# Patient Record
Sex: FEMALE | Race: WHITE | NOT HISPANIC OR LATINO | Employment: OTHER | URBAN - METROPOLITAN AREA
[De-identification: names, ages, dates, MRNs, and addresses within clinical notes are randomized per-mention and may not be internally consistent; named-entity substitution may affect disease eponyms.]

---

## 2019-01-01 ENCOUNTER — APPOINTMENT (INPATIENT)
Dept: NON INVASIVE DIAGNOSTICS | Facility: HOSPITAL | Age: 64
DRG: 383 | End: 2019-01-01
Payer: COMMERCIAL

## 2019-01-01 ENCOUNTER — HOSPITAL ENCOUNTER (INPATIENT)
Facility: HOSPITAL | Age: 64
LOS: 3 days | Discharge: HOME | DRG: 720 | End: 2019-12-18
Attending: EMERGENCY MEDICINE | Admitting: FAMILY MEDICINE
Payer: COMMERCIAL

## 2019-01-01 ENCOUNTER — HOSPITAL ENCOUNTER (INPATIENT)
Facility: HOSPITAL | Age: 64
LOS: 4 days | Discharge: HOME HEALTH | DRG: 383 | End: 2019-12-24
Attending: EMERGENCY MEDICINE | Admitting: INTERNAL MEDICINE
Payer: COMMERCIAL

## 2019-01-01 ENCOUNTER — APPOINTMENT (EMERGENCY)
Dept: RADIOLOGY | Facility: HOSPITAL | Age: 64
DRG: 383 | End: 2019-01-01
Payer: COMMERCIAL

## 2019-01-01 ENCOUNTER — APPOINTMENT (EMERGENCY)
Dept: CT IMAGING | Facility: HOSPITAL | Age: 64
DRG: 383 | End: 2019-01-01
Payer: COMMERCIAL

## 2019-01-01 ENCOUNTER — APPOINTMENT (INPATIENT)
Dept: ULTRASOUND IMAGING | Facility: HOSPITAL | Age: 64
DRG: 383 | End: 2019-01-01
Payer: COMMERCIAL

## 2019-01-01 ENCOUNTER — APPOINTMENT (EMERGENCY)
Dept: RADIOLOGY | Facility: HOSPITAL | Age: 64
DRG: 720 | End: 2019-01-01
Payer: COMMERCIAL

## 2019-01-01 ENCOUNTER — OFFICE VISIT (OUTPATIENT)
Dept: UROLOGY | Facility: CLINIC | Age: 64
End: 2019-01-01
Payer: COMMERCIAL

## 2019-01-01 ENCOUNTER — APPOINTMENT (INPATIENT)
Dept: VASCULAR ULTRASOUND | Facility: HOSPITAL | Age: 64
DRG: 383 | End: 2019-01-01
Payer: COMMERCIAL

## 2019-01-01 VITALS
HEIGHT: 68 IN | WEIGHT: 271.6 LBS | HEART RATE: 18 BPM | BODY MASS INDEX: 41.16 KG/M2 | SYSTOLIC BLOOD PRESSURE: 118 MMHG | DIASTOLIC BLOOD PRESSURE: 80 MMHG

## 2019-01-01 VITALS
WEIGHT: 277.78 LBS | HEIGHT: 68 IN | BODY MASS INDEX: 42.1 KG/M2 | DIASTOLIC BLOOD PRESSURE: 56 MMHG | OXYGEN SATURATION: 95 % | RESPIRATION RATE: 22 BRPM | SYSTOLIC BLOOD PRESSURE: 118 MMHG | TEMPERATURE: 98.3 F | HEART RATE: 94 BPM

## 2019-01-01 VITALS
DIASTOLIC BLOOD PRESSURE: 54 MMHG | HEIGHT: 68 IN | RESPIRATION RATE: 18 BRPM | TEMPERATURE: 97.6 F | OXYGEN SATURATION: 97 % | HEART RATE: 82 BPM | BODY MASS INDEX: 42.97 KG/M2 | WEIGHT: 283.51 LBS | SYSTOLIC BLOOD PRESSURE: 101 MMHG

## 2019-01-01 DIAGNOSIS — R53.1 WEAKNESS: ICD-10-CM

## 2019-01-01 DIAGNOSIS — A41.9 SEPSIS WITHOUT ACUTE ORGAN DYSFUNCTION, DUE TO UNSPECIFIED ORGANISM (HCC): ICD-10-CM

## 2019-01-01 DIAGNOSIS — N20.0 STAGHORN CALCULUS: Primary | ICD-10-CM

## 2019-01-01 DIAGNOSIS — R94.31 PROLONGED Q-T INTERVAL ON ECG: ICD-10-CM

## 2019-01-01 DIAGNOSIS — L03.90 CELLULITIS: ICD-10-CM

## 2019-01-01 DIAGNOSIS — M79.89 LEFT ARM SWELLING: ICD-10-CM

## 2019-01-01 DIAGNOSIS — C49.9 LEIOMYOSARCOMA (HCC): ICD-10-CM

## 2019-01-01 DIAGNOSIS — R50.9 FEVER: Primary | ICD-10-CM

## 2019-01-01 DIAGNOSIS — L03.113 CELLULITIS OF RIGHT UPPER ARM: Primary | ICD-10-CM

## 2019-01-01 LAB
ALBUMIN SERPL BCP-MCNC: 1.9 G/DL (ref 3.5–5)
ALBUMIN SERPL BCP-MCNC: 3.2 G/DL (ref 3.5–5)
ALP SERPL-CCNC: 104 U/L (ref 46–116)
ALP SERPL-CCNC: 116 U/L (ref 46–116)
ALT SERPL W P-5'-P-CCNC: 33 U/L (ref 12–78)
ALT SERPL W P-5'-P-CCNC: 54 U/L (ref 12–78)
ANION GAP SERPL CALCULATED.3IONS-SCNC: 10 MMOL/L (ref 4–13)
ANION GAP SERPL CALCULATED.3IONS-SCNC: 11 MMOL/L (ref 4–13)
ANION GAP SERPL CALCULATED.3IONS-SCNC: 8 MMOL/L (ref 4–13)
ANION GAP SERPL CALCULATED.3IONS-SCNC: 9 MMOL/L (ref 4–13)
ANION GAP SERPL CALCULATED.3IONS-SCNC: 9 MMOL/L (ref 4–13)
APTT PPP: 34 SECONDS (ref 23–37)
APTT PPP: 38 SECONDS (ref 23–37)
AST SERPL W P-5'-P-CCNC: 32 U/L (ref 5–45)
AST SERPL W P-5'-P-CCNC: 43 U/L (ref 5–45)
ATRIAL RATE: 84 BPM
ATRIAL RATE: 87 BPM
ATRIAL RATE: 99 BPM
BACTERIA BLD CULT: NORMAL
BACTERIA UR CULT: ABNORMAL
BACTERIA UR QL AUTO: ABNORMAL /HPF
BACTERIA UR QL AUTO: ABNORMAL /HPF
BASOPHILS # BLD AUTO: 0.02 THOUSANDS/ΜL (ref 0–0.1)
BASOPHILS # BLD MANUAL: 0 THOUSAND/UL (ref 0–0.1)
BASOPHILS # BLD MANUAL: 0 THOUSAND/UL (ref 0–0.1)
BASOPHILS NFR BLD AUTO: 0 % (ref 0–1)
BASOPHILS NFR MAR MANUAL: 0 % (ref 0–1)
BASOPHILS NFR MAR MANUAL: 0 % (ref 0–1)
BILIRUB DIRECT SERPL-MCNC: 0.17 MG/DL (ref 0–0.2)
BILIRUB SERPL-MCNC: 0.5 MG/DL (ref 0.2–1)
BILIRUB SERPL-MCNC: 0.7 MG/DL (ref 0.2–1)
BILIRUB UR QL STRIP: NEGATIVE
BILIRUB UR QL STRIP: NEGATIVE
BUN SERPL-MCNC: 10 MG/DL (ref 5–25)
BUN SERPL-MCNC: 15 MG/DL (ref 5–25)
BUN SERPL-MCNC: 8 MG/DL (ref 5–25)
BUN SERPL-MCNC: 8 MG/DL (ref 5–25)
BUN SERPL-MCNC: 9 MG/DL (ref 5–25)
CALCIUM SERPL-MCNC: 8.4 MG/DL (ref 8.3–10.1)
CALCIUM SERPL-MCNC: 8.7 MG/DL (ref 8.3–10.1)
CALCIUM SERPL-MCNC: 8.7 MG/DL (ref 8.3–10.1)
CALCIUM SERPL-MCNC: 8.8 MG/DL (ref 8.3–10.1)
CALCIUM SERPL-MCNC: 8.8 MG/DL (ref 8.3–10.1)
CALCIUM SERPL-MCNC: 8.9 MG/DL (ref 8.3–10.1)
CALCIUM SERPL-MCNC: 9 MG/DL (ref 8.3–10.1)
CHLORIDE SERPL-SCNC: 101 MMOL/L (ref 100–108)
CHLORIDE SERPL-SCNC: 101 MMOL/L (ref 100–108)
CHLORIDE SERPL-SCNC: 102 MMOL/L (ref 100–108)
CHLORIDE SERPL-SCNC: 102 MMOL/L (ref 100–108)
CHLORIDE SERPL-SCNC: 103 MMOL/L (ref 100–108)
CHLORIDE SERPL-SCNC: 99 MMOL/L (ref 100–108)
CHLORIDE SERPL-SCNC: 99 MMOL/L (ref 100–108)
CLARITY UR: ABNORMAL
CLARITY UR: CLEAR
CO2 SERPL-SCNC: 23 MMOL/L (ref 21–32)
CO2 SERPL-SCNC: 24 MMOL/L (ref 21–32)
CO2 SERPL-SCNC: 24 MMOL/L (ref 21–32)
CO2 SERPL-SCNC: 25 MMOL/L (ref 21–32)
CO2 SERPL-SCNC: 25 MMOL/L (ref 21–32)
CO2 SERPL-SCNC: 26 MMOL/L (ref 21–32)
CO2 SERPL-SCNC: 30 MMOL/L (ref 21–32)
COLOR UR: YELLOW
COLOR UR: YELLOW
CREAT SERPL-MCNC: 0.91 MG/DL (ref 0.6–1.3)
CREAT SERPL-MCNC: 0.91 MG/DL (ref 0.6–1.3)
CREAT SERPL-MCNC: 0.97 MG/DL (ref 0.6–1.3)
CREAT SERPL-MCNC: 1.02 MG/DL (ref 0.6–1.3)
CREAT SERPL-MCNC: 1.04 MG/DL (ref 0.6–1.3)
CREAT SERPL-MCNC: 1.05 MG/DL (ref 0.6–1.3)
CREAT SERPL-MCNC: 1.05 MG/DL (ref 0.6–1.3)
EOSINOPHIL # BLD AUTO: 0.01 THOUSAND/ΜL (ref 0–0.61)
EOSINOPHIL # BLD AUTO: 0.01 THOUSAND/ΜL (ref 0–0.61)
EOSINOPHIL # BLD AUTO: 0.04 THOUSAND/ΜL (ref 0–0.61)
EOSINOPHIL # BLD MANUAL: 0 THOUSAND/UL (ref 0–0.4)
EOSINOPHIL # BLD MANUAL: 0 THOUSAND/UL (ref 0–0.4)
EOSINOPHIL NFR BLD AUTO: 0 % (ref 0–6)
EOSINOPHIL NFR BLD MANUAL: 0 % (ref 0–6)
EOSINOPHIL NFR BLD MANUAL: 0 % (ref 0–6)
ERYTHROCYTE [DISTWIDTH] IN BLOOD BY AUTOMATED COUNT: 14.4 % (ref 11.6–15.1)
ERYTHROCYTE [DISTWIDTH] IN BLOOD BY AUTOMATED COUNT: 14.6 % (ref 11.6–15.1)
ERYTHROCYTE [DISTWIDTH] IN BLOOD BY AUTOMATED COUNT: 14.6 % (ref 11.6–15.1)
ERYTHROCYTE [DISTWIDTH] IN BLOOD BY AUTOMATED COUNT: 14.7 % (ref 11.6–15.1)
ERYTHROCYTE [DISTWIDTH] IN BLOOD BY AUTOMATED COUNT: 14.9 % (ref 11.6–15.1)
ERYTHROCYTE [DISTWIDTH] IN BLOOD BY AUTOMATED COUNT: 15 % (ref 11.6–15.1)
FERRITIN SERPL-MCNC: 1568 NG/ML (ref 8–388)
FLUAV RNA NPH QL NAA+PROBE: NORMAL
FLUBV RNA NPH QL NAA+PROBE: NORMAL
GFR SERPL CREATININE-BSD FRML MDRD: 57 ML/MIN/1.73SQ M
GFR SERPL CREATININE-BSD FRML MDRD: 59 ML/MIN/1.73SQ M
GFR SERPL CREATININE-BSD FRML MDRD: 62 ML/MIN/1.73SQ M
GFR SERPL CREATININE-BSD FRML MDRD: 67 ML/MIN/1.73SQ M
GFR SERPL CREATININE-BSD FRML MDRD: 67 ML/MIN/1.73SQ M
GLUCOSE P FAST SERPL-MCNC: 125 MG/DL (ref 65–99)
GLUCOSE SERPL-MCNC: 125 MG/DL (ref 65–140)
GLUCOSE SERPL-MCNC: 136 MG/DL (ref 65–140)
GLUCOSE SERPL-MCNC: 137 MG/DL (ref 65–140)
GLUCOSE SERPL-MCNC: 137 MG/DL (ref 65–140)
GLUCOSE SERPL-MCNC: 138 MG/DL (ref 65–140)
GLUCOSE SERPL-MCNC: 140 MG/DL (ref 65–140)
GLUCOSE SERPL-MCNC: 143 MG/DL (ref 65–140)
GLUCOSE SERPL-MCNC: 143 MG/DL (ref 65–140)
GLUCOSE SERPL-MCNC: 144 MG/DL (ref 65–140)
GLUCOSE SERPL-MCNC: 149 MG/DL (ref 65–140)
GLUCOSE SERPL-MCNC: 150 MG/DL (ref 65–140)
GLUCOSE SERPL-MCNC: 154 MG/DL (ref 65–140)
GLUCOSE SERPL-MCNC: 158 MG/DL (ref 65–140)
GLUCOSE SERPL-MCNC: 161 MG/DL (ref 65–140)
GLUCOSE SERPL-MCNC: 166 MG/DL (ref 65–140)
GLUCOSE SERPL-MCNC: 168 MG/DL (ref 65–140)
GLUCOSE SERPL-MCNC: 169 MG/DL (ref 65–140)
GLUCOSE SERPL-MCNC: 170 MG/DL (ref 65–140)
GLUCOSE SERPL-MCNC: 171 MG/DL (ref 65–140)
GLUCOSE SERPL-MCNC: 179 MG/DL (ref 65–140)
GLUCOSE SERPL-MCNC: 195 MG/DL (ref 65–140)
GLUCOSE SERPL-MCNC: 207 MG/DL (ref 65–140)
GLUCOSE UR STRIP-MCNC: NEGATIVE MG/DL
GLUCOSE UR STRIP-MCNC: NEGATIVE MG/DL
HCT VFR BLD AUTO: 22.5 % (ref 34.8–46.1)
HCT VFR BLD AUTO: 23.6 % (ref 34.8–46.1)
HCT VFR BLD AUTO: 23.8 % (ref 34.8–46.1)
HCT VFR BLD AUTO: 23.8 % (ref 34.8–46.1)
HCT VFR BLD AUTO: 24.3 % (ref 34.8–46.1)
HCT VFR BLD AUTO: 32.4 % (ref 34.8–46.1)
HCT VFR BLD AUTO: 32.7 % (ref 34.8–46.1)
HCT VFR BLD AUTO: 36.7 % (ref 34.8–46.1)
HGB BLD-MCNC: 10.8 G/DL (ref 11.5–15.4)
HGB BLD-MCNC: 10.9 G/DL (ref 11.5–15.4)
HGB BLD-MCNC: 12.2 G/DL (ref 11.5–15.4)
HGB BLD-MCNC: 7.6 G/DL (ref 11.5–15.4)
HGB BLD-MCNC: 7.8 G/DL (ref 11.5–15.4)
HGB BLD-MCNC: 7.9 G/DL (ref 11.5–15.4)
HGB BLD-MCNC: 8 G/DL (ref 11.5–15.4)
HGB BLD-MCNC: 8.3 G/DL (ref 11.5–15.4)
HGB UR QL STRIP.AUTO: ABNORMAL
HGB UR QL STRIP.AUTO: ABNORMAL
IMM GRANULOCYTES # BLD AUTO: 0.04 THOUSAND/UL (ref 0–0.2)
IMM GRANULOCYTES # BLD AUTO: 0.17 THOUSAND/UL (ref 0–0.2)
IMM GRANULOCYTES # BLD AUTO: 0.2 THOUSAND/UL (ref 0–0.2)
IMM GRANULOCYTES NFR BLD AUTO: 0 % (ref 0–2)
IMM GRANULOCYTES NFR BLD AUTO: 2 % (ref 0–2)
IMM GRANULOCYTES NFR BLD AUTO: 2 % (ref 0–2)
INR PPP: 1.02 (ref 0.84–1.19)
INR PPP: 1.12 (ref 0.84–1.19)
IRON SATN MFR SERPL: 15 %
IRON SERPL-MCNC: 41 UG/DL (ref 50–170)
KETONES UR STRIP-MCNC: NEGATIVE MG/DL
KETONES UR STRIP-MCNC: NEGATIVE MG/DL
LACTATE SERPL-SCNC: 0.9 MMOL/L (ref 0.5–2)
LACTATE SERPL-SCNC: 1.2 MMOL/L (ref 0.5–2)
LACTATE SERPL-SCNC: 1.2 MMOL/L (ref 0.5–2)
LEUKOCYTE ESTERASE UR QL STRIP: ABNORMAL
LEUKOCYTE ESTERASE UR QL STRIP: ABNORMAL
LYMPHOCYTES # BLD AUTO: 0.56 THOUSAND/UL (ref 0.6–4.47)
LYMPHOCYTES # BLD AUTO: 0.62 THOUSANDS/ΜL (ref 0.6–4.47)
LYMPHOCYTES # BLD AUTO: 0.71 THOUSANDS/ΜL (ref 0.6–4.47)
LYMPHOCYTES # BLD AUTO: 0.82 THOUSAND/UL (ref 0.6–4.47)
LYMPHOCYTES # BLD AUTO: 0.91 THOUSANDS/ΜL (ref 0.6–4.47)
LYMPHOCYTES # BLD AUTO: 10 % (ref 14–44)
LYMPHOCYTES # BLD AUTO: 6 % (ref 14–44)
LYMPHOCYTES NFR BLD AUTO: 6 % (ref 14–44)
LYMPHOCYTES NFR BLD AUTO: 6 % (ref 14–44)
LYMPHOCYTES NFR BLD AUTO: 8 % (ref 14–44)
MAGNESIUM SERPL-MCNC: 1.6 MG/DL (ref 1.6–2.6)
MAGNESIUM SERPL-MCNC: 1.9 MG/DL (ref 1.6–2.6)
MCH RBC QN AUTO: 32.4 PG (ref 26.8–34.3)
MCH RBC QN AUTO: 32.7 PG (ref 26.8–34.3)
MCH RBC QN AUTO: 32.9 PG (ref 26.8–34.3)
MCH RBC QN AUTO: 33 PG (ref 26.8–34.3)
MCH RBC QN AUTO: 33.2 PG (ref 26.8–34.3)
MCH RBC QN AUTO: 33.3 PG (ref 26.8–34.3)
MCHC RBC AUTO-ENTMCNC: 32.8 G/DL (ref 31.4–37.4)
MCHC RBC AUTO-ENTMCNC: 33 G/DL (ref 31.4–37.4)
MCHC RBC AUTO-ENTMCNC: 33.2 G/DL (ref 31.4–37.4)
MCHC RBC AUTO-ENTMCNC: 33.2 G/DL (ref 31.4–37.4)
MCHC RBC AUTO-ENTMCNC: 33.6 G/DL (ref 31.4–37.4)
MCHC RBC AUTO-ENTMCNC: 33.8 G/DL (ref 31.4–37.4)
MCHC RBC AUTO-ENTMCNC: 33.9 G/DL (ref 31.4–37.4)
MCHC RBC AUTO-ENTMCNC: 34.2 G/DL (ref 31.4–37.4)
MCV RBC AUTO: 97 FL (ref 82–98)
MCV RBC AUTO: 97 FL (ref 82–98)
MCV RBC AUTO: 98 FL (ref 82–98)
MCV RBC AUTO: 99 FL (ref 82–98)
MONOCYTES # BLD AUTO: 0 THOUSAND/UL (ref 0–1.22)
MONOCYTES # BLD AUTO: 0 THOUSAND/UL (ref 0–1.22)
MONOCYTES # BLD AUTO: 0.11 THOUSAND/ΜL (ref 0.17–1.22)
MONOCYTES # BLD AUTO: 0.22 THOUSAND/ΜL (ref 0.17–1.22)
MONOCYTES # BLD AUTO: 0.32 THOUSAND/ΜL (ref 0.17–1.22)
MONOCYTES NFR BLD AUTO: 1 % (ref 4–12)
MONOCYTES NFR BLD AUTO: 2 % (ref 4–12)
MONOCYTES NFR BLD AUTO: 3 % (ref 4–12)
MONOCYTES NFR BLD: 0 % (ref 4–12)
MONOCYTES NFR BLD: 0 % (ref 4–12)
NEUTROPHILS # BLD AUTO: 10.66 THOUSANDS/ΜL (ref 1.85–7.62)
NEUTROPHILS # BLD AUTO: 8.79 THOUSANDS/ΜL (ref 1.85–7.62)
NEUTROPHILS # BLD AUTO: 9.95 THOUSANDS/ΜL (ref 1.85–7.62)
NEUTROPHILS # BLD MANUAL: 12.78 THOUSAND/UL (ref 1.85–7.62)
NEUTROPHILS # BLD MANUAL: 4.85 THOUSAND/UL (ref 1.85–7.62)
NEUTS BAND NFR BLD MANUAL: 7 % (ref 0–8)
NEUTS SEG NFR BLD AUTO: 87 % (ref 43–75)
NEUTS SEG NFR BLD AUTO: 87 % (ref 43–75)
NEUTS SEG NFR BLD AUTO: 89 % (ref 43–75)
NEUTS SEG NFR BLD AUTO: 90 % (ref 43–75)
NEUTS SEG NFR BLD AUTO: 91 % (ref 43–75)
NITRITE UR QL STRIP: NEGATIVE
NITRITE UR QL STRIP: NEGATIVE
NON-SQ EPI CELLS URNS QL MICRO: ABNORMAL /HPF
NON-SQ EPI CELLS URNS QL MICRO: ABNORMAL /HPF
NRBC BLD AUTO-RTO: 0 /100 WBCS
NRBC BLD AUTO-RTO: 1 /100 WBCS
NRBC BLD AUTO-RTO: 1 /100 WBCS
OTHER STN SPEC: ABNORMAL
P AXIS: 43 DEGREES
P AXIS: 43 DEGREES
P AXIS: 63 DEGREES
PH UR STRIP.AUTO: 5.5 [PH]
PH UR STRIP.AUTO: 5.5 [PH]
PLATELET # BLD AUTO: 130 THOUSANDS/UL (ref 149–390)
PLATELET # BLD AUTO: 135 THOUSANDS/UL (ref 149–390)
PLATELET # BLD AUTO: 149 THOUSANDS/UL (ref 149–390)
PLATELET # BLD AUTO: 158 THOUSANDS/UL (ref 149–390)
PLATELET # BLD AUTO: 158 THOUSANDS/UL (ref 149–390)
PLATELET # BLD AUTO: 167 THOUSANDS/UL (ref 149–390)
PLATELET # BLD AUTO: 172 THOUSANDS/UL (ref 149–390)
PLATELET # BLD AUTO: 208 THOUSANDS/UL (ref 149–390)
PLATELET # BLD AUTO: 263 THOUSANDS/UL (ref 149–390)
PLATELET BLD QL SMEAR: ABNORMAL
PLATELET BLD QL SMEAR: ADEQUATE
PMV BLD AUTO: 10.1 FL (ref 8.9–12.7)
PMV BLD AUTO: 10.2 FL (ref 8.9–12.7)
PMV BLD AUTO: 10.3 FL (ref 8.9–12.7)
PMV BLD AUTO: 10.7 FL (ref 8.9–12.7)
PMV BLD AUTO: 9.4 FL (ref 8.9–12.7)
PMV BLD AUTO: 9.6 FL (ref 8.9–12.7)
PMV BLD AUTO: 9.6 FL (ref 8.9–12.7)
PMV BLD AUTO: 9.8 FL (ref 8.9–12.7)
PMV BLD AUTO: 9.8 FL (ref 8.9–12.7)
POTASSIUM SERPL-SCNC: 3.1 MMOL/L (ref 3.5–5.3)
POTASSIUM SERPL-SCNC: 3.1 MMOL/L (ref 3.5–5.3)
POTASSIUM SERPL-SCNC: 3.2 MMOL/L (ref 3.5–5.3)
POTASSIUM SERPL-SCNC: 3.3 MMOL/L (ref 3.5–5.3)
POTASSIUM SERPL-SCNC: 3.4 MMOL/L (ref 3.5–5.3)
POTASSIUM SERPL-SCNC: 3.6 MMOL/L (ref 3.5–5.3)
POTASSIUM SERPL-SCNC: 4.3 MMOL/L (ref 3.5–5.3)
PR INTERVAL: 142 MS
PR INTERVAL: 144 MS
PR INTERVAL: 250 MS
PROCALCITONIN SERPL-MCNC: 0.4 NG/ML
PROCALCITONIN SERPL-MCNC: 0.59 NG/ML
PROCALCITONIN SERPL-MCNC: 0.71 NG/ML
PROCALCITONIN SERPL-MCNC: 0.96 NG/ML
PROCALCITONIN SERPL-MCNC: 1.58 NG/ML
PROCALCITONIN SERPL-MCNC: <0.05 NG/ML
PROT SERPL-MCNC: 6.5 G/DL (ref 6.4–8.2)
PROT SERPL-MCNC: 7.1 G/DL (ref 6.4–8.2)
PROT UR STRIP-MCNC: ABNORMAL MG/DL
PROT UR STRIP-MCNC: NEGATIVE MG/DL
PROTHROMBIN TIME: 13.4 SECONDS (ref 11.6–14.5)
PROTHROMBIN TIME: 14.4 SECONDS (ref 11.6–14.5)
QRS AXIS: 35 DEGREES
QRS AXIS: 65 DEGREES
QRS AXIS: 66 DEGREES
QRSD INTERVAL: 76 MS
QRSD INTERVAL: 94 MS
QRSD INTERVAL: 96 MS
QT INTERVAL: 326 MS
QT INTERVAL: 454 MS
QT INTERVAL: 454 MS
QTC INTERVAL: 418 MS
QTC INTERVAL: 536 MS
QTC INTERVAL: 546 MS
RBC # BLD AUTO: 2.28 MILLION/UL (ref 3.81–5.12)
RBC # BLD AUTO: 2.4 MILLION/UL (ref 3.81–5.12)
RBC # BLD AUTO: 2.41 MILLION/UL (ref 3.81–5.12)
RBC # BLD AUTO: 2.43 MILLION/UL (ref 3.81–5.12)
RBC # BLD AUTO: 2.5 MILLION/UL (ref 3.81–5.12)
RBC # BLD AUTO: 3.3 MILLION/UL (ref 3.81–5.12)
RBC # BLD AUTO: 3.3 MILLION/UL (ref 3.81–5.12)
RBC # BLD AUTO: 3.71 MILLION/UL (ref 3.81–5.12)
RBC #/AREA URNS AUTO: ABNORMAL /HPF
RBC #/AREA URNS AUTO: ABNORMAL /HPF
RSV RNA NPH QL NAA+PROBE: NORMAL
SODIUM SERPL-SCNC: 133 MMOL/L (ref 136–145)
SODIUM SERPL-SCNC: 135 MMOL/L (ref 136–145)
SODIUM SERPL-SCNC: 136 MMOL/L (ref 136–145)
SODIUM SERPL-SCNC: 137 MMOL/L (ref 136–145)
SODIUM SERPL-SCNC: 138 MMOL/L (ref 136–145)
SP GR UR STRIP.AUTO: 1.02 (ref 1–1.03)
SP GR UR STRIP.AUTO: <=1.005 (ref 1–1.03)
T WAVE AXIS: 45 DEGREES
T WAVE AXIS: 56 DEGREES
T WAVE AXIS: 57 DEGREES
TIBC SERPL-MCNC: 272 UG/DL (ref 250–450)
TOTAL CELLS COUNTED SPEC: 100
TOTAL CELLS COUNTED SPEC: 100
TROPONIN I SERPL-MCNC: <0.02 NG/ML
TROPONIN I SERPL-MCNC: <0.02 NG/ML
UROBILINOGEN UR QL STRIP.AUTO: 0.2 E.U./DL
UROBILINOGEN UR QL STRIP.AUTO: 0.2 E.U./DL
VANCOMYCIN TROUGH SERPL-MCNC: 17.2 UG/ML (ref 10–20)
VANCOMYCIN TROUGH SERPL-MCNC: 17.5 UG/ML (ref 10–20)
VANCOMYCIN TROUGH SERPL-MCNC: 20.4 UG/ML (ref 10–20)
VARIANT LYMPHS # BLD AUTO: 3 %
VENTRICULAR RATE: 84 BPM
VENTRICULAR RATE: 87 BPM
VENTRICULAR RATE: 99 BPM
WBC # BLD AUTO: 10.06 THOUSAND/UL (ref 4.31–10.16)
WBC # BLD AUTO: 10.3 THOUSAND/UL (ref 4.31–10.16)
WBC # BLD AUTO: 11.07 THOUSAND/UL (ref 4.31–10.16)
WBC # BLD AUTO: 11.72 THOUSAND/UL (ref 4.31–10.16)
WBC # BLD AUTO: 11.92 THOUSAND/UL (ref 4.31–10.16)
WBC # BLD AUTO: 13.6 THOUSAND/UL (ref 4.31–10.16)
WBC # BLD AUTO: 5.57 THOUSAND/UL (ref 4.31–10.16)
WBC # BLD AUTO: 9.83 THOUSAND/UL (ref 4.31–10.16)
WBC #/AREA URNS AUTO: ABNORMAL /HPF
WBC #/AREA URNS AUTO: ABNORMAL /HPF

## 2019-01-01 PROCEDURE — 99285 EMERGENCY DEPT VISIT HI MDM: CPT

## 2019-01-01 PROCEDURE — G8988 SELF CARE GOAL STATUS: HCPCS

## 2019-01-01 PROCEDURE — 94760 N-INVAS EAR/PLS OXIMETRY 1: CPT

## 2019-01-01 PROCEDURE — 99232 SBSQ HOSP IP/OBS MODERATE 35: CPT | Performed by: PHYSICIAN ASSISTANT

## 2019-01-01 PROCEDURE — 80202 ASSAY OF VANCOMYCIN: CPT | Performed by: NURSE PRACTITIONER

## 2019-01-01 PROCEDURE — 93010 ELECTROCARDIOGRAM REPORT: CPT | Performed by: INTERNAL MEDICINE

## 2019-01-01 PROCEDURE — 99254 IP/OBS CNSLTJ NEW/EST MOD 60: CPT | Performed by: INTERNAL MEDICINE

## 2019-01-01 PROCEDURE — 99255 IP/OBS CONSLTJ NEW/EST HI 80: CPT | Performed by: INTERNAL MEDICINE

## 2019-01-01 PROCEDURE — 85007 BL SMEAR W/DIFF WBC COUNT: CPT | Performed by: INTERNAL MEDICINE

## 2019-01-01 PROCEDURE — 80202 ASSAY OF VANCOMYCIN: CPT | Performed by: PHYSICIAN ASSISTANT

## 2019-01-01 PROCEDURE — 82948 REAGENT STRIP/BLOOD GLUCOSE: CPT

## 2019-01-01 PROCEDURE — 93306 TTE W/DOPPLER COMPLETE: CPT | Performed by: INTERNAL MEDICINE

## 2019-01-01 PROCEDURE — 80048 BASIC METABOLIC PNL TOTAL CA: CPT | Performed by: PHYSICIAN ASSISTANT

## 2019-01-01 PROCEDURE — 93005 ELECTROCARDIOGRAM TRACING: CPT

## 2019-01-01 PROCEDURE — 93306 TTE W/DOPPLER COMPLETE: CPT

## 2019-01-01 PROCEDURE — 83550 IRON BINDING TEST: CPT | Performed by: PHYSICIAN ASSISTANT

## 2019-01-01 PROCEDURE — 83605 ASSAY OF LACTIC ACID: CPT | Performed by: EMERGENCY MEDICINE

## 2019-01-01 PROCEDURE — 80048 BASIC METABOLIC PNL TOTAL CA: CPT | Performed by: EMERGENCY MEDICINE

## 2019-01-01 PROCEDURE — G8987 SELF CARE CURRENT STATUS: HCPCS

## 2019-01-01 PROCEDURE — 85049 AUTOMATED PLATELET COUNT: CPT | Performed by: PHYSICIAN ASSISTANT

## 2019-01-01 PROCEDURE — 94664 DEMO&/EVAL PT USE INHALER: CPT

## 2019-01-01 PROCEDURE — 83540 ASSAY OF IRON: CPT | Performed by: PHYSICIAN ASSISTANT

## 2019-01-01 PROCEDURE — 36415 COLL VENOUS BLD VENIPUNCTURE: CPT | Performed by: EMERGENCY MEDICINE

## 2019-01-01 PROCEDURE — 70450 CT HEAD/BRAIN W/O DYE: CPT

## 2019-01-01 PROCEDURE — 99285 EMERGENCY DEPT VISIT HI MDM: CPT | Performed by: EMERGENCY MEDICINE

## 2019-01-01 PROCEDURE — 84484 ASSAY OF TROPONIN QUANT: CPT | Performed by: PHYSICIAN ASSISTANT

## 2019-01-01 PROCEDURE — 83605 ASSAY OF LACTIC ACID: CPT | Performed by: PHYSICIAN ASSISTANT

## 2019-01-01 PROCEDURE — 80048 BASIC METABOLIC PNL TOTAL CA: CPT | Performed by: INTERNAL MEDICINE

## 2019-01-01 PROCEDURE — 81001 URINALYSIS AUTO W/SCOPE: CPT | Performed by: PHYSICIAN ASSISTANT

## 2019-01-01 PROCEDURE — G8978 MOBILITY CURRENT STATUS: HCPCS

## 2019-01-01 PROCEDURE — 99233 SBSQ HOSP IP/OBS HIGH 50: CPT | Performed by: INTERNAL MEDICINE

## 2019-01-01 PROCEDURE — 96367 TX/PROPH/DG ADDL SEQ IV INF: CPT

## 2019-01-01 PROCEDURE — 99232 SBSQ HOSP IP/OBS MODERATE 35: CPT | Performed by: INTERNAL MEDICINE

## 2019-01-01 PROCEDURE — 85610 PROTHROMBIN TIME: CPT | Performed by: EMERGENCY MEDICINE

## 2019-01-01 PROCEDURE — 93971 EXTREMITY STUDY: CPT

## 2019-01-01 PROCEDURE — 85730 THROMBOPLASTIN TIME PARTIAL: CPT | Performed by: EMERGENCY MEDICINE

## 2019-01-01 PROCEDURE — 83735 ASSAY OF MAGNESIUM: CPT | Performed by: NURSE PRACTITIONER

## 2019-01-01 PROCEDURE — 81001 URINALYSIS AUTO W/SCOPE: CPT | Performed by: EMERGENCY MEDICINE

## 2019-01-01 PROCEDURE — 87040 BLOOD CULTURE FOR BACTERIA: CPT | Performed by: EMERGENCY MEDICINE

## 2019-01-01 PROCEDURE — 99238 HOSP IP/OBS DSCHRG MGMT 30/<: CPT | Performed by: INTERNAL MEDICINE

## 2019-01-01 PROCEDURE — 93971 EXTREMITY STUDY: CPT | Performed by: SURGERY

## 2019-01-01 PROCEDURE — 84145 PROCALCITONIN (PCT): CPT | Performed by: NURSE PRACTITIONER

## 2019-01-01 PROCEDURE — 36415 COLL VENOUS BLD VENIPUNCTURE: CPT | Performed by: INTERNAL MEDICINE

## 2019-01-01 PROCEDURE — 85027 COMPLETE CBC AUTOMATED: CPT | Performed by: PHYSICIAN ASSISTANT

## 2019-01-01 PROCEDURE — 80076 HEPATIC FUNCTION PANEL: CPT | Performed by: EMERGENCY MEDICINE

## 2019-01-01 PROCEDURE — 84145 PROCALCITONIN (PCT): CPT | Performed by: EMERGENCY MEDICINE

## 2019-01-01 PROCEDURE — 84145 PROCALCITONIN (PCT): CPT | Performed by: INTERNAL MEDICINE

## 2019-01-01 PROCEDURE — 84484 ASSAY OF TROPONIN QUANT: CPT | Performed by: EMERGENCY MEDICINE

## 2019-01-01 PROCEDURE — 82728 ASSAY OF FERRITIN: CPT | Performed by: PHYSICIAN ASSISTANT

## 2019-01-01 PROCEDURE — 85007 BL SMEAR W/DIFF WBC COUNT: CPT | Performed by: NURSE PRACTITIONER

## 2019-01-01 PROCEDURE — 84145 PROCALCITONIN (PCT): CPT | Performed by: PHYSICIAN ASSISTANT

## 2019-01-01 PROCEDURE — 99253 IP/OBS CNSLTJ NEW/EST LOW 45: CPT | Performed by: INTERNAL MEDICINE

## 2019-01-01 PROCEDURE — 83735 ASSAY OF MAGNESIUM: CPT | Performed by: PHYSICIAN ASSISTANT

## 2019-01-01 PROCEDURE — 99283 EMERGENCY DEPT VISIT LOW MDM: CPT | Performed by: EMERGENCY MEDICINE

## 2019-01-01 PROCEDURE — 85025 COMPLETE CBC W/AUTO DIFF WBC: CPT | Performed by: EMERGENCY MEDICINE

## 2019-01-01 PROCEDURE — 80048 BASIC METABOLIC PNL TOTAL CA: CPT | Performed by: NURSE PRACTITIONER

## 2019-01-01 PROCEDURE — 71046 X-RAY EXAM CHEST 2 VIEWS: CPT

## 2019-01-01 PROCEDURE — 85027 COMPLETE CBC AUTOMATED: CPT | Performed by: NURSE PRACTITIONER

## 2019-01-01 PROCEDURE — 99214 OFFICE O/P EST MOD 30 MIN: CPT | Performed by: UROLOGY

## 2019-01-01 PROCEDURE — 83735 ASSAY OF MAGNESIUM: CPT | Performed by: INTERNAL MEDICINE

## 2019-01-01 PROCEDURE — 87631 RESP VIRUS 3-5 TARGETS: CPT | Performed by: NURSE PRACTITIONER

## 2019-01-01 PROCEDURE — 99220 PR INITIAL OBSERVATION CARE/DAY 70 MINUTES: CPT | Performed by: INTERNAL MEDICINE

## 2019-01-01 PROCEDURE — 80053 COMPREHEN METABOLIC PANEL: CPT | Performed by: EMERGENCY MEDICINE

## 2019-01-01 PROCEDURE — 96365 THER/PROPH/DIAG IV INF INIT: CPT

## 2019-01-01 PROCEDURE — 97163 PT EVAL HIGH COMPLEX 45 MIN: CPT

## 2019-01-01 PROCEDURE — 87106 FUNGI IDENTIFICATION YEAST: CPT | Performed by: EMERGENCY MEDICINE

## 2019-01-01 PROCEDURE — 99239 HOSP IP/OBS DSCHRG MGMT >30: CPT | Performed by: PHYSICIAN ASSISTANT

## 2019-01-01 PROCEDURE — 83036 HEMOGLOBIN GLYCOSYLATED A1C: CPT | Performed by: PHYSICIAN ASSISTANT

## 2019-01-01 PROCEDURE — 83735 ASSAY OF MAGNESIUM: CPT | Performed by: EMERGENCY MEDICINE

## 2019-01-01 PROCEDURE — G8979 MOBILITY GOAL STATUS: HCPCS

## 2019-01-01 PROCEDURE — 99223 1ST HOSP IP/OBS HIGH 75: CPT | Performed by: INTERNAL MEDICINE

## 2019-01-01 PROCEDURE — 97167 OT EVAL HIGH COMPLEX 60 MIN: CPT

## 2019-01-01 PROCEDURE — 85027 COMPLETE CBC AUTOMATED: CPT | Performed by: INTERNAL MEDICINE

## 2019-01-01 PROCEDURE — 85025 COMPLETE CBC W/AUTO DIFF WBC: CPT | Performed by: PHYSICIAN ASSISTANT

## 2019-01-01 PROCEDURE — 87086 URINE CULTURE/COLONY COUNT: CPT | Performed by: EMERGENCY MEDICINE

## 2019-01-01 RX ORDER — POTASSIUM CHLORIDE 750 MG/1
10 TABLET, EXTENDED RELEASE ORAL DAILY
Status: DISCONTINUED | OUTPATIENT
Start: 2019-01-01 | End: 2019-01-01

## 2019-01-01 RX ORDER — BUPROPION HYDROCHLORIDE 150 MG/1
150 TABLET, EXTENDED RELEASE ORAL 2 TIMES DAILY
Status: DISCONTINUED | OUTPATIENT
Start: 2019-01-01 | End: 2019-01-01 | Stop reason: HOSPADM

## 2019-01-01 RX ORDER — NICOTINE 21 MG/24HR
1 PATCH, TRANSDERMAL 24 HOURS TRANSDERMAL EVERY 24 HOURS
Status: DISCONTINUED | OUTPATIENT
Start: 2019-01-01 | End: 2019-01-01 | Stop reason: HOSPADM

## 2019-01-01 RX ORDER — IBUPROFEN 600 MG/1
600 TABLET ORAL ONCE
Status: COMPLETED | OUTPATIENT
Start: 2019-01-01 | End: 2019-01-01

## 2019-01-01 RX ORDER — POTASSIUM CHLORIDE 20 MEQ/1
40 TABLET, EXTENDED RELEASE ORAL ONCE
Status: COMPLETED | OUTPATIENT
Start: 2019-01-01 | End: 2019-01-01

## 2019-01-01 RX ORDER — POTASSIUM CHLORIDE 20 MEQ/1
40 TABLET, EXTENDED RELEASE ORAL DAILY
Status: DISCONTINUED | OUTPATIENT
Start: 2019-01-01 | End: 2019-01-01 | Stop reason: HOSPADM

## 2019-01-01 RX ORDER — POTASSIUM CHLORIDE 20 MEQ/1
40 TABLET, EXTENDED RELEASE ORAL
Status: COMPLETED | OUTPATIENT
Start: 2019-01-01 | End: 2019-01-01

## 2019-01-01 RX ORDER — SACCHAROMYCES BOULARDII 250 MG
250 CAPSULE ORAL 2 TIMES DAILY
Status: DISCONTINUED | OUTPATIENT
Start: 2019-01-01 | End: 2019-01-01 | Stop reason: HOSPADM

## 2019-01-01 RX ORDER — CYCLOBENZAPRINE HCL 10 MG
5 TABLET ORAL 3 TIMES DAILY PRN
Status: DISCONTINUED | OUTPATIENT
Start: 2019-01-01 | End: 2019-01-01 | Stop reason: HOSPADM

## 2019-01-01 RX ORDER — SODIUM CHLORIDE 9 MG/ML
50 INJECTION, SOLUTION INTRAVENOUS CONTINUOUS
Status: DISCONTINUED | OUTPATIENT
Start: 2019-01-01 | End: 2019-01-01

## 2019-01-01 RX ORDER — SACCHAROMYCES BOULARDII 250 MG
250 CAPSULE ORAL 2 TIMES DAILY
Refills: 0 | Status: ON HOLD
Start: 2019-01-01 | End: 2020-01-01 | Stop reason: ALTCHOICE

## 2019-01-01 RX ORDER — ASPIRIN 81 MG/1
81 TABLET, CHEWABLE ORAL DAILY
Status: DISCONTINUED | OUTPATIENT
Start: 2019-01-01 | End: 2019-01-01 | Stop reason: HOSPADM

## 2019-01-01 RX ORDER — MONTELUKAST SODIUM 10 MG/1
10 TABLET ORAL
Status: DISCONTINUED | OUTPATIENT
Start: 2019-01-01 | End: 2019-01-01 | Stop reason: HOSPADM

## 2019-01-01 RX ORDER — LANOLIN ALCOHOL/MO/W.PET/CERES
3 CREAM (GRAM) TOPICAL
Status: DISCONTINUED | OUTPATIENT
Start: 2019-01-01 | End: 2019-01-01

## 2019-01-01 RX ORDER — LIDOCAINE 50 MG/G
1 PATCH TOPICAL DAILY
Status: DISCONTINUED | OUTPATIENT
Start: 2019-01-01 | End: 2019-01-01 | Stop reason: HOSPADM

## 2019-01-01 RX ORDER — ACETAMINOPHEN 325 MG/1
650 TABLET ORAL ONCE
Status: DISCONTINUED | OUTPATIENT
Start: 2019-01-01 | End: 2019-01-01

## 2019-01-01 RX ORDER — GABAPENTIN 300 MG/1
600 CAPSULE ORAL
Status: DISCONTINUED | OUTPATIENT
Start: 2019-01-01 | End: 2019-01-01 | Stop reason: HOSPADM

## 2019-01-01 RX ORDER — ALBUTEROL SULFATE 2.5 MG/3ML
2.5 SOLUTION RESPIRATORY (INHALATION) EVERY 6 HOURS PRN
Status: DISCONTINUED | OUTPATIENT
Start: 2019-01-01 | End: 2019-01-01 | Stop reason: HOSPADM

## 2019-01-01 RX ORDER — GABAPENTIN 300 MG/1
600 CAPSULE ORAL
Status: DISCONTINUED | OUTPATIENT
Start: 2019-01-01 | End: 2019-01-01

## 2019-01-01 RX ORDER — POTASSIUM CHLORIDE 14.9 MG/ML
20 INJECTION INTRAVENOUS ONCE
Status: COMPLETED | OUTPATIENT
Start: 2019-01-01 | End: 2019-01-01

## 2019-01-01 RX ORDER — PRAVASTATIN SODIUM 40 MG
40 TABLET ORAL
Status: DISCONTINUED | OUTPATIENT
Start: 2019-01-01 | End: 2019-01-01 | Stop reason: HOSPADM

## 2019-01-01 RX ORDER — TRAMADOL HYDROCHLORIDE 50 MG/1
50 TABLET ORAL EVERY 6 HOURS PRN
Status: DISCONTINUED | OUTPATIENT
Start: 2019-01-01 | End: 2019-01-01 | Stop reason: HOSPADM

## 2019-01-01 RX ORDER — NICOTINE 21 MG/24HR
1 PATCH, TRANSDERMAL 24 HOURS TRANSDERMAL DAILY
Status: DISCONTINUED | OUTPATIENT
Start: 2019-01-01 | End: 2019-01-01 | Stop reason: HOSPADM

## 2019-01-01 RX ORDER — LISINOPRIL 10 MG/1
10 TABLET ORAL DAILY
Status: DISCONTINUED | OUTPATIENT
Start: 2019-01-01 | End: 2019-01-01 | Stop reason: HOSPADM

## 2019-01-01 RX ORDER — BUTALBITAL, ACETAMINOPHEN AND CAFFEINE 50; 325; 40 MG/1; MG/1; MG/1
1 TABLET ORAL EVERY 4 HOURS PRN
Status: DISCONTINUED | OUTPATIENT
Start: 2019-01-01 | End: 2019-01-01 | Stop reason: HOSPADM

## 2019-01-01 RX ORDER — SERTRALINE HYDROCHLORIDE 100 MG/1
100 TABLET, FILM COATED ORAL
Status: DISCONTINUED | OUTPATIENT
Start: 2019-01-01 | End: 2019-01-01

## 2019-01-01 RX ORDER — DIPHENHYDRAMINE HCL 25 MG
25 TABLET ORAL EVERY 6 HOURS PRN
Status: DISCONTINUED | OUTPATIENT
Start: 2019-01-01 | End: 2019-01-01 | Stop reason: HOSPADM

## 2019-01-01 RX ORDER — LANOLIN ALCOHOL/MO/W.PET/CERES
3 CREAM (GRAM) TOPICAL
Status: DISCONTINUED | OUTPATIENT
Start: 2019-01-01 | End: 2019-01-01 | Stop reason: HOSPADM

## 2019-01-01 RX ORDER — SODIUM CHLORIDE 9 MG/ML
3 INJECTION INTRAVENOUS AS NEEDED
Status: DISCONTINUED | OUTPATIENT
Start: 2019-01-01 | End: 2019-01-01 | Stop reason: HOSPADM

## 2019-01-01 RX ORDER — IBUPROFEN 600 MG/1
600 TABLET ORAL EVERY 6 HOURS PRN
Status: DISCONTINUED | OUTPATIENT
Start: 2019-01-01 | End: 2019-01-01

## 2019-01-01 RX ORDER — ACETAMINOPHEN 325 MG/1
650 TABLET ORAL EVERY 6 HOURS PRN
Status: DISCONTINUED | OUTPATIENT
Start: 2019-01-01 | End: 2019-01-01 | Stop reason: HOSPADM

## 2019-01-01 RX ORDER — LANOLIN ALCOHOL/MO/W.PET/CERES
6 CREAM (GRAM) TOPICAL
Status: DISCONTINUED | OUTPATIENT
Start: 2019-01-01 | End: 2019-01-01 | Stop reason: HOSPADM

## 2019-01-01 RX ORDER — ONDANSETRON 4 MG/1
4 TABLET, ORALLY DISINTEGRATING ORAL ONCE
Status: COMPLETED | OUTPATIENT
Start: 2019-01-01 | End: 2019-01-01

## 2019-01-01 RX ORDER — MAGNESIUM SULFATE HEPTAHYDRATE 40 MG/ML
2 INJECTION, SOLUTION INTRAVENOUS ONCE
Status: COMPLETED | OUTPATIENT
Start: 2019-01-01 | End: 2019-01-01

## 2019-01-01 RX ORDER — ONDANSETRON 2 MG/ML
4 INJECTION INTRAMUSCULAR; INTRAVENOUS EVERY 4 HOURS PRN
Status: DISCONTINUED | OUTPATIENT
Start: 2019-01-01 | End: 2019-01-01 | Stop reason: HOSPADM

## 2019-01-01 RX ADMIN — BUPROPION HYDROCHLORIDE 150 MG: 150 TABLET, FILM COATED, EXTENDED RELEASE ORAL at 09:12

## 2019-01-01 RX ADMIN — MELATONIN 3 MG: 3 TAB ORAL at 22:53

## 2019-01-01 RX ADMIN — BUTALBITAL, ACETAMINOPHEN, AND CAFFEINE 1 TABLET: 50; 325; 40 TABLET ORAL at 11:37

## 2019-01-01 RX ADMIN — BUPROPION HYDROCHLORIDE 150 MG: 150 TABLET, FILM COATED, EXTENDED RELEASE ORAL at 08:49

## 2019-01-01 RX ADMIN — MELATONIN 3 MG: 3 TAB ORAL at 21:21

## 2019-01-01 RX ADMIN — ENOXAPARIN SODIUM 40 MG: 40 INJECTION SUBCUTANEOUS at 08:11

## 2019-01-01 RX ADMIN — GABAPENTIN 600 MG: 300 CAPSULE ORAL at 21:07

## 2019-01-01 RX ADMIN — MONTELUKAST SODIUM 10 MG: 10 TABLET, FILM COATED ORAL at 21:10

## 2019-01-01 RX ADMIN — LISINOPRIL 10 MG: 10 TABLET ORAL at 10:27

## 2019-01-01 RX ADMIN — LISINOPRIL: 10 TABLET ORAL at 09:12

## 2019-01-01 RX ADMIN — BUPROPION HYDROCHLORIDE 150 MG: 150 TABLET, FILM COATED, EXTENDED RELEASE ORAL at 08:35

## 2019-01-01 RX ADMIN — GABAPENTIN 600 MG: 300 CAPSULE ORAL at 05:44

## 2019-01-01 RX ADMIN — POTASSIUM CHLORIDE 20 MEQ: 200 INJECTION, SOLUTION INTRAVENOUS at 14:07

## 2019-01-01 RX ADMIN — SERTRALINE HYDROCHLORIDE 100 MG: 100 TABLET ORAL at 21:57

## 2019-01-01 RX ADMIN — BUPROPION HYDROCHLORIDE 150 MG: 150 TABLET, FILM COATED, EXTENDED RELEASE ORAL at 17:12

## 2019-01-01 RX ADMIN — BUPROPION HYDROCHLORIDE 150 MG: 150 TABLET, FILM COATED, EXTENDED RELEASE ORAL at 17:14

## 2019-01-01 RX ADMIN — CEFEPIME HYDROCHLORIDE 1000 MG: 1 INJECTION, POWDER, FOR SOLUTION INTRAMUSCULAR; INTRAVENOUS at 00:50

## 2019-01-01 RX ADMIN — LISINOPRIL: 10 TABLET ORAL at 08:09

## 2019-01-01 RX ADMIN — MONTELUKAST SODIUM 10 MG: 10 TABLET, FILM COATED ORAL at 21:08

## 2019-01-01 RX ADMIN — NICOTINE 1 PATCH: 14 PATCH TRANSDERMAL at 09:11

## 2019-01-01 RX ADMIN — Medication 250 MG: at 17:06

## 2019-01-01 RX ADMIN — MELATONIN 3 MG: 3 TAB ORAL at 21:10

## 2019-01-01 RX ADMIN — SODIUM CHLORIDE 1000 ML: 0.9 INJECTION, SOLUTION INTRAVENOUS at 17:01

## 2019-01-01 RX ADMIN — VANCOMYCIN HYDROCHLORIDE 1500 MG: 5 INJECTION, POWDER, LYOPHILIZED, FOR SOLUTION INTRAVENOUS at 03:36

## 2019-01-01 RX ADMIN — ONDANSETRON 4 MG: 4 TABLET, ORALLY DISINTEGRATING ORAL at 00:20

## 2019-01-01 RX ADMIN — ASPIRIN 81 MG 81 MG: 81 TABLET ORAL at 10:26

## 2019-01-01 RX ADMIN — VANCOMYCIN HYDROCHLORIDE 1500 MG: 5 INJECTION, POWDER, LYOPHILIZED, FOR SOLUTION INTRAVENOUS at 04:37

## 2019-01-01 RX ADMIN — LISINOPRIL 10 MG: 10 TABLET ORAL at 08:49

## 2019-01-01 RX ADMIN — MONTELUKAST SODIUM 10 MG: 10 TABLET, FILM COATED ORAL at 21:27

## 2019-01-01 RX ADMIN — BUPROPION HYDROCHLORIDE 150 MG: 150 TABLET, FILM COATED, EXTENDED RELEASE ORAL at 08:21

## 2019-01-01 RX ADMIN — POTASSIUM CHLORIDE 10 MEQ: 10 TABLET, EXTENDED RELEASE ORAL at 09:55

## 2019-01-01 RX ADMIN — ACETAMINOPHEN 650 MG: 325 TABLET, FILM COATED ORAL at 23:56

## 2019-01-01 RX ADMIN — INSULIN LISPRO 1 UNITS: 100 INJECTION, SOLUTION INTRAVENOUS; SUBCUTANEOUS at 08:05

## 2019-01-01 RX ADMIN — GABAPENTIN 600 MG: 300 CAPSULE ORAL at 21:21

## 2019-01-01 RX ADMIN — PRAVASTATIN SODIUM 40 MG: 40 TABLET ORAL at 17:12

## 2019-01-01 RX ADMIN — VANCOMYCIN HYDROCHLORIDE 1500 MG: 5 INJECTION, POWDER, LYOPHILIZED, FOR SOLUTION INTRAVENOUS at 08:08

## 2019-01-01 RX ADMIN — Medication 250 MG: at 17:45

## 2019-01-01 RX ADMIN — NICOTINE 1 PATCH: 21 PATCH TRANSDERMAL at 10:25

## 2019-01-01 RX ADMIN — Medication 250 MG: at 08:50

## 2019-01-01 RX ADMIN — HYDROCHLOROTHIAZIDE: 12.5 TABLET ORAL at 10:27

## 2019-01-01 RX ADMIN — CYCLOBENZAPRINE HYDROCHLORIDE 5 MG: 10 TABLET, FILM COATED ORAL at 08:28

## 2019-01-01 RX ADMIN — POTASSIUM CHLORIDE 40 MEQ: 1500 TABLET, EXTENDED RELEASE ORAL at 14:23

## 2019-01-01 RX ADMIN — POTASSIUM CHLORIDE 40 MEQ: 1500 TABLET, EXTENDED RELEASE ORAL at 10:56

## 2019-01-01 RX ADMIN — VANCOMYCIN HYDROCHLORIDE 1250 MG: 5 INJECTION, POWDER, LYOPHILIZED, FOR SOLUTION INTRAVENOUS at 23:56

## 2019-01-01 RX ADMIN — CEFEPIME HYDROCHLORIDE 1000 MG: 1 INJECTION, POWDER, FOR SOLUTION INTRAMUSCULAR; INTRAVENOUS at 14:50

## 2019-01-01 RX ADMIN — Medication 1 TABLET: at 08:35

## 2019-01-01 RX ADMIN — MONTELUKAST SODIUM 10 MG: 10 TABLET, FILM COATED ORAL at 22:33

## 2019-01-01 RX ADMIN — CEFEPIME HYDROCHLORIDE 2000 MG: 2 INJECTION, POWDER, FOR SOLUTION INTRAVENOUS at 23:08

## 2019-01-01 RX ADMIN — BUPROPION HYDROCHLORIDE 150 MG: 150 TABLET, FILM COATED, EXTENDED RELEASE ORAL at 10:28

## 2019-01-01 RX ADMIN — Medication 250 MG: at 09:12

## 2019-01-01 RX ADMIN — Medication 1 TABLET: at 08:09

## 2019-01-01 RX ADMIN — CEFEPIME HYDROCHLORIDE 2000 MG: 2 INJECTION, POWDER, FOR SOLUTION INTRAVENOUS at 04:19

## 2019-01-01 RX ADMIN — BUPROPION HYDROCHLORIDE 150 MG: 150 TABLET, FILM COATED, EXTENDED RELEASE ORAL at 17:27

## 2019-01-01 RX ADMIN — MONTELUKAST SODIUM 10 MG: 10 TABLET, FILM COATED ORAL at 21:56

## 2019-01-01 RX ADMIN — VANCOMYCIN HYDROCHLORIDE 1500 MG: 5 INJECTION, POWDER, LYOPHILIZED, FOR SOLUTION INTRAVENOUS at 03:54

## 2019-01-01 RX ADMIN — CEFEPIME HYDROCHLORIDE 1000 MG: 1 INJECTION, POWDER, FOR SOLUTION INTRAMUSCULAR; INTRAVENOUS at 02:15

## 2019-01-01 RX ADMIN — LISINOPRIL: 10 TABLET ORAL at 08:21

## 2019-01-01 RX ADMIN — Medication 1 TABLET: at 09:55

## 2019-01-01 RX ADMIN — ENOXAPARIN SODIUM 40 MG: 40 INJECTION SUBCUTANEOUS at 10:25

## 2019-01-01 RX ADMIN — PRAVASTATIN SODIUM 40 MG: 40 TABLET ORAL at 19:39

## 2019-01-01 RX ADMIN — Medication 250 MG: at 09:55

## 2019-01-01 RX ADMIN — SODIUM CHLORIDE 50 ML/HR: 0.9 INJECTION, SOLUTION INTRAVENOUS at 10:41

## 2019-01-01 RX ADMIN — PRAVASTATIN SODIUM 40 MG: 40 TABLET ORAL at 16:18

## 2019-01-01 RX ADMIN — POTASSIUM CHLORIDE 10 MEQ: 10 TABLET, EXTENDED RELEASE ORAL at 08:21

## 2019-01-01 RX ADMIN — SERTRALINE HYDROCHLORIDE 100 MG: 100 TABLET ORAL at 22:33

## 2019-01-01 RX ADMIN — ENOXAPARIN SODIUM 40 MG: 40 INJECTION SUBCUTANEOUS at 08:09

## 2019-01-01 RX ADMIN — ACETAMINOPHEN 650 MG: 325 TABLET, FILM COATED ORAL at 14:53

## 2019-01-01 RX ADMIN — NICOTINE 1 PATCH: 14 PATCH TRANSDERMAL at 09:55

## 2019-01-01 RX ADMIN — VANCOMYCIN HYDROCHLORIDE 1500 MG: 5 INJECTION, POWDER, LYOPHILIZED, FOR SOLUTION INTRAVENOUS at 05:13

## 2019-01-01 RX ADMIN — ONDANSETRON 4 MG: 2 INJECTION INTRAMUSCULAR; INTRAVENOUS at 14:27

## 2019-01-01 RX ADMIN — NICOTINE 1 PATCH: 21 PATCH TRANSDERMAL at 08:10

## 2019-01-01 RX ADMIN — POTASSIUM CHLORIDE 20 MEQ: 200 INJECTION, SOLUTION INTRAVENOUS at 19:41

## 2019-01-01 RX ADMIN — ASPIRIN 81 MG 81 MG: 81 TABLET ORAL at 09:12

## 2019-01-01 RX ADMIN — IBUPROFEN 600 MG: 600 TABLET, FILM COATED ORAL at 22:52

## 2019-01-01 RX ADMIN — Medication 1 TABLET: at 10:26

## 2019-01-01 RX ADMIN — ENOXAPARIN SODIUM 40 MG: 40 INJECTION SUBCUTANEOUS at 09:55

## 2019-01-01 RX ADMIN — CYANOCOBALAMIN TAB 500 MCG 1000 MCG: 500 TAB at 08:50

## 2019-01-01 RX ADMIN — POTASSIUM CHLORIDE 40 MEQ: 1500 TABLET, EXTENDED RELEASE ORAL at 17:27

## 2019-01-01 RX ADMIN — MAGNESIUM SULFATE HEPTAHYDRATE 2 G: 40 INJECTION, SOLUTION INTRAVENOUS at 22:19

## 2019-01-01 RX ADMIN — SERTRALINE HYDROCHLORIDE 100 MG: 100 TABLET ORAL at 06:11

## 2019-01-01 RX ADMIN — Medication 250 MG: at 08:09

## 2019-01-01 RX ADMIN — Medication 250 MG: at 08:21

## 2019-01-01 RX ADMIN — INSULIN LISPRO 1 UNITS: 100 INJECTION, SOLUTION INTRAVENOUS; SUBCUTANEOUS at 11:37

## 2019-01-01 RX ADMIN — CYANOCOBALAMIN TAB 500 MCG 1000 MCG: 500 TAB at 08:09

## 2019-01-01 RX ADMIN — INSULIN LISPRO 1 UNITS: 100 INJECTION, SOLUTION INTRAVENOUS; SUBCUTANEOUS at 17:31

## 2019-01-01 RX ADMIN — CEFEPIME HYDROCHLORIDE 1000 MG: 1 INJECTION, POWDER, FOR SOLUTION INTRAMUSCULAR; INTRAVENOUS at 01:20

## 2019-01-01 RX ADMIN — SODIUM CHLORIDE 1000 ML: 0.9 INJECTION, SOLUTION INTRAVENOUS at 02:30

## 2019-01-01 RX ADMIN — INSULIN LISPRO 1 UNITS: 100 INJECTION, SOLUTION INTRAVENOUS; SUBCUTANEOUS at 11:55

## 2019-01-01 RX ADMIN — CEFEPIME HYDROCHLORIDE 1000 MG: 1 INJECTION, POWDER, FOR SOLUTION INTRAMUSCULAR; INTRAVENOUS at 16:03

## 2019-01-01 RX ADMIN — PRAVASTATIN SODIUM 40 MG: 40 TABLET ORAL at 15:54

## 2019-01-01 RX ADMIN — Medication 250 MG: at 19:39

## 2019-01-01 RX ADMIN — PRAVASTATIN SODIUM 40 MG: 40 TABLET ORAL at 17:06

## 2019-01-01 RX ADMIN — ASPIRIN 81 MG 81 MG: 81 TABLET ORAL at 09:55

## 2019-01-01 RX ADMIN — VANCOMYCIN HYDROCHLORIDE 1500 MG: 5 INJECTION, POWDER, LYOPHILIZED, FOR SOLUTION INTRAVENOUS at 17:12

## 2019-01-01 RX ADMIN — BUPROPION HYDROCHLORIDE 150 MG: 150 TABLET, FILM COATED, EXTENDED RELEASE ORAL at 08:22

## 2019-01-01 RX ADMIN — CYCLOBENZAPRINE HYDROCHLORIDE 5 MG: 10 TABLET, FILM COATED ORAL at 18:12

## 2019-01-01 RX ADMIN — PRAVASTATIN SODIUM 40 MG: 40 TABLET ORAL at 15:59

## 2019-01-01 RX ADMIN — CEFEPIME HYDROCHLORIDE 2000 MG: 2 INJECTION, POWDER, FOR SOLUTION INTRAVENOUS at 17:06

## 2019-01-01 RX ADMIN — BUPROPION HYDROCHLORIDE 150 MG: 150 TABLET, FILM COATED, EXTENDED RELEASE ORAL at 17:59

## 2019-01-01 RX ADMIN — Medication 250 MG: at 17:12

## 2019-01-01 RX ADMIN — Medication 250 MG: at 17:13

## 2019-01-01 RX ADMIN — BUPROPION HYDROCHLORIDE 150 MG: 150 TABLET, FILM COATED, EXTENDED RELEASE ORAL at 08:09

## 2019-01-01 RX ADMIN — CYCLOBENZAPRINE HYDROCHLORIDE 5 MG: 10 TABLET, FILM COATED ORAL at 17:28

## 2019-01-01 RX ADMIN — POTASSIUM CHLORIDE 40 MEQ: 1500 TABLET, EXTENDED RELEASE ORAL at 10:32

## 2019-01-01 RX ADMIN — POTASSIUM CHLORIDE 40 MEQ: 1500 TABLET, EXTENDED RELEASE ORAL at 08:09

## 2019-01-01 RX ADMIN — BUPROPION HYDROCHLORIDE 150 MG: 150 TABLET, FILM COATED, EXTENDED RELEASE ORAL at 19:39

## 2019-01-01 RX ADMIN — VANCOMYCIN HYDROCHLORIDE 1500 MG: 5 INJECTION, POWDER, LYOPHILIZED, FOR SOLUTION INTRAVENOUS at 16:18

## 2019-01-01 RX ADMIN — Medication 250 MG: at 08:35

## 2019-01-01 RX ADMIN — BUPROPION HYDROCHLORIDE 150 MG: 150 TABLET, FILM COATED, EXTENDED RELEASE ORAL at 17:06

## 2019-01-01 RX ADMIN — ASPIRIN 81 MG 81 MG: 81 TABLET ORAL at 08:50

## 2019-01-01 RX ADMIN — ONDANSETRON 4 MG: 2 INJECTION INTRAMUSCULAR; INTRAVENOUS at 16:18

## 2019-01-01 RX ADMIN — LISINOPRIL: 10 TABLET ORAL at 09:55

## 2019-01-01 RX ADMIN — CYANOCOBALAMIN TAB 500 MCG 1000 MCG: 500 TAB at 08:35

## 2019-01-01 RX ADMIN — GABAPENTIN 600 MG: 300 CAPSULE ORAL at 21:10

## 2019-01-01 RX ADMIN — NICOTINE 1 PATCH: 21 PATCH TRANSDERMAL at 08:36

## 2019-01-01 RX ADMIN — BUPROPION HYDROCHLORIDE 150 MG: 150 TABLET, FILM COATED, EXTENDED RELEASE ORAL at 09:55

## 2019-01-01 RX ADMIN — NICOTINE 1 PATCH: 21 PATCH TRANSDERMAL at 08:50

## 2019-01-01 RX ADMIN — ENOXAPARIN SODIUM 40 MG: 40 INJECTION SUBCUTANEOUS at 08:21

## 2019-01-01 RX ADMIN — CEFEPIME HYDROCHLORIDE 1000 MG: 1 INJECTION, POWDER, FOR SOLUTION INTRAMUSCULAR; INTRAVENOUS at 15:45

## 2019-01-01 RX ADMIN — GABAPENTIN 600 MG: 300 CAPSULE ORAL at 21:27

## 2019-01-01 RX ADMIN — PERFLUTREN 0.6 ML/MIN: 6.52 INJECTION, SUSPENSION INTRAVENOUS at 11:03

## 2019-01-01 RX ADMIN — POTASSIUM CHLORIDE 20 MEQ: 200 INJECTION, SOLUTION INTRAVENOUS at 00:20

## 2019-01-01 RX ADMIN — ASPIRIN 81 MG 81 MG: 81 TABLET ORAL at 08:35

## 2019-01-01 RX ADMIN — MONTELUKAST SODIUM 10 MG: 10 TABLET, FILM COATED ORAL at 21:29

## 2019-01-01 RX ADMIN — MELATONIN 6 MG: 3 TAB ORAL at 21:08

## 2019-01-01 RX ADMIN — VANCOMYCIN HYDROCHLORIDE 1500 MG: 1 INJECTION, POWDER, LYOPHILIZED, FOR SOLUTION INTRAVENOUS at 17:34

## 2019-01-01 RX ADMIN — MELATONIN 3 MG: 3 TAB ORAL at 21:57

## 2019-01-01 RX ADMIN — ONDANSETRON 4 MG: 2 INJECTION INTRAMUSCULAR; INTRAVENOUS at 08:05

## 2019-01-01 RX ADMIN — PRAVASTATIN SODIUM 40 MG: 40 TABLET ORAL at 17:27

## 2019-01-01 RX ADMIN — CEFEPIME HYDROCHLORIDE 2000 MG: 2 INJECTION, POWDER, FOR SOLUTION INTRAVENOUS at 06:03

## 2019-01-01 RX ADMIN — SODIUM CHLORIDE 50 ML/HR: 0.9 INJECTION, SOLUTION INTRAVENOUS at 14:20

## 2019-01-01 RX ADMIN — Medication 250 MG: at 17:59

## 2019-01-01 RX ADMIN — CYCLOBENZAPRINE HYDROCHLORIDE 5 MG: 10 TABLET, FILM COATED ORAL at 21:10

## 2019-01-01 RX ADMIN — GABAPENTIN 600 MG: 300 CAPSULE ORAL at 22:32

## 2019-01-01 RX ADMIN — Medication 250 MG: at 17:27

## 2019-01-01 RX ADMIN — TRAMADOL HYDROCHLORIDE 50 MG: 50 TABLET, FILM COATED ORAL at 21:29

## 2019-01-01 RX ADMIN — MELATONIN 3 MG: 3 TAB ORAL at 22:33

## 2019-01-01 RX ADMIN — CEFEPIME HYDROCHLORIDE 2000 MG: 2 INJECTION, POWDER, FOR SOLUTION INTRAVENOUS at 06:12

## 2019-01-01 RX ADMIN — MONTELUKAST SODIUM 10 MG: 10 TABLET, FILM COATED ORAL at 21:21

## 2019-01-01 RX ADMIN — NICOTINE 1 PATCH: 14 PATCH TRANSDERMAL at 08:17

## 2019-01-01 RX ADMIN — GABAPENTIN 600 MG: 300 CAPSULE ORAL at 21:29

## 2019-01-01 RX ADMIN — ENOXAPARIN SODIUM 40 MG: 40 INJECTION SUBCUTANEOUS at 08:50

## 2019-01-01 RX ADMIN — CYANOCOBALAMIN TAB 500 MCG 1000 MCG: 500 TAB at 10:28

## 2019-01-01 RX ADMIN — VANCOMYCIN HYDROCHLORIDE 1500 MG: 5 INJECTION, POWDER, LYOPHILIZED, FOR SOLUTION INTRAVENOUS at 17:17

## 2019-01-01 RX ADMIN — ASPIRIN 81 MG 81 MG: 81 TABLET ORAL at 08:21

## 2019-01-01 RX ADMIN — GABAPENTIN 600 MG: 300 CAPSULE ORAL at 21:56

## 2019-01-01 RX ADMIN — CYANOCOBALAMIN TAB 500 MCG 1000 MCG: 500 TAB at 09:55

## 2019-01-01 RX ADMIN — ENOXAPARIN SODIUM 40 MG: 40 INJECTION SUBCUTANEOUS at 09:11

## 2019-01-01 RX ADMIN — LIDOCAINE 1 PATCH: 50 PATCH TOPICAL at 08:08

## 2019-01-01 RX ADMIN — SODIUM CHLORIDE 1000 ML: 0.9 INJECTION, SOLUTION INTRAVENOUS at 23:08

## 2019-01-01 RX ADMIN — IBUPROFEN 600 MG: 600 TABLET, FILM COATED ORAL at 19:25

## 2019-01-01 RX ADMIN — NICOTINE 1 PATCH: 14 PATCH TRANSDERMAL at 08:22

## 2019-01-01 RX ADMIN — SODIUM CHLORIDE 50 ML/HR: 0.9 INJECTION, SOLUTION INTRAVENOUS at 15:47

## 2019-01-01 RX ADMIN — Medication 1 TABLET: at 08:21

## 2019-01-01 RX ADMIN — CYANOCOBALAMIN TAB 500 MCG 1000 MCG: 500 TAB at 08:21

## 2019-01-01 RX ADMIN — LISINOPRIL 10 MG: 10 TABLET ORAL at 08:09

## 2019-01-01 RX ADMIN — Medication 1 TABLET: at 09:12

## 2019-01-01 RX ADMIN — ENOXAPARIN SODIUM 40 MG: 40 INJECTION SUBCUTANEOUS at 08:35

## 2019-01-01 RX ADMIN — POTASSIUM CHLORIDE 10 MEQ: 10 TABLET, EXTENDED RELEASE ORAL at 09:12

## 2019-01-01 RX ADMIN — SERTRALINE HYDROCHLORIDE 100 MG: 100 TABLET ORAL at 21:10

## 2019-01-01 RX ADMIN — MELATONIN 6 MG: 3 TAB ORAL at 21:29

## 2019-01-01 RX ADMIN — CYANOCOBALAMIN TAB 500 MCG 1000 MCG: 500 TAB at 09:11

## 2019-01-01 RX ADMIN — Medication 1 TABLET: at 08:49

## 2019-01-01 RX ADMIN — ASPIRIN 81 MG 81 MG: 81 TABLET ORAL at 08:09

## 2019-01-01 RX ADMIN — POTASSIUM CHLORIDE 40 MEQ: 1500 TABLET, EXTENDED RELEASE ORAL at 15:54

## 2019-01-01 RX ADMIN — BUPROPION HYDROCHLORIDE 150 MG: 150 TABLET, FILM COATED, EXTENDED RELEASE ORAL at 17:45

## 2019-01-01 RX ADMIN — SODIUM CHLORIDE 50 ML/HR: 0.9 INJECTION, SOLUTION INTRAVENOUS at 14:27

## 2019-10-12 ENCOUNTER — HOSPITAL ENCOUNTER (INPATIENT)
Facility: HOSPITAL | Age: 64
LOS: 7 days | Discharge: HOME | DRG: 721 | End: 2019-10-20
Attending: EMERGENCY MEDICINE | Admitting: INTERNAL MEDICINE
Payer: COMMERCIAL

## 2019-10-12 ENCOUNTER — APPOINTMENT (EMERGENCY)
Dept: CT IMAGING | Facility: HOSPITAL | Age: 64
DRG: 721 | End: 2019-10-12
Payer: COMMERCIAL

## 2019-10-12 ENCOUNTER — APPOINTMENT (EMERGENCY)
Dept: RADIOLOGY | Facility: HOSPITAL | Age: 64
DRG: 721 | End: 2019-10-12
Payer: COMMERCIAL

## 2019-10-12 DIAGNOSIS — Z92.21 HISTORY OF CANCER CHEMOTHERAPY: ICD-10-CM

## 2019-10-12 DIAGNOSIS — N20.0 STAGHORN CALCULUS: ICD-10-CM

## 2019-10-12 DIAGNOSIS — J18.9 PNEUMONIA: ICD-10-CM

## 2019-10-12 DIAGNOSIS — R50.9 FEVER: Primary | ICD-10-CM

## 2019-10-12 DIAGNOSIS — R19.7 DIARRHEA: ICD-10-CM

## 2019-10-12 DIAGNOSIS — A41.9 SEPSIS (HCC): ICD-10-CM

## 2019-10-12 DIAGNOSIS — T80.219A: ICD-10-CM

## 2019-10-12 LAB
ALBUMIN SERPL BCP-MCNC: 3.3 G/DL (ref 3.5–5)
ALP SERPL-CCNC: 83 U/L (ref 46–116)
ALT SERPL W P-5'-P-CCNC: 25 U/L (ref 12–78)
ANION GAP SERPL CALCULATED.3IONS-SCNC: 10 MMOL/L (ref 4–13)
APTT PPP: 38 SECONDS (ref 23–37)
AST SERPL W P-5'-P-CCNC: 19 U/L (ref 5–45)
ATRIAL RATE: 104 BPM
BASOPHILS # BLD AUTO: 0.03 THOUSANDS/ΜL (ref 0–0.1)
BASOPHILS NFR BLD AUTO: 0 % (ref 0–1)
BILIRUB SERPL-MCNC: 0.7 MG/DL (ref 0.2–1)
BUN SERPL-MCNC: 16 MG/DL (ref 5–25)
CALCIUM SERPL-MCNC: 9.4 MG/DL (ref 8.3–10.1)
CHLORIDE SERPL-SCNC: 99 MMOL/L (ref 100–108)
CO2 SERPL-SCNC: 27 MMOL/L (ref 21–32)
CREAT SERPL-MCNC: 1.02 MG/DL (ref 0.6–1.3)
EOSINOPHIL # BLD AUTO: 0.03 THOUSAND/ΜL (ref 0–0.61)
EOSINOPHIL NFR BLD AUTO: 0 % (ref 0–6)
ERYTHROCYTE [DISTWIDTH] IN BLOOD BY AUTOMATED COUNT: 13.6 % (ref 11.6–15.1)
GFR SERPL CREATININE-BSD FRML MDRD: 59 ML/MIN/1.73SQ M
GLUCOSE SERPL-MCNC: 130 MG/DL (ref 65–140)
HCT VFR BLD AUTO: 37.8 % (ref 34.8–46.1)
HGB BLD-MCNC: 13 G/DL (ref 11.5–15.4)
IMM GRANULOCYTES # BLD AUTO: 0.08 THOUSAND/UL (ref 0–0.2)
IMM GRANULOCYTES NFR BLD AUTO: 1 % (ref 0–2)
INR PPP: 1.04 (ref 0.84–1.19)
LACTATE SERPL-SCNC: 1.9 MMOL/L (ref 0.5–2)
LYMPHOCYTES # BLD AUTO: 1.2 THOUSANDS/ΜL (ref 0.6–4.47)
LYMPHOCYTES NFR BLD AUTO: 7 % (ref 14–44)
MCH RBC QN AUTO: 32.1 PG (ref 26.8–34.3)
MCHC RBC AUTO-ENTMCNC: 34.4 G/DL (ref 31.4–37.4)
MCV RBC AUTO: 93 FL (ref 82–98)
MONOCYTES # BLD AUTO: 0.12 THOUSAND/ΜL (ref 0.17–1.22)
MONOCYTES NFR BLD AUTO: 1 % (ref 4–12)
NEUTROPHILS # BLD AUTO: 15.93 THOUSANDS/ΜL (ref 1.85–7.62)
NEUTS SEG NFR BLD AUTO: 91 % (ref 43–75)
NRBC BLD AUTO-RTO: 0 /100 WBCS
P AXIS: 56 DEGREES
PLATELET # BLD AUTO: 176 THOUSANDS/UL (ref 149–390)
PMV BLD AUTO: 10 FL (ref 8.9–12.7)
POTASSIUM SERPL-SCNC: 3.4 MMOL/L (ref 3.5–5.3)
PR INTERVAL: 144 MS
PROT SERPL-MCNC: 7.5 G/DL (ref 6.4–8.2)
PROTHROMBIN TIME: 13.6 SECONDS (ref 11.6–14.5)
QRS AXIS: 55 DEGREES
QRSD INTERVAL: 90 MS
QT INTERVAL: 320 MS
QTC INTERVAL: 420 MS
RBC # BLD AUTO: 4.05 MILLION/UL (ref 3.81–5.12)
SODIUM SERPL-SCNC: 136 MMOL/L (ref 136–145)
T WAVE AXIS: 49 DEGREES
VENTRICULAR RATE: 104 BPM
WBC # BLD AUTO: 17.39 THOUSAND/UL (ref 4.31–10.16)

## 2019-10-12 PROCEDURE — 85025 COMPLETE CBC W/AUTO DIFF WBC: CPT | Performed by: EMERGENCY MEDICINE

## 2019-10-12 PROCEDURE — 96374 THER/PROPH/DIAG INJ IV PUSH: CPT

## 2019-10-12 PROCEDURE — 83605 ASSAY OF LACTIC ACID: CPT | Performed by: EMERGENCY MEDICINE

## 2019-10-12 PROCEDURE — 74177 CT ABD & PELVIS W/CONTRAST: CPT

## 2019-10-12 PROCEDURE — 84145 PROCALCITONIN (PCT): CPT | Performed by: EMERGENCY MEDICINE

## 2019-10-12 PROCEDURE — 71046 X-RAY EXAM CHEST 2 VIEWS: CPT

## 2019-10-12 PROCEDURE — 87040 BLOOD CULTURE FOR BACTERIA: CPT | Performed by: EMERGENCY MEDICINE

## 2019-10-12 PROCEDURE — 99285 EMERGENCY DEPT VISIT HI MDM: CPT | Performed by: EMERGENCY MEDICINE

## 2019-10-12 PROCEDURE — 71260 CT THORAX DX C+: CPT

## 2019-10-12 PROCEDURE — 85610 PROTHROMBIN TIME: CPT | Performed by: EMERGENCY MEDICINE

## 2019-10-12 PROCEDURE — 96361 HYDRATE IV INFUSION ADD-ON: CPT

## 2019-10-12 PROCEDURE — 85730 THROMBOPLASTIN TIME PARTIAL: CPT | Performed by: EMERGENCY MEDICINE

## 2019-10-12 PROCEDURE — 36415 COLL VENOUS BLD VENIPUNCTURE: CPT | Performed by: EMERGENCY MEDICINE

## 2019-10-12 PROCEDURE — 93010 ELECTROCARDIOGRAM REPORT: CPT | Performed by: INTERNAL MEDICINE

## 2019-10-12 PROCEDURE — 81001 URINALYSIS AUTO W/SCOPE: CPT | Performed by: EMERGENCY MEDICINE

## 2019-10-12 PROCEDURE — 80053 COMPREHEN METABOLIC PANEL: CPT | Performed by: EMERGENCY MEDICINE

## 2019-10-12 PROCEDURE — 96375 TX/PRO/DX INJ NEW DRUG ADDON: CPT

## 2019-10-12 PROCEDURE — 99285 EMERGENCY DEPT VISIT HI MDM: CPT

## 2019-10-12 PROCEDURE — 93005 ELECTROCARDIOGRAM TRACING: CPT

## 2019-10-12 RX ORDER — ONDANSETRON 2 MG/ML
4 INJECTION INTRAMUSCULAR; INTRAVENOUS ONCE
Status: COMPLETED | OUTPATIENT
Start: 2019-10-12 | End: 2019-10-12

## 2019-10-12 RX ORDER — HYDROMORPHONE HCL/PF 1 MG/ML
0.5 SYRINGE (ML) INJECTION ONCE
Status: COMPLETED | OUTPATIENT
Start: 2019-10-12 | End: 2019-10-12

## 2019-10-12 RX ORDER — ACETAMINOPHEN 325 MG/1
650 TABLET ORAL ONCE
Status: COMPLETED | OUTPATIENT
Start: 2019-10-12 | End: 2019-10-12

## 2019-10-12 RX ADMIN — ACETAMINOPHEN 650 MG: 325 TABLET, FILM COATED ORAL at 21:20

## 2019-10-12 RX ADMIN — HYDROMORPHONE HYDROCHLORIDE 0.5 MG: 1 INJECTION, SOLUTION INTRAMUSCULAR; INTRAVENOUS; SUBCUTANEOUS at 22:15

## 2019-10-12 RX ADMIN — ONDANSETRON 4 MG: 2 INJECTION INTRAMUSCULAR; INTRAVENOUS at 22:15

## 2019-10-12 RX ADMIN — IOHEXOL 100 ML: 350 INJECTION, SOLUTION INTRAVENOUS at 23:53

## 2019-10-12 RX ADMIN — SODIUM CHLORIDE 1000 ML: 0.9 INJECTION, SOLUTION INTRAVENOUS at 21:21

## 2019-10-13 PROBLEM — C49.9 LEIOMYOSARCOMA (HCC): Status: ACTIVE | Noted: 2019-10-13

## 2019-10-13 PROBLEM — Z72.0 TOBACCO ABUSE: Chronic | Status: ACTIVE | Noted: 2019-10-13

## 2019-10-13 PROBLEM — J18.9 PNEUMONIA: Status: ACTIVE | Noted: 2019-10-13

## 2019-10-13 PROBLEM — R19.7 DIARRHEA: Status: ACTIVE | Noted: 2019-10-13

## 2019-10-13 PROBLEM — I10 HYPERTENSION: Status: ACTIVE | Noted: 2019-10-13

## 2019-10-13 PROBLEM — E87.6 HYPOKALEMIA: Status: ACTIVE | Noted: 2019-10-13

## 2019-10-13 PROBLEM — N20.0 STAGHORN CALCULUS: Status: ACTIVE | Noted: 2019-10-13

## 2019-10-13 PROBLEM — A41.9 SEPSIS (HCC): Status: ACTIVE | Noted: 2019-10-13

## 2019-10-13 LAB
ANION GAP SERPL CALCULATED.3IONS-SCNC: 10 MMOL/L (ref 4–13)
BACTERIA UR QL AUTO: ABNORMAL /HPF
BASOPHILS # BLD MANUAL: 0 THOUSAND/UL (ref 0–0.1)
BASOPHILS NFR MAR MANUAL: 0 % (ref 0–1)
BILIRUB UR QL STRIP: NEGATIVE
BUN SERPL-MCNC: 16 MG/DL (ref 5–25)
CALCIUM SERPL-MCNC: 8.3 MG/DL (ref 8.3–10.1)
CHLORIDE SERPL-SCNC: 102 MMOL/L (ref 100–108)
CLARITY UR: CLEAR
CO2 SERPL-SCNC: 24 MMOL/L (ref 21–32)
COLOR UR: YELLOW
CREAT SERPL-MCNC: 1.04 MG/DL (ref 0.6–1.3)
EOSINOPHIL # BLD MANUAL: 0.15 THOUSAND/UL (ref 0–0.4)
EOSINOPHIL NFR BLD MANUAL: 1 % (ref 0–6)
ERYTHROCYTE [DISTWIDTH] IN BLOOD BY AUTOMATED COUNT: 13.8 % (ref 11.6–15.1)
FLUAV AG SPEC QL: NOT DETECTED
FLUBV AG SPEC QL: NOT DETECTED
GFR SERPL CREATININE-BSD FRML MDRD: 57 ML/MIN/1.73SQ M
GLUCOSE SERPL-MCNC: 141 MG/DL (ref 65–140)
GLUCOSE UR STRIP-MCNC: NEGATIVE MG/DL
HCT VFR BLD AUTO: 30.9 % (ref 34.8–46.1)
HGB BLD-MCNC: 10.5 G/DL (ref 11.5–15.4)
HGB UR QL STRIP.AUTO: NEGATIVE
KETONES UR STRIP-MCNC: NEGATIVE MG/DL
L PNEUMO1 AG UR QL IA.RAPID: NEGATIVE
LEUKOCYTE ESTERASE UR QL STRIP: NEGATIVE
LYMPHOCYTES # BLD AUTO: 0.88 THOUSAND/UL (ref 0.6–4.47)
LYMPHOCYTES # BLD AUTO: 6 % (ref 14–44)
MCH RBC QN AUTO: 32 PG (ref 26.8–34.3)
MCHC RBC AUTO-ENTMCNC: 34 G/DL (ref 31.4–37.4)
MCV RBC AUTO: 94 FL (ref 82–98)
MONOCYTES # BLD AUTO: 0.15 THOUSAND/UL (ref 0–1.22)
MONOCYTES NFR BLD: 1 % (ref 4–12)
MUCOUS THREADS UR QL AUTO: ABNORMAL
NEUTROPHILS # BLD MANUAL: 13.21 THOUSAND/UL (ref 1.85–7.62)
NEUTS SEG NFR BLD AUTO: 90 % (ref 43–75)
NITRITE UR QL STRIP: NEGATIVE
NON-SQ EPI CELLS URNS QL MICRO: ABNORMAL /HPF
NRBC BLD AUTO-RTO: 0 /100 WBCS
PH UR STRIP.AUTO: 5.5 [PH]
PLATELET # BLD AUTO: 141 THOUSANDS/UL (ref 149–390)
PLATELET BLD QL SMEAR: ABNORMAL
PMV BLD AUTO: 9.9 FL (ref 8.9–12.7)
POTASSIUM SERPL-SCNC: 3.3 MMOL/L (ref 3.5–5.3)
PROCALCITONIN SERPL-MCNC: <0.05 NG/ML
PROT UR STRIP-MCNC: ABNORMAL MG/DL
RBC # BLD AUTO: 3.28 MILLION/UL (ref 3.81–5.12)
RBC #/AREA URNS AUTO: ABNORMAL /HPF
RSV B RNA SPEC QL NAA+PROBE: NOT DETECTED
S PNEUM AG UR QL: NEGATIVE
SODIUM SERPL-SCNC: 136 MMOL/L (ref 136–145)
SP GR UR STRIP.AUTO: 1.02 (ref 1–1.03)
TOTAL CELLS COUNTED SPEC: 100
UROBILINOGEN UR QL STRIP.AUTO: 0.2 E.U./DL
VARIANT LYMPHS # BLD AUTO: 2 %
WBC # BLD AUTO: 14.68 THOUSAND/UL (ref 4.31–10.16)
WBC #/AREA URNS AUTO: ABNORMAL /HPF

## 2019-10-13 PROCEDURE — 80048 BASIC METABOLIC PNL TOTAL CA: CPT | Performed by: INTERNAL MEDICINE

## 2019-10-13 PROCEDURE — 87070 CULTURE OTHR SPECIMN AEROBIC: CPT | Performed by: PHYSICIAN ASSISTANT

## 2019-10-13 PROCEDURE — 87493 C DIFF AMPLIFIED PROBE: CPT | Performed by: PHYSICIAN ASSISTANT

## 2019-10-13 PROCEDURE — 87631 RESP VIRUS 3-5 TARGETS: CPT | Performed by: PHYSICIAN ASSISTANT

## 2019-10-13 PROCEDURE — 85027 COMPLETE CBC AUTOMATED: CPT | Performed by: INTERNAL MEDICINE

## 2019-10-13 PROCEDURE — 99223 1ST HOSP IP/OBS HIGH 75: CPT | Performed by: INTERNAL MEDICINE

## 2019-10-13 PROCEDURE — 87449 NOS EACH ORGANISM AG IA: CPT | Performed by: PHYSICIAN ASSISTANT

## 2019-10-13 PROCEDURE — 85007 BL SMEAR W/DIFF WBC COUNT: CPT | Performed by: INTERNAL MEDICINE

## 2019-10-13 PROCEDURE — 87205 SMEAR GRAM STAIN: CPT | Performed by: PHYSICIAN ASSISTANT

## 2019-10-13 PROCEDURE — 96375 TX/PRO/DX INJ NEW DRUG ADDON: CPT

## 2019-10-13 RX ORDER — HEPARIN SODIUM 5000 [USP'U]/ML
5000 INJECTION, SOLUTION INTRAVENOUS; SUBCUTANEOUS EVERY 8 HOURS SCHEDULED
Status: DISCONTINUED | OUTPATIENT
Start: 2019-10-13 | End: 2019-10-20 | Stop reason: HOSPADM

## 2019-10-13 RX ORDER — BUPROPION HYDROCHLORIDE 150 MG/1
150 TABLET, EXTENDED RELEASE ORAL 2 TIMES DAILY
Status: DISCONTINUED | OUTPATIENT
Start: 2019-10-13 | End: 2019-10-20 | Stop reason: HOSPADM

## 2019-10-13 RX ORDER — ACETAMINOPHEN 325 MG/1
650 TABLET ORAL EVERY 6 HOURS PRN
Status: DISCONTINUED | OUTPATIENT
Start: 2019-10-13 | End: 2019-10-20 | Stop reason: HOSPADM

## 2019-10-13 RX ORDER — PRAVASTATIN SODIUM 40 MG
40 TABLET ORAL
Status: DISCONTINUED | OUTPATIENT
Start: 2019-10-13 | End: 2019-10-20 | Stop reason: HOSPADM

## 2019-10-13 RX ORDER — GABAPENTIN 600 MG/1
600 TABLET ORAL 2 TIMES DAILY
COMMUNITY

## 2019-10-13 RX ORDER — CYCLOBENZAPRINE HCL 10 MG
5 TABLET ORAL 3 TIMES DAILY PRN
COMMUNITY
End: 2019-01-01 | Stop reason: ALTCHOICE

## 2019-10-13 RX ORDER — NICOTINE 21 MG/24HR
1 PATCH, TRANSDERMAL 24 HOURS TRANSDERMAL DAILY
Status: DISCONTINUED | OUTPATIENT
Start: 2019-10-13 | End: 2019-10-20 | Stop reason: HOSPADM

## 2019-10-13 RX ORDER — POTASSIUM CHLORIDE 750 MG/1
20 TABLET, EXTENDED RELEASE ORAL DAILY
COMMUNITY

## 2019-10-13 RX ORDER — MONTELUKAST SODIUM 10 MG/1
10 TABLET ORAL
Status: DISCONTINUED | OUTPATIENT
Start: 2019-10-13 | End: 2019-10-20 | Stop reason: HOSPADM

## 2019-10-13 RX ORDER — ONDANSETRON 2 MG/ML
4 INJECTION INTRAMUSCULAR; INTRAVENOUS EVERY 6 HOURS PRN
Status: DISCONTINUED | OUTPATIENT
Start: 2019-10-13 | End: 2019-10-20 | Stop reason: HOSPADM

## 2019-10-13 RX ORDER — NICOTINE 21 MG/24HR
1 PATCH, TRANSDERMAL 24 HOURS TRANSDERMAL EVERY 24 HOURS
COMMUNITY

## 2019-10-13 RX ORDER — LISINOPRIL AND HYDROCHLOROTHIAZIDE 12.5; 1 MG/1; MG/1
1 TABLET ORAL DAILY
COMMUNITY

## 2019-10-13 RX ORDER — ONDANSETRON HYDROCHLORIDE 8 MG/1
8 TABLET, FILM COATED ORAL EVERY 8 HOURS PRN
COMMUNITY

## 2019-10-13 RX ORDER — SIMVASTATIN 20 MG
20 TABLET ORAL
COMMUNITY

## 2019-10-13 RX ORDER — ASPIRIN 81 MG/1
81 TABLET, CHEWABLE ORAL DAILY
Status: DISCONTINUED | OUTPATIENT
Start: 2019-10-13 | End: 2019-10-20 | Stop reason: HOSPADM

## 2019-10-13 RX ORDER — ASPIRIN 81 MG/1
81 TABLET, CHEWABLE ORAL DAILY
COMMUNITY

## 2019-10-13 RX ORDER — MONTELUKAST SODIUM 10 MG/1
10 TABLET ORAL
COMMUNITY

## 2019-10-13 RX ORDER — IBUPROFEN 600 MG/1
600 TABLET ORAL ONCE
Status: COMPLETED | OUTPATIENT
Start: 2019-10-13 | End: 2019-10-13

## 2019-10-13 RX ORDER — GABAPENTIN 300 MG/1
600 CAPSULE ORAL
Status: DISCONTINUED | OUTPATIENT
Start: 2019-10-13 | End: 2019-10-20 | Stop reason: HOSPADM

## 2019-10-13 RX ORDER — ALBUTEROL SULFATE 2.5 MG/3ML
2.5 SOLUTION RESPIRATORY (INHALATION) EVERY 6 HOURS PRN
COMMUNITY

## 2019-10-13 RX ORDER — BUTALBITAL, ACETAMINOPHEN AND CAFFEINE 50; 325; 40 MG/1; MG/1; MG/1
1 TABLET ORAL ONCE
Status: COMPLETED | OUTPATIENT
Start: 2019-10-13 | End: 2019-10-13

## 2019-10-13 RX ORDER — SODIUM CHLORIDE 9 MG/ML
75 INJECTION, SOLUTION INTRAVENOUS CONTINUOUS
Status: DISCONTINUED | OUTPATIENT
Start: 2019-10-13 | End: 2019-10-16

## 2019-10-13 RX ORDER — KETOROLAC TROMETHAMINE 30 MG/ML
15 INJECTION, SOLUTION INTRAMUSCULAR; INTRAVENOUS ONCE
Status: COMPLETED | OUTPATIENT
Start: 2019-10-13 | End: 2019-10-13

## 2019-10-13 RX ORDER — BUPROPION HYDROCHLORIDE 150 MG/1
100 TABLET, EXTENDED RELEASE ORAL 2 TIMES DAILY
COMMUNITY

## 2019-10-13 RX ORDER — POTASSIUM CHLORIDE 20 MEQ/1
20 TABLET, EXTENDED RELEASE ORAL ONCE
Status: COMPLETED | OUTPATIENT
Start: 2019-10-13 | End: 2019-10-13

## 2019-10-13 RX ORDER — SERTRALINE HYDROCHLORIDE 100 MG/1
100 TABLET, FILM COATED ORAL
COMMUNITY
End: 2019-01-01 | Stop reason: HOSPADM

## 2019-10-13 RX ORDER — CYCLOBENZAPRINE HCL 10 MG
5 TABLET ORAL 3 TIMES DAILY PRN
Status: DISCONTINUED | OUTPATIENT
Start: 2019-10-13 | End: 2019-10-20 | Stop reason: HOSPADM

## 2019-10-13 RX ADMIN — PRAVASTATIN SODIUM 40 MG: 40 TABLET ORAL at 17:36

## 2019-10-13 RX ADMIN — ONDANSETRON 4 MG: 2 INJECTION INTRAMUSCULAR; INTRAVENOUS at 09:48

## 2019-10-13 RX ADMIN — ASPIRIN 81 MG 81 MG: 81 TABLET ORAL at 09:51

## 2019-10-13 RX ADMIN — IBUPROFEN 600 MG: 600 TABLET ORAL at 23:02

## 2019-10-13 RX ADMIN — BUPROPION HYDROCHLORIDE 150 MG: 150 TABLET, FILM COATED, EXTENDED RELEASE ORAL at 17:36

## 2019-10-13 RX ADMIN — CEFEPIME HYDROCHLORIDE 2000 MG: 2 INJECTION, POWDER, FOR SOLUTION INTRAVENOUS at 01:50

## 2019-10-13 RX ADMIN — SERTRALINE HYDROCHLORIDE 100 MG: 50 TABLET ORAL at 23:00

## 2019-10-13 RX ADMIN — HEPARIN SODIUM 5000 UNITS: 5000 INJECTION INTRAVENOUS; SUBCUTANEOUS at 05:08

## 2019-10-13 RX ADMIN — KETOROLAC TROMETHAMINE 15 MG: 30 INJECTION, SOLUTION INTRAMUSCULAR at 01:51

## 2019-10-13 RX ADMIN — ACETAMINOPHEN 650 MG: 325 TABLET, FILM COATED ORAL at 03:03

## 2019-10-13 RX ADMIN — ACETAMINOPHEN 650 MG: 325 TABLET, FILM COATED ORAL at 17:38

## 2019-10-13 RX ADMIN — BUPROPION HYDROCHLORIDE 150 MG: 150 TABLET, FILM COATED, EXTENDED RELEASE ORAL at 09:51

## 2019-10-13 RX ADMIN — SODIUM CHLORIDE 75 ML/HR: 0.9 INJECTION, SOLUTION INTRAVENOUS at 03:01

## 2019-10-13 RX ADMIN — BUTALBITAL, ACETAMINOPHEN AND CAFFEINE 1 TABLET: 50; 325; 40 TABLET ORAL at 09:52

## 2019-10-13 RX ADMIN — CEFEPIME HYDROCHLORIDE 2000 MG: 2 INJECTION, POWDER, FOR SOLUTION INTRAVENOUS at 14:30

## 2019-10-13 RX ADMIN — NICOTINE 1 PATCH: 14 PATCH TRANSDERMAL at 09:50

## 2019-10-13 RX ADMIN — HEPARIN SODIUM 5000 UNITS: 5000 INJECTION INTRAVENOUS; SUBCUTANEOUS at 14:30

## 2019-10-13 RX ADMIN — MONTELUKAST 10 MG: 10 TABLET, FILM COATED ORAL at 23:02

## 2019-10-13 RX ADMIN — HEPARIN SODIUM 5000 UNITS: 5000 INJECTION INTRAVENOUS; SUBCUTANEOUS at 23:02

## 2019-10-13 RX ADMIN — SODIUM CHLORIDE 1000 ML: 0.9 INJECTION, SOLUTION INTRAVENOUS at 01:21

## 2019-10-13 RX ADMIN — SODIUM CHLORIDE 75 ML/HR: 0.9 INJECTION, SOLUTION INTRAVENOUS at 17:39

## 2019-10-13 RX ADMIN — POTASSIUM CHLORIDE 20 MEQ: 1500 TABLET, EXTENDED RELEASE ORAL at 09:51

## 2019-10-13 RX ADMIN — GABAPENTIN 600 MG: 300 CAPSULE ORAL at 23:02

## 2019-10-13 NOTE — PLAN OF CARE
Problem: PAIN - ADULT  Goal: Verbalizes/displays adequate comfort level or baseline comfort level  Description  Interventions:  - Encourage patient to monitor pain and request assistance  - Assess pain using appropriate pain scale  - Administer analgesics based on type and severity of pain and evaluate response  - Implement non-pharmacological measures as appropriate and evaluate response  - Consider cultural and social influences on pain and pain management  - Notify physician/advanced practitioner if interventions unsuccessful or patient reports new pain  Outcome: Progressing     Problem: INFECTION - ADULT  Goal: Absence or prevention of progression during hospitalization  Description  INTERVENTIONS:  - Assess and monitor for signs and symptoms of infection  - Monitor lab/diagnostic results  - Monitor all insertion sites, i e  indwelling lines, tubes, and drains  - [place] appropriate cooling/warming therapies per order  - Administer medications as ordered  - Instruct and encourage patient and family to use good hand hygiene technique     Outcome: Progressing  Goal: Absence of fever/infection during neutropenic period  Description  INTERVENTIONS:  - Monitor WBC    Outcome: Progressing     Problem: SAFETY ADULT  Goal: Patient will remain free of falls  Description  INTERVENTIONS:  - Assess patient frequently for physical needs  -  Identify cognitive and physical deficits and behaviors that affect risk of falls    -  [place] fall precautions as indicated by assessment   - Educate patient/family on patient safety including physical limitations  - Instruct patient to call for assistance with activity based on assessment  - Modify environment to reduce risk of injury  - Consider OT/PT consult to assist with strengthening/mobility  Outcome: Progressing  Goal: Maintain or return to baseline ADL function  Description  INTERVENTIONS:  -  Assess patient's ability to carry out ADLs; assess patient's baseline for ADL function and identify physical deficits which impact ability to perform ADLs (bathing, care of mouth/teeth, toileting, grooming, dressing, etc )  - Assess/evaluate cause of self-care deficits   - Assess range of motion  - Assess patient's mobility; develop plan if impaired  - Assess patient's need for assistive devices and provide as appropriate  - Encourage maximum independence but intervene and supervise when necessary  - Involve family in performance of ADLs  - Assess for home care needs following discharge   - Consider OT consult to assist with ADL evaluation and planning for discharge  - Provide patient education as appropriate  Outcome: Progressing  Goal: Maintain or return mobility status to optimal level  Description  INTERVENTIONS:  - Assess patient's baseline mobility status (ambulation, transfers, stairs, etc )    - Identify cognitive and physical deficits and behaviors that affect mobility  - [place] fall precautions as indicated by assessment  - Record patient progress and toleration of activity level on Mobility SBAR; progress patient to next Phase/Stage  - Instruct patient to call for assistance with activity based on assessment   Outcome: Progressing     Problem: DISCHARGE PLANNING  Goal: Discharge to home or other facility with appropriate resources  Description  INTERVENTIONS:  - Identify barriers to discharge w/patient and caregiver  - Arrange for needed discharge resources and transportation as appropriate  - Identify discharge learning needs (meds, wound care, etc )  - Refer to Case Management Department for coordinating discharge planning if the patient needs post-hospital services based on physician/advanced practitioner order or complex needs related to functional status, cognitive ability, or social support system   Outcome: Progressing     Problem: Knowledge Deficit  Goal: Patient/family/caregiver demonstrates understanding of disease process, treatment plan, medications, and discharge instructions  Description  Complete learning assessment and assess knowledge base    Interventions:  - Provide teaching at level of understanding  - Provide teaching via preferred learning methods  Outcome: Progressing     Problem: CARDIOVASCULAR - ADULT  Goal: Maintains optimal cardiac output and hemodynamic stability  Description  INTERVENTIONS:  - Monitor I/O, vital signs and rhythm  - Monitor for S/S and trends of decreased cardiac output  - Administer and titrate ordered vasoactive medications to optimize hemodynamic stability  - Assess quality of pulses, skin color and temperature  - Assess for signs of decreased coronary artery perfusion  - Instruct patient to report change in severity of symptoms  Outcome: Progressing  Goal: Absence of cardiac dysrhythmias or at baseline rhythm  Description  INTERVENTIONS:  - Continuous cardiac monitoring, vital signs, obtain 12 lead EKG if ordered  - Administer antiarrhythmic and heart rate control medications as ordered  - Monitor electrolytes and administer replacement therapy as ordered  Outcome: Progressing     Problem: METABOLIC, FLUID AND ELECTROLYTES - ADULT  Goal: Electrolytes maintained within normal limits  Description  INTERVENTIONS:  - Monitor labs and assess patient for signs and symptoms of electrolyte imbalances  - Administer electrolyte replacement as ordered  - Monitor response to electrolyte replacements, including repeat lab results as appropriate  - Instruct patient on fluid and nutrition as appropriate  Outcome: Progressing  Goal: Fluid balance maintained  Description  INTERVENTIONS:  - Monitor labs   - Monitor I/O and WT  - Instruct patient on fluid and nutrition as appropriate  - Assess for signs & symptoms of volume excess or deficit  Outcome: Progressing  Goal: Glucose maintained within target range  Description  INTERVENTIONS:  - Monitor Blood Glucose as ordered  - Assess for signs and symptoms of hyperglycemia and hypoglycemia  - Administer ordered medications to maintain glucose within target range  - Assess nutritional intake and initiate nutrition service referral as needed  Outcome: Progressing     Problem: SKIN/TISSUE INTEGRITY - ADULT  Goal: Skin integrity remains intact  Description  INTERVENTIONS  - Identify patients at risk for skin breakdown  - Assess and monitor skin integrity  - Assess and monitor nutrition and hydration status  - Monitor labs (i e  albumin)  - Assist with mobility/ambulation  - Relieve pressure over bony prominences  - Avoid friction and shearing  - Provide appropriate hygiene as needed including keeping skin clean and dry  - Evaluate need for skin moisturizer/barrier cream  - Collaborate with interdisciplinary team (i e  Nutrition, Rehabilitation, etc )   - Patient/family teaching   Outcome: Progressing  Goal: Incision(s), wounds(s) or drain site(s) healing without S/S of infection  Description  INTERVENTIONS  - Assess and document risk factors for skin impairment   - Assess and document dressing, incision, wound bed, drain sites and surrounding tissue  - Consider nutrition services referral as needed  - Oral mucous membranes remain intact  - Provide patient/ family education  Outcome: Progressing  Goal: Oral mucous membranes remain intact  Description  INTERVENTIONS  - Assess oral mucosa and hygiene practices  - Implement preventative oral hygiene regimen  - Implement oral medicated treatments as ordered     Outcome: Progressing     Problem: Potential for Falls  Goal: Patient will remain free of falls  Description  INTERVENTIONS:  - Assess patient frequently for physical needs  -  Identify cognitive and physical deficits and behaviors that affect risk of falls    -  [place] fall precautions as indicated by assessment   - Educate patient/family on patient safety including physical limitations  - Instruct patient to call for assistance with activity based on assessment  - Modify environment to reduce risk of injury  - Consider OT/PT consult to assist with strengthening/mobility  Outcome: Progressing     Problem: Nutrition/Hydration-ADULT  Goal: Nutrient/Hydration intake appropriate for improving, restoring or maintaining nutritional needs  Description  Monitor and assess patient's nutrition/hydration status for malnutrition  Collaborate with interdisciplinary team and initiate plan and interventions as ordered  Monitor patient's weight and dietary intake as ordered or per policy  Utilize nutrition screening tool and intervene as necessary  Determine patient's food preferences and provide high-protein, high-caloric foods as appropriate       INTERVENTIONS:  - Monitor oral intake, urinary output, labs, and treatment plans  - Assess nutrition and hydration status and recommend course of action  - Evaluate amount of meals eaten  - Assist patient with eating if necessary   - Allow adequate time for meals  - Recommend/ encourage appropriate diets, oral nutritional supplements, and vitamin/mineral supplements  - Order, calculate, and assess calorie counts as needed  - Recommend, monitor, and adjust tube feedings and TPN/PPN based on assessed needs  - Assess need for intravenous fluids  - Provide specific nutrition/hydration education as appropriate  - Include patient/family/caregiver in decisions related to nutrition  Outcome: Progressing

## 2019-10-13 NOTE — UTILIZATION REVIEW
Initial Clinical Review    Admission: Date/Time/Statement: Inpatient Admission Orders (From admission, onward)     Ordered        10/13/19 0155  Inpatient Admission  Once                   Orders Placed This Encounter   Procedures    Inpatient Admission     Standing Status:   Standing     Number of Occurrences:   1     Order Specific Question:   Admitting Physician     Answer:   Dina Khan [[id number]]     Order Specific Question:   Level of Care     Answer:   Med Surg [[id number]]     Order Specific Question:   Estimated length of stay     Answer:   More than 2 Midnights     Order Specific Question:   Certification     Answer:   I certify that inpatient services are medically necessary for this patient for a duration of greater than two midnights  See H&P and MD Progress Notes for additional information about the patient's course of treatment  ED Arrival Information     Expected Arrival Acuity Means of Arrival Escorted By Service Admission Type    - 10/12/2019 20:35 Emergent [address]  [facility] Emergency    Arrival Complaint    FEVER        Chief Complaint   Patient presents with    Fever - 9 weeks to 74 years     pt presents with a fever that started yesterday , was seen by her PCP yesterday awaiting results for her urine culture, had her first chemo treatment wednesday , n/v/d      Assessment/Plan:    62  Y O female  Presents to  ED from home  With fevers, chills, myalgias, nausea, vomiting, diarrhea and cough  For past day  PMH  Is  Tobacco abuse, 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  Found with  LLL  Pneumonia  ADMIT  IP MED SURG  LOC  WITH    SEPSIS, Pneumonia  And plan is  Monitor  Labs,  OLEG and  O2  PRN          ED Triage Vitals   Temperature Pulse Respirations Blood Pressure SpO2   10/12/19 2046 10/12/19 2046 10/12/19 2046 10/12/19 2046 10/12/19 2046   (!) 102 9 °F (39 4 °C) (!) 115 22 132/59 91 %      Temp Source Heart Rate Source Patient Position - Orthostatic VS BP Location FiO2 (%)   10/12/19 2046 10/12/19 2046 10/12/19 2046 10/12/19 2046 --   Oral Monitor Sitting Left arm       Pain Score       10/12/19 2200       No Pain        Wt Readings from Last 1 Encounters:   10/13/19 129 kg (284 lb 13 4 oz)     Additional Vital Signs:   13/19 07:49:13  97 8 °F (36 6 °C)  86  16  114/67  83  95 %       10/13/19 0434        97/55           10/13/19 0300    94    95/57  70  91 %       10/13/19 02:46:12  100 1 °F (37 8 °C)  96    95/57  70  93 %       10/13/19 0200    96  18  105/57    95 %       10/13/19 0130    88  18  92/54    99 %       10/13/19 0100    90  18  88/49Abnormal     96 %       10/13/19 0030    94  18  101/56    97 %       10/13/19 0022  99 7 °F (37 6 °C)                 10/13/19 0000    100  18  118/53    92 %       10/12/19 2330    91  18  117/71    96 %       10/12/19 2219  100 5 °F (38 1 °C)                 10/12/19 2215    101  18  108/55    99 %       10/12/19 2200    102  21  113/55    98 %       10/12/19 2046  102 9 °F (39 4 °C)Abnormal   115Abnormal   22  132/59    91 %  None (Room air)           Pertinent Labs/Diagnostic Test Results:   Results from last 7 days   Lab Units 10/13/19  0504 10/12/19  2123   WBC Thousand/uL 14 68* 17 39*   HEMOGLOBIN g/dL 10 5* 13 0   HEMATOCRIT % 30 9* 37 8   PLATELETS Thousands/uL 141* 176   NEUTROS ABS Thousands/µL  --  15 93*         Results from last 7 days   Lab Units 10/13/19  0504 10/12/19  2123   SODIUM mmol/L 136 136   POTASSIUM mmol/L 3 3* 3 4*   CHLORIDE mmol/L 102 99*   CO2 mmol/L 24 27   ANION GAP mmol/L 10 10   BUN mg/dL 16 16   CREATININE mg/dL 1 04 1 02   EGFR ml/min/1 73sq m 57 59   CALCIUM mg/dL 8 3 9 4     Results from last 7 days   Lab Units 10/12/19  2123   AST U/L 19   ALT U/L 25   ALK PHOS U/L 83   TOTAL PROTEIN g/dL 7 5   ALBUMIN g/dL 3 3*   TOTAL BILIRUBIN mg/dL 0 70         Results from last 7 days   Lab Units 10/13/19  0504 10/12/19  2123 GLUCOSE RANDOM mg/dL 141* 130           Results from last 7 days   Lab Units 10/12/19  2123   PROTIME seconds 13 6   INR  1 04   PTT seconds 38*             Results from last 7 days   Lab Units 10/12/19  2124   LACTIC ACID mmol/L 1 9               Results from last 7 days   Lab Units 10/12/19  2336   CLARITY UA  Clear   COLOR UA  Yellow   SPEC GRAV UA  1 020   PH UA  5 5   GLUCOSE UA mg/dl Negative   KETONES UA mg/dl Negative   BLOOD UA  Negative   PROTEIN UA mg/dl Trace*   NITRITE UA  Negative   BILIRUBIN UA  Negative   UROBILINOGEN UA E U /dl 0 2   LEUKOCYTES UA  Negative   WBC UA /hpf 1-2*   RBC UA /hpf 0-1*   BACTERIA UA /hpf Occasional   EPITHELIAL CELLS WET PREP /hpf Moderate*   MUCUS THREADS  Occasional*            Results from last 7 days   Lab Units 10/13/19  0504   TOTAL COUNTED  100       Ct  Chest/abd/pelvis   (  10/13)    There is a large staghorn calculus within the left kidney   There is marked dilatation of the left upper pole collecting system and there is associated left renal cortical thinning   Please see above description and discussion   Superimposed infection   should be excluded clinically  The urinary bladder is nearly empty, limiting its evaluation, however, there is a small amount of gas layering non-dependently within the most anterior superior aspect of the urinary bladder   Clinical correlation regarding the possibility of recent   manipulation is recommended   Correlation with urinalysis is suggested  There is a combination of atelectasis and infiltrate within the left lower lobe of the lung and left lingula   Infection should be excluded clinically   There is mild atelectasis at the right lung base  There is some stranding of the fat posterior to the medial aspect right scapula   Clinical correlation is recommended  Possible tiny gallstones   No CT evidence of acute cholecystitis  Bilateral renal cysts   No right hydronephrosis      CXR  ( 10/13)  NAD    ED Treatment: Medication Administration from 10/12/2019 2035 to 10/13/2019 0216       Date/Time Order Dose Route Action Comments     10/12/2019 2321 sodium chloride 0 9 % bolus 1,000 mL 0 mL Intravenous Stopped      10/12/2019 2121 sodium chloride 0 9 % bolus 1,000 mL 1,000 mL Intravenous New Bag      10/12/2019 2120 acetaminophen (TYLENOL) tablet 650 mg 650 mg Oral Given      10/12/2019 2215 ondansetron (ZOFRAN) injection 4 mg 4 mg Intravenous Given      10/12/2019 2215 HYDROmorphone (DILAUDID) injection 0 5 mg 0 5 mg Intravenous Given      10/12/2019 2353 iohexol (OMNIPAQUE) 350 MG/ML injection (MULTI-DOSE) 100 mL 100 mL Intravenous Given      10/13/2019 0121 sodium chloride 0 9 % bolus 1,000 mL 1,000 mL Intravenous New Bag      10/13/2019 0151 ketorolac (TORADOL) injection 15 mg 15 mg Intravenous Given      10/13/2019 0150 cefepime (MAXIPIME) 2,000 mg in dextrose 5 % 50 mL IVPB 2,000 mg Intravenous New Bag           Present on Admission:   Hypokalemia   Tobacco abuse   Pneumonia      Admitting Diagnosis: Pneumonia [J18 9]  Fever [R50 9]  History of cancer chemotherapy [Z92 21]  Age/Sex: 62 y o  female  Admission Orders:    Medications:  aspirin 81 mg Oral Daily   buPROPion 150 mg Oral BID   cefepime 2,000 mg Intravenous Q12H   gabapentin 600 mg Oral HS   heparin (porcine) 5,000 Units Subcutaneous Q8H [address]   montelukast 10 mg Oral HS   nicotine 1 patch Transdermal Daily   pravastatin 40 mg Oral Daily With Dinner   sertraline 100 mg Oral HS       sodium chloride 75 mL/hr Intravenous Continuous       acetaminophen 650 mg Oral Q6H PRN   cyclobenzaprine 5 mg Oral TID PRN   ondansetron 4 mg Intravenous Q6H PRN   pneumococcal 13-valent conjugate vaccine 0 5 mL Intramuscular Prior to discharge       IP CONSULT TO UROLOGY   Stool c/diff  Sputum  c/s    Network Utilization Review Department  Phone: [phone]; Fax [phone]  [email or web address]  ATTENTION: Please call with any questions or concerns to [phone]  and carefully listen to the prompts so that you are directed to the right person  Send all requests for admission clinical reviews, approved or denied determinations and any other requests to fax [phone]   All voicemails are confidential

## 2019-10-13 NOTE — PLAN OF CARE
Problem: PAIN - ADULT  Goal: Verbalizes/displays adequate comfort level or baseline comfort level  Description  Interventions:  - Encourage patient to monitor pain and request assistance  - Assess pain using appropriate pain scale  - Administer analgesics based on type and severity of pain and evaluate response  - Implement non-pharmacological measures as appropriate and evaluate response  - Consider cultural and social influences on pain and pain management  - Notify physician/advanced practitioner if interventions unsuccessful or patient reports new pain  Outcome: Progressing     Problem: INFECTION - ADULT  Goal: Absence or prevention of progression during hospitalization  Description  INTERVENTIONS:  - Assess and monitor for signs and symptoms of infection  - Monitor lab/diagnostic results  - Monitor all insertion sites, i e  indwelling lines, tubes, and drains  - [place] appropriate cooling/warming therapies per order  - Administer medications as ordered  - Instruct and encourage patient and family to use good hand hygiene technique     Outcome: Progressing  Goal: Absence of fever/infection during neutropenic period  Description  INTERVENTIONS:  - Monitor WBC    Outcome: Progressing     Problem: SAFETY ADULT  Goal: Patient will remain free of falls  Description  INTERVENTIONS:  - Assess patient frequently for physical needs  -  Identify cognitive and physical deficits and behaviors that affect risk of falls    -  [place] fall precautions as indicated by assessment   - Educate patient/family on patient safety including physical limitations  - Instruct patient to call for assistance with activity based on assessment  - Modify environment to reduce risk of injury  - Consider OT/PT consult to assist with strengthening/mobility  Outcome: Progressing  Goal: Maintain or return to baseline ADL function  Description  INTERVENTIONS:  -  Assess patient's ability to carry out ADLs; assess patient's baseline for ADL function and identify physical deficits which impact ability to perform ADLs (bathing, care of mouth/teeth, toileting, grooming, dressing, etc )  - Assess/evaluate cause of self-care deficits   - Assess range of motion  - Assess patient's mobility; develop plan if impaired  - Assess patient's need for assistive devices and provide as appropriate  - Encourage maximum independence but intervene and supervise when necessary  - Involve family in performance of ADLs  - Assess for home care needs following discharge   - Consider OT consult to assist with ADL evaluation and planning for discharge  - Provide patient education as appropriate  Outcome: Progressing  Goal: Maintain or return mobility status to optimal level  Description  INTERVENTIONS:  - Assess patient's baseline mobility status (ambulation, transfers, stairs, etc )    - Identify cognitive and physical deficits and behaviors that affect mobility  - [place] fall precautions as indicated by assessment  - Record patient progress and toleration of activity level on Mobility SBAR; progress patient to next Phase/Stage  - Instruct patient to call for assistance with activity based on assessment   Outcome: Progressing     Problem: DISCHARGE PLANNING  Goal: Discharge to home or other facility with appropriate resources  Description  INTERVENTIONS:  - Identify barriers to discharge w/patient and caregiver  - Arrange for needed discharge resources and transportation as appropriate  - Identify discharge learning needs (meds, wound care, etc )  - Refer to Case Management Department for coordinating discharge planning if the patient needs post-hospital services based on physician/advanced practitioner order or complex needs related to functional status, cognitive ability, or social support system   Outcome: Progressing     Problem: Knowledge Deficit  Goal: Patient/family/caregiver demonstrates understanding of disease process, treatment plan, medications, and discharge instructions  Description  Complete learning assessment and assess knowledge base    Interventions:  - Provide teaching at level of understanding  - Provide teaching via preferred learning methods  Outcome: Progressing     Problem: CARDIOVASCULAR - ADULT  Goal: Maintains optimal cardiac output and hemodynamic stability  Description  INTERVENTIONS:  - Monitor I/O, vital signs and rhythm  - Monitor for S/S and trends of decreased cardiac output  - Administer and titrate ordered vasoactive medications to optimize hemodynamic stability  - Assess quality of pulses, skin color and temperature  - Assess for signs of decreased coronary artery perfusion  - Instruct patient to report change in severity of symptoms  Outcome: Progressing  Goal: Absence of cardiac dysrhythmias or at baseline rhythm  Description  INTERVENTIONS:  - Continuous cardiac monitoring, vital signs, obtain 12 lead EKG if ordered  - Administer antiarrhythmic and heart rate control medications as ordered  - Monitor electrolytes and administer replacement therapy as ordered  Outcome: Progressing     Problem: METABOLIC, FLUID AND ELECTROLYTES - ADULT  Goal: Electrolytes maintained within normal limits  Description  INTERVENTIONS:  - Monitor labs and assess patient for signs and symptoms of electrolyte imbalances  - Administer electrolyte replacement as ordered  - Monitor response to electrolyte replacements, including repeat lab results as appropriate  - Instruct patient on fluid and nutrition as appropriate  Outcome: Progressing  Goal: Fluid balance maintained  Description  INTERVENTIONS:  - Monitor labs   - Monitor I/O and WT  - Instruct patient on fluid and nutrition as appropriate  - Assess for signs & symptoms of volume excess or deficit  Outcome: Progressing  Goal: Glucose maintained within target range  Description  INTERVENTIONS:  - Monitor Blood Glucose as ordered  - Assess for signs and symptoms of hyperglycemia and hypoglycemia  - Administer ordered medications to maintain glucose within target range  - Assess nutritional intake and initiate nutrition service referral as needed  Outcome: Progressing     Problem: SKIN/TISSUE INTEGRITY - ADULT  Goal: Skin integrity remains intact  Description  INTERVENTIONS  - Identify patients at risk for skin breakdown  - Assess and monitor skin integrity  - Assess and monitor nutrition and hydration status  - Monitor labs (i e  albumin)  - Assist with mobility/ambulation  - Relieve pressure over bony prominences  - Avoid friction and shearing  - Provide appropriate hygiene as needed including keeping skin clean and dry  - Evaluate need for skin moisturizer/barrier cream  - Collaborate with interdisciplinary team (i e  Nutrition, Rehabilitation, etc )   - Patient/family teaching   Outcome: Progressing  Goal: Incision(s), wounds(s) or drain site(s) healing without S/S of infection  Description  INTERVENTIONS  - Assess and document risk factors for skin impairment   - Assess and document dressing, incision, wound bed, drain sites and surrounding tissue  - Consider nutrition services referral as needed  - Oral mucous membranes remain intact  - Provide patient/ family education  Outcome: Progressing  Goal: Oral mucous membranes remain intact  Description  INTERVENTIONS  - Assess oral mucosa and hygiene practices  - Implement preventative oral hygiene regimen  - Implement oral medicated treatments as ordered     Outcome: Progressing

## 2019-10-13 NOTE — ASSESSMENT & PLAN NOTE
· Patient presented with fever and cough with evidence of a LLL infiltrate on CT of the chest   She is immunosuppressed - recently started on Chemo  · Continue IV Cefepime  No history of MRSA  · Check strep PNA, Legionella, Sputum Culture  Check RSV/Flu  Check Procalcitonin  · O2 sat monitoring with supplemental O2 p r n  to maintain sat 90% or greater by pulse oximetry  · Albuterol nebulizers prn for shortness of breath or wheezing, Incentive Spirometry, Supportive care

## 2019-10-13 NOTE — ASSESSMENT & PLAN NOTE
Likely cause is pneumonia  Treat underlying cause  CBC in a m  To monitor leukocytosis  P r n   Antipyretic

## 2019-10-13 NOTE — SEPSIS NOTE
Sepsis Note   Cliff Puentes 62 y o  female MRN: [id number]  Unit/Bed#: ED 24 Encounter: [id number]      qSOFA     Row Name 10/13/19 0130 10/13/19 0100 10/13/19 0030 10/13/19 0000 10/12/19 2330    Altered mental status GCS < 15              Respiratory Rate > / =22  0  0  0  0  0    Systolic BP < / =787  1  1  0  0  0    Q Sofa Score  1  1  0  0  0    Row Name 10/12/19 2215 10/12/19 2200 10/12/19 2046          Altered mental status GCS < 15            Respiratory Rate > / =22  0  0  1      Systolic BP < / =127  0  0  0      Q Sofa Score  0  0  1          Initial Sepsis Screening     Row Name 10/13/19 0156                Is the patient's history suggestive of a new or worsening infection? (!) Yes (Proceed)  -CE        Suspected source of infection  suspect infection, source unknown  -CE        Are two or more of the following signs & symptoms of infection both present and new to the patient? (!) Yes (Proceed)  -CE        Indicate SIRS criteria  Hyperthemia > 38 3C (100 9F); Leukocytosis (WBC > 03626 IJL)  -CE        If the answer is yes to both questions, suspicion of sepsis is present          If severe sepsis is present AND tissue hypoperfusion perists in the hour after fluid resuscitation or lactate > 4, the patient meets criteria for SEPTIC SHOCK          Are any of the following organ dysfunction criteria present within 6 hours of suspected infection and SIRS criteria that are NOT considered to be chronic conditions? No  -CE        Organ dysfunction          Date of presentation of severe sepsis          Time of presentation of severe sepsis          Tissue hypoperfusion persists in the hour after crystalloid fluid administration, evidenced, by either:          Was hypotension present within one hour of the conclusion of crystalloid fluid administration?           Date of presentation of septic shock          Time of presentation of septic shock            User Key  (r) = Recorded By, (t) = Taken By, (c) = Cosigned By    [address] Name Provider Foster Goff MD Physician

## 2019-10-13 NOTE — ED NOTES
Pt ambulatory to bathroom with assistance of daughter and RN to provide urine sample     Namon Kehr, RN  10/12/19 5640

## 2019-10-13 NOTE — ASSESSMENT & PLAN NOTE
Cefepime started in the emergency department and we will continue this  Guaifenesin  CBC in a m  O2 sat monitoring with supplemental O2 p r n  To maintain sat 90% or greater by pulse oximetry  P r n  Tylenol  P r n   Albuterol neb for shortness of breath or wheezing  Supportive care

## 2019-10-13 NOTE — ED PROVIDER NOTES
History  Chief Complaint   Patient presents with    Fever - 9 weeks to 74 years     pt presents with a fever that started yesterday , was seen by her PCP yesterday awaiting results for her urine culture, had her first chemo treatment wednesday , n/v/d        History provided by:  Patient  Fever - 9 weeks to 74 years   Max temp prior to arrival:  101  Temp source:  Oral  Severity:  Moderate  Onset quality:  Gradual  Duration:  1 day  Timing:  Constant  Progression:  Worsening  Chronicity:  New  Relieved by:  Nothing  Worsened by:  Nothing  Ineffective treatments:  None tried  Associated symptoms: chills, cough, diarrhea, headaches, myalgias, nausea and vomiting    Associated symptoms: no chest pain, no dysuria, no somnolence and no sore throat    Risk factors: immunosuppression (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)    Risk factors comment:  Pt is still a current smoker      None       Past Medical History:   Diagnosis Date    Asthma     Cancer ([facility] [address] )     Hyperlipidemia     Hypertension        Past Surgical History:   Procedure Laterality Date    LUNG REMOVAL, PARTIAL      lower lobe       History reviewed  No pertinent family history  I have reviewed and agree with the history as documented  Social History     Tobacco Use    Smoking status: Current Every Day Smoker     Packs/day: 0 50     Types: Cigarettes    Smokeless tobacco: Never Used   Substance Use Topics    Alcohol use: Never     Frequency: Never    Drug use: Not Currently        Review of Systems   Constitutional: Positive for chills and fever  HENT: Negative for sore throat  Respiratory: Positive for cough  Cardiovascular: Negative for chest pain  Gastrointestinal: Positive for diarrhea, nausea and vomiting  Genitourinary: Negative for dysuria  Musculoskeletal: Positive for myalgias  Neurological: Positive for headaches  All other systems reviewed and are negative        Physical Exam  Physical Exam   Constitutional: She is oriented to person, place, and time  She appears well-developed and well-nourished  No distress  HENT:   Head: Normocephalic and atraumatic  Nose: Nose normal    Mouth/Throat: Oropharynx is clear and moist    Eyes: Conjunctivae and EOM are normal    Neck: Normal range of motion  Neck supple  Cardiovascular: Normal rate, regular rhythm and normal heart sounds  Pulmonary/Chest: Effort normal and breath sounds normal  No respiratory distress  She exhibits tenderness (has right wall chest port placed, incision C/D/I, mild tenderness to the area)  Abdominal: Soft  She exhibits no distension  There is no tenderness  Musculoskeletal: Normal range of motion  She exhibits no edema  Neurological: She is alert and oriented to person, place, and time  Skin: Skin is warm and dry  No rash noted  She is not diaphoretic  Psychiatric: She has a normal mood and affect  Nursing note and vitals reviewed        Vital Signs  ED Triage Vitals   Temperature Pulse Respirations Blood Pressure SpO2   10/12/19 2046 10/12/19 2046 10/12/19 2046 10/12/19 2046 10/12/19 2046   (!) 102 9 °F (39 4 °C) (!) 115 22 132/59 91 %      Temp Source Heart Rate Source Patient Position - Orthostatic VS BP Location FiO2 (%)   10/12/19 2046 10/12/19 2046 10/12/19 2046 10/12/19 2046 --   Oral Monitor Sitting Left arm       Pain Score       10/12/19 2200       No Pain           Vitals:    10/13/19 0000 10/13/19 0030 10/13/19 0100 10/13/19 0130   BP: 118/53 101/56 (!) 88/49 92/54   Pulse: 100 94 90 88   Patient Position - Orthostatic VS:             Visual Acuity      ED Medications  Medications   sodium chloride 0 9 % bolus 1,000 mL (1,000 mL Intravenous New Bag 10/13/19 0121)   cefepime (MAXIPIME) 2,000 mg in dextrose 5 % 50 mL IVPB (2,000 mg Intravenous New Bag 10/13/19 0150)   nicotine (NICODERM CQ) 14 mg/24hr TD 24 hr patch 1 patch (has no administration in time range)   heparin (porcine) subcutaneous injection 5,000 Units (has no administration in time range)   acetaminophen (TYLENOL) tablet 650 mg (has no administration in time range)   sodium chloride 0 9 % bolus 1,000 mL (0 mL Intravenous Stopped 10/12/19 2321)   acetaminophen (TYLENOL) tablet 650 mg (650 mg Oral Given 10/12/19 2120)   ondansetron (ZOFRAN) injection 4 mg (4 mg Intravenous Given 10/12/19 2215)   HYDROmorphone (DILAUDID) injection 0 5 mg (0 5 mg Intravenous Given 10/12/19 2215)   iohexol (OMNIPAQUE) 350 MG/ML injection (MULTI-DOSE) 100 mL (100 mL Intravenous Given 10/12/19 2353)   ketorolac (TORADOL) injection 15 mg (15 mg Intravenous Given 10/13/19 0151)       Diagnostic Studies  Results Reviewed     Procedure Component Value Units Date/Time    Platelet count [[id number]]     Lab Status:  No result Specimen:  Blood     Urine Microscopic [[id number]]  (Abnormal) Collected:  10/12/19 2336    Lab Status:  Final result Specimen:  Urine, Clean Catch Updated:  10/13/19 0033     RBC, UA 0-1 /hpf      WBC, UA 1-2 /hpf      Epithelial Cells Moderate /hpf      Bacteria, UA Occasional /hpf      MUCUS THREADS Occasional    UA w Reflex to Microscopic w Reflex to Culture [[id number]]  (Abnormal) Collected:  10/12/19 2336    Lab Status:  Final result Specimen:  Urine, Clean Catch Updated:  10/13/19 0026     Color, UA Yellow     Clarity, UA Clear     Specific [place], UA 1 020     pH, UA 5 5     Leukocytes, UA Negative     Nitrite, UA Negative     Protein, UA Trace mg/dl      Glucose, UA Negative mg/dl      Ketones, UA Negative mg/dl      Urobilinogen, UA 0 2 E U /dl      Bilirubin, UA Negative     Blood, UA Negative    CBC and differential [[id number]]  (Abnormal) Collected:  10/12/19 2123    Lab Status:  Final result Specimen:  Blood from Arm, Left Updated:  10/12/19 2226     WBC 17 39 Thousand/uL      RBC 4 05 Million/uL      Hemoglobin 13 0 g/dL      Hematocrit 37 8 %      MCV 93 fL      MCH 32 1 pg      MCHC 34 4 g/dL      RDW 13 6 %      MPV 10 0 fL      Platelets 176 Thousands/uL      nRBC 0 /100 WBCs      Neutrophils Relative 91 %      Immat GRANS % 1 %      Lymphocytes Relative 7 %      Monocytes Relative 1 %      Eosinophils Relative 0 %      Basophils Relative 0 %      Neutrophils Absolute 15 93 Thousands/µL      Immature Grans Absolute 0 08 Thousand/uL      Lymphocytes Absolute 1 20 Thousands/µL      Monocytes Absolute 0 12 Thousand/µL      Eosinophils Absolute 0 03 Thousand/µL      Basophils Absolute 0 03 Thousands/µL     Lactic acid x2 [[id number]]  (Normal) Collected:  10/12/19 2124    Lab Status:  Final result Specimen:  Blood from Arm, Left Updated:  10/12/19 2158     LACTIC ACID 1 9 mmol/L     Narrative:       Result may be elevated if tourniquet was used during collection      Comprehensive metabolic panel [[id number]]  (Abnormal) Collected:  10/12/19 2123    Lab Status:  Final result Specimen:  Blood from Arm, Left Updated:  10/12/19 2155     Sodium 136 mmol/L      Potassium 3 4 mmol/L      Chloride 99 mmol/L      CO2 27 mmol/L      ANION GAP 10 mmol/L      BUN 16 mg/dL      Creatinine 1 02 mg/dL      Glucose 130 mg/dL      Calcium 9 4 mg/dL      AST 19 U/L      ALT 25 U/L      Alkaline Phosphatase 83 U/L      Total Protein 7 5 g/dL      Albumin 3 3 g/dL      Total Bilirubin 0 70 mg/dL      eGFR 59 ml/min/1 73sq m     Narrative:       [place] guidelines for Chronic Kidney Disease (CKD):     Stage 1 with normal or high GFR (GFR > 90 mL/min/1 73 square meters)    Stage 2 Mild CKD (GFR = 60-89 mL/min/1 73 square meters)    Stage 3A Moderate CKD (GFR = 45-59 mL/min/1 73 square meters)    Stage 3B Moderate CKD (GFR = 30-44 mL/min/1 73 square meters)    Stage 4 Severe CKD (GFR = 15-29 mL/min/1 73 square meters)    Stage 5 End Stage CKD (GFR <15 mL/min/1 73 square meters)  Note: GFR calculation is accurate only with a steady state creatinine    APTT [[id number]]  (Abnormal) Collected:  10/12/19 2123    Lab Status:  Final result Specimen: Blood from Arm, Left Updated:  10/12/19 2149     PTT 38 seconds     Protime-INR [[id number]]  (Normal) Collected:  10/12/19 2123    Lab Status:  Final result Specimen:  Blood from Arm, Left Updated:  10/12/19 2149     Protime 13 6 seconds      INR 1 04    Procalcitonin [[id number]] Collected:  10/12/19 2123    Lab Status: In process Specimen:  Blood from Arm, Left Updated:  10/12/19 2131    Blood culture #1 [[id number]] Collected:  10/12/19 2123    Lab Status: In process Specimen:  Blood from Arm, Left Updated:  10/12/19 2131    Blood culture #2 [[id number]] Collected:  10/12/19 2123    Lab Status: In process Specimen:  Blood from Arm, Right Updated:  10/12/19 2131                 CT chest abdomen pelvis w contrast   Final Result by Julio Cesar Almonte MD (10/13 0123)      There is a large staghorn calculus within the left kidney  There is marked dilatation of the left upper pole collecting system and there is associated left renal cortical thinning  Please see above description and discussion  Superimposed infection    should be excluded clinically  The urinary bladder is nearly empty, limiting its evaluation, however, there is a small amount of gas layering non-dependently within the most anterior superior aspect of the urinary bladder  Clinical correlation regarding the possibility of recent    manipulation is recommended  Correlation with urinalysis is suggested  There is a combination of atelectasis and infiltrate within the left lower lobe of the lung and left lingula  Infection should be excluded clinically  There is mild atelectasis at the right lung base  There is some stranding of the fat posterior to the medial aspect right scapula  Clinical correlation is recommended  Possible tiny gallstones  No CT evidence of acute cholecystitis  Bilateral renal cysts  No right hydronephrosis               I personally discussed this study with Jose Isaac on 10/13/2019 at 1:13 AM  Workstation performed: [id number]         XR chest 2 views   ED Interpretation by Megan Parker MD (10/12 0374)   NAD                 Procedures  ECG 12 Lead Documentation Only  Date/Time: 10/13/2019 1:58 AM  Performed by: Megan Parker MD  Authorized by: Megan Parker MD     Indications / Diagnosis:  Fever  ECG reviewed by me, the ED Provider: yes    Patient location:  ED  Rate:     ECG rate:  104    ECG rate assessment: tachycardic    Rhythm:     Rhythm: sinus tachycardia    ST segments:     ST segments:  Normal  T waves:     T waves: non-specific             ED Course                   Initial Sepsis Screening     Row Name 10/13/19 0156                Is the patient's history suggestive of a new or worsening infection? (!) Yes (Proceed)  -CE        Suspected source of infection  suspect infection, source unknown  -CE        Are two or more of the following signs & symptoms of infection both present and new to the patient? (!) Yes (Proceed)  -CE        Indicate SIRS criteria  Hyperthemia > 38 3C (100 9F); Leukocytosis (WBC > 00545 IJL)  -CE        If the answer is yes to both questions, suspicion of sepsis is present          If severe sepsis is present AND tissue hypoperfusion perists in the hour after fluid resuscitation or lactate > 4, the patient meets criteria for SEPTIC SHOCK          Are any of the following organ dysfunction criteria present within 6 hours of suspected infection and SIRS criteria that are NOT considered to be chronic conditions? No  -CE        Organ dysfunction          Date of presentation of severe sepsis          Time of presentation of severe sepsis          Tissue hypoperfusion persists in the hour after crystalloid fluid administration, evidenced, by either:          Was hypotension present within one hour of the conclusion of crystalloid fluid administration?           Date of presentation of septic shock          Time of presentation of septic shock   User Key  (r) = Recorded By, (t) = Taken By, (c) = Cosigned By    [address] Name Provider Pankaj España MD Physician                  MDM  Number of Diagnoses or Management Options  Fever: new and requires workup  History of cancer chemotherapy: new and requires workup  Pneumonia: new and requires workup     Amount and/or Complexity of Data Reviewed  Clinical lab tests: ordered and reviewed  Tests in the radiology section of CPT®: ordered and reviewed  Independent visualization of images, tracings, or specimens: yes        Disposition  Final diagnoses:   Fever   Pneumonia   History of cancer chemotherapy     Time reflects when diagnosis was documented in both MDM as applicable and the Disposition within this note     Time User Action Codes Description Comment    10/13/2019  1:53 AM Stevie, [address] [R50 9] Fever     10/13/2019  1:53 AM Crissy Gurrola [address] [J18 9] Pneumonia     10/13/2019  1:54 AM Braulio Jones [Z92 [id number]] History of cancer chemotherapy       ED Disposition     ED Disposition Condition Date/Time Comment    Admit Stable Sun Oct 13, 2019  1:53 AM Case was discussed with Dave Palomino and the patient's admission status was agreed to be Admission Status: inpatient status to the service of Dr Dave Palomino   Follow-up Information    None         Patient's Medications    No medications on file     No discharge procedures on file      ED Provider  Electronically Signed by           Cash Elkins MD  10/13/19 6300

## 2019-10-13 NOTE — ASSESSMENT & PLAN NOTE
· Patient reports diarrhea since starting chemotherapy - likely secondary to chemo - rule out infection  · Check stool culture and stool c diff

## 2019-10-13 NOTE — H&P
H&P- Sheridan Mckinney 1955, 62 y o  female MRN: [id number]    Unit/Bed#: -01 Encounter: [id number]    Primary Care Provider: No primary care provider on file  Date and time admitted to hospital: 10/12/2019  8:56 PM        * Sepsis [facility])  Assessment & Plan  Likely cause is pneumonia  Treat underlying cause  CBC in a m  To monitor leukocytosis  P r n  Antipyretic    Pneumonia  Assessment & Plan  Cefepime started in the emergency department and we will continue this  Guaifenesin  CBC in a m  O2 sat monitoring with supplemental O2 p r n  To maintain sat 90% or greater by pulse oximetry  P r n  Tylenol  P r n  Albuterol neb for shortness of breath or wheezing  Supportive care    Tobacco abuse  Assessment & Plan  Cessation counseled  Replacement offered    Hypokalemia  Assessment & Plan  Supplement potassium p o  20 mEq now  Recheck potassium in a m  VTE Prophylaxis: Heparin  / sequential compression device   Code Status:  Full code  POLST: There is no POLST form on file for this patient (pre-hospital)  Discussion with family:      Anticipated Length of Stay:  Patient will be admitted on an Inpatient basis with an anticipated length of stay of  more than 2 midnights  Justification for Hospital Stay:  Sepsis    Total Time for Visit, including Counseling / Coordination of Care: 30 minutes  Greater than 50% of this total time spent on direct patient counseling and coordination of care  Chief Complaint:   Fever    History of Present Illness:    Sheridan Mckinney is a 62 y o  female past medical history in this case significant for stage for tobacco abuse, 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 that started on 10/09/2019 as well as a history of partial pneumonectomy of the right lung who is presenting with a complaint of 1 day of fevers  Patient developed  fevers chills diarrhea headache nausea vomiting and myalgias with associated cough started yesterday    Patient presents to the emergency department for evaluation where she was found by CT scan to have left lower lobe pneumonia  Patient denies chest pain, hemoptysis  Review of Systems:    Review of Systems   Constitutional: Positive for chills and fever  Negative for activity change, appetite change, diaphoresis, fatigue and unexpected weight change  HENT: Negative  Negative for congestion, rhinorrhea, sinus pressure, sinus pain, sore throat and trouble swallowing  Eyes: Negative  Negative for photophobia, pain, discharge and visual disturbance  Respiratory: Positive for cough  Negative for chest tightness, shortness of breath, wheezing and stridor  Cardiovascular: Negative  Negative for chest pain, palpitations and leg swelling  Gastrointestinal: Positive for diarrhea, nausea and vomiting  Negative for abdominal distention, abdominal pain and constipation  Endocrine: Negative  Negative for cold intolerance, heat intolerance and polyuria  Genitourinary: Negative  Negative for difficulty urinating, dysuria, flank pain, frequency, hematuria and urgency  Musculoskeletal: Positive for myalgias  Negative for arthralgias, gait problem, joint swelling and neck stiffness  Skin: Negative  Negative for color change, pallor, rash and wound  Allergic/Immunologic: Negative  Negative for immunocompromised state  Neurological: Positive for headaches  Negative for dizziness, seizures, syncope, weakness, light-headedness and numbness  Hematological: Negative  Negative for adenopathy  Does not bruise/bleed easily  Psychiatric/Behavioral: Negative  Negative for confusion and hallucinations  The patient is not nervous/anxious          Past Medical and Surgical History:     Past Medical History:   Diagnosis Date    Asthma     Cancer ([facility] )     COPD (chronic obstructive pulmonary disease) ([facility] )     Hyperlipidemia     Hypertension     Psychiatric disorder     Depression       Past Surgical History:   Procedure Laterality Date    LUNG REMOVAL, PARTIAL      lower lobe       Meds/Allergies:    Prior to Admission medications    Medication Sig Start Date End Date Taking? Authorizing Provider   albuterol (2 5 mg/3 mL) 0 083 % nebulizer solution Take 2 5 mg by nebulization every 6 (six) hours as needed for wheezing or shortness of breath   Yes Historical Provider, MD   aspirin 81 mg chewable tablet Chew 81 mg daily   Yes Historical Provider, MD   buPROPion (WELLBUTRIN SR) 150 mg 12 hr tablet Take 150 mg by mouth 2 (two) times a day   Yes Historical Provider, MD   cyclobenzaprine (FLEXERIL) 10 mg tablet Take 5 mg by mouth 3 (three) times a day as needed for muscle spasms   Yes Historical Provider, MD   gabapentin (NEURONTIN) 600 MG tablet Take 600 mg by mouth daily at bedtime   Yes Historical Provider, MD   lisinopril-hydrochlorothiazide (PRINZIDE,ZESTORETIC) 10-12 5 MG per tablet Take 1 tablet by mouth daily   Yes Historical Provider, MD   montelukast (SINGULAIR) 10 mg tablet Take 10 mg by mouth daily at bedtime   Yes Historical Provider, MD   nicotine (NICODERM CQ) 21 mg/24 hr TD 24 hr patch Place 1 patch on the skin every 24 hours   Yes Historical Provider, MD   ondansetron (ZOFRAN) 8 mg tablet Take 8 mg by mouth every 8 (eight) hours as needed for nausea or vomiting   Yes Historical Provider, MD   potassium chloride (K-DUR,KLOR-CON) 10 mEq tablet Take 10 mEq by mouth daily   Yes Historical Provider, MD   sertraline (ZOLOFT) 100 mg tablet Take 100 mg by mouth daily at bedtime   Yes Historical Provider, MD   simvastatin (ZOCOR) 20 mg tablet Take 20 mg by mouth daily at bedtime   Yes Historical Provider, MD     I have reviewed home medications using allscripts  Allergies:    Allergies   Allergen Reactions    Morphine Vomiting     Other reaction(s): Vomiting  Other reaction(s): (PIMS) vomiting  Makes her vomit      Sulfa Antibiotics Rash       Social History:     Marital Status:    Occupation:    Patient Pre-hospital Living Situation: Independent  Patient Pre-hospital Level of Mobility:   Patient Pre-hospital Diet Restrictions:  None  Substance Use History:   Social History     Substance and Sexual Activity   Alcohol Use Never    Frequency: Never     Social History     Tobacco Use   Smoking Status Current Every Day Smoker    Packs/day: 0 50    Types: Cigarettes   Smokeless Tobacco Never Used     Social History     Substance and Sexual Activity   Drug Use Not Currently       Family History:    non-contributory    Physical Exam:     Vitals:   Blood Pressure: 97/55 (10/13/19 0434)  Pulse: 94 (10/13/19 0300)  Temperature: 100 1 °F (37 8 °C) (10/13/19 0246)  Temp Source: Oral (10/13/19 0246)  Respirations: 18 (10/13/19 0200)  Height: 5' 8" (172 7 cm) (10/13/19 0434)  Weight - Scale: 129 kg (284 lb 13 4 oz) (10/13/19 0434)  SpO2: 91 % (10/13/19 0300)    Physical Exam   Constitutional: She is oriented to person, place, and time  She appears well-developed and well-nourished  No distress  HENT:   Head: Normocephalic and atraumatic  Right Ear: External ear normal    Left Ear: External ear normal    Nose: Nose normal    Mouth/Throat: Oropharynx is clear and moist  No oropharyngeal exudate  Eyes: Conjunctivae are normal  Right eye exhibits no discharge  Left eye exhibits no discharge  No scleral icterus  Neck: Normal range of motion  Neck supple  No JVD present  No tracheal deviation present  No thyromegaly present  Cardiovascular: Normal rate, regular rhythm, normal heart sounds and intact distal pulses  Exam reveals no gallop and no friction rub  No murmur heard  Pulmonary/Chest: Effort normal and breath sounds normal  No stridor  No respiratory distress  She has no wheezes  She has no rales  Abdominal: Soft  Bowel sounds are normal  She exhibits no distension  There is no tenderness  There is no rebound and no guarding  Musculoskeletal: Normal range of motion  She exhibits no edema, tenderness or deformity     Neurological: She is alert and oriented to person, place, and time  She has normal reflexes  She exhibits normal muscle tone  Coordination normal    Skin: Skin is warm and dry  No rash noted  She is not diaphoretic  No erythema  No pallor  Right chest wall port without sign of erythema or exudate   Psychiatric: She has a normal mood and affect  Her behavior is normal  Judgment and thought content normal    Nursing note and vitals reviewed  Additional Data:     Lab Results: I have personally reviewed pertinent reports  Results from last 7 days   Lab Units 10/13/19  0504 10/12/19  2123   WBC Thousand/uL 14 68* 17 39*   HEMOGLOBIN g/dL 10 5* 13 0   HEMATOCRIT % 30 9* 37 8   PLATELETS Thousands/uL 141* 176   NEUTROS PCT %  --  91*   LYMPHS PCT %  --  7*   LYMPHO PCT % 6*  --    MONOS PCT %  --  1*   MONO PCT % 1*  --    EOS PCT % 1 0     Results from last 7 days   Lab Units 10/13/19  0504 10/12/19  2123   SODIUM mmol/L 136 136   POTASSIUM mmol/L 3 3* 3 4*   CHLORIDE mmol/L 102 99*   CO2 mmol/L 24 27   BUN mg/dL 16 16   CREATININE mg/dL 1 04 1 02   ANION GAP mmol/L 10 10   CALCIUM mg/dL 8 3 9 4   ALBUMIN g/dL  --  3 3*   TOTAL BILIRUBIN mg/dL  --  0 70   ALK PHOS U/L  --  83   ALT U/L  --  25   AST U/L  --  19   GLUCOSE RANDOM mg/dL 141* 130     Results from last 7 days   Lab Units 10/12/19  2123   INR  1 04             Results from last 7 days   Lab Units 10/12/19  2124   LACTIC ACID mmol/L 1 9       Imaging: I have personally reviewed pertinent reports  CT chest abdomen pelvis w contrast   Final Result by Ana Coker MD (10/13 0123)      There is a large staghorn calculus within the left kidney  There is marked dilatation of the left upper pole collecting system and there is associated left renal cortical thinning  Please see above description and discussion  Superimposed infection    should be excluded clinically        The urinary bladder is nearly empty, limiting its evaluation, however, there is a small amount of gas layering non-dependently within the most anterior superior aspect of the urinary bladder  Clinical correlation regarding the possibility of recent    manipulation is recommended  Correlation with urinalysis is suggested  There is a combination of atelectasis and infiltrate within the left lower lobe of the lung and left lingula  Infection should be excluded clinically  There is mild atelectasis at the right lung base  There is some stranding of the fat posterior to the medial aspect right scapula  Clinical correlation is recommended  Possible tiny gallstones  No CT evidence of acute cholecystitis  Bilateral renal cysts  No right hydronephrosis  I personally discussed this study with Ras Wiseman on 10/13/2019 at 1:13 AM                Workstation performed: [id number]         XR chest 2 views   ED Interpretation by Nirali Herring MD (10/12 2223)   NAD          EKG, Pathology, and Other Studies Reviewed on Admission:   · EKG:      Allscripts / Epic Records Reviewed: Yes     ** Please Note: This note has been constructed using a voice recognition system   **

## 2019-10-13 NOTE — ASSESSMENT & PLAN NOTE
· Sepsis POA as evidenced by fever, leukocytosis and tachycardia in the setting of pneumonia  · Lactic acid level normal   · On Cefepime IV  · Follow up blood cultures x 2   · Monitor CBC (WBC trending down)  Check Procalcitonin  · Monitor vitals, IVFs, supportive care

## 2019-10-13 NOTE — ASSESSMENT & PLAN NOTE
· Patient with metastatic leiomyosarcoma and recently started on Chemo  · She follows with an Oncologist through [facility]

## 2019-10-13 NOTE — ED NOTES
Patient transported to [address] [place] [address], [address] [place] [address]  10/12/19 3233

## 2019-10-13 NOTE — ED NOTES
1  CC  fever  2  Orientation status  A+O  3  Abnormal labs, vitals, focused assessment  Elevated wbc, pneumonia  4  Medication/ drips  Cefepime, zofran, nss bolus  5  Narcotics/pain medications last given  toradol 0151, dilaudid 2215  6  IV lines/drains/ etc   20 G RAC  7  Isolation status  none  8  Skin  Not assessed  9  Ambulation status  Assist x1   10   ED phone number  100 Lilli Velazco RN  10/13/19 3645

## 2019-10-13 NOTE — ASSESSMENT & PLAN NOTE
· CT A/P shows a large staghorn calculus on the left which occupies the majority of the left renal pelvis and extends into the left UPJ and proximal left ureter with extension of  the staghorn calculus into several lower pole calyces and marked dilatation of the left upper pole collecting system   · UA negative  · Urology consult pending

## 2019-10-13 NOTE — PROGRESS NOTES
Progress Note - Marine Forbes 1955, 62 y o  female MRN: [id number]    Unit/Bed#: -01 Encounter: [id number]    Primary Care Provider: No primary care provider on file  Date and time admitted to hospital: 10/12/2019  8:56 PM    Post-admission note:    Pneumonia  Assessment & Plan  · Patient presented with fever and cough with evidence of a LLL infiltrate on CT of the chest   She is immunosuppressed - recently started on Chemo  · Continue IV Cefepime  No history of MRSA  · Check strep PNA, Legionella, Sputum Culture  Check RSV/Flu  Check Procalcitonin  · O2 sat monitoring with supplemental O2 p r n  to maintain sat 90% or greater by pulse oximetry  · Albuterol nebulizers prn for shortness of breath or wheezing, Incentive Spirometry, Supportive care  * Sepsis ([facility] )  Assessment & Plan  · Sepsis POA as evidenced by fever, leukocytosis and tachycardia in the setting of pneumonia  · Lactic acid level normal   · On Cefepime IV  · Follow up blood cultures x 2   · Monitor CBC (WBC trending down)  Check Procalcitonin  · Monitor vitals, IVFs, supportive care  Hypokalemia  Assessment & Plan  · Supplement potassium p o  20 mEq  · Recheck potassium in a m  Check Mg  Diarrhea  Assessment & Plan  · Patient reports diarrhea since starting chemotherapy - likely secondary to chemo - rule out infection  · Check stool culture and stool c diff  Staghorn calculus  Assessment & Plan  · CT A/P shows a large staghorn calculus on the left which occupies the majority of the left renal pelvis and extends into the left UPJ and proximal left ureter with extension of  the staghorn calculus into several lower pole calyces and marked dilatation of the left upper pole collecting system   · UA negative  · Urology consult pending  Hypertension  Assessment & Plan  · Linisinpril/HCTZ held as BP low on admission but improving  Continue to hold at this time      Leiomyosarcoma [facility])  Assessment & Plan  · Patient with metastatic leiomyosarcoma and recently started on Chemo  · She follows with an Oncologist through [facility]  S: Patient reports not feeling well  Came in with fever, cough, nausea, and diarrhea  She recently started Chemo  Also, reports back pain  No dysuria  Also, with a HA this am   O: Gen: 63 y/o female in NAD  HEENT: NCAT  Lungs: no respiratory distress, decreased BS left side, no wheezes, CV: S1S2, RRR  Abdomen: Soft, NT, +BS  Ext: No C/C/E  Neuro: AOx3

## 2019-10-14 ENCOUNTER — TELEPHONE (OUTPATIENT)
Dept: OTHER | Facility: HOSPITAL | Age: 64
End: 2019-10-14

## 2019-10-14 ENCOUNTER — APPOINTMENT (INPATIENT)
Dept: INTERVENTIONAL RADIOLOGY/VASCULAR | Facility: HOSPITAL | Age: 64
DRG: 721 | End: 2019-10-14
Payer: COMMERCIAL

## 2019-10-14 LAB
ANION GAP SERPL CALCULATED.3IONS-SCNC: 7 MMOL/L (ref 4–13)
BASOPHILS # BLD MANUAL: 0.12 THOUSAND/UL (ref 0–0.1)
BASOPHILS NFR MAR MANUAL: 1 % (ref 0–1)
BUN SERPL-MCNC: 12 MG/DL (ref 5–25)
CALCIUM SERPL-MCNC: 8.6 MG/DL (ref 8.3–10.1)
CHLORIDE SERPL-SCNC: 104 MMOL/L (ref 100–108)
CO2 SERPL-SCNC: 27 MMOL/L (ref 21–32)
CREAT SERPL-MCNC: 0.95 MG/DL (ref 0.6–1.3)
EOSINOPHIL # BLD MANUAL: 0 THOUSAND/UL (ref 0–0.4)
EOSINOPHIL NFR BLD MANUAL: 0 % (ref 0–6)
ERYTHROCYTE [DISTWIDTH] IN BLOOD BY AUTOMATED COUNT: 13.6 % (ref 11.6–15.1)
GFR SERPL CREATININE-BSD FRML MDRD: 64 ML/MIN/1.73SQ M
GLUCOSE SERPL-MCNC: 116 MG/DL (ref 65–140)
HCT VFR BLD AUTO: 28.1 % (ref 34.8–46.1)
HGB BLD-MCNC: 9.4 G/DL (ref 11.5–15.4)
LYMPHOCYTES # BLD AUTO: 0.46 THOUSAND/UL (ref 0.6–4.47)
LYMPHOCYTES # BLD AUTO: 4 % (ref 14–44)
MAGNESIUM SERPL-MCNC: 1.7 MG/DL (ref 1.6–2.6)
MCH RBC QN AUTO: 32 PG (ref 26.8–34.3)
MCHC RBC AUTO-ENTMCNC: 33.5 G/DL (ref 31.4–37.4)
MCV RBC AUTO: 96 FL (ref 82–98)
MONOCYTES # BLD AUTO: 0 THOUSAND/UL (ref 0–1.22)
MONOCYTES NFR BLD: 0 % (ref 4–12)
NEUTROPHILS # BLD MANUAL: 10.95 THOUSAND/UL (ref 1.85–7.62)
NEUTS BAND NFR BLD MANUAL: 2 % (ref 0–8)
NEUTS SEG NFR BLD AUTO: 93 % (ref 43–75)
NRBC BLD AUTO-RTO: 0 /100 WBCS
PLATELET # BLD AUTO: 135 THOUSANDS/UL (ref 149–390)
PLATELET BLD QL SMEAR: ABNORMAL
PMV BLD AUTO: 10.2 FL (ref 8.9–12.7)
POTASSIUM SERPL-SCNC: 3.2 MMOL/L (ref 3.5–5.3)
PROCALCITONIN SERPL-MCNC: 0.67 NG/ML
RBC # BLD AUTO: 2.94 MILLION/UL (ref 3.81–5.12)
SODIUM SERPL-SCNC: 138 MMOL/L (ref 136–145)
TOTAL CELLS COUNTED SPEC: 100
WBC # BLD AUTO: 11.53 THOUSAND/UL (ref 4.31–10.16)

## 2019-10-14 PROCEDURE — 84145 PROCALCITONIN (PCT): CPT | Performed by: PHYSICIAN ASSISTANT

## 2019-10-14 PROCEDURE — 99232 SBSQ HOSP IP/OBS MODERATE 35: CPT | Performed by: PHYSICIAN ASSISTANT

## 2019-10-14 PROCEDURE — 99152 MOD SED SAME PHYS/QHP 5/>YRS: CPT

## 2019-10-14 PROCEDURE — 99254 IP/OBS CNSLTJ NEW/EST MOD 60: CPT | Performed by: DENTIST

## 2019-10-14 PROCEDURE — 83735 ASSAY OF MAGNESIUM: CPT | Performed by: PHYSICIAN ASSISTANT

## 2019-10-14 PROCEDURE — 36590 REMOVAL TUNNELED CV CATH: CPT

## 2019-10-14 PROCEDURE — 80048 BASIC METABOLIC PNL TOTAL CA: CPT | Performed by: PHYSICIAN ASSISTANT

## 2019-10-14 PROCEDURE — 36590 REMOVAL TUNNELED CV CATH: CPT | Performed by: STUDENT IN AN ORGANIZED HEALTH CARE EDUCATION/TRAINING PROGRAM

## 2019-10-14 PROCEDURE — 87070 CULTURE OTHR SPECIMN AEROBIC: CPT | Performed by: STUDENT IN AN ORGANIZED HEALTH CARE EDUCATION/TRAINING PROGRAM

## 2019-10-14 PROCEDURE — 99153 MOD SED SAME PHYS/QHP EA: CPT

## 2019-10-14 PROCEDURE — 85027 COMPLETE CBC AUTOMATED: CPT | Performed by: PHYSICIAN ASSISTANT

## 2019-10-14 PROCEDURE — 99254 IP/OBS CNSLTJ NEW/EST MOD 60: CPT | Performed by: UROLOGY

## 2019-10-14 PROCEDURE — 99152 MOD SED SAME PHYS/QHP 5/>YRS: CPT | Performed by: STUDENT IN AN ORGANIZED HEALTH CARE EDUCATION/TRAINING PROGRAM

## 2019-10-14 PROCEDURE — 85007 BL SMEAR W/DIFF WBC COUNT: CPT | Performed by: PHYSICIAN ASSISTANT

## 2019-10-14 PROCEDURE — 0JPT0WZ REMOVAL OF TOTALLY IMPLANTABLE VASCULAR ACCESS DEVICE FROM TRUNK SUBCUTANEOUS TISSUE AND FASCIA, OPEN APPROACH: ICD-10-PCS | Performed by: STUDENT IN AN ORGANIZED HEALTH CARE EDUCATION/TRAINING PROGRAM

## 2019-10-14 PROCEDURE — 99024 POSTOP FOLLOW-UP VISIT: CPT | Performed by: STUDENT IN AN ORGANIZED HEALTH CARE EDUCATION/TRAINING PROGRAM

## 2019-10-14 RX ORDER — POTASSIUM CHLORIDE 20 MEQ/1
40 TABLET, EXTENDED RELEASE ORAL ONCE
Status: COMPLETED | OUTPATIENT
Start: 2019-10-14 | End: 2019-10-14

## 2019-10-14 RX ORDER — LIDOCAINE HYDROCHLORIDE AND EPINEPHRINE 10; 10 MG/ML; UG/ML
INJECTION, SOLUTION INFILTRATION; PERINEURAL CODE/TRAUMA/SEDATION MEDICATION
Status: COMPLETED | OUTPATIENT
Start: 2019-10-14 | End: 2019-10-14

## 2019-10-14 RX ORDER — BUTALBITAL, ACETAMINOPHEN AND CAFFEINE 50; 325; 40 MG/1; MG/1; MG/1
1 TABLET ORAL ONCE
Status: COMPLETED | OUTPATIENT
Start: 2019-10-14 | End: 2019-10-14

## 2019-10-14 RX ORDER — METOCLOPRAMIDE HYDROCHLORIDE 5 MG/ML
5 INJECTION INTRAMUSCULAR; INTRAVENOUS EVERY 6 HOURS SCHEDULED
Status: DISCONTINUED | OUTPATIENT
Start: 2019-10-14 | End: 2019-10-16

## 2019-10-14 RX ORDER — MIDAZOLAM HYDROCHLORIDE 1 MG/ML
INJECTION INTRAMUSCULAR; INTRAVENOUS CODE/TRAUMA/SEDATION MEDICATION
Status: COMPLETED | OUTPATIENT
Start: 2019-10-14 | End: 2019-10-14

## 2019-10-14 RX ORDER — FENTANYL CITRATE 50 UG/ML
INJECTION, SOLUTION INTRAMUSCULAR; INTRAVENOUS CODE/TRAUMA/SEDATION MEDICATION
Status: COMPLETED | OUTPATIENT
Start: 2019-10-14 | End: 2019-10-14

## 2019-10-14 RX ADMIN — VANCOMYCIN HYDROCHLORIDE 1500 MG: 5 INJECTION, POWDER, LYOPHILIZED, FOR SOLUTION INTRAVENOUS at 14:43

## 2019-10-14 RX ADMIN — NICOTINE 1 PATCH: 14 PATCH TRANSDERMAL at 08:32

## 2019-10-14 RX ADMIN — POTASSIUM CHLORIDE 40 MEQ: 1500 TABLET, EXTENDED RELEASE ORAL at 11:19

## 2019-10-14 RX ADMIN — CEFEPIME HYDROCHLORIDE 2000 MG: 2 INJECTION, POWDER, FOR SOLUTION INTRAVENOUS at 01:10

## 2019-10-14 RX ADMIN — METOCLOPRAMIDE 5 MG: 5 INJECTION, SOLUTION INTRAMUSCULAR; INTRAVENOUS at 14:46

## 2019-10-14 RX ADMIN — HEPARIN SODIUM 5000 UNITS: 5000 INJECTION INTRAVENOUS; SUBCUTANEOUS at 14:46

## 2019-10-14 RX ADMIN — BUPROPION HYDROCHLORIDE 150 MG: 150 TABLET, FILM COATED, EXTENDED RELEASE ORAL at 17:49

## 2019-10-14 RX ADMIN — GABAPENTIN 600 MG: 300 CAPSULE ORAL at 22:09

## 2019-10-14 RX ADMIN — LIDOCAINE HYDROCHLORIDE,EPINEPHRINE BITARTRATE 12 ML: 10; .01 INJECTION, SOLUTION INFILTRATION; PERINEURAL at 16:56

## 2019-10-14 RX ADMIN — ACETAMINOPHEN 650 MG: 325 TABLET, FILM COATED ORAL at 17:48

## 2019-10-14 RX ADMIN — MONTELUKAST 10 MG: 10 TABLET, FILM COATED ORAL at 22:09

## 2019-10-14 RX ADMIN — SERTRALINE HYDROCHLORIDE 100 MG: 50 TABLET ORAL at 22:09

## 2019-10-14 RX ADMIN — MIDAZOLAM HYDROCHLORIDE 1 MG: 1 INJECTION, SOLUTION INTRAMUSCULAR; INTRAVENOUS at 16:50

## 2019-10-14 RX ADMIN — BUTALBITAL, ACETAMINOPHEN AND CAFFEINE 1 TABLET: 50; 325; 40 TABLET ORAL at 10:16

## 2019-10-14 RX ADMIN — HEPARIN SODIUM 5000 UNITS: 5000 INJECTION INTRAVENOUS; SUBCUTANEOUS at 22:09

## 2019-10-14 RX ADMIN — MIDAZOLAM HYDROCHLORIDE 1 MG: 1 INJECTION, SOLUTION INTRAMUSCULAR; INTRAVENOUS at 16:54

## 2019-10-14 RX ADMIN — METOCLOPRAMIDE 5 MG: 5 INJECTION, SOLUTION INTRAMUSCULAR; INTRAVENOUS at 17:49

## 2019-10-14 RX ADMIN — FENTANYL CITRATE 50 MCG: 50 INJECTION, SOLUTION INTRAMUSCULAR; INTRAVENOUS at 16:50

## 2019-10-14 RX ADMIN — METOCLOPRAMIDE 5 MG: 5 INJECTION, SOLUTION INTRAMUSCULAR; INTRAVENOUS at 23:05

## 2019-10-14 RX ADMIN — ONDANSETRON 4 MG: 2 INJECTION INTRAMUSCULAR; INTRAVENOUS at 10:17

## 2019-10-14 RX ADMIN — PRAVASTATIN SODIUM 40 MG: 40 TABLET ORAL at 17:48

## 2019-10-14 RX ADMIN — ASPIRIN 81 MG 81 MG: 81 TABLET ORAL at 08:30

## 2019-10-14 RX ADMIN — HEPARIN SODIUM 5000 UNITS: 5000 INJECTION INTRAVENOUS; SUBCUTANEOUS at 06:40

## 2019-10-14 RX ADMIN — FENTANYL CITRATE 50 MCG: 50 INJECTION, SOLUTION INTRAMUSCULAR; INTRAVENOUS at 16:58

## 2019-10-14 RX ADMIN — BUPROPION HYDROCHLORIDE 150 MG: 150 TABLET, FILM COATED, EXTENDED RELEASE ORAL at 08:30

## 2019-10-14 NOTE — MALNUTRITION/BMI
This medical record reflects one or more clinical indicators suggestive ofmorbid obesity  BMI Findings:  BMI Classifications: Morbid Obesity 40-44 9     Body mass index is 43 31 kg/m²  See Nutrition note dated 10/14/2019 for additional details  Completed nutrition assessment is viewable in the nutrition documentation

## 2019-10-14 NOTE — ASSESSMENT & PLAN NOTE
· Patient reports diarrhea since starting chemotherapy - likely secondary to chemo - rule out infection  · Check stool culture and stool c diff  · Supportive measures

## 2019-10-14 NOTE — QUICK NOTE
Spoke to surgery regarding port-a-cath removal   They recommended removal by IR  Consult to IR placed

## 2019-10-14 NOTE — PROGRESS NOTES
Vancomycin Assessment    Dinorah Tinoco is a 62 y o  female who is currently receiving vancomycin 15mg/kg q12h for skin-soft tissue infection, other port infection   Relevant clinical data and objective history reviewed:  Creatinine   Date Value Ref Range Status   10/14/2019 0 95 0 60 - 1 30 mg/dL Final     Comment:     Standardized to IDMS reference method   10/13/2019 1 04 0 60 - 1 30 mg/dL Final     Comment:     Standardized to IDMS reference method   10/12/2019 1 02 0 60 - 1 30 mg/dL Final     Comment:     Standardized to IDMS reference method     /71 (BP Location: Left arm)   Pulse 99   Temp 99 6 °F (37 6 °C) (Oral)   Resp 18   Ht 5' 8" (1 727 m)   Wt 129 kg (284 lb 13 4 oz)   SpO2 98%   BMI 43 31 kg/m²   I/O last 3 completed shifts: In: 3945 [P O :830; I V :900; IV Piggyback:1000]  Out: 400 [Urine:400]  Lab Results   Component Value Date/Time    BUN 12 10/14/2019 05:21 AM    WBC 11 53 (H) 10/14/2019 05:21 AM    HGB 9 4 (L) 10/14/2019 05:21 AM    HCT 28 1 (L) 10/14/2019 05:21 AM    MCV 96 10/14/2019 05:21 AM     (L) 10/14/2019 05:21 AM     Temp Readings from Last 3 Encounters:   10/14/19 99 6 °F (37 6 °C) (Oral)     Vancomycin Days of Therapy: 1    Assessment/Plan  The patient is currently on vancomycin utilizing scheduled dosing based on adjusted body weight (due to obesity)  Baseline risks associated with therapy include: none  The patient is currently receiving 15mg/kg q12h and after clinical evaluation will be changed to 17 5mg/kg q12h  Pharmacy will also follow closely for s/sx of nephrotoxicity, infusion reactions and appropriateness of therapy  BMP and CBC will be ordered per protocol  Plan for trough as patient approaches steady state, prior to the 5th  dose at approximately 1330 on 10/16  Due to infection severity, will target a trough of 15-20 (appropriate for most indications)     Pharmacy will continue to follow the patients culture results and clinical progress daily      Denise Castellanos, Pharmacist

## 2019-10-14 NOTE — ASSESSMENT & PLAN NOTE
· Patient reports diarrhea since starting chemotherapy - likely secondary to chemo - rule out infection  · Stool studies via C diff and bacterial studies pending  · Supportive measures

## 2019-10-14 NOTE — UTILIZATION REVIEW
Notification of Inpatient Admission/Inpatient Authorization Request  This is a Notification of Inpatient Admission/Request for Inpatient Authorization for our facility [facility]  Be advised that this patient was admitted to our facility under Inpatient Status  Please contact the Utilization Review Department where the patient is receiving care services for additional admission information  Facility: [facility]  Place of Service Code: 21   Place of Service Name: [address]  Presentation Date & Time: 10/12/2019  8:56 PM  Inpatient Admission Date & Time: 10/13/19 0155  Discharge Date & Time: No discharge date for patient encounter  Discharge Disposition (if discharged): Final discharge disposition not confirmed  Attending Physician and NPI#: Radha Thurston, [address] [[id number]]  Admission Orders (From admission, onward)     Ordered        10/13/19 0155  Inpatient Admission  Once                 [facility] Utilization Review Department  Phone: [phone]; Fax [phone]  [email or web address]  ATTENTION: Please call with any questions or concerns to [phone]  and carefully listen to the prompts so that you are directed to the right person  Send all requests for admission clinical reviews, approved or denied determinations and any other requests to fax [phone]   All voicemails are confidential

## 2019-10-14 NOTE — ASSESSMENT & PLAN NOTE
· Supplement potassium 40 MEQ    · Hypokalemia improved slightly will replete again BMP in the morning

## 2019-10-14 NOTE — CONSULTS
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  She has a  history significant for nephrolithiasis known to Dr Madeline Regan with prior lithotripsy  CT of the abdomen and pelvis demonstrated large left staghorn calculus and dilatation of left upper pole  T-max 102° point fever current temperature 99 6°  She is normotensive  Mild leukocytosis of 11 5 K and normal creatinine  Patient denies current flank, abdominal, suprapubic or groin pain, dysuria or gross hematuria  Urologic consultation requested for further management recommendations  Active Problems:    Patient Active Problem List   Diagnosis    Hypokalemia    Sepsis ([address] )    Tobacco abuse    Pneumonia    [facility]    Hypertension    Staghorn calculus    Diarrhea            Impressions  Left staghorn Calculus--long-standing  Review of Care everywhere demonstrate similar findings on prior CT  Patient denies renal colic  Creatinine within normal limits    Recommendations  Continue medical optimization and antibiosis for primary concern--pneumonia  Would not recommend  surgical instrumentation at this time  If patient demonstrates evidence of colic, renal impairment or  sepsis--may require percutaneous nephrostomy  With the extent of stone burden, patient will necessitate percutaneous approach for definitive stone management in the future    Elective surgical intervention is precluded while on chemotherapy  Patient will require urologic follow-up non urgently however  Our service will contact patient/caregiver with hospital follow-up appointment date and time  Past Medical History:   Diagnosis Date    Asthma     Cancer [facility]     COPD [facility]chronic obstructive pulmonary disease) ([facility] )     Hyperlipidemia     Hypertension     Psychiatric disorder     Depression       Past Surgical History:   Procedure Laterality Date    LUNG REMOVAL, PARTIAL      lower lobe       History reviewed  No pertinent family history      Social History     Socioeconomic History    Marital status:      Spouse name: Not on file    Number of children: Not on file    Years of education: Not on file    Highest education level: Not on file   Occupational History    Not on file   Social Needs    Financial resource strain: Not on file    Food insecurity:     Worry: Not on file     Inability: Not on file    Transportation needs:     Medical: Not on file     Non-medical: Not on file   Tobacco Use    Smoking status: Current Every Day Smoker     Packs/day: 0 50     Types: Cigarettes    Smokeless tobacco: Never Used   Substance and Sexual Activity    Alcohol use: Never     Frequency: Never    Drug use: Not Currently    Sexual activity: Not on file   Lifestyle    Physical activity:     Days per week: Not on file     Minutes per session: Not on file    Stress: Not on file   Relationships    Social connections:     Talks on phone: Not on file     Gets together: Not on file     Attends [employer] service: Not on file     Active member of club or organization: Not on file     Attends meetings of clubs or organizations: Not on file     Relationship status: Not on file    Intimate partner violence:     Fear of current or ex partner: Not on file     Emotionally abused: Not on file     Physically abused: Not on file     Forced sexual activity: Not on file   Other Topics Concern    Not on file   Social History Narrative  Not on file       Allergies   Allergen Reactions    Morphine Vomiting     Other reaction(s): Vomiting  Other reaction(s): (PIMS) vomiting  Makes her vomit      Sulfa Antibiotics Rash       Review of Systems  10 point review of systems negative except as noted in HPI  Constitutional:   positive for  - chills, fever and malaise  Cardiovascular:   no chest pain or dyspnea on exertion  Gastrointestinal:   no abdominal pain, change in bowel habits, or black or bloody stools  Genito-Urinary:   no dysuria, trouble voiding, or hematuria  Neurological:   no TIA or stroke symptoms     Chart Review   Allergies, current medications, history, problem list    Vital Signs  /71 (BP Location: Left arm)   Pulse 99   Temp 99 6 °F (37 6 °C) (Oral)   Resp 18   Ht 5' 8" (1 727 m)   Wt 129 kg (284 lb 13 4 oz)   SpO2 98%   BMI 43 31 kg/m²     Physical Exam  General appearance: alert and oriented, in no acute distress, appears stated age, cooperative and no distress  Head: Normocephalic, without obvious abnormality, atraumatic  Neck: no adenopathy, no carotid bruit, no JVD, supple, symmetrical, trachea midline and thyroid not enlarged, symmetric, no tenderness/mass/nodules  Lungs: diminished breath sounds  Heart: regular rate and rhythm, S1, S2 normal, no murmur, click, rub or gallop  Abdomen: soft, non-tender; bowel sounds normal; no masses,  no organomegaly  Extremities: extremities normal, warm and well-perfused; no cyanosis, clubbing, or edema  Pulses: 2+ and symmetric  Neurologic: Grossly normal  No urinary drains     Laboratory Studies  Lab Results   Component Value Date    K 3 2 (L) 10/14/2019     10/14/2019    CO2 27 10/14/2019    CREATININE 0 95 10/14/2019    BUN 12 10/14/2019    MG 1 7 10/14/2019     Lab Results   Component Value Date    WBC 11 53 (H) 10/14/2019    RBC 2 94 (L) 10/14/2019    HGB 9 4 (L) 10/14/2019    HCT 28 1 (L) 10/14/2019    MCV 96 10/14/2019    MCH 32 0 10/14/2019    RDW 13 6 10/14/2019  (L) 10/14/2019         Imaging and Other Studies  )  Xr Chest 2 Views    Result Date: 10/13/2019  Narrative: CHEST INDICATION:   fever/chemo therapy, low pulse ox  COMPARISON:  None EXAM PERFORMED/VIEWS:  XR CHEST PA & LATERAL FINDINGS:  Right chest Chemo-Port with the central line catheter tip in the distal superior vena cava  Cardiomediastinal silhouette appears unremarkable  Uncoiled aorta  The lungs are clear  No pneumothorax or pleural effusion  Osseous structures appear within normal limits for patient age  Impression: No acute cardiopulmonary disease  Workstation performed: [id number]     Ct Chest Abdomen Pelvis W Contrast    Result Date: 10/13/2019  Narrative: CT CHEST, ABDOMEN AND PELVIS WITH IV CONTRAST INDICATION:   fever/vomiting/diarrhea, lipomyosarcoma stage 4, just started palliative chemo at [facility]  History of hypertension, tobacco use, prior partial pneumonectomy  COMPARISON:  None available  TECHNIQUE: CT examination of the chest, abdomen and pelvis was performed  Axial, sagittal, and coronal 2D reformatted images were created from the source data and submitted for interpretation  Radiation dose length product (DLP) for this visit:  2087 mGy-cm   This examination, like all CT scans performed in the [facility], was performed utilizing techniques to minimize radiation dose exposure, including the use of iterative reconstruction and automated exposure control  IV Contrast:  100 mL of iohexol (OMNIPAQUE) Enteric Contrast: Enteric contrast was not administered  FINDINGS: CHEST LUNGS:  There is some atelectasis and infiltrate within the left lower lobe of the lung and left lingula; infection should be excluded clinically  There is mild atelectasis at the right lung base    There is some postoperative scarring and suture material within the medial aspect of the lower right lung extending into the region of the azygoesophageal recess, consistent with the history of prior partial pneumonectomy  There is focal bronchiectasis at the right apex  PLEURA:  Unremarkable  HEART/GREAT VESSELS:  Unremarkable for patient's age  MEDIASTINUM AND SIVAN:  Unremarkable  CHEST WALL AND LOWER NECK:   A Port-A-Cath is present on the right  There is some stranding of the fat posterior to the medial aspect right scapula, best demonstrated on series 2 image 1-4  Clinical correlation is recommended  ABDOMEN LIVER/BILIARY TREE:  Unremarkable  GALLBLADDER:  Possible tiny gallstones  No pericholecystic inflammatory change  SPLEEN:  Unremarkable  PANCREAS:  Unremarkable  ADRENAL GLANDS:  Unremarkable  KIDNEYS/URETERS:  Bilateral renal cysts  No right hydronephrosis  There is a large staghorn calculus on the left which occupies the majority of the left renal pelvis and extends into the left UPJ and proximal left ureter  There is also extension of the staghorn calculus into several lower pole calyces  There is marked dilatation of the left upper pole collecting system and there is renal cortical thinning involving the left kidney, most pronounced involving the upper pole, suggesting that this may  be a chronic process  Superimposed infection should be excluded clinically  Of note, the left nephrogram is only minimally diminished compared with the right  STOMACH AND BOWEL:  There is no evidence of bowel obstruction  There is some residual oral contrast material within a few bowel loops, possibly related to prior imaging study at another facility  APPENDIX:  No findings to suggest appendicitis  ABDOMINOPELVIC CAVITY:  No significant ascites  No pneumoperitoneum  VESSELS:  There is atherosclerosis  There is no abdominal aortic aneurysm  PELVIS REPRODUCTIVE ORGANS:  Unremarkable for patient's age  URINARY BLADDER:  The bladder is nearly empty, limiting its evaluation  There is a small amount of gas layering non-dependently within the most anterior superior aspect of the urinary bladder    Clinical correlation regarding the possibility of recent manipulation is recommended  Correlation with urinalysis is suggested  ABDOMINAL WALL/INGUINAL REGIONS:  Unremarkable  OSSEOUS STRUCTURES:  No acute fracture or destructive osseous lesion  Impression: There is a large staghorn calculus within the left kidney  There is marked dilatation of the left upper pole collecting system and there is associated left renal cortical thinning  Please see above description and discussion  Superimposed infection should be excluded clinically  The urinary bladder is nearly empty, limiting its evaluation, however, there is a small amount of gas layering non-dependently within the most anterior superior aspect of the urinary bladder  Clinical correlation regarding the possibility of recent manipulation is recommended  Correlation with urinalysis is suggested  There is a combination of atelectasis and infiltrate within the left lower lobe of the lung and left lingula  Infection should be excluded clinically  There is mild atelectasis at the right lung base  There is some stranding of the fat posterior to the medial aspect right scapula  Clinical correlation is recommended  Possible tiny gallstones  No CT evidence of acute cholecystitis  Bilateral renal cysts  No right hydronephrosis    I personally discussed this study with Emanuel Elliott on 10/13/2019 at 1:13 AM  Workstation performed: [id number]       Medications     Current Facility-Administered Medications:  acetaminophen 650 mg Oral Q6H PRN Lyndsay Byers MD    aspirin 81 mg Oral Daily Derick Squires PA-C    buPROPion 150 mg Oral BID Derick Squires PA-C    butalbital-acetaminophen-caffeine 1 tablet Oral Once Derick Squires PA-C    cefepime 2,000 mg Intravenous Q12H Lyndsay Byers MD Last Rate: 2,000 mg (10/14/19 0110)   cyclobenzaprine 5 mg Oral TID PRN Derick Squires PA-C    gabapentin 600 mg Oral HS Orin Velasquez PA-C    heparin (porcine) 5,000 Units Subcutaneous [facility] Luis Cope MD    metoclopramide 5 mg Intravenous Q6H [address] Orin Velasquez PA-C    montelukast 10 mg Oral HS Arlen Ferrell PA-C    nicotine 1 patch Transdermal Daily Luis Cope MD    ondansetron 4 mg Intravenous Q6H PRN Arlen Ferrell PA-C    pneumococcal 13-valent conjugate vaccine 0 5 mL Intramuscular Prior to discharge Luis Cope MD    potassium chloride 40 mEq Oral Once Arlen Ferrell PA-C    pravastatin 40 mg Oral Daily With [employer], PA-C    sertraline 100 mg Oral HS Arlen Ferrell PA-C    sodium chloride 75 mL/hr Intravenous Continuous Luis Cope MD Last Rate: 75 mL/hr (10/13/19 2389)         Total time spent with patient 25 minutes, >50% spent counseling and/or coordination of care           BRITTNY Maurer

## 2019-10-14 NOTE — NURSING NOTE
Pt returned from IR, post right chest port removal, AAOx4, no c/o pain, dressing right chest dry and intact  Will continue to monitor

## 2019-10-14 NOTE — CONSULTS
Consultation - Infectious Disease   Shayne Dennis 62 y o  female MRN: [id number]  Unit/Bed#: -72 Encounter: [id number]      IMPRESSION & RECOMMENDATIONS:   Impression/Recommendations:  1  Sepsis, present on admission  Evidenced by fever, leukocytosis, tachycardia  Likely sources include left pneumonia, possible Port-A-Cath infection  Rule out associated bacteremia  Rec:   · Discontinue cefepime for now  · Add vancomycin  · Consult pharmacy for vancomycin management  · Watch fever curve  · Monitor leukocytosis  · Recheck procalcitonin level  · Follow-up blood cultures    2  Right-sided chest wall cellulitis overlying Port-A-Cath  Likely indicates Port-A-Cath infection  Port was placed on 10/02/2019  Rec:   · Vancomycin as above  · Recommend consult surgery for Port-A-Cath removal    3  Possible left-sided pneumonia  So far strep pneumo urine antigen and Legionella urine antigen are both negative  Sputum culture with gpcs and gnrs on gs, cx in progress  Rec:   · Follow-up sputum culture  4   Diarrhea - patient reports 2 episodes of diarrhea daily  No reported incontinence  Rec:   · Follow-up C diff    5  Leiomyosarcoma, 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 10/09/2019  Rec:   · Currently being management oncologist at [facility]  6   L staghorn calculus - UA negative, Cr normal   Evaluated by Urology, no intervention at this time  Rec:   · Management as per Urology    Antibiotics:  Cefepime #2    Thank you for this consultation  We will follow along with you  HISTORY OF PRESENT ILLNESS:  Reason for Consult:  Sepsis/pneumonia    HPI: Shayne Dennis is a 62 y o  female with a past medical history of leiomyosarcoma presented to the emergency department on 10/12/2019 with fever  Patient has a history of leiomyosarcoma started on palliative chemo 10/09/2019  Patient had Port-A-Cath placed on 10/02/2019 for planned chemo    Patient states the day after chemo she started running temperatures of 99°  The next day she was up over 100°  Her daughter then came to check on her and took her to the emergency department where her fever was up over 101  Patient complained of headache and productive cough  Otherwise she has no new complaints  Emergency department patient have fever 102 9° with a heart rate of 115  She was given a dose of cefepime in the emergency department  She was found to have leukocytosis of 17 39  Workup included an initial procalcitonin which was negative but increase to 0 67 today  Lactic acid 1 9, UA negative influenza/RSV PCR negative, Legionella and strep pneumo urine antigens negative  Patient has blood cultures and C diff currently pending  Chest x-ray was negative  Although CT scan of the chest abdomen pelvis did show a combination of atelectasis and infiltrate in the left lower lobe of the lung and left lingula  Patient was subsequently admitted and continued on cefepime  REVIEW OF SYSTEMS:  Constitutional:  Positive fever  HEENT:  Positive headache  Cardiovascular:  Denies chest pain  Pulmonary:  Positive productive cough  GI:  Positive nausea vomiting diarrhea  :  Denies dysuria  Back:  Denies new onset back pains  Skin:  Denies rash  Extremities:  Denies edema  Musculoskeletal:  Denies arthralgias myalgias  A complete system-based review of systems is otherwise negative      PAST MEDICAL HISTORY:  Past Medical History:   Diagnosis Date    Asthma     [facility] [facility]     [facility]     Hyperlipidemia     Hypertension     Psychiatric disorder     Depression     Past Surgical History:   Procedure Laterality Date    LUNG REMOVAL, PARTIAL      lower lobe       FAMILY HISTORY:  Non-contributory    SOCIAL HISTORY:  Social History     Substance and Sexual Activity   Alcohol Use Never    Frequency: Never     Social History     Substance and Sexual Activity   Drug Use Not Currently     Social History Tobacco Use   Smoking Status Current Every Day Smoker    Packs/day: 0 50    Types: Cigarettes   Smokeless Tobacco Never Used       ALLERGIES:  Allergies   Allergen Reactions    Morphine Vomiting     Other reaction(s): Vomiting  Other reaction(s): (PIMS) vomiting  Makes her vomit      Sulfa Antibiotics Rash       MEDICATIONS:  All current active medications have been reviewed  PHYSICAL EXAM:  Vitals:  Temp:  [98 2 °F (36 8 °C)-102 3 °F (39 1 °C)] 99 6 °F (37 6 °C)  HR:  [] 99  Resp:  [18] 18  BP: (127-132)/(71-80) 127/71  SpO2:  [95 %-98 %] 98 %  Temp (24hrs), Av 5 °F (38 1 °C), Min:98 2 °F (36 8 °C), Max:102 3 °F (39 1 °C)  Current: Temperature: 99 6 °F (37 6 °C)     Physical Exam:  General:  Well-nourished, well-developed, Flushed  Eyes:  Conjunctive clear with no hemorrhages or effusions  Oropharynx:  No ulcers, no lesions  Neck:  Supple, no lymphadenopathy, no meningismus  Lungs:  Clear to auscultation bilaterally, no accessory muscle use  Cardiac:  Regular rate and rhythm, no murmurs  Abdomen:  Soft, non-tender, non-distended  :  No Lugo  Back:  Nontender to palpation  Extremities:  No peripheral cyanosis, clubbing, or edema  Skin:  No rashes, no ulcers, right chest wall with erythema, warmth and tenderness to palpation overlying Port-A-Cath and associated incision site  Neurological:  Moves all four extremities spontaneously, sensation grossly intact      LABS, IMAGING, & OTHER STUDIES:  Lab Results:  I have personally reviewed pertinent labs    Results from last 7 days   Lab Units 10/14/19  0521 10/13/19  0504 10/12/19  2123   POTASSIUM mmol/L 3 2* 3 3* 3 4*   CHLORIDE mmol/L 104 102 99*   CO2 mmol/L 27 24 27   BUN mg/dL 12 16 16   CREATININE mg/dL 0 95 1 04 1 02   EGFR ml/min/1 73sq m 64 57 59   CALCIUM mg/dL 8 6 8 3 9 4   AST U/L  --   --  19   ALT U/L  --   --  25   ALK PHOS U/L  --   --  83     Results from last 7 days   Lab Units 10/14/19  0521 10/13/19  0504 10/12/19  2123   WBC Thousand/uL 11 53* 14 68* 17 39*   HEMOGLOBIN g/dL 9 4* 10 5* 13 0   PLATELETS Thousands/uL 135* 141* 176     Results from last 7 days   Lab Units 10/13/19  1724 10/13/19  1136 10/13/19  1002   GRAM STAIN RESULT  Rare Epithelial Cells*  Rare Polys*  2+ Gram negative rods*  2+ Gram positive cocci in pairs*  1+ Gram positive cocci in clusters*  --   --    INFLUENZA B PCR   --   --  Not Detected   RSV PCR   --   --  Not Detected   LEGIONELLA URINARY ANTIGEN   --  Negative  --          Imaging Studies:   I have personally reviewed pertinent imaging study reports and images in PACS  EKG, Pathology, and Other Studies:   I have personally reviewed pertinent reports

## 2019-10-14 NOTE — ASSESSMENT & PLAN NOTE
· Sepsis POA as evidenced by fever, leukocytosis and tachycardia  · She is immunosuppressed - recently started on Chemo  · Lactic acid level normal   · Continue vancomycin IV per ID recommendations  · Blood cultures negative x2 for growth  · Monitor CBC (WBC trending down)  Procalcitonin trended up today 1 32  · Monitor vitals, IVFs, supportive care  · ID input appreciated: Sepsis suspected in the setting of a port-a-cath infection  Port-A-Cath removed by IR yesterday  · 10/14 patient continues with intermittent fevers appreciate ongoing recommendations from ID

## 2019-10-14 NOTE — ASSESSMENT & PLAN NOTE
· Patient presented with fever and with evidence of a LLL infiltrate on CT of the chest   · Started on IV Cefepime id switched to IV vancomycin  · Strep PNA and Legionella negative  RSV/Flu negative  · Sputum culture pending preliminary reviewed will await final growth  · ID input appreciated: less suspicious of a bacterial pneumonia as the cause of her Sepsis - suspected to be from the infected Port-a-cath

## 2019-10-14 NOTE — ASSESSMENT & PLAN NOTE
· Patient with metastatic leiomyosarcoma and patient recently started on Chemo  · She [place] with an Oncologist through [facility]      · Will attempt to contact at patient's request her oncologist who is Moe Myers -[phone]; called placed to office for Dr Ruthy Junior

## 2019-10-14 NOTE — ASSESSMENT & PLAN NOTE
· CT A/P shows a large staghorn calculus on the left which occupies the majority of the left renal pelvis and extends into the left UPJ and proximal left ureter with extension of  the staghorn calculus into several lower pole calyces and marked dilatation of the left upper pole collecting system   · UA negative  · Urology input appreciated  · Urology recommendations reviewed not recommending any urological intervention at this time given patient is asymptomatic  If patient would show evidence of urosepsis call a would recommend a percutaneous nephrostomy tube  Recommending close monitoring while in-patient an outpatient follow-up upon discharge

## 2019-10-14 NOTE — TELEPHONE ENCOUNTER
Marine Forbes is a 61-year-old female seen in consultation at [facility] for staghorn calculus  Please contact patient with non urgent hospital follow-up in approximately 2 months  Can be seen by any provider  Thank you

## 2019-10-14 NOTE — BRIEF OP NOTE (RAD/CATH)
Port Removal  Procedure Note    PATIENT NAME: Jono Gonzalez  : 1955  MRN: [id number]     Pre-op Diagnosis:   1  Fever    2  Pneumonia    3  History of cancer chemotherapy    4  Staghorn calculus    5  Sepsis ([facility] )    6  Infection due to Port-A-Cath      Post-op Diagnosis:   1  Fever    2  Pneumonia    3  History of cancer chemotherapy    4  Staghorn calculus    5  Sepsis ([facility] )    6  Infection due to Port-A-Cath        Surgeon:   Pilar Rock MD  Assistants:     No qualified resident was available, Resident is only observing    Estimated Blood Loss: 5 mL  Findings:   Removal of infected R chest port  Specimens: infected port sent to lab  Complications:  None  Anesthesia: Conscious sedation    Pilar Rock MD     Date: 10/14/2019  Time: 5:25 PM    Thank you for allowing me to participate in the care of Jono Gonzalez  Please don't hesitate to call, text, email, or TigerText with any questions  Pilar Rock MD  Interventional Radiologist  [facility]  Personal Cell: [phone]  [email or web address]

## 2019-10-14 NOTE — PROGRESS NOTES
Interventional Radiology Preprocedure Note    History/Indication for procedure:   Chantell Hunter is a 62 y o  female with a PMH of leiomyosarcoma who had a port placed at [facility] last week  Since Saturday she has had high grade temperature, N/V, and pain at port site  The site appears infected      She presents for port removal     Relevant past medical history:    Past Medical History:   Diagnosis Date    Asthma     Cancer ([place] [address] )     COPD (chronic obstructive pulmonary disease) ([place] [address] )     Hyperlipidemia     Hypertension     Psychiatric disorder     Depression     Patient Active Problem List   Diagnosis    Hypokalemia    Sepsis ([place] [address] )    Tobacco abuse    Pneumonia    Leiomyosarcoma ([place] [address] )    Hypertension    Staghorn calculus    Diarrhea       /78   Pulse 105   Temp (!) 102 8 °F (39 3 °C)   Resp 18   Ht 5' 8" (1 727 m)   Wt 129 kg (284 lb 13 4 oz)   SpO2 96%   BMI 43 31 kg/m²     Medications:    Inpatient Medications:     Scheduled Medications:    Current Facility-Administered Medications:  acetaminophen 650 mg Oral Q6H PRN Vince Strong MD    aspirin 81 mg Oral Daily Femi Benson PA-C    buPROPion 150 mg Oral BID Femi Benson PA-C    cyclobenzaprine 5 mg Oral TID PRN Femi Benson PA-C    gabapentin 600 mg Oral HS Femi Benson PA-C    heparin (porcine) 5,000 Units Subcutaneous [facility] Vince Strong MD    metoclopramide 5 mg Intravenous Q6H [facility] Orin Velasquez PA-C    montelukast 10 mg Oral HS Femi Benson PA-C    nicotine 1 patch Transdermal Daily Vince Strong MD    ondansetron 4 mg Intravenous Q6H PRN Femi Benson PA-C    pneumococcal 13-valent conjugate vaccine 0 5 mL Intramuscular Prior to discharge Vince Strong MD    pravastatin 40 mg Oral Daily With SYSCOHUA    sertraline 100 mg Oral HS Femi Benson PA-C    sodium chloride 75 mL/hr Intravenous Continuous Vince Strong MD Last Rate: 75 mL/hr (10/13/19 7479) vancomycin 17 5 mg/kg (Adjusted) Intravenous Q12H Shae Hankins MD Last Rate: 1,500 mg (10/14/19 1443)       Infusions:    sodium chloride 75 mL/hr Last Rate: 75 mL/hr (10/13/19 1680)       PRN:    acetaminophen    cyclobenzaprine    ondansetron    pneumococcal 13-valent conjugate vaccine    Outpatient Medications:  No current facility-administered medications on file prior to encounter        Current Outpatient Medications on File Prior to Encounter   Medication Sig Dispense Refill    albuterol (2 5 mg/3 mL) 0 083 % nebulizer solution Take 2 5 mg by nebulization every 6 (six) hours as needed for wheezing or shortness of breath      aspirin 81 mg chewable tablet Chew 81 mg daily      buPROPion (WELLBUTRIN SR) 150 mg 12 hr tablet Take 150 mg by mouth 2 (two) times a day      cyclobenzaprine (FLEXERIL) 10 mg tablet Take 5 mg by mouth 3 (three) times a day as needed for muscle spasms      gabapentin (NEURONTIN) 600 MG tablet Take 600 mg by mouth daily at bedtime      lisinopril-hydrochlorothiazide (PRINZIDE,ZESTORETIC) 10-12 5 MG per tablet Take 1 tablet by mouth daily      montelukast (SINGULAIR) 10 mg tablet Take 10 mg by mouth daily at bedtime      nicotine (NICODERM CQ) 21 mg/24 hr TD 24 hr patch Place 1 patch on the skin every 24 hours      ondansetron (ZOFRAN) 8 mg tablet Take 8 mg by mouth every 8 (eight) hours as needed for nausea or vomiting      potassium chloride (K-DUR,KLOR-CON) 10 mEq tablet Take 10 mEq by mouth daily      sertraline (ZOLOFT) 100 mg tablet Take 100 mg by mouth daily at bedtime      simvastatin (ZOCOR) 20 mg tablet Take 20 mg by mouth daily at bedtime         Allergies   Allergen Reactions    Morphine Vomiting     Other reaction(s): Vomiting  Other reaction(s): (PIMS) vomiting  Makes her vomit      Sulfa Antibiotics Rash       Anticoagulants: none    ASA classification: ASA 3 - Patient with moderate systemic disease with functional limitations    Airway Assessment: II (hard and soft palate, upper portion of tonsils anduvula visible)    Relevant family history: None    Relevant review of systems: None    Prior sedation/anesthesia: yes    Can the patient lie flat? Yes     Does patient have sleep apnea? No    If yes, does patient use CPAP? not applicable    NPO Status: yes    Labs:   CBC with diff: Lab Results   Component Value Date    WBC 11 53 (H) 10/14/2019    HGB 9 4 (L) 10/14/2019    HCT 28 1 (L) 10/14/2019    MCV 96 10/14/2019     (L) 10/14/2019    MCH 32 0 10/14/2019    MCHC 33 5 10/14/2019    RDW 13 6 10/14/2019    MPV 10 2 10/14/2019    NRBC 0 10/14/2019     BMP/CMP:  Lab Results   Component Value Date    K 3 2 (L) 10/14/2019     10/14/2019    CO2 27 10/14/2019    BUN 12 10/14/2019    CREATININE 0 95 10/14/2019    CALCIUM 8 6 10/14/2019    AST 19 10/12/2019    ALT 25 10/12/2019    ALKPHOS 83 10/12/2019    EGFR 64 10/14/2019   ,     Coags:   Lab Results   Component Value Date    PTT 38 (H) 10/12/2019    INR 1 04 10/12/2019   ,   Results from last 7 days   Lab Units 10/12/19  2123   PTT seconds 38*   INR  1 04        Relevant imaging studies:   Reviewed  Directed physical examination:  I agree with the physical exam performed on 10/14/19 and there are no additional changes  Assessment/Plan:   Dave Simms is a 62 y o  female with a PMH of leiomyosarcoma who had a port placed at [facility] last week  Since Saturday she has had high grade temperature, N/V, and pain at port site  The site appears infected  She presents for port removal     Sedation/Anesthesia plan: Moderate sedation will be used as needed for procedure      Consent with alternatives to the procedure, risks and benefits have been explained and discussed with the patient/patient's family: yes

## 2019-10-14 NOTE — ASSESSMENT & PLAN NOTE
· Patient presented with fever and cough with evidence of a LLL infiltrate on CT of the chest   She is immunosuppressed - recently started on Chemo  · Continue IV Cefepime  No history of MRSA  · Strep PNA and Legionella negative  RSV/Flu negative  Follow up Procalcitonin  · O2 sat monitoring with supplemental O2 p r n  to maintain sat 90% or greater by pulse oximetry  · Albuterol nebulizers prn for shortness of breath or wheezing, Incentive Spirometry, Supportive care

## 2019-10-14 NOTE — SOCIAL WORK
CM attempted to see pt for d/c planning assessment,  who is off unit at this time    1435  CM attempted to see pt for d/c planning assessment,  Pt unavailable at this time

## 2019-10-14 NOTE — PLAN OF CARE
Problem: PAIN - ADULT  Goal: Verbalizes/displays adequate comfort level or baseline comfort level  Description  Interventions:  - Encourage patient to monitor pain and request assistance  - Assess pain using appropriate pain scale  - Administer analgesics based on type and severity of pain and evaluate response  - Implement non-pharmacological measures as appropriate and evaluate response  - Consider cultural and social influences on pain and pain management  - Notify physician/advanced practitioner if interventions unsuccessful or patient reports new pain  Outcome: Progressing     Problem: INFECTION - ADULT  Goal: Absence or prevention of progression during hospitalization  Description  INTERVENTIONS:  - Assess and monitor for signs and symptoms of infection  - Monitor lab/diagnostic results  - Monitor all insertion sites, i e  indwelling lines, tubes, and drains  - [place] appropriate cooling/warming therapies per order  - Administer medications as ordered  - Instruct and encourage patient and family to use good hand hygiene technique     Outcome: Progressing  Goal: Absence of fever/infection during neutropenic period  Description  INTERVENTIONS:  - Monitor WBC    Outcome: Progressing     Problem: SAFETY ADULT  Goal: Patient will remain free of falls  Description  INTERVENTIONS:  - Assess patient frequently for physical needs  -  Identify cognitive and physical deficits and behaviors that affect risk of falls    -  [place] fall precautions as indicated by assessment   - Educate patient/family on patient safety including physical limitations  - Instruct patient to call for assistance with activity based on assessment  - Modify environment to reduce risk of injury  - Consider OT/PT consult to assist with strengthening/mobility  Outcome: Progressing  Goal: Maintain or return to baseline ADL function  Description  INTERVENTIONS:  -  Assess patient's ability to carry out ADLs; assess patient's baseline for ADL function and identify physical deficits which impact ability to perform ADLs (bathing, care of mouth/teeth, toileting, grooming, dressing, etc )  - Assess/evaluate cause of self-care deficits   - Assess range of motion  - Assess patient's mobility; develop plan if impaired  - Assess patient's need for assistive devices and provide as appropriate  - Encourage maximum independence but intervene and supervise when necessary  - Involve family in performance of ADLs  - Assess for home care needs following discharge   - Consider OT consult to assist with ADL evaluation and planning for discharge  - Provide patient education as appropriate  Outcome: Progressing  Goal: Maintain or return mobility status to optimal level  Description  INTERVENTIONS:  - Assess patient's baseline mobility status (ambulation, transfers, stairs, etc )    - Identify cognitive and physical deficits and behaviors that affect mobility  - [place] fall precautions as indicated by assessment  - Record patient progress and toleration of activity level on Mobility SBAR; progress patient to next Phase/Stage  - Instruct patient to call for assistance with activity based on assessment   Outcome: Progressing     Problem: DISCHARGE PLANNING  Goal: Discharge to home or other facility with appropriate resources  Description  INTERVENTIONS:  - Identify barriers to discharge w/patient and caregiver  - Arrange for needed discharge resources and transportation as appropriate  - Identify discharge learning needs (meds, wound care, etc )  - Refer to Case Management Department for coordinating discharge planning if the patient needs post-hospital services based on physician/advanced practitioner order or complex needs related to functional status, cognitive ability, or social support system   Outcome: Progressing     Problem: Knowledge Deficit  Goal: Patient/family/caregiver demonstrates understanding of disease process, treatment plan, medications, and discharge instructions  Description  Complete learning assessment and assess knowledge base    Interventions:  - Provide teaching at level of understanding  - Provide teaching via preferred learning methods  Outcome: Progressing     Problem: CARDIOVASCULAR - ADULT  Goal: Maintains optimal cardiac output and hemodynamic stability  Description  INTERVENTIONS:  - Monitor I/O, vital signs and rhythm  - Monitor for S/S and trends of decreased cardiac output  - Administer and titrate ordered vasoactive medications to optimize hemodynamic stability  - Assess quality of pulses, skin color and temperature  - Assess for signs of decreased coronary artery perfusion  - Instruct patient to report change in severity of symptoms  Outcome: Progressing  Goal: Absence of cardiac dysrhythmias or at baseline rhythm  Description  INTERVENTIONS:  - Continuous cardiac monitoring, vital signs, obtain 12 lead EKG if ordered  - Administer antiarrhythmic and heart rate control medications as ordered  - Monitor electrolytes and administer replacement therapy as ordered  Outcome: Progressing     Problem: METABOLIC, FLUID AND ELECTROLYTES - ADULT  Goal: Electrolytes maintained within normal limits  Description  INTERVENTIONS:  - Monitor labs and assess patient for signs and symptoms of electrolyte imbalances  - Administer electrolyte replacement as ordered  - Monitor response to electrolyte replacements, including repeat lab results as appropriate  - Instruct patient on fluid and nutrition as appropriate  Outcome: Progressing  Goal: Fluid balance maintained  Description  INTERVENTIONS:  - Monitor labs   - Monitor I/O and WT  - Instruct patient on fluid and nutrition as appropriate  - Assess for signs & symptoms of volume excess or deficit  Outcome: Progressing  Goal: Glucose maintained within target range  Description  INTERVENTIONS:  - Monitor Blood Glucose as ordered  - Assess for signs and symptoms of hyperglycemia and hypoglycemia  - Administer ordered medications to maintain glucose within target range  - Assess nutritional intake and initiate nutrition service referral as needed  Outcome: Progressing     Problem: SKIN/TISSUE INTEGRITY - ADULT  Goal: Skin integrity remains intact  Description  INTERVENTIONS  - Identify patients at risk for skin breakdown  - Assess and monitor skin integrity  - Assess and monitor nutrition and hydration status  - Monitor labs (i e  albumin)  - Assist with mobility/ambulation  - Relieve pressure over bony prominences  - Avoid friction and shearing  - Provide appropriate hygiene as needed including keeping skin clean and dry  - Evaluate need for skin moisturizer/barrier cream  - Collaborate with interdisciplinary team (i e  Nutrition, Rehabilitation, etc )   - Patient/family teaching   Outcome: Progressing  Goal: Incision(s), wounds(s) or drain site(s) healing without S/S of infection  Description  INTERVENTIONS  - Assess and document risk factors for skin impairment   - Assess and document dressing, incision, wound bed, drain sites and surrounding tissue  - Consider nutrition services referral as needed  - Oral mucous membranes remain intact  - Provide patient/ family education  Outcome: Progressing  Goal: Oral mucous membranes remain intact  Description  INTERVENTIONS  - Assess oral mucosa and hygiene practices  - Implement preventative oral hygiene regimen  - Implement oral medicated treatments as ordered     Outcome: Progressing     Problem: Potential for Falls  Goal: Patient will remain free of falls  Description  INTERVENTIONS:  - Assess patient frequently for physical needs  -  Identify cognitive and physical deficits and behaviors that affect risk of falls    -  [place] fall precautions as indicated by assessment   - Educate patient/family on patient safety including physical limitations  - Instruct patient to call for assistance with activity based on assessment  - Modify environment to reduce risk of injury  - Consider OT/PT consult to assist with strengthening/mobility  Outcome: Progressing     Problem: Nutrition/Hydration-ADULT  Goal: Nutrient/Hydration intake appropriate for improving, restoring or maintaining nutritional needs  Description  Monitor and assess patient's nutrition/hydration status for malnutrition  Collaborate with interdisciplinary team and initiate plan and interventions as ordered  Monitor patient's weight and dietary intake as ordered or per policy  Utilize nutrition screening tool and intervene as necessary  Determine patient's food preferences and provide high-protein, high-caloric foods as appropriate       INTERVENTIONS:  - Monitor oral intake, urinary output, labs, and treatment plans  - Assess nutrition and hydration status and recommend course of action  - Evaluate amount of meals eaten  - Assist patient with eating if necessary   - Allow adequate time for meals  - Recommend/ encourage appropriate diets, oral nutritional supplements, and vitamin/mineral supplements  - Order, calculate, and assess calorie counts as needed  - Recommend, monitor, and adjust tube feedings and TPN/PPN based on assessed needs  - Assess need for intravenous fluids  - Provide specific nutrition/hydration education as appropriate  - Include patient/family/caregiver in decisions related to nutrition  Outcome: Progressing

## 2019-10-14 NOTE — PROGRESS NOTES
Progress Note - Mckenzie Billings 1955, 62 y o  female MRN: [id number]    Unit/Bed#: -01 Encounter: [id number]    Primary Care Provider: No primary care provider on file  Date and time admitted to hospital: 10/12/2019  8:56 PM        * Sepsis ([address] )  Assessment & Plan  · Sepsis POA as evidenced by fever, leukocytosis and tachycardia  · She is immunosuppressed - recently started on Chemo  · Lactic acid level normal   · Started on Cefepime IV on admission  · Follow up blood cultures x 2   · Monitor CBC (WBC trending down)  Follow up Procalcitonin  · Monitor vitals, IVFs, supportive care  · ID input appreciated: Sepsis suspected in the setting of a port-a-cath infection  Antibiotics changed to Vanco IV  General surgery consult for removal     Pneumonia  Assessment & Plan  · Patient presented with fever and with evidence of a LLL infiltrate on CT of the chest   · Started on IV Cefepime on admission  · Strep PNA and Legionella negative  RSV/Flu negative  Follow up sputum culture  · ID input appreciated: less suspicious of a bacterial pneumonia as the cause of her Sepsis - suspected to be from the infected Port-a-cath  Diarrhea  Assessment & Plan  · Patient reports diarrhea since starting chemotherapy - likely secondary to chemo - rule out infection  · Check stool culture and stool c diff  · Supportive measures  Hypokalemia  Assessment & Plan  · Supplement potassium 40 MEQ  · Recheck potassium in a m  Mg normal     Staghorn calculus  Assessment & Plan  · CT A/P shows a large staghorn calculus on the left which occupies the majority of the left renal pelvis and extends into the left UPJ and proximal left ureter with extension of  the staghorn calculus into several lower pole calyces and marked dilatation of the left upper pole collecting system   · UA negative  · Urology input appreciated  Long standing history  Outpatient follow up recommended      Hypertension  Assessment & Plan  · Linisinpril/HCTZ held as BP low on admission but improving  Continue to hold at this time  Leiomyosarcoma [facility])  Assessment & Plan  · Patient with metastatic leiomyosarcoma and recently started on Chemo  · She follows with an Oncologist through [facility]  She would like us to contact her Oncologist and will obtain the number  VTE Pharmacologic Prophylaxis:   Pharmacologic: Heparin  Mechanical VTE Prophylaxis in Place: Yes    Patient Centered Rounds: I have performed bedside rounds with nursing staff today  Discussions with Specialists or Other Care Team Provider: Appreciate Urology input  ID consult pending  Education and Discussions with Family / Patient: Patient  Time Spent for Care: 30 minutes  More than 50% of total time spent on counseling and coordination of care as described above  Current Length of Stay: 1 day(s)    Current Patient Status: Inpatient   Certification Statement: The patient will continue to require additional inpatient hospital stay due to treatment of sepsis/PNA  Discharge Plan: Not medically cleared  Code Status: Level 1 - Full Code      Subjective:   Patient had a fever late last night of 102  3  Reports minimal cough  On room air  No CP or abdominal pain  +N/V/D  Reglan added for nausea in addition to Zofran  Feels tired  She would like us to contact her Oncologist at [facility] (will obtain the number)  No dyuria  Objective:     Vitals:   Temp (24hrs), Av 5 °F (38 1 °C), Min:98 2 °F (36 8 °C), Max:102 3 °F (39 1 °C)    Temp:  [98 2 °F (36 8 °C)-102 3 °F (39 1 °C)] 99 6 °F (37 6 °C)  HR:  [] 99  Resp:  [18] 18  BP: (127-132)/(71-80) 127/71  SpO2:  [95 %-98 %] 98 %  Body mass index is 43 31 kg/m²  Input and Output Summary (last 24 hours):        Intake/Output Summary (Last 24 hours) at 10/14/2019 1424  Last data filed at 10/14/2019 1313  Gross per 24 hour   Intake 1950 ml   Output 1 ml   Net 1949 ml       Physical Exam: Physical Exam   Constitutional: She is oriented to person, place, and time  No distress  HENT:   Head: Normocephalic and atraumatic  Eyes: No scleral icterus  Cardiovascular: Normal rate and regular rhythm  Pulmonary/Chest: Effort normal  No respiratory distress  Scant wheeze on right side  Abdominal: Soft  Bowel sounds are normal  She exhibits no distension  There is no tenderness  Musculoskeletal: She exhibits no edema  Neurological: She is alert and oriented to person, place, and time  Skin: She is not diaphoretic  Vitals reviewed  Additional Data:     Labs:    Results from last 7 days   Lab Units 10/14/19  0521  10/12/19  2123   WBC Thousand/uL 11 53*   < > 17 39*   HEMOGLOBIN g/dL 9 4*   < > 13 0   HEMATOCRIT % 28 1*   < > 37 8   PLATELETS Thousands/uL 135*   < > 176   NEUTROS PCT %  --   --  91*   LYMPHS PCT %  --   --  7*   LYMPHO PCT % 4*   < >  --    MONOS PCT %  --   --  1*   MONO PCT % 0*   < >  --    EOS PCT % 0   < > 0    < > = values in this interval not displayed  Results from last 7 days   Lab Units 10/14/19  0521  10/12/19  2123   POTASSIUM mmol/L 3 2*   < > 3 4*   CHLORIDE mmol/L 104   < > 99*   CO2 mmol/L 27   < > 27   BUN mg/dL 12   < > 16   CREATININE mg/dL 0 95   < > 1 02   CALCIUM mg/dL 8 6   < > 9 4   ALK PHOS U/L  --   --  83   ALT U/L  --   --  25   AST U/L  --   --  19    < > = values in this interval not displayed  Results from last 7 days   Lab Units 10/12/19  2123   INR  1 04       * I Have Reviewed All Lab Data Listed Above  * Additional Pertinent Lab Tests Reviewed: All Labs Within Last 24 Hours Reviewed    Imaging:    Imaging Reports Reviewed Today Include: No new imaging  Recent Cultures (last 7 days):     Results from last 7 days   Lab Units 10/13/19  1724 10/13/19  1136 10/13/19  1002 10/12/19  2123   BLOOD CULTURE   --   --   --  No Growth at 24 hrs  No Growth at 24 hrs     GRAM STAIN RESULT  Rare Epithelial Cells*  Rare Polys*  2+ Gram negative rods*  2+ Gram positive cocci in pairs*  1+ Gram positive cocci in clusters*  --   --   --    INFLUENZA B PCR   --   --  Not Detected  --    RSV PCR   --   --  Not Detected  --    LEGIONELLA URINARY ANTIGEN   --  Negative  --   --        Last 24 Hours Medication List:     Current Facility-Administered Medications:  acetaminophen 650 mg Oral Q6H PRN Osmin Carreno MD    aspirin 81 mg Oral Daily Luis Houston PA-C    buPROPion 150 mg Oral BID Luis Houston PA-C    cyclobenzaprine 5 mg Oral TID PRN Luis Houston PA-C    gabapentin 600 mg Oral HS Luis Houston PA-C    heparin (porcine) 5,000 Units Subcutaneous [facility] Osmin Carreno MD    metoclopramide 5 mg Intravenous Q6H [address] Orin Velasquez PA-C    montelukast 10 mg Oral HS Luis Houston PA-C    nicotine 1 patch Transdermal Daily Osmin Carreno MD    ondansetron 4 mg Intravenous Q6H PRN Luis Houston PA-C    pneumococcal 13-valent conjugate vaccine 0 5 mL Intramuscular Prior to discharge Osmin Carreno MD    pravastatin 40 mg Oral Daily With SYSCOHUA    sertraline 100 mg Oral HS Luis Houston PA-C    sodium chloride 75 mL/hr Intravenous Continuous Osmin Carreno MD Last Rate: 75 mL/hr (10/13/19 5764)   vancomycin 17 5 mg/kg (Adjusted) Intravenous Q12H Donn Hare MD         Today, Patient Was Seen By: Luis Houston PA-C    ** Please Note: Dictation voice to text software may have been used in the creation of this document   **

## 2019-10-14 NOTE — ASSESSMENT & PLAN NOTE
· Sepsis POA as evidenced by fever, leukocytosis and tachycardia in the setting of pneumonia  · Lactic acid level normal   · On Cefepime IV  · Follow up blood cultures x 2   · Monitor CBC (WBC trending down)  Follow up Procalcitonin  · Monitor vitals, IVFs, supportive care  · ID consult pending

## 2019-10-14 NOTE — ASSESSMENT & PLAN NOTE
· Patient with metastatic leiomyosarcoma and recently started on Chemo  · She follows with an Oncologist through [facility]  She would like us to contact her Oncologist and will obtain the number

## 2019-10-14 NOTE — ASSESSMENT & PLAN NOTE
· CT A/P shows a large staghorn calculus on the left which occupies the majority of the left renal pelvis and extends into the left UPJ and proximal left ureter with extension of  the staghorn calculus into several lower pole calyces and marked dilatation of the left upper pole collecting system   · UA negative  · Urology input appreciated  Long standing history  Outpatient follow up recommended

## 2019-10-15 LAB
ANION GAP SERPL CALCULATED.3IONS-SCNC: 12 MMOL/L (ref 4–13)
BACTERIA SPT RESP CULT: ABNORMAL
BASOPHILS # BLD MANUAL: 0 THOUSAND/UL (ref 0–0.1)
BASOPHILS NFR MAR MANUAL: 0 % (ref 0–1)
BUN SERPL-MCNC: 10 MG/DL (ref 5–25)
C DIFF TOX GENS STL QL NAA+PROBE: NORMAL
CALCIUM SERPL-MCNC: 8.7 MG/DL (ref 8.3–10.1)
CHLORIDE SERPL-SCNC: 102 MMOL/L (ref 100–108)
CO2 SERPL-SCNC: 21 MMOL/L (ref 21–32)
CREAT SERPL-MCNC: 1 MG/DL (ref 0.6–1.3)
EOSINOPHIL # BLD MANUAL: 0 THOUSAND/UL (ref 0–0.4)
EOSINOPHIL NFR BLD MANUAL: 0 % (ref 0–6)
ERYTHROCYTE [DISTWIDTH] IN BLOOD BY AUTOMATED COUNT: 13.9 % (ref 11.6–15.1)
GFR SERPL CREATININE-BSD FRML MDRD: 60 ML/MIN/1.73SQ M
GLUCOSE SERPL-MCNC: 122 MG/DL (ref 65–140)
GRAM STN SPEC: ABNORMAL
HCT VFR BLD AUTO: 27.2 % (ref 34.8–46.1)
HGB BLD-MCNC: 9.2 G/DL (ref 11.5–15.4)
LYMPHOCYTES # BLD AUTO: 0.5 THOUSAND/UL (ref 0.6–4.47)
LYMPHOCYTES # BLD AUTO: 7 % (ref 14–44)
MCH RBC QN AUTO: 32.1 PG (ref 26.8–34.3)
MCHC RBC AUTO-ENTMCNC: 33.8 G/DL (ref 31.4–37.4)
MCV RBC AUTO: 95 FL (ref 82–98)
MONOCYTES # BLD AUTO: 0.07 THOUSAND/UL (ref 0–1.22)
MONOCYTES NFR BLD: 1 % (ref 4–12)
NEUTROPHILS # BLD MANUAL: 6.57 THOUSAND/UL (ref 1.85–7.62)
NEUTS BAND NFR BLD MANUAL: 1 % (ref 0–8)
NEUTS SEG NFR BLD AUTO: 91 % (ref 43–75)
NRBC BLD AUTO-RTO: 0 /100 WBCS
PLATELET # BLD AUTO: 120 THOUSANDS/UL (ref 149–390)
PLATELET BLD QL SMEAR: ABNORMAL
PMV BLD AUTO: 9.7 FL (ref 8.9–12.7)
POTASSIUM SERPL-SCNC: 3.4 MMOL/L (ref 3.5–5.3)
PROCALCITONIN SERPL-MCNC: 1.32 NG/ML
RBC # BLD AUTO: 2.87 MILLION/UL (ref 3.81–5.12)
SODIUM SERPL-SCNC: 135 MMOL/L (ref 136–145)
TOTAL CELLS COUNTED SPEC: 100
WBC # BLD AUTO: 7.14 THOUSAND/UL (ref 4.31–10.16)

## 2019-10-15 PROCEDURE — 99233 SBSQ HOSP IP/OBS HIGH 50: CPT | Performed by: DENTIST

## 2019-10-15 PROCEDURE — 85007 BL SMEAR W/DIFF WBC COUNT: CPT | Performed by: PHYSICIAN ASSISTANT

## 2019-10-15 PROCEDURE — 84145 PROCALCITONIN (PCT): CPT | Performed by: PHYSICIAN ASSISTANT

## 2019-10-15 PROCEDURE — 99232 SBSQ HOSP IP/OBS MODERATE 35: CPT | Performed by: NURSE PRACTITIONER

## 2019-10-15 PROCEDURE — 85027 COMPLETE CBC AUTOMATED: CPT | Performed by: PHYSICIAN ASSISTANT

## 2019-10-15 PROCEDURE — 80048 BASIC METABOLIC PNL TOTAL CA: CPT | Performed by: PHYSICIAN ASSISTANT

## 2019-10-15 RX ORDER — BUTALBITAL, ACETAMINOPHEN AND CAFFEINE 50; 325; 40 MG/1; MG/1; MG/1
1 TABLET ORAL EVERY 4 HOURS PRN
Status: DISCONTINUED | OUTPATIENT
Start: 2019-10-15 | End: 2019-10-20 | Stop reason: HOSPADM

## 2019-10-15 RX ORDER — TRAMADOL HYDROCHLORIDE 50 MG/1
50 TABLET ORAL EVERY 6 HOURS PRN
Status: DISCONTINUED | OUTPATIENT
Start: 2019-10-15 | End: 2019-10-20 | Stop reason: HOSPADM

## 2019-10-15 RX ORDER — POTASSIUM CHLORIDE 20 MEQ/1
40 TABLET, EXTENDED RELEASE ORAL ONCE
Status: COMPLETED | OUTPATIENT
Start: 2019-10-15 | End: 2019-10-15

## 2019-10-15 RX ADMIN — HEPARIN SODIUM 5000 UNITS: 5000 INJECTION INTRAVENOUS; SUBCUTANEOUS at 15:00

## 2019-10-15 RX ADMIN — ACETAMINOPHEN 650 MG: 325 TABLET, FILM COATED ORAL at 19:40

## 2019-10-15 RX ADMIN — METOCLOPRAMIDE 5 MG: 5 INJECTION, SOLUTION INTRAMUSCULAR; INTRAVENOUS at 17:04

## 2019-10-15 RX ADMIN — HEPARIN SODIUM 5000 UNITS: 5000 INJECTION INTRAVENOUS; SUBCUTANEOUS at 21:25

## 2019-10-15 RX ADMIN — NICOTINE 1 PATCH: 14 PATCH TRANSDERMAL at 09:30

## 2019-10-15 RX ADMIN — SODIUM CHLORIDE 75 ML/HR: 0.9 INJECTION, SOLUTION INTRAVENOUS at 02:48

## 2019-10-15 RX ADMIN — HEPARIN SODIUM 5000 UNITS: 5000 INJECTION INTRAVENOUS; SUBCUTANEOUS at 05:27

## 2019-10-15 RX ADMIN — VANCOMYCIN HYDROCHLORIDE 1500 MG: 5 INJECTION, POWDER, LYOPHILIZED, FOR SOLUTION INTRAVENOUS at 15:03

## 2019-10-15 RX ADMIN — VANCOMYCIN HYDROCHLORIDE 1500 MG: 5 INJECTION, POWDER, LYOPHILIZED, FOR SOLUTION INTRAVENOUS at 05:26

## 2019-10-15 RX ADMIN — BUPROPION HYDROCHLORIDE 150 MG: 150 TABLET, FILM COATED, EXTENDED RELEASE ORAL at 09:30

## 2019-10-15 RX ADMIN — POTASSIUM CHLORIDE 40 MEQ: 1500 TABLET, EXTENDED RELEASE ORAL at 09:35

## 2019-10-15 RX ADMIN — GABAPENTIN 600 MG: 300 CAPSULE ORAL at 21:25

## 2019-10-15 RX ADMIN — ASPIRIN 81 MG 81 MG: 81 TABLET ORAL at 09:30

## 2019-10-15 RX ADMIN — TRAMADOL HYDROCHLORIDE 50 MG: 50 TABLET, FILM COATED ORAL at 13:06

## 2019-10-15 RX ADMIN — PRAVASTATIN SODIUM 40 MG: 40 TABLET ORAL at 17:04

## 2019-10-15 RX ADMIN — SERTRALINE HYDROCHLORIDE 100 MG: 50 TABLET ORAL at 21:25

## 2019-10-15 RX ADMIN — METOCLOPRAMIDE 5 MG: 5 INJECTION, SOLUTION INTRAMUSCULAR; INTRAVENOUS at 12:24

## 2019-10-15 RX ADMIN — BUPROPION HYDROCHLORIDE 150 MG: 150 TABLET, FILM COATED, EXTENDED RELEASE ORAL at 17:04

## 2019-10-15 RX ADMIN — ACETAMINOPHEN 650 MG: 325 TABLET, FILM COATED ORAL at 12:24

## 2019-10-15 RX ADMIN — TRAMADOL HYDROCHLORIDE 50 MG: 50 TABLET, FILM COATED ORAL at 19:39

## 2019-10-15 RX ADMIN — METOCLOPRAMIDE 5 MG: 5 INJECTION, SOLUTION INTRAMUSCULAR; INTRAVENOUS at 05:26

## 2019-10-15 RX ADMIN — ACETAMINOPHEN 650 MG: 325 TABLET, FILM COATED ORAL at 00:09

## 2019-10-15 RX ADMIN — MONTELUKAST 10 MG: 10 TABLET, FILM COATED ORAL at 21:25

## 2019-10-15 NOTE — PROGRESS NOTES
Progress Note - Infectious Disease   Pura Garcia 62 y o  female MRN: [id number]  Unit/Bed#: -62 Encounter: [id number]      Impression/Recommendations:  1  Sepsis, POA  Fever, leukocytosis  Likely due to # 2   UA, blood cultures negative  ROS and exam otherwise negative  CT suggests possible lingular infiltrate but patient without significant respiratory symptoms  Flu PCR negative  2  Port-A-Cath infection  In setting of recently placed Port-A-Cath 10/02/2019  Now status post removal   3  Staghorn calculus left kidney  Chronic based on review of prior imaging  Doubt clinical infection given negative UA and lack of symptoms  4  Diarrhea  Suspect chemotherapy induced  5  Metastatic leiomyosarcoma  Recently started on palliative chemotherapy 10/09/2019      Continue vancomycin for now  Pharmacy consult for vanco dosing  Follow up port cultures  Follow up final blood cultures  Follow temperatures closely  Follow up C diff  Supportive care as per the primary service  Antibiotics:  Vancomycin #2  Antibiotics #3    Subjective:  Patient seen on PM rounds  Feels better today  Fever improving  Has chronic "smoker's" cough, unchanged from baseline  24 Hour Events: Had port removed by IR yesterday  Continues to have high fevers  Procalcitonin trending up  Objective:  Vitals:  Temp:  [99 °F (37 2 °C)-102 8 °F (39 3 °C)] 99 °F (37 2 °C)  HR:  [] 102  Resp:  [16-24] 18  BP: ()/(54-78) 127/70  SpO2:  [91 %-98 %] 95 %  Temp (24hrs), Av 1 °F (38 4 °C), Min:99 °F (37 2 °C), Max:102 8 °F (39 3 °C)  Current: Temperature: 99 °F (37 2 °C)    Physical Exam:   General:  No acute distress  Psychiatric:  Awake and alert  Chest:  Improved erythema  Pulmonary:  Normal respiratory excursion without accessory muscle use  Abdomen:  Soft, nontender  Extremities:  No edema  Skin:  No rashes    Lab Results:  I have personally reviewed pertinent labs    Results from last 7 days   Lab Units 10/15/19  0506 10/14/19  0521 10/13/19  0504 10/12/19  2123   POTASSIUM mmol/L 3 4* 3 2* 3 3* 3 4*   CHLORIDE mmol/L 102 104 102 99*   CO2 mmol/L 21 27 24 27   BUN mg/dL 10 12 16 16   CREATININE mg/dL 1 00 0 95 1 04 1 02   EGFR ml/min/1 73sq m 60 64 57 59   CALCIUM mg/dL 8 7 8 6 8 3 9 4   AST U/L  --   --   --  19   ALT U/L  --   --   --  25   ALK PHOS U/L  --   --   --  83     Results from last 7 days   Lab Units 10/15/19  0506 10/14/19  0521 10/13/19  0504   WBC Thousand/uL 7 14 11 53* 14 68*   HEMOGLOBIN g/dL 9 2* 9 4* 10 5*   PLATELETS Thousands/uL 120* 135* 141*     Results from last 7 days   Lab Units 10/13/19  1724 10/13/19  1136 10/13/19  1002 10/12/19  2123   BLOOD CULTURE   --   --   --  No Growth at 24 hrs  No Growth at 24 hrs  SPUTUM CULTURE  4+ Growth of   --   --   --    GRAM STAIN RESULT  Rare Epithelial Cells*  Rare Polys*  2+ Gram negative rods*  2+ Gram positive cocci in pairs*  1+ Gram positive cocci in clusters*  --   --   --    INFLUENZA B PCR   --   --  Not Detected  --    RSV PCR   --   --  Not Detected  --    LEGIONELLA URINARY ANTIGEN   --  Negative  --   --        Imaging Studies:   I have personally reviewed pertinent imaging study reports and images in PACS  EKG, Pathology, and Other Studies:   I have personally reviewed pertinent reports

## 2019-10-15 NOTE — PLAN OF CARE
Patient refused non- skid socks  Educated  Needs further encouragement  Cloyde Najjar, RN    Problem: SAFETY ADULT  Goal: Patient will remain free of falls  Description  INTERVENTIONS:  - Assess patient frequently for physical needs  -  Identify cognitive and physical deficits and behaviors that affect risk of falls    -  Ruby fall precautions as indicated by assessment   - Educate patient/family on patient safety including physical limitations  - Instruct patient to call for assistance with activity based on assessment  - Modify environment to reduce risk of injury  - Consider OT/PT consult to assist with strengthening/mobility  Outcome: Progressing

## 2019-10-15 NOTE — PROGRESS NOTES
Vancomycin IV Pharmacy-to-Dose Consultation    Mckenzie Billings is a 62 y o  female who is currently receiving Vancomycin IV with management by the Pharmacy Consult service  Assessment/Plan:  The patient was reviewed  Renal function is stable and no signs or symptoms of nephrotoxicity and/or infusion reactions were documented in the chart  Based on todays assessment, continue current vancomycin (day 2) dosing of 1500mg Q12hrs, with a plan for trough to be drawn at 1330 on 10/16  We will continue to follow the patients culture results and clinical progress daily      Betzy Garrido, Pharmacist

## 2019-10-15 NOTE — PROGRESS NOTES
Progress Note - Benjamin Reynaga 1955, 62 y o  female MRN: [id number]    Unit/Bed#: -01 Encounter: [id number]    Primary Care Provider: No primary care provider on file  Date and time admitted to hospital: 10/12/2019  8:56 PM        * Sepsis ([address] )  Assessment & Plan  · Sepsis POA as evidenced by fever, leukocytosis and tachycardia  · She is immunosuppressed - recently started on Chemo  · Lactic acid level normal   · Continue vancomycin IV per ID recommendations  · Blood cultures negative x2 for growth  · Monitor CBC (WBC trending down)  Procalcitonin trended up today 1 32  · Monitor vitals, IVFs, supportive care  · ID input appreciated: Sepsis suspected in the setting of a port-a-cath infection  Port-A-Cath removed by IR yesterday  · 10/14 patient continues with intermittent fevers appreciate ongoing recommendations from ID  Pneumonia  Assessment & Plan  · Patient presented with fever and with evidence of a LLL infiltrate on CT of the chest   · Started on IV Cefepime id switched to IV vancomycin  · Strep PNA and Legionella negative  RSV/Flu negative  · Sputum culture pending preliminary reviewed will await final growth  · ID input appreciated: less suspicious of a bacterial pneumonia as the cause of her Sepsis - suspected to be from the infected Port-a-cath  Hypokalemia  Assessment & Plan  · Supplement potassium 40 MEQ  · Hypokalemia improved slightly will replete again BMP in the morning    Hypertension  Assessment & Plan  · Linisinpril/HCTZ held as BP low on admission but improving  · Continue to hold at this time  · Can likely resume upon discharge    Leiomyosarcoma [facility])  Assessment & Plan  · Patient with metastatic leiomyosarcoma and patient recently started on Chemo  · She follows with an Oncologist [facility]      · Will attempt to contact at patient's request her oncologist who is Aviva Hui [phone]; called placed to office for Dr Nabil Chaney calculus  Assessment & Plan  · CT A/P shows a large staghorn calculus on the left which occupies the majority of the left renal pelvis and extends into the left UPJ and proximal left ureter with extension of  the staghorn calculus into several lower pole calyces and marked dilatation of the left upper pole collecting system   · UA negative  · Urology input appreciated  · Urology recommendations reviewed not recommending any urological intervention at this time given patient is asymptomatic  If patient would show evidence of urosepsis call a would recommend a percutaneous nephrostomy tube  Recommending close monitoring while in-patient an outpatient follow-up upon discharge  Diarrhea  Assessment & Plan  · Patient reports diarrhea since starting chemotherapy - likely secondary to chemo - rule out infection  · Stool studies via C diff and bacterial studies pending  · Supportive measures  Tobacco abuse  Assessment & Plan  · Cessation counseled  · Continue nicotine patch      VTE Pharmacologic Prophylaxis:   Pharmacologic: Heparin  Mechanical VTE Prophylaxis in Place: Yes    Patient Centered Rounds: I have performed bedside rounds with nursing staff today  Discussions with Specialists or Other Care Team Provider:  ID note reviewed    Education and Discussions with Family / Patient:  Patient    Time Spent for Care: 30 minutes  More than 50% of total time spent on counseling and coordination of care as described above      Current Length of Stay: 2 day(s)    Current Patient Status: Inpatient   Certification Statement: The patient will continue to require additional inpatient hospital stay due to Ongoing treatment of sepsis in setting of Port-A-Cath infection    Discharge Plan:  Not medically cleared    Code Status: Level 1 - Full Code      Subjective:   Patient reporting ongoing feel fever generally does not feel well reports she has an ongoing headache which is chronic for her and she takes Fioricet along with her gabapentin which she has not been receiving here  Patient reporting tenderness around the catheterization site denies shortness of breath chest pain abdominal pain  Patient reporting ongoing diarrhea  Objective:     Vitals:   Temp (24hrs), Av 1 °F (38 4 °C), Min:99 °F (37 2 °C), Max:102 8 °F (39 3 °C)    Temp:  [99 °F (37 2 °C)-102 8 °F (39 3 °C)] 99 °F (37 2 °C)  HR:  [] 102  Resp:  [16-24] 18  BP: ()/(54-78) 127/70  SpO2:  [91 %-98 %] 95 %  Body mass index is 43 31 kg/m²  Input and Output Summary (last 24 hours): Intake/Output Summary (Last 24 hours) at 10/15/2019 1012  Last data filed at 10/14/2019 2001  Gross per 24 hour   Intake 820 ml   Output    Net 820 ml       Physical Exam:     Physical Exam   Constitutional: She is oriented to person, place, and time  She appears well-developed and well-nourished  She appears ill  HENT:   Head: Normocephalic and atraumatic  Eyes: Conjunctivae and EOM are normal    Neck: Normal range of motion  Cardiovascular: Normal rate, regular rhythm and normal heart sounds  Pulmonary/Chest: Effort normal and breath sounds normal    Abdominal: Soft  Bowel sounds are normal    Musculoskeletal: Normal range of motion  Neurological: She is alert and oriented to person, place, and time  Skin: Skin is warm and dry  Tenderness to palpation to Port-A-Cath site  No induration appreciated  Psychiatric: She has a normal mood and affect  Her behavior is normal          Additional Data:     Labs:    Results from last 7 days   Lab Units 10/15/19  0506  10/12/19  2123   WBC Thousand/uL 7 14   < > 17 39*   HEMOGLOBIN g/dL 9 2*   < > 13 0   HEMATOCRIT % 27 2*   < > 37 8   PLATELETS Thousands/uL 120*   < > 176   BANDS PCT % 1   < >  --    NEUTROS PCT %  --   --  91*   LYMPHS PCT %  --   --  7*   LYMPHO PCT % 7*   < >  --    MONOS PCT %  --   --  1*   MONO PCT % 1*   < >  --    EOS PCT % 0   < > 0    < > = values in this interval not displayed  Results from last 7 days   Lab Units 10/15/19  0506  10/12/19  2123   SODIUM mmol/L 135*   < > 136   POTASSIUM mmol/L 3 4*   < > 3 4*   CHLORIDE mmol/L 102   < > 99*   CO2 mmol/L 21   < > 27   BUN mg/dL 10   < > 16   CREATININE mg/dL 1 00   < > 1 02   ANION GAP mmol/L 12   < > 10   CALCIUM mg/dL 8 7   < > 9 4   ALBUMIN g/dL  --   --  3 3*   TOTAL BILIRUBIN mg/dL  --   --  0 70   ALK PHOS U/L  --   --  83   ALT U/L  --   --  25   AST U/L  --   --  19   GLUCOSE RANDOM mg/dL 122   < > 130    < > = values in this interval not displayed  Results from last 7 days   Lab Units 10/12/19  2123   INR  1 04             Results from last 7 days   Lab Units 10/15/19  0506 10/14/19  0521 10/12/19  2124 10/12/19  2123   LACTIC ACID mmol/L  --   --  1 9  --    PROCALCITONIN ng/ml 1 32* 0 67*  --  <0 05           * I Have Reviewed All Lab Data Listed Above  * Additional Pertinent Lab Tests Reviewed: All Labs Within Last 24 Hours Reviewed        Recent Cultures (last 7 days):     Results from last 7 days   Lab Units 10/13/19  1724 10/13/19  1136 10/13/19  1002 10/12/19  2123   BLOOD CULTURE   --   --   --  No Growth at 24 hrs  No Growth at 24 hrs     SPUTUM CULTURE  Culture too young- will reincubate  --   --   --    GRAM STAIN RESULT  Rare Epithelial Cells*  Rare Polys*  2+ Gram negative rods*  2+ Gram positive cocci in pairs*  1+ Gram positive cocci in clusters*  --   --   --    INFLUENZA B PCR   --   --  Not Detected  --    RSV PCR   --   --  Not Detected  --    LEGIONELLA URINARY ANTIGEN   --  Negative  --   --        Last 24 Hours Medication List:     Current Facility-Administered Medications:  acetaminophen 650 mg Oral Q6H PRN Lyndsay Byers MD    aspirin 81 mg Oral Daily Derick Squires PA-C    buPROPion 150 mg Oral BID Derick Squires PA-C    butalbital-acetaminophen-caffeine 1 tablet Oral Q4H PRN BRITTNY Graham    cyclobenzaprine 5 mg Oral TID PRN Derick Squires PA-C    gabapentin 600 mg Oral HS Mariah Franco PA-C    heparin (porcine) 5,000 Units Subcutaneous [facility] Minda Brittle, MD    metoclopramide 5 mg Intravenous Q6H [address] Orin Velasquez PA-C    montelukast 10 mg Oral HS Mariah Franco PA-C    nicotine 1 patch Transdermal Daily Minda Brittle, MD    ondansetron 4 mg Intravenous Q6H PRN Mariah Franco PA-C    pneumococcal 13-valent conjugate vaccine 0 5 mL Intramuscular Prior to discharge Minda Brittle, MD    pravastatin 40 mg Oral Daily With SYSCOHUA    sertraline 100 mg Oral HS Mariah Franco PA-C    sodium chloride 75 mL/hr Intravenous Continuous Minda Brittle, MD Last Rate: 75 mL/hr (10/15/19 0248)   vancomycin 17 5 mg/kg (Adjusted) Intravenous Q12H Dexter Paez MD Last Rate: 1,500 mg (10/15/19 0526)        Today, Patient Was Seen By: BRITTNY Malik    ** Please Note: Dictation voice to text software may have been used in the creation of this document   **

## 2019-10-16 LAB
ANION GAP SERPL CALCULATED.3IONS-SCNC: 11 MMOL/L (ref 4–13)
BUN SERPL-MCNC: 10 MG/DL (ref 5–25)
CALCIUM SERPL-MCNC: 8.4 MG/DL (ref 8.3–10.1)
CHLORIDE SERPL-SCNC: 103 MMOL/L (ref 100–108)
CO2 SERPL-SCNC: 20 MMOL/L (ref 21–32)
CREAT SERPL-MCNC: 1 MG/DL (ref 0.6–1.3)
ERYTHROCYTE [DISTWIDTH] IN BLOOD BY AUTOMATED COUNT: 13.9 % (ref 11.6–15.1)
GFR SERPL CREATININE-BSD FRML MDRD: 60 ML/MIN/1.73SQ M
GLUCOSE SERPL-MCNC: 121 MG/DL (ref 65–140)
HCT VFR BLD AUTO: 24 % (ref 34.8–46.1)
HGB BLD-MCNC: 8.2 G/DL (ref 11.5–15.4)
MCH RBC QN AUTO: 32.4 PG (ref 26.8–34.3)
MCHC RBC AUTO-ENTMCNC: 34.2 G/DL (ref 31.4–37.4)
MCV RBC AUTO: 95 FL (ref 82–98)
PLATELET # BLD AUTO: 89 THOUSANDS/UL (ref 149–390)
PMV BLD AUTO: 10 FL (ref 8.9–12.7)
POTASSIUM SERPL-SCNC: 3.3 MMOL/L (ref 3.5–5.3)
PROCALCITONIN SERPL-MCNC: 1.69 NG/ML
RBC # BLD AUTO: 2.53 MILLION/UL (ref 3.81–5.12)
SODIUM SERPL-SCNC: 134 MMOL/L (ref 136–145)
VANCOMYCIN TROUGH SERPL-MCNC: 18 UG/ML (ref 10–20)
WBC # BLD AUTO: 8.53 THOUSAND/UL (ref 4.31–10.16)

## 2019-10-16 PROCEDURE — 80202 ASSAY OF VANCOMYCIN: CPT | Performed by: INTERNAL MEDICINE

## 2019-10-16 PROCEDURE — 99233 SBSQ HOSP IP/OBS HIGH 50: CPT | Performed by: DENTIST

## 2019-10-16 PROCEDURE — 80048 BASIC METABOLIC PNL TOTAL CA: CPT | Performed by: NURSE PRACTITIONER

## 2019-10-16 PROCEDURE — 99232 SBSQ HOSP IP/OBS MODERATE 35: CPT | Performed by: NURSE PRACTITIONER

## 2019-10-16 PROCEDURE — 84145 PROCALCITONIN (PCT): CPT | Performed by: NURSE PRACTITIONER

## 2019-10-16 PROCEDURE — 85027 COMPLETE CBC AUTOMATED: CPT | Performed by: NURSE PRACTITIONER

## 2019-10-16 RX ORDER — POTASSIUM CHLORIDE 20 MEQ/1
40 TABLET, EXTENDED RELEASE ORAL ONCE
Status: COMPLETED | OUTPATIENT
Start: 2019-10-16 | End: 2019-10-16

## 2019-10-16 RX ADMIN — BUPROPION HYDROCHLORIDE 150 MG: 150 TABLET, FILM COATED, EXTENDED RELEASE ORAL at 08:58

## 2019-10-16 RX ADMIN — MONTELUKAST 10 MG: 10 TABLET, FILM COATED ORAL at 22:33

## 2019-10-16 RX ADMIN — BUPROPION HYDROCHLORIDE 150 MG: 150 TABLET, FILM COATED, EXTENDED RELEASE ORAL at 18:01

## 2019-10-16 RX ADMIN — SODIUM CHLORIDE 75 ML/HR: 0.9 INJECTION, SOLUTION INTRAVENOUS at 10:27

## 2019-10-16 RX ADMIN — ASPIRIN 81 MG 81 MG: 81 TABLET ORAL at 08:58

## 2019-10-16 RX ADMIN — POTASSIUM CHLORIDE 40 MEQ: 1500 TABLET, EXTENDED RELEASE ORAL at 15:06

## 2019-10-16 RX ADMIN — HEPARIN SODIUM 5000 UNITS: 5000 INJECTION INTRAVENOUS; SUBCUTANEOUS at 05:15

## 2019-10-16 RX ADMIN — ACETAMINOPHEN 650 MG: 325 TABLET, FILM COATED ORAL at 20:56

## 2019-10-16 RX ADMIN — METOCLOPRAMIDE 5 MG: 5 INJECTION, SOLUTION INTRAMUSCULAR; INTRAVENOUS at 00:03

## 2019-10-16 RX ADMIN — ONDANSETRON 4 MG: 2 INJECTION INTRAMUSCULAR; INTRAVENOUS at 21:28

## 2019-10-16 RX ADMIN — VANCOMYCIN HYDROCHLORIDE 1500 MG: 5 INJECTION, POWDER, LYOPHILIZED, FOR SOLUTION INTRAVENOUS at 18:01

## 2019-10-16 RX ADMIN — CEFEPIME HYDROCHLORIDE 2000 MG: 2 INJECTION, POWDER, FOR SOLUTION INTRAVENOUS at 16:20

## 2019-10-16 RX ADMIN — ACETAMINOPHEN 650 MG: 325 TABLET, FILM COATED ORAL at 15:04

## 2019-10-16 RX ADMIN — PRAVASTATIN SODIUM 40 MG: 40 TABLET ORAL at 18:01

## 2019-10-16 RX ADMIN — METOCLOPRAMIDE 5 MG: 5 INJECTION, SOLUTION INTRAMUSCULAR; INTRAVENOUS at 05:14

## 2019-10-16 RX ADMIN — ACETAMINOPHEN 650 MG: 325 TABLET, FILM COATED ORAL at 01:42

## 2019-10-16 RX ADMIN — HEPARIN SODIUM 5000 UNITS: 5000 INJECTION INTRAVENOUS; SUBCUTANEOUS at 15:06

## 2019-10-16 RX ADMIN — VANCOMYCIN HYDROCHLORIDE 1500 MG: 5 INJECTION, POWDER, LYOPHILIZED, FOR SOLUTION INTRAVENOUS at 01:43

## 2019-10-16 RX ADMIN — SERTRALINE HYDROCHLORIDE 100 MG: 50 TABLET ORAL at 22:33

## 2019-10-16 RX ADMIN — NICOTINE 1 PATCH: 14 PATCH TRANSDERMAL at 08:59

## 2019-10-16 RX ADMIN — BUTALBITAL, ACETAMINOPHEN AND CAFFEINE 1 TABLET: 50; 325; 40 TABLET ORAL at 22:33

## 2019-10-16 RX ADMIN — HEPARIN SODIUM 5000 UNITS: 5000 INJECTION INTRAVENOUS; SUBCUTANEOUS at 22:34

## 2019-10-16 RX ADMIN — GABAPENTIN 600 MG: 300 CAPSULE ORAL at 22:33

## 2019-10-16 NOTE — PROGRESS NOTES
Progress Note - Marine Forbes 1955, 62 y o  female MRN: [id number]    Unit/Bed#: -01 Encounter: [id number]    Primary Care Provider: No primary care provider on file  Date and time admitted to hospital: 10/12/2019  8:56 PM        * Sepsis ([address] )  Assessment & Plan  · Sepsis POA as evidenced by fever, leukocytosis and tachycardia  · She is immunosuppressed - recently started on Chemo  · Lactic acid level normal   · Continue vancomycin IV and add cefepime IV per ID recommendations  · Blood cultures negative x2 for growth  · Monitor CBC (WBC trending down)  Procalcitonin trended up today 1 69  · Monitor vitals, IVFs, supportive care  · Personal discussion with Infectious Disease will have Urology re-evaluate preliminary culture results of the cath tip are negative and do not want to discuss at the fact the patient has a calculus that has concern for obstruction urology aware and will re-evaluate in a m  · ID input appreciated: Sepsis suspected in the setting of a port-a-cath infection  Port-A-Cath removed by IR 10/15/19  · 10/14 patient continues with intermittent fevers appreciate ongoing recommendations from ID  Pneumonia  Assessment & Plan  · Patient presented with fever and with evidence of a LLL infiltrate on CT of the chest   ·  IV vancomycin continue and IV cefepime add per ID  · Strep PNA and Legionella negative  RSV/Flu negative  · Sputum culture positive for gram-negative, gram-positive cocci in pairs, and gram-positive cocci in clusters  · ID input appreciated: less suspicious of a bacterial pneumonia as the cause of her Sepsis - suspected to be from the infected Port-a-cath versus obstructing uropathy    Hypokalemia  Assessment & Plan  · Potassium 3 3 today, supplemented with p o   Potassium 40 mEq 1 time dose  · Hypokalemia improved slightly will repeat BMP in the morning    Hypertension  Assessment & Plan  · Linisinpril/HCTZ held as BP low on admission and is within normal limits  · Continue to hold at this time  · Can likely resume upon discharge    Leiomyosarcoma [facility])  Assessment & Plan  · Patient with metastatic leiomyosarcoma and patient recently started on Chemo  · She follows with an Oncologist through [facility]  · Will attempt to contact at patient's request her oncologist who is Lesly Espino [phone]; call placed to office for Dr Loren Gilliam  · CT A/P shows a large staghorn calculus on the left which occupies the majority of the left renal pelvis and extends into the left UPJ and proximal left ureter with extension of  the staghorn calculus into several lower pole calyces and marked dilatation of the left upper pole collecting system   · UA negative  · Urology input appreciated  · Id had a personal discussion with Urology will re-evaluate is given preliminary culture showing no growth with the catheter tip    Diarrhea  Assessment & Plan  · Patient reports diarrhea since starting chemotherapy - likely secondary to chemo - rule out infection  · Stool studies via C diff negative  Bacterial studies pending  · Supportive measures  Tobacco abuse  Assessment & Plan  · Cessation counseled  · Continue nicotine patch        VTE Pharmacologic Prophylaxis:   Pharmacologic: Heparin  Mechanical VTE Prophylaxis in Place: Yes    Patient Centered Rounds: I have performed bedside rounds with nursing staff today  Discussions with Specialists or Other Care Team Provider:  Infectious Disease    Education and Discussions with Family / Patient:  Patient and sister at bedside    Time Spent for Care: 30 minutes  More than 50% of total time spent on counseling and coordination of care as described above      Current Length of Stay: 3 day(s)    Current Patient Status: Inpatient   Certification Statement: The patient will continue to require additional inpatient hospital stay due to Ongoing treatment of sepsis    Discharge Plan:  Medically cleared    Code Status: Level 1 - Full Code      Subjective:   Patient generally states she does feel but worst does have some tenderness over her catheterization site  Patient again expressing some fevers overnight and understands that she needs further workup to determine the source of the infection  Sister at bedside made aware that her chemotherapy movement is canceled tomorrow and her PET scan as well as canceled and she could follow up next week for both of those as she has been reaching out to her oncology office  Sister express concern is could this be from the water given there was a source thing issue over at [facility]  Again discussed currently focus on the localized infection and cannot assume outside source thing of affection at this time until further workup is completed  Patient's only request this time she would like to shower  Objective:     Vitals:   Temp (24hrs), Av 7 °F (37 6 °C), Min:97 3 °F (36 3 °C), Max:102 7 °F (39 3 °C)    Temp:  [97 3 °F (36 3 °C)-102 7 °F (39 3 °C)] 98 1 °F (36 7 °C)  HR:  [] 82  Resp:  [18-19] 19  BP: (114-123)/(58-75) 114/58  SpO2:  [93 %-97 %] 93 %  Body mass index is 43 31 kg/m²  Input and Output Summary (last 24 hours): Intake/Output Summary (Last 24 hours) at 10/16/2019 1521  Last data filed at 10/16/2019 1327  Gross per 24 hour   Intake 1740 ml   Output    Net 1740 ml       Physical Exam:     Physical Exam   Constitutional: She is oriented to person, place, and time  She appears ill  HENT:   Head: Normocephalic and atraumatic  Eyes: Conjunctivae and EOM are normal    Neck: Normal range of motion  Cardiovascular: Normal rate, regular rhythm and normal heart sounds  Pulmonary/Chest: Effort normal and breath sounds normal    Abdominal: Soft  Bowel sounds are normal    Musculoskeletal: Normal range of motion  Neurological: She is alert and oriented to person, place, and time  Skin: Skin is warm and dry     Tender over right chest Port-A-Cath site minimal erythema well approximated no drainage   Psychiatric: She has a normal mood and affect  Her behavior is normal          Additional Data:     Labs:    Results from last 7 days   Lab Units 10/16/19  0558 10/15/19  0506  10/12/19  2123   WBC Thousand/uL 8 53 7 14   < > 17 39*   HEMOGLOBIN g/dL 8 2* 9 2*   < > 13 0   HEMATOCRIT % 24 0* 27 2*   < > 37 8   PLATELETS Thousands/uL 89* 120*   < > 176   BANDS PCT %  --  1   < >  --    NEUTROS PCT %  --   --   --  91*   LYMPHS PCT %  --   --   --  7*   LYMPHO PCT %  --  7*   < >  --    MONOS PCT %  --   --   --  1*   MONO PCT %  --  1*   < >  --    EOS PCT %  --  0   < > 0    < > = values in this interval not displayed  Results from last 7 days   Lab Units 10/16/19  0558  10/12/19  2123   SODIUM mmol/L 134*   < > 136   POTASSIUM mmol/L 3 3*   < > 3 4*   CHLORIDE mmol/L 103   < > 99*   CO2 mmol/L 20*   < > 27   BUN mg/dL 10   < > 16   CREATININE mg/dL 1 00   < > 1 02   ANION GAP mmol/L 11   < > 10   CALCIUM mg/dL 8 4   < > 9 4   ALBUMIN g/dL  --   --  3 3*   TOTAL BILIRUBIN mg/dL  --   --  0 70   ALK PHOS U/L  --   --  83   ALT U/L  --   --  25   AST U/L  --   --  19   GLUCOSE RANDOM mg/dL 121   < > 130    < > = values in this interval not displayed  Results from last 7 days   Lab Units 10/12/19  2123   INR  1 04             Results from last 7 days   Lab Units 10/16/19  0558 10/15/19  0506 10/14/19  0521 10/12/19  2124 10/12/19  2123   LACTIC ACID mmol/L  --   --   --  1 9  --    PROCALCITONIN ng/ml 1 69* 1 32* 0 67*  --  <0 05           * I Have Reviewed All Lab Data Listed Above  * Additional Pertinent Lab Tests Reviewed: All Labs Within Last 24 Hours Reviewed        Recent Cultures (last 7 days):     Results from last 7 days   Lab Units 10/13/19  2122 10/13/19  1724 10/13/19  1136 10/13/19  1002 10/12/19  2123   BLOOD CULTURE   --   --   --   --  No Growth at 72 hrs  No Growth at 72 hrs     SPUTUM CULTURE   --  4+ Growth of   --   --   -- GRAM STAIN RESULT   --  Rare Epithelial Cells*  Rare Polys*  2+ Gram negative rods*  2+ Gram positive cocci in pairs*  1+ Gram positive cocci in clusters*  --   --   --    INFLUENZA B PCR   --   --   --  Not Detected  --    RSV PCR   --   --   --  Not Detected  --    LEGIONELLA URINARY ANTIGEN   --   --  Negative  --   --    C DIFF TOXIN B  NEGATIVE for C difficle toxin by PCR    --   --   --   --        Last 24 Hours Medication List:     Current Facility-Administered Medications:  acetaminophen 650 mg Oral Q6H PRN Nakul Penny MD    aspirin 81 mg Oral Daily Donelda HUA Young    buPROPion 150 mg Oral BID Donelda HUA Young    butalbital-acetaminophen-caffeine 1 tablet Oral Q4H PRN BRITTNY Robins    cefepime 2,000 mg Intravenous Q12H James Chappell MD    cyclobenzaprine 5 mg Oral TID PRN Donelda HUA Young    gabapentin 600 mg Oral HS Orin Velasquez PA-C    heparin (porcine) 5,000 Units Subcutaneous [facility] Nakul Penny MD    montelukast 10 mg Oral HS Donelda HUA Young    nicotine 1 patch Transdermal Daily Nakul Penny MD    ondansetron 4 mg Intravenous Q6H PRN Chel Young PA-C    pneumococcal 13-valent conjugate vaccine 0 5 mL Intramuscular Prior to discharge Nakul Penny MD    pravastatin 40 mg Oral Daily With SYSCOHUA    sertraline 100 mg Oral HS Donelda HUA Young    traMADol 50 mg Oral Q6H PRN BRITTNY Robins    vancomycin 17 5 mg/kg (Adjusted) Intravenous Q12H James Chappell MD Last Rate: 1,500 mg (10/16/19 0143)        Today, Patient Was Seen By: BRITTNY Robins    ** Please Note: Dictation voice to text software may have been used in the creation of this document   **

## 2019-10-16 NOTE — ASSESSMENT & PLAN NOTE
· CT A/P shows a large staghorn calculus on the left which occupies the majority of the left renal pelvis and extends into the left UPJ and proximal left ureter with extension of  the staghorn calculus into several lower pole calyces and marked dilatation of the left upper pole collecting system   · UA negative  · Urology input appreciated     · Id had a personal discussion with Urology will re-evaluate is given preliminary culture showing no growth with the catheter tip

## 2019-10-16 NOTE — PROGRESS NOTES
Progress Note - Infectious Disease   Sheridan Mckinney 62 y o  female MRN: [id number]  Unit/Bed#: -32 Encounter: [id number]      Impression/Recommendations:  1  Sepsis, POA   Fever, leukocytosis   Likely due to # 2 although continues to have high-grade fever despite D/C of port, vancomycin, and improvement in cellulitis  UA, blood cultures, C  Diff, Flu PCR negative   ROS and exam otherwise negative   CT suggests possible lingular infiltrate but patient without significant respiratory symptoms   Seems too late for chemo-related fever  WBC normal but procalcitonin rising  Consider role of #3  Consider drug fever (on IV Reglan)  Despite ongoing fevers, actually seems improved since admission  2  Port-A-Cath infection   In setting of recently placed Port-A-Cath 10/02/2019  Now status post removal with improving cellulitis  3  Staghorn calculus left kidney   With hydronephrosis  Chronic based on review of prior imaging   Has negative UA and lack of symptoms, but consider infection given ongoing fevers  4  Diarrhea   Suspect chemotherapy induced  C  Diff negative  5  Metastatic leiomyosarcoma   Recently started on palliative chemotherapy 10/09/2019  Gemcitabine, Taxotere      Continue vancomycin for now  Pharmacy consult for vanco dosing   Add cefepime   Follow up final blood cultures   Follow temperatures closely   Recheck CBC in AM   Consider IR consult for aspiration versus drainage of renal collecting system - discussed with [facility] u[facility] PA who will reeevaluate patient today   Supportive care as per the primary service  The above plan was discussed in detail with the patient, her sister at the bedside, and the urology/[facility] services      Antibiotics:  Vancomycin #3  Antibiotics #4    Subjective:  Patient seen on AM rounds  Continues to have high subjective fevers at night  Denies fevers, chills, sweats, nausea, vomiting, or diarrhea  No new symptoms      24 Hour Events:  High fevers again overnight  WBC normal   Procalcitonin rising  No documented nausea, vomiting, or diarrhea  Objective:  Vitals:  Temp:  [97 3 °F (36 3 °C)-102 7 °F (39 3 °C)] 98 1 °F (36 7 °C)  HR:  [] 82  Resp:  [18-19] 19  BP: (114-123)/(58-75) 114/58  SpO2:  [93 %-97 %] 93 %  Temp (24hrs), Av 7 °F (37 6 °C), Min:97 3 °F (36 3 °C), Max:102 7 °F (39 3 °C)  Current: Temperature: 98 1 °F (36 7 °C)    Physical Exam:   General:  No acute distress, sitting up in bed eating lunch  Eyes:  Normal lids and conjunctivae  ENT:  Normal external ears and nose  Neck:  Neck symmetric with midline trachea  Pulmonary:  Normal respiratory effort without accessory muscle use  Cardiovascular:  Regular rate and rhythm; no peripheral edema  Gastrointestinal:  No tenderness or distention  Musculoskeletal:  No digital clubbing or cyanosis  Skin:  No visible rashes; No palpable nodules  Neurologic:  Sensation grossly intact to light touch  Psychiatric:  Alert and oriented; Normal mood    Lab Results:  I have personally reviewed pertinent labs  Results from last 7 days   Lab Units 10/16/19  0558 10/15/19  0506 10/14/19  0521  10/12/19  2123   POTASSIUM mmol/L 3 3* 3 4* 3 2*   < > 3 4*   CHLORIDE mmol/L 103 102 104   < > 99*   CO2 mmol/L 20* 21 27   < > 27   BUN mg/dL 10 10 12   < > 16   CREATININE mg/dL 1 00 1 00 0 95   < > 1 02   EGFR ml/min/1 73sq m 60 60 64   < > 59   CALCIUM mg/dL 8 4 8 7 8 6   < > 9 4   AST U/L  --   --   --   --  19   ALT U/L  --   --   --   --  25   ALK PHOS U/L  --   --   --   --  83    < > = values in this interval not displayed  Results from last 7 days   Lab Units 10/16/19  0558 10/15/19  0506 10/14/19  0521   WBC Thousand/uL 8 53 7 14 11 53*   HEMOGLOBIN g/dL 8 2* 9 2* 9 4*   PLATELETS Thousands/uL 89* 120* 135*     Results from last 7 days   Lab Units 10/13/19  2122 10/13/19  1724 10/13/19  1136 10/13/19  1002 10/12/19  2123   BLOOD CULTURE   --   --   --   --  No Growth at 48 hrs  No Growth at 48 hrs  SPUTUM CULTURE   --  4+ Growth of   --   --   --    GRAM STAIN RESULT   --  Rare Epithelial Cells*  Rare Polys*  2+ Gram negative rods*  2+ Gram positive cocci in pairs*  1+ Gram positive cocci in clusters*  --   --   --    INFLUENZA B PCR   --   --   --  Not Detected  --    RSV PCR   --   --   --  Not Detected  --    LEGIONELLA URINARY ANTIGEN   --   --  Negative  --   --    C DIFF TOXIN B  NEGATIVE for C difficle toxin by PCR    --   --   --   --        Imaging Studies:   I have personally reviewed pertinent imaging study reports and images in PACS  CT A/P re-reviewed personally left staghorn calculus with hydronephrosis  EKG, Pathology, and Other Studies:   I have personally reviewed pertinent reports

## 2019-10-16 NOTE — ASSESSMENT & PLAN NOTE
· Patient reports diarrhea since starting chemotherapy - likely secondary to chemo - rule out infection  · Stool studies via C diff negative  Bacterial studies pending  · Supportive measures

## 2019-10-16 NOTE — ASSESSMENT & PLAN NOTE
· Patient presented with fever and with evidence of a LLL infiltrate on CT of the chest   ·  IV vancomycin continue and IV cefepime add per ID  · Strep PNA and Legionella negative  RSV/Flu negative  · Sputum culture positive for gram-negative, gram-positive cocci in pairs, and gram-positive cocci in clusters    · ID input appreciated: less suspicious of a bacterial pneumonia as the cause of her Sepsis - suspected to be from the infected Port-a-cath versus obstructing uropathy

## 2019-10-16 NOTE — PLAN OF CARE
Problem: PAIN - ADULT  Goal: Verbalizes/displays adequate comfort level or baseline comfort level  Description  Interventions:  - Encourage patient to monitor pain and request assistance  - Assess pain using appropriate pain scale  - Administer analgesics based on type and severity of pain and evaluate response  - Implement non-pharmacological measures as appropriate and evaluate response  - Consider cultural and social influences on pain and pain management  - Notify physician/advanced practitioner if interventions unsuccessful or patient reports new pain  Outcome: Progressing     Problem: INFECTION - ADULT  Goal: Absence or prevention of progression during hospitalization  Description  INTERVENTIONS:  - Assess and monitor for signs and symptoms of infection  - Monitor lab/diagnostic results  - Monitor all insertion sites, i e  indwelling lines, tubes, and drains  - [place] appropriate cooling/warming therapies per order  - Administer medications as ordered  - Instruct and encourage patient and family to use good hand hygiene technique     Outcome: Progressing     Problem: SAFETY ADULT  Goal: Patient will remain free of falls  Description  INTERVENTIONS:  - Assess patient frequently for physical needs  -  Identify cognitive and physical deficits and behaviors that affect risk of falls    -  [place] fall precautions as indicated by assessment   - Educate patient/family on patient safety including physical limitations  - Instruct patient to call for assistance with activity based on assessment  - Modify environment to reduce risk of injury  - Consider OT/PT consult to assist with strengthening/mobility  Outcome: Progressing  Goal: Maintain or return to baseline ADL function  Description  INTERVENTIONS:  -  Assess patient's ability to carry out ADLs; assess patient's baseline for ADL function and identify physical deficits which impact ability to perform ADLs (bathing, care of mouth/teeth, toileting, grooming, dressing, etc )  - Assess/evaluate cause of self-care deficits   - Assess range of motion  - Assess patient's mobility; develop plan if impaired  - Assess patient's need for assistive devices and provide as appropriate  - Encourage maximum independence but intervene and supervise when necessary  - Involve family in performance of ADLs  - Assess for home care needs following discharge   - Consider OT consult to assist with ADL evaluation and planning for discharge  - Provide patient education as appropriate  Outcome: Progressing  Goal: Maintain or return mobility status to optimal level  Description  INTERVENTIONS:  - Assess patient's baseline mobility status (ambulation, transfers, stairs, etc )    - Identify cognitive and physical deficits and behaviors that affect mobility  - [place] fall precautions as indicated by assessment  - Record patient progress and toleration of activity level on Mobility SBAR; progress patient to next Phase/Stage  - Instruct patient to call for assistance with activity based on assessment   Outcome: Progressing     Problem: DISCHARGE PLANNING  Goal: Discharge to home or other facility with appropriate resources  Description  INTERVENTIONS:  - Identify barriers to discharge w/patient and caregiver  - Arrange for needed discharge resources and transportation as appropriate  - Identify discharge learning needs (meds, wound care, etc )  - Refer to Case Management Department for coordinating discharge planning if the patient needs post-hospital services based on physician/advanced practitioner order or complex needs related to functional status, cognitive ability, or social support system   Outcome: Progressing     Problem: Knowledge Deficit  Goal: Patient/family/caregiver demonstrates understanding of disease process, treatment plan, medications, and discharge instructions  Description  Complete learning assessment and assess knowledge base    Interventions:  - Provide teaching at level of understanding  - Provide teaching via preferred learning methods  Outcome: Progressing     Problem: CARDIOVASCULAR - ADULT  Goal: Maintains optimal cardiac output and hemodynamic stability  Description  INTERVENTIONS:  - Monitor I/O, vital signs and rhythm  - Monitor for S/S and trends of decreased cardiac output  - Administer and titrate ordered vasoactive medications to optimize hemodynamic stability  - Assess quality of pulses, skin color and temperature  - Assess for signs of decreased coronary artery perfusion  - Instruct patient to report change in severity of symptoms  Outcome: Progressing  Goal: Absence of cardiac dysrhythmias or at baseline rhythm  Description  INTERVENTIONS:  - Continuous cardiac monitoring, vital signs, obtain 12 lead EKG if ordered  - Administer antiarrhythmic and heart rate control medications as ordered  - Monitor electrolytes and administer replacement therapy as ordered  Outcome: Progressing     Problem: METABOLIC, FLUID AND ELECTROLYTES - ADULT  Goal: Electrolytes maintained within normal limits  Description  INTERVENTIONS:  - Monitor labs and assess patient for signs and symptoms of electrolyte imbalances  - Administer electrolyte replacement as ordered  - Monitor response to electrolyte replacements, including repeat lab results as appropriate  - Instruct patient on fluid and nutrition as appropriate  Outcome: Progressing  Goal: Fluid balance maintained  Description  INTERVENTIONS:  - Monitor labs   - Monitor I/O and WT  - Instruct patient on fluid and nutrition as appropriate  - Assess for signs & symptoms of volume excess or deficit  Outcome: Progressing  Goal: Glucose maintained within target range  Description  INTERVENTIONS:  - Monitor Blood Glucose as ordered  - Assess for signs and symptoms of hyperglycemia and hypoglycemia  - Administer ordered medications to maintain glucose within target range  - Assess nutritional intake and initiate nutrition service referral as needed  Outcome: Progressing     Problem: SKIN/TISSUE INTEGRITY - ADULT  Goal: Skin integrity remains intact  Description  INTERVENTIONS  - Identify patients at risk for skin breakdown  - Assess and monitor skin integrity  - Assess and monitor nutrition and hydration status  - Monitor labs (i e  albumin)  - Assist with mobility/ambulation  - Relieve pressure over bony prominences  - Avoid friction and shearing  - Provide appropriate hygiene as needed including keeping skin clean and dry  - Evaluate need for skin moisturizer/barrier cream  - Collaborate with interdisciplinary team (i e  Nutrition, Rehabilitation, etc )   - Patient/family teaching   Outcome: Progressing  Goal: Incision(s), wounds(s) or drain site(s) healing without S/S of infection  Description  INTERVENTIONS  - Assess and document risk factors for skin impairment   - Assess and document dressing, incision, wound bed, drain sites and surrounding tissue  - Consider nutrition services referral as needed  - Oral mucous membranes remain intact  - Provide patient/ family education  Outcome: Progressing  Goal: Oral mucous membranes remain intact  Description  INTERVENTIONS  - Assess oral mucosa and hygiene practices  - Implement preventative oral hygiene regimen  - Implement oral medicated treatments as ordered     Outcome: Progressing     Problem: Potential for Falls  Goal: Patient will remain free of falls  Description  INTERVENTIONS:  - Assess patient frequently for physical needs  -  Identify cognitive and physical deficits and behaviors that affect risk of falls    -  [place] fall precautions as indicated by assessment   - Educate patient/family on patient safety including physical limitations  - Instruct patient to call for assistance with activity based on assessment  - Modify environment to reduce risk of injury  - Consider OT/PT consult to assist with strengthening/mobility  Outcome: Progressing     Problem: Nutrition/Hydration-ADULT  Goal: Nutrient/Hydration intake appropriate for improving, restoring or maintaining nutritional needs  Description  Monitor and assess patient's nutrition/hydration status for malnutrition  Collaborate with interdisciplinary team and initiate plan and interventions as ordered  Monitor patient's weight and dietary intake as ordered or per policy  Utilize nutrition screening tool and intervene as necessary  Determine patient's food preferences and provide high-protein, high-caloric foods as appropriate       INTERVENTIONS:  - Monitor oral intake, urinary output, labs, and treatment plans  - Assess nutrition and hydration status and recommend course of action  - Evaluate amount of meals eaten  - Assist patient with eating if necessary   - Allow adequate time for meals  - Recommend/ encourage appropriate diets, oral nutritional supplements, and vitamin/mineral supplements  - Order, calculate, and assess calorie counts as needed  - Recommend, monitor, and adjust tube feedings and TPN/PPN based on assessed needs  - Assess need for intravenous fluids  - Provide specific nutrition/hydration education as appropriate  - Include patient/family/caregiver in decisions related to nutrition  Outcome: Progressing     Problem: Prexisting or High Potential for Compromised Skin Integrity  Goal: Skin integrity is maintained or improved  Description  INTERVENTIONS:  - Identify patients at risk for skin breakdown  - Assess and monitor skin integrity  - Assess and monitor nutrition and hydration status  - Monitor labs   - Assess for incontinence   - Turn and reposition patient  - Assist with mobility/ambulation  - Relieve pressure over bony prominences  - Avoid friction and shearing  - Provide appropriate hygiene as needed including keeping skin clean and dry  - Evaluate need for skin moisturizer/barrier cream  - Collaborate with interdisciplinary team   - Patient/family teaching  - Consider wound care consult   Outcome: Progressing

## 2019-10-16 NOTE — ASSESSMENT & PLAN NOTE
· Potassium 3 3 today, supplemented with p o   Potassium 40 mEq 1 time dose  · Hypokalemia improved slightly will repeat BMP in the morning

## 2019-10-16 NOTE — ASSESSMENT & PLAN NOTE
· Patient with metastatic leiomyosarcoma and patient recently started on Chemo  · [place] [facility] [place]      · Will attempt to contact at patient's request her oncologist who is Alondra Mayo [phone]; call placed to office for Dr Valentino Lane

## 2019-10-16 NOTE — PROGRESS NOTES
Vancomycin Assessment    Marine Forbes is a 62 y o  female who is currently receiving vancomycin 15mg/kg q12h for skin-soft tissue infection, other port infection   Relevant clinical data and objective history reviewed:  Creatinine   Date Value Ref Range Status   10/16/2019 1 00 0 60 - 1 30 mg/dL Final     Comment:     Standardized to IDMS reference method   10/15/2019 1 00 0 60 - 1 30 mg/dL Final     Comment:     Standardized to IDMS reference method   10/14/2019 0 95 0 60 - 1 30 mg/dL Final     Comment:     Standardized to IDMS reference method     /58 (BP Location: Right arm)   Pulse 82   Temp 98 1 °F (36 7 °C) (Oral)   Resp 19   Ht 5' 8" (1 727 m)   Wt 129 kg (284 lb 13 4 oz)   SpO2 93%   BMI 43 31 kg/m²   I/O last 3 completed shifts: In: 1980 [P O :1980]  Out: -   Lab Results   Component Value Date/Time    BUN 10 10/16/2019 05:58 AM    WBC 8 53 10/16/2019 05:58 AM    HGB 8 2 (L) 10/16/2019 05:58 AM    HCT 24 0 (L) 10/16/2019 05:58 AM    MCV 95 10/16/2019 05:58 AM    PLT 89 (L) 10/16/2019 05:58 AM     Temp Readings from Last 3 Encounters:   10/16/19 98 1 °F (36 7 °C) (Oral)     Vancomycin Days of Therapy: Day 3    Assessment/Plan  The patient is currently on vancomycin utilizing scheduled dosing  Baseline risks associated with therapy include: advanced age  The patient is receiving 15mg/kg q12h with the most recent vancomycin level being at steady-state and therapeutic based on a goal of 15-20 (appropriate for most indications) ; therefore, is clinically appropriate and dose will be continued   Pharmacy will continue to follow closely for s/sx of nephrotoxicity, infusion reactions and appropriateness of therapy  BMP and CBC will be ordered per protocol  Plan for trough as patient approaches steady state, prior to the other  dose at approximately 1330 on 10/23  Pharmacy will continue to follow the patients culture results and clinical progress daily      Florinda Ch, Pharmacist

## 2019-10-16 NOTE — ASSESSMENT & PLAN NOTE
· Sepsis POA as evidenced by fever, leukocytosis and tachycardia  · She is immunosuppressed - recently started on Chemo  · Lactic acid level normal   · Continue vancomycin IV and add cefepime IV per ID recommendations  · Blood cultures negative x2 for growth  · Monitor CBC (WBC trending down)  Procalcitonin trended up today 1 69  · Monitor vitals, IVFs, supportive care  · Personal discussion with Infectious Disease will have Urology re-evaluate preliminary culture results of the cath tip are negative and do not want to discuss at the fact the patient has a calculus that has concern for obstruction urology aware and will re-evaluate in a m  · ID input appreciated: Sepsis suspected in the setting of a port-a-cath infection  Port-A-Cath removed by IR 10/15/19  · 10/14 patient continues with intermittent fevers appreciate ongoing recommendations from ID

## 2019-10-16 NOTE — ASSESSMENT & PLAN NOTE
· Linisinpril/HCTZ held as BP low on admission and is within normal limits  · Continue to hold at this time    · Can likely resume upon discharge

## 2019-10-17 ENCOUNTER — APPOINTMENT (INPATIENT)
Dept: INTERVENTIONAL RADIOLOGY/VASCULAR | Facility: HOSPITAL | Age: 64
DRG: 721 | End: 2019-10-17
Payer: COMMERCIAL

## 2019-10-17 LAB
ANION GAP SERPL CALCULATED.3IONS-SCNC: 13 MMOL/L (ref 4–13)
BACTERIA CATH TIP CULT: NO GROWTH
BUN SERPL-MCNC: 11 MG/DL (ref 5–25)
CALCIUM SERPL-MCNC: 8.7 MG/DL (ref 8.3–10.1)
CHLORIDE SERPL-SCNC: 104 MMOL/L (ref 100–108)
CO2 SERPL-SCNC: 21 MMOL/L (ref 21–32)
CREAT SERPL-MCNC: 0.98 MG/DL (ref 0.6–1.3)
ERYTHROCYTE [DISTWIDTH] IN BLOOD BY AUTOMATED COUNT: 13.9 % (ref 11.6–15.1)
GFR SERPL CREATININE-BSD FRML MDRD: 62 ML/MIN/1.73SQ M
GLUCOSE SERPL-MCNC: 119 MG/DL (ref 65–140)
HCT VFR BLD AUTO: 23.9 % (ref 34.8–46.1)
HGB BLD-MCNC: 7.9 G/DL (ref 11.5–15.4)
MCH RBC QN AUTO: 31.5 PG (ref 26.8–34.3)
MCHC RBC AUTO-ENTMCNC: 33.1 G/DL (ref 31.4–37.4)
MCV RBC AUTO: 95 FL (ref 82–98)
PLATELET # BLD AUTO: 86 THOUSANDS/UL (ref 149–390)
PMV BLD AUTO: 11.1 FL (ref 8.9–12.7)
POTASSIUM SERPL-SCNC: 3.4 MMOL/L (ref 3.5–5.3)
PROCALCITONIN SERPL-MCNC: 1.4 NG/ML
RBC # BLD AUTO: 2.51 MILLION/UL (ref 3.81–5.12)
SODIUM SERPL-SCNC: 138 MMOL/L (ref 136–145)
WBC # BLD AUTO: 8.46 THOUSAND/UL (ref 4.31–10.16)

## 2019-10-17 PROCEDURE — 80048 BASIC METABOLIC PNL TOTAL CA: CPT | Performed by: NURSE PRACTITIONER

## 2019-10-17 PROCEDURE — C1729 CATH, DRAINAGE: HCPCS

## 2019-10-17 PROCEDURE — 99152 MOD SED SAME PHYS/QHP 5/>YRS: CPT | Performed by: RADIOLOGY

## 2019-10-17 PROCEDURE — 99232 SBSQ HOSP IP/OBS MODERATE 35: CPT | Performed by: NURSE PRACTITIONER

## 2019-10-17 PROCEDURE — 99024 POSTOP FOLLOW-UP VISIT: CPT | Performed by: RADIOLOGY

## 2019-10-17 PROCEDURE — 85027 COMPLETE CBC AUTOMATED: CPT | Performed by: NURSE PRACTITIONER

## 2019-10-17 PROCEDURE — 0T9130Z DRAINAGE OF LEFT KIDNEY WITH DRAINAGE DEVICE, PERCUTANEOUS APPROACH: ICD-10-PCS | Performed by: RADIOLOGY

## 2019-10-17 PROCEDURE — C1894 INTRO/SHEATH, NON-LASER: HCPCS

## 2019-10-17 PROCEDURE — 99152 MOD SED SAME PHYS/QHP 5/>YRS: CPT

## 2019-10-17 PROCEDURE — 84145 PROCALCITONIN (PCT): CPT | Performed by: NURSE PRACTITIONER

## 2019-10-17 PROCEDURE — 50432 PLMT NEPHROSTOMY CATHETER: CPT | Performed by: RADIOLOGY

## 2019-10-17 PROCEDURE — 99222 1ST HOSP IP/OBS MODERATE 55: CPT | Performed by: UROLOGY

## 2019-10-17 PROCEDURE — 99233 SBSQ HOSP IP/OBS HIGH 50: CPT | Performed by: DENTIST

## 2019-10-17 PROCEDURE — 87086 URINE CULTURE/COLONY COUNT: CPT | Performed by: NURSE PRACTITIONER

## 2019-10-17 PROCEDURE — 99153 MOD SED SAME PHYS/QHP EA: CPT

## 2019-10-17 PROCEDURE — 50432 PLMT NEPHROSTOMY CATHETER: CPT

## 2019-10-17 PROCEDURE — 94760 N-INVAS EAR/PLS OXIMETRY 1: CPT

## 2019-10-17 PROCEDURE — C1769 GUIDE WIRE: HCPCS

## 2019-10-17 RX ORDER — POTASSIUM CHLORIDE 20 MEQ/1
40 TABLET, EXTENDED RELEASE ORAL ONCE
Status: COMPLETED | OUTPATIENT
Start: 2019-10-17 | End: 2019-10-17

## 2019-10-17 RX ORDER — ALBUTEROL SULFATE 2.5 MG/3ML
2.5 SOLUTION RESPIRATORY (INHALATION) EVERY 6 HOURS PRN
Status: DISCONTINUED | OUTPATIENT
Start: 2019-10-17 | End: 2019-10-17

## 2019-10-17 RX ORDER — TRAMADOL HYDROCHLORIDE 50 MG/1
50 TABLET ORAL ONCE
Status: COMPLETED | OUTPATIENT
Start: 2019-10-17 | End: 2019-10-17

## 2019-10-17 RX ORDER — FENTANYL CITRATE 50 UG/ML
INJECTION, SOLUTION INTRAMUSCULAR; INTRAVENOUS CODE/TRAUMA/SEDATION MEDICATION
Status: COMPLETED | OUTPATIENT
Start: 2019-10-17 | End: 2019-10-17

## 2019-10-17 RX ORDER — MIDAZOLAM HYDROCHLORIDE 1 MG/ML
INJECTION INTRAMUSCULAR; INTRAVENOUS CODE/TRAUMA/SEDATION MEDICATION
Status: COMPLETED | OUTPATIENT
Start: 2019-10-17 | End: 2019-10-17

## 2019-10-17 RX ORDER — LIDOCAINE HYDROCHLORIDE 10 MG/ML
INJECTION, SOLUTION EPIDURAL; INFILTRATION; INTRACAUDAL; PERINEURAL CODE/TRAUMA/SEDATION MEDICATION
Status: COMPLETED | OUTPATIENT
Start: 2019-10-17 | End: 2019-10-17

## 2019-10-17 RX ORDER — DIPHENHYDRAMINE HYDROCHLORIDE 50 MG/ML
INJECTION INTRAMUSCULAR; INTRAVENOUS CODE/TRAUMA/SEDATION MEDICATION
Status: COMPLETED | OUTPATIENT
Start: 2019-10-17 | End: 2019-10-17

## 2019-10-17 RX ADMIN — Medication 2000 MG: at 17:19

## 2019-10-17 RX ADMIN — POTASSIUM CHLORIDE 40 MEQ: 1500 TABLET, EXTENDED RELEASE ORAL at 10:45

## 2019-10-17 RX ADMIN — VANCOMYCIN HYDROCHLORIDE 1500 MG: 5 INJECTION, POWDER, LYOPHILIZED, FOR SOLUTION INTRAVENOUS at 18:35

## 2019-10-17 RX ADMIN — FENTANYL CITRATE 12.5 MCG: 50 INJECTION, SOLUTION INTRAMUSCULAR; INTRAVENOUS at 16:18

## 2019-10-17 RX ADMIN — DIPHENHYDRAMINE HYDROCHLORIDE 25 MG: 50 INJECTION, SOLUTION INTRAMUSCULAR; INTRAVENOUS at 16:03

## 2019-10-17 RX ADMIN — HEPARIN SODIUM 5000 UNITS: 5000 INJECTION INTRAVENOUS; SUBCUTANEOUS at 21:51

## 2019-10-17 RX ADMIN — VANCOMYCIN HYDROCHLORIDE 1500 MG: 5 INJECTION, POWDER, LYOPHILIZED, FOR SOLUTION INTRAVENOUS at 05:50

## 2019-10-17 RX ADMIN — ASPIRIN 81 MG 81 MG: 81 TABLET ORAL at 10:45

## 2019-10-17 RX ADMIN — MIDAZOLAM HYDROCHLORIDE 1 MG: 1 INJECTION, SOLUTION INTRAMUSCULAR; INTRAVENOUS at 16:03

## 2019-10-17 RX ADMIN — PRAVASTATIN SODIUM 40 MG: 40 TABLET ORAL at 17:19

## 2019-10-17 RX ADMIN — NICOTINE 1 PATCH: 14 PATCH TRANSDERMAL at 10:46

## 2019-10-17 RX ADMIN — ONDANSETRON 4 MG: 2 INJECTION INTRAMUSCULAR; INTRAVENOUS at 15:08

## 2019-10-17 RX ADMIN — HEPARIN SODIUM 5000 UNITS: 5000 INJECTION INTRAVENOUS; SUBCUTANEOUS at 05:49

## 2019-10-17 RX ADMIN — BUPROPION HYDROCHLORIDE 150 MG: 150 TABLET, FILM COATED, EXTENDED RELEASE ORAL at 10:45

## 2019-10-17 RX ADMIN — GABAPENTIN 600 MG: 300 CAPSULE ORAL at 21:51

## 2019-10-17 RX ADMIN — IOHEXOL 15 ML: 300 INJECTION, SOLUTION INTRAVENOUS at 16:43

## 2019-10-17 RX ADMIN — CYCLOBENZAPRINE HYDROCHLORIDE 5 MG: 10 TABLET, FILM COATED ORAL at 20:09

## 2019-10-17 RX ADMIN — Medication 2000 MG: at 03:57

## 2019-10-17 RX ADMIN — BUPROPION HYDROCHLORIDE 150 MG: 150 TABLET, FILM COATED, EXTENDED RELEASE ORAL at 17:20

## 2019-10-17 RX ADMIN — MIDAZOLAM HYDROCHLORIDE 0.25 MG: 1 INJECTION, SOLUTION INTRAMUSCULAR; INTRAVENOUS at 16:10

## 2019-10-17 RX ADMIN — MONTELUKAST 10 MG: 10 TABLET, FILM COATED ORAL at 21:51

## 2019-10-17 RX ADMIN — LIDOCAINE HYDROCHLORIDE 5 ML: 10 INJECTION, SOLUTION EPIDURAL; INFILTRATION; INTRACAUDAL; PERINEURAL at 16:10

## 2019-10-17 RX ADMIN — FENTANYL CITRATE 12.5 MCG: 50 INJECTION, SOLUTION INTRAMUSCULAR; INTRAVENOUS at 16:10

## 2019-10-17 RX ADMIN — SERTRALINE HYDROCHLORIDE 100 MG: 50 TABLET ORAL at 21:51

## 2019-10-17 RX ADMIN — FENTANYL CITRATE 25 MCG: 50 INJECTION, SOLUTION INTRAMUSCULAR; INTRAVENOUS at 16:03

## 2019-10-17 RX ADMIN — TRAMADOL HYDROCHLORIDE 50 MG: 50 TABLET, FILM COATED ORAL at 17:20

## 2019-10-17 RX ADMIN — MIDAZOLAM HYDROCHLORIDE 0.25 MG: 1 INJECTION, SOLUTION INTRAMUSCULAR; INTRAVENOUS at 16:18

## 2019-10-17 NOTE — ASSESSMENT & PLAN NOTE
· Patient presented with fever and with evidence of a LLL infiltrate on CT of the chest   ·  IV vancomycin continue and IV cefepime add per ID  · Strep PNA and Legionella negative  RSV/Flu negative     · Sputum culture showing normal respiratory sarkis  · ID input appreciated: less suspicious of a bacterial pneumonia as the cause of her Sepsis - suspected to be from other sources at this point obstructive uropathy given negative cath tip growth from culture

## 2019-10-17 NOTE — PROGRESS NOTES
Progress Note - Juju Hernandez 1955, 62 y o  female MRN: [id number]    Unit/Bed#: -01 Encounter: [id number]    Primary Care Provider: No primary care provider on file  Date and time admitted to hospital: 10/12/2019  8:56 PM        * Sepsis ([address] )  Assessment & Plan  · Sepsis POA as evidenced by fever, leukocytosis and tachycardia  · She is immunosuppressed - recently started on Chemo  · Lactic acid level normal   · Continue vancomycin IV and IV cefepime  · Blood cultures x2 negative for growth at 72 hours  · Monitor CBC (WBC trending down)  Procalcitonin trended up yesterday 1 69 repeat procalcitonin today pending  · Monitor vitals, IVFs, supportive care  · Cath tip negative for growth  · ID following concerned given negative tip growth that sources possibly could be due to infection behind the Staghorn calculi  Discussed with Urology awaiting re-evaluation patient currently NPO as patient may need a nephrostomy tube  · Continue NPO status appreciate ID and Urology recommendations  Pneumonia  Assessment & Plan  · Patient presented with fever and with evidence of a LLL infiltrate on CT of the chest   ·  IV vancomycin continue and IV cefepime add per ID  · Strep PNA and Legionella negative  RSV/Flu negative  · Sputum culture showing normal respiratory sarkis  · ID input appreciated: less suspicious of a bacterial pneumonia as the cause of her Sepsis - suspected to be from other sources at this point obstructive uropathy given negative cath tip growth from culture    Hypokalemia  Assessment & Plan  · Potassium 3 4 today, supplemented with p o  Potassium 40 mEq 1 time dose  · Hypokalemia improved slightly will repeat BMP in the morning    Hypertension  Assessment & Plan  · Linisinpril/HCTZ held as BP low on admission and is within normal limits  · Continue to hold at this time    · Can likely resume upon discharge    Leiomyosarcoma [facility])  Assessment & Plan  · Patient with metastatic leiomyosarcoma and patient recently started on Chemo  · She follows with an Oncologist through [facility]  · Oncology team where the patient is here if there is questions can contact our office at [facility] [phone]    Staghorn calculus  Assessment & Plan  · CT A/P shows a large staghorn calculus on the left which occupies the majority of the left renal pelvis and extends into the left UPJ and proximal left ureter with extension of  the staghorn calculus into several lower pole calyces and marked dilatation of the left upper pole collecting system   · UA negative however given ongoing nocturnal fevers there is concern behind the obstructing uropathy for possible infectious source  · Urology to re-evaluate  · Ongoing recommendations from Urology in IV appreciated    Diarrhea  Assessment & Plan  · Patient reports diarrhea since starting chemotherapy - likely secondary to chemo - rule out infection  · Stool studies via C diff negative  Overall clinically improved  · Supportive measures  Tobacco abuse  Assessment & Plan  · Cessation counseled  · Continue nicotine patch        VTE Pharmacologic Prophylaxis:   Pharmacologic: Heparin  Mechanical VTE Prophylaxis in Place: Yes    Patient Centered Rounds: I have performed bedside rounds with nursing staff today  Discussions with Specialists or Other Care Team Provider:  Urology    Education and Discussions with Family / Patient:  Patient    Time Spent for Care: 30 minutes  More than 50% of total time spent on counseling and coordination of care as described above      Current Length of Stay: 4 day(s)    Current Patient Status: Inpatient   Certification Statement: The patient will continue to require additional inpatient hospital stay due to Ongoing acute intervention in setting of sepsis    Discharge Plan:  Not medically cleared    Code Status: Level 1 - Full Code      Subjective:   Patient reporting she felt febrile show RN at bedside performed in oral temperature at 98 9° will continue to monitor tabs per patient request and unit protocol  Patient understanding thus far is that the Port-A-Cath tip was negative for bacterial growth understands that urology needs to evaluated she may require a tube in setting of her kidney stone  Patient reports she did have an episode when she was much younger and she went septic it was in the hospital did not receive a tube at that point and wants to explore options with Urology and understands the reason that she cannot needing by mouth  Patient denies pain currently other than mild tenderness at her incision site from removing the Port-A-Cath  Objective:     Vitals:   Temp (24hrs), Av 6 °F (37 6 °C), Min:98 6 °F (37 °C), Max:102 °F (38 9 °C)    Temp:  [98 6 °F (37 °C)-102 °F (38 9 °C)] 98 6 °F (37 °C)  HR:  [88-89] 88  Resp:  [19-20] 19  BP: (101-136)/(55-75) 126/74  SpO2:  [94 %-95 %] 95 %  Body mass index is 43 31 kg/m²  Input and Output Summary (last 24 hours): Intake/Output Summary (Last 24 hours) at 10/17/2019 1033  Last data filed at 10/16/2019 1838  Gross per 24 hour   Intake 540 ml   Output    Net 540 ml       Physical Exam:     Physical Exam   Constitutional: She is oriented to person, place, and time  She appears well-developed and well-nourished  HENT:   Head: Normocephalic and atraumatic  Eyes: Conjunctivae and EOM are normal    Neck: Normal range of motion  Cardiovascular: Normal rate, regular rhythm and normal heart sounds  Pulmonary/Chest: Effort normal and breath sounds normal    Abdominal: Soft  Bowel sounds are normal    Musculoskeletal: Normal range of motion  Neurological: She is alert and oriented to person, place, and time  Skin: Skin is warm and dry  There is erythema  Previous Port-A-Cath site right chest wall well approximated mild tenderness inferior to incision site; mild erythema improved from previous exam   Psychiatric: She has a normal mood and affect   Her behavior is normal          Additional Data:     Labs:    Results from last 7 days   Lab Units 10/17/19  0624  10/15/19  0506  10/12/19  2123   WBC Thousand/uL 8 46   < > 7 14   < > 17 39*   HEMOGLOBIN g/dL 7 9*   < > 9 2*   < > 13 0   HEMATOCRIT % 23 9*   < > 27 2*   < > 37 8   PLATELETS Thousands/uL 86*   < > 120*   < > 176   BANDS PCT %  --   --  1   < >  --    NEUTROS PCT %  --   --   --   --  91*   LYMPHS PCT %  --   --   --   --  7*   LYMPHO PCT %  --   --  7*   < >  --    MONOS PCT %  --   --   --   --  1*   MONO PCT %  --   --  1*   < >  --    EOS PCT %  --   --  0   < > 0    < > = values in this interval not displayed  Results from last 7 days   Lab Units 10/17/19  0624  10/12/19  2123   SODIUM mmol/L 138   < > 136   POTASSIUM mmol/L 3 4*   < > 3 4*   CHLORIDE mmol/L 104   < > 99*   CO2 mmol/L 21   < > 27   BUN mg/dL 11   < > 16   CREATININE mg/dL 0 98   < > 1 02   ANION GAP mmol/L 13   < > 10   CALCIUM mg/dL 8 7   < > 9 4   ALBUMIN g/dL  --   --  3 3*   TOTAL BILIRUBIN mg/dL  --   --  0 70   ALK PHOS U/L  --   --  83   ALT U/L  --   --  25   AST U/L  --   --  19   GLUCOSE RANDOM mg/dL 119   < > 130    < > = values in this interval not displayed  Results from last 7 days   Lab Units 10/12/19  2123   INR  1 04             Results from last 7 days   Lab Units 10/16/19  0558 10/15/19  0506 10/14/19  0521 10/12/19  2124 10/12/19  2123   LACTIC ACID mmol/L  --   --   --  1 9  --    PROCALCITONIN ng/ml 1 69* 1 32* 0 67*  --  <0 05           * I Have Reviewed All Lab Data Listed Above  * Additional Pertinent Lab Tests Reviewed: All Labs Within Last 24 Hours Reviewed        Recent Cultures (last 7 days):     Results from last 7 days   Lab Units 10/13/19  2122 10/13/19  1724 10/13/19  1136 10/13/19  1002 10/12/19  9083   BLOOD CULTURE   --   --   --   --  No Growth at 72 hrs  No Growth at 72 hrs     SPUTUM CULTURE   --  4+ Growth of   --   --   --    GRAM STAIN RESULT   --  Rare Epithelial Cells*  Rare Polys*  2+ Gram negative rods*  2+ Gram positive cocci in pairs*  1+ Gram positive cocci in clusters*  --   --   --    INFLUENZA B PCR   --   --   --  Not Detected  --    RSV PCR   --   --   --  Not Detected  --    LEGIONELLA URINARY ANTIGEN   --   --  Negative  --   --    C DIFF TOXIN B  NEGATIVE for C difficle toxin by PCR    --   --   --   --        Last 24 Hours Medication List:     Current Facility-Administered Medications:  acetaminophen 650 mg Oral Q6H PRN Reji Chavez MD    albuterol 2 5 mg Nebulization Q6H PRN BRITTNY Alberts    aspirin 81 mg Oral Daily Roxy Montano PA-C    buPROPion 150 mg Oral BID Roxy Montano PA-C    butalbital-acetaminophen-caffeine 1 tablet Oral Q4H PRN BRITTNY Alberts    cefepime 2,000 mg Intravenous Q12H Nicolette Robledo MD Last Rate: 2,000 mg (10/17/19 3651)   cyclobenzaprine 5 mg Oral TID PRN Roxy Montano PA-C    gabapentin 600 mg Oral HS Orin Velasquez PA-C    heparin (porcine) 5,000 Units Subcutaneous [facility] Reji Chavez MD    montelukast 10 mg Oral HS Roxyeli Montano PA-C    nicotine 1 patch Transdermal Daily Reji Chavez MD    ondansetron 4 mg Intravenous Q6H PRN Roxy Montano PA-C    pneumococcal 13-valent conjugate vaccine 0 5 mL Intramuscular Prior to discharge Reji Chavez MD    potassium chloride 40 mEq Oral Once BRITTNY Alberts    pravastatin 40 mg Oral Daily With SYSCOHUA    sertraline 100 mg Oral HS Roxy NoseHUA    traMADol 50 mg Oral Q6H PRN BRITTNY Alberts    vancomycin 17 5 mg/kg (Adjusted) Intravenous Q12H Nicolette Robledo MD Last Rate: 1,500 mg (10/17/19 3267)        Today, Patient Was Seen By: BRITTNY Alberts    ** Please Note: Dictation voice to text software may have been used in the creation of this document   **

## 2019-10-17 NOTE — ASSESSMENT & PLAN NOTE
· Potassium 3 4 today, supplemented with p o   Potassium 40 mEq 1 time dose  · Hypokalemia improved slightly will repeat BMP in the morning

## 2019-10-17 NOTE — H&P
Interventional Radiology Preprocedure Note    History/Indication for procedure:   Cliff Puentes is a 62 y o  female with persistent fever and left staghorn calculus resulting in dilatation of upper pole calyces, which is suspected to be the primary source of infection      Relevant past medical history:    Past Medical History:   Diagnosis Date    Asthma     Cancer ([facility] )     COPD (chronic obstructive pulmonary disease) ([facility] )     Hyperlipidemia     Hypertension     Psychiatric disorder     Depression     Patient Active Problem List   Diagnosis    Hypokalemia    Sepsis ([place] [address] )    Tobacco abuse    Pneumonia    Leiomyosarcoma ([place] [address] )    Hypertension    Staghorn calculus    Diarrhea       /74   Pulse 88   Temp 98 6 °F (37 °C)   Resp 19   Ht 5' 8" (1 727 m)   Wt 129 kg (284 lb 13 4 oz)   SpO2 98%   BMI 43 31 kg/m²     Medications:    Inpatient Medications:     Scheduled Medications:    Current Facility-Administered Medications:  acetaminophen 650 mg Oral Q6H PRN Reji Chavez MD    aspirin 81 mg Oral Daily Roxy Montano PA-C    buPROPion 150 mg Oral BID Roxy Montano PA-C    butalbital-acetaminophen-caffeine 1 tablet Oral Q4H PRN BRITTNY Alberts    cefepime 2,000 mg Intravenous Q12H Nicolette Robledo MD Last Rate: 2,000 mg (10/17/19 0357)   cyclobenzaprine 5 mg Oral TID PRN Roxy Montano PA-C    gabapentin 600 mg Oral HS Orin Velasquez PA-C    heparin (porcine) 5,000 Units Subcutaneous [facility] Reji Chavez MD    montelukast 10 mg Oral HS Roxy Montano PA-C    nicotine 1 patch Transdermal Daily Reji Chavez MD    ondansetron 4 mg Intravenous Q6H PRN Roxy Montano PA-C    pneumococcal 13-valent conjugate vaccine 0 5 mL Intramuscular Prior to discharge Reji Chavez MD    pravastatin 40 mg Oral Daily With SYSCOHUA    sertraline 100 mg Oral HS Roxy Montano PA-C    traMADol 50 mg Oral Q6H PRN BRITTNY Alberts    vancomycin 17 5 mg/kg (Adjusted) Intravenous Q12H Pat Bella MD Last Rate: 1,500 mg (10/17/19 7054)       Infusions:       PRN:    acetaminophen    butalbital-acetaminophen-caffeine    cyclobenzaprine    ondansetron    pneumococcal 13-valent conjugate vaccine    traMADol    Outpatient Medications:  No current facility-administered medications on file prior to encounter        Current Outpatient Medications on File Prior to Encounter   Medication Sig Dispense Refill    albuterol (2 5 mg/3 mL) 0 083 % nebulizer solution Take 2 5 mg by nebulization every 6 (six) hours as needed for wheezing or shortness of breath      aspirin 81 mg chewable tablet Chew 81 mg daily      buPROPion (WELLBUTRIN SR) 150 mg 12 hr tablet Take 150 mg by mouth 2 (two) times a day      cyclobenzaprine (FLEXERIL) 10 mg tablet Take 5 mg by mouth 3 (three) times a day as needed for muscle spasms      gabapentin (NEURONTIN) 600 MG tablet Take 600 mg by mouth daily at bedtime      lisinopril-hydrochlorothiazide (PRINZIDE,ZESTORETIC) 10-12 5 MG per tablet Take 1 tablet by mouth daily      montelukast (SINGULAIR) 10 mg tablet Take 10 mg by mouth daily at bedtime      nicotine (NICODERM CQ) 21 mg/24 hr TD 24 hr patch Place 1 patch on the skin every 24 hours      ondansetron (ZOFRAN) 8 mg tablet Take 8 mg by mouth every 8 (eight) hours as needed for nausea or vomiting      potassium chloride (K-DUR,KLOR-CON) 10 mEq tablet Take 10 mEq by mouth daily      sertraline (ZOLOFT) 100 mg tablet Take 100 mg by mouth daily at bedtime      simvastatin (ZOCOR) 20 mg tablet Take 20 mg by mouth daily at bedtime         Allergies   Allergen Reactions    Morphine Vomiting     Other reaction(s): Vomiting  Other reaction(s): (PIMS) vomiting  Makes her vomit      Sulfa Antibiotics Rash       Anticoagulants: none    ASA classification: ASA 2 - Patient with mild systemic disease with no functional limitations    Airway Assessment: III (soft and hard palate and base of uvula visible)    Relevant family history: None    Relevant review of systems: None    Prior sedation/anesthesia: yes    Can the patient lie flat? Yes     Does patient have sleep apnea? No    If yes, does patient use CPAP? not applicable    NPO Status: yes    Labs:   CBC with diff: Lab Results   Component Value Date    WBC 8 46 10/17/2019    HGB 7 9 (L) 10/17/2019    HCT 23 9 (L) 10/17/2019    MCV 95 10/17/2019    PLT 86 (L) 10/17/2019    MCH 31 5 10/17/2019    MCHC 33 1 10/17/2019    RDW 13 9 10/17/2019    MPV 11 1 10/17/2019    NRBC 0 10/15/2019     BMP/CMP:  Lab Results   Component Value Date    K 3 4 (L) 10/17/2019     10/17/2019    CO2 21 10/17/2019    BUN 11 10/17/2019    CREATININE 0 98 10/17/2019    CALCIUM 8 7 10/17/2019    AST 19 10/12/2019    ALT 25 10/12/2019    ALKPHOS 83 10/12/2019    EGFR 62 10/17/2019   ,     Coags:   Lab Results   Component Value Date    PTT 38 (H) 10/12/2019    INR 1 04 10/12/2019   ,   Results from last 7 days   Lab Units 10/12/19  2123   PTT seconds 38*   INR  1 04        Relevant imaging studies:   CT of the abdomen pelvis was reviewed by me  Directed physical examination:  No apparent distress  Pulses intact  AO x3    Assessment/Plan:   Left percutaneous nephrostomy tube placement  Sedation/Anesthesia plan: Moderate sedation will be used as needed for procedure      Consent with alternatives to the procedure, risks and benefits have been explained and discussed with the patient/patient's family: yes

## 2019-10-17 NOTE — RESPIRATORY THERAPY NOTE
RT Protocol Note  Juju Hernandez 62 y o  female MRN: [id number]  Unit/Bed#: -01 Encounter: [id number]    Assessment    Principal Problem:    Sepsis ([facility] )  Active Problems:    Hypokalemia    Tobacco abuse    Pneumonia    Leiomyosarcoma (HCC)    Hypertension    Staghorn calculus    Diarrhea      Home Pulmonary Medications:  Albuterol prn       Past Medical History:   Diagnosis Date    Asthma     Cancer ([place] [address] )     COPD (chronic obstructive pulmonary disease) ([place] [address] )     Hyperlipidemia     Hypertension     Psychiatric disorder     Depression     Social History     Socioeconomic History    Marital status:      Spouse name: None    Number of children: None    Years of education: None    Highest education level: None   Occupational History    None   Social Needs    Financial resource strain: None    Food insecurity:     Worry: None     Inability: None    Transportation needs:     Medical: None     Non-medical: None   Tobacco Use    Smoking status: Current Every Day Smoker     Packs/day: 0 50     Types: Cigarettes    Smokeless tobacco: Never Used   Substance and Sexual Activity    Alcohol use: Never     Frequency: Never    Drug use: Not Currently    Sexual activity: None   Lifestyle    Physical activity:     Days per week: None     Minutes per session: None    Stress: None   Relationships    Social connections:     Talks on phone: None     Gets together: None     Attends Latter day service: None     Active member of club or organization: None     Attends meetings of clubs or organizations: None     Relationship status: None    Intimate partner violence:     Fear of current or ex partner: None     Emotionally abused: None     Physically abused: None     Forced sexual activity: None   Other Topics Concern    None   Social History Narrative    None       Subjective         Objective    Physical Exam:   Assessment Type: Assess only  General Appearance: Drowsy(patient is arousable)  Respiratory Pattern: Normal  Chest Assessment: Chest expansion symmetrical    Vitals:  Blood pressure 126/74, pulse 88, temperature 98 6 °F (37 °C), resp  rate 19, height 5' 8" (1 727 m), weight 129 kg (284 lb 13 4 oz), SpO2 98 %  Imaging and other studies: I have personally reviewed pertinent reports  Plan    Respiratory Plan: No distress/Pulmonary history, Discontinue Protocol     Patient admitted for sepsis  Lung sounds are clear bilaterally  Patient has a strong, non-productive cough and can protect and clear her airway  Prn nebs have been ordered to match above stated regimen  No further modalities unless patient's condition changes

## 2019-10-17 NOTE — PROGRESS NOTES
Vancomycin IV Pharmacy-to-Dose Consultation    Elvin Bullard is a 62 y o   who is currently receiving Vancomycin IV with management by the Pharmacy Consult service  Assessment/Plan:  The patient was reviewed  Renal function is stable and no signs or symptoms of nephrotoxicity and/or infusion reactions were documented in the chart  Based on todays assessment, continue current vancomycin day # 4 of regimen 1500 mg q12h,  with a therapeutic trough of 18 and a plan for trough to be drawn at 0500 on 10/23/19  Total Days of Vancomycin therapy received to date: 4 days  We will continue to follow the patients culture results and clinical progress daily      Anson Hatch, Pharmacist

## 2019-10-17 NOTE — ASSESSMENT & PLAN NOTE
· Patient with metastatic leiomyosarcoma and patient recently started on Chemo  · She follows with an Oncologist through [facility]      · Oncology team where the patient is here if there is questions can contact our office at [facility] [phone]

## 2019-10-17 NOTE — SOCIAL WORK
LOS 4 0  GMLOS 0  The pt is not a 30 day re-admit  The pt is alert and oriented  The pt resides in [place] alone in a 2nd floor apt with 16 steps to enter  The pt does not use any DME and is able to complete all of her daily living skills  The pt has no hx of VNA/STR  The pt fills Rx at [facility] in [place] and is able to afford all of her meds  The pt has no hx of MH/SA  The pt daughter Faizan Gibson is her POA  The pt is on disability and is a cancer pt  The pt transports herself to and from [employer]  The pt family will transport her home at [place]  The pt has no interest in any [facility] at this time  CM reviewed discharge planning process including the following: identifying caregivers at home, preference for d/c planning needs, availability of treatment team to discuss questions or concerns patient and/or family may have regarding diagnosis, plan of care, old or new medications and discharge planning  Discharge Checklist reviewed and CM will continue to monitor for progress toward discharge goals in nursing and provider rounds

## 2019-10-17 NOTE — PLAN OF CARE
Problem: PAIN - ADULT  Goal: Verbalizes/displays adequate comfort level or baseline comfort level  Description  Interventions:  - Encourage patient to monitor pain and request assistance  - Assess pain using appropriate pain scale  - Administer analgesics based on type and severity of pain and evaluate response  - Implement non-pharmacological measures as appropriate and evaluate response  - Consider cultural and social influences on pain and pain management  - Notify physician/advanced practitioner if interventions unsuccessful or patient reports new pain  Outcome: Progressing     Problem: INFECTION - ADULT  Goal: Absence or prevention of progression during hospitalization  Description  INTERVENTIONS:  - Assess and monitor for signs and symptoms of infection  - Monitor lab/diagnostic results  - Monitor all insertion sites, i e  indwelling lines, tubes, and drains  - [place] appropriate cooling/warming therapies per order  - Administer medications as ordered  - Instruct and encourage patient and family to use good hand hygiene technique     Outcome: Progressing     Problem: SAFETY ADULT  Goal: Patient will remain free of falls  Description  INTERVENTIONS:  - Assess patient frequently for physical needs  -  Identify cognitive and physical deficits and behaviors that affect risk of falls    -  [place] fall precautions as indicated by assessment   - Educate patient/family on patient safety including physical limitations  - Instruct patient to call for assistance with activity based on assessment  - Modify environment to reduce risk of injury  - Consider OT/PT consult to assist with strengthening/mobility  Outcome: Progressing  Goal: Maintain or return to baseline ADL function  Description  INTERVENTIONS:  -  Assess patient's ability to carry out ADLs; assess patient's baseline for ADL function and identify physical deficits which impact ability to perform ADLs (bathing, care of mouth/teeth, toileting, grooming, dressing, etc )  - Assess/evaluate cause of self-care deficits   - Assess range of motion  - Assess patient's mobility; develop plan if impaired  - Assess patient's need for assistive devices and provide as appropriate  - Encourage maximum independence but intervene and supervise when necessary  - Involve family in performance of ADLs  - Assess for home care needs following discharge   - Consider OT consult to assist with ADL evaluation and planning for discharge  - Provide patient education as appropriate  Outcome: Progressing  Goal: Maintain or return mobility status to optimal level  Description  INTERVENTIONS:  - Assess patient's baseline mobility status (ambulation, transfers, stairs, etc )    - Identify cognitive and physical deficits and behaviors that affect mobility  - [place] fall precautions as indicated by assessment  - Record patient progress and toleration of activity level on Mobility SBAR; progress patient to next Phase/Stage  - Instruct patient to call for assistance with activity based on assessment   Outcome: Progressing     Problem: DISCHARGE PLANNING  Goal: Discharge to home or other facility with appropriate resources  Description  INTERVENTIONS:  - Identify barriers to discharge w/patient and caregiver  - Arrange for needed discharge resources and transportation as appropriate  - Identify discharge learning needs (meds, wound care, etc )  - Refer to Case Management Department for coordinating discharge planning if the patient needs post-hospital services based on physician/advanced practitioner order or complex needs related to functional status, cognitive ability, or social support system   Outcome: Progressing     Problem: Knowledge Deficit  Goal: Patient/family/caregiver demonstrates understanding of disease process, treatment plan, medications, and discharge instructions  Description  Complete learning assessment and assess knowledge base    Interventions:  - Provide teaching at level of understanding  - Provide teaching via preferred learning methods  Outcome: Progressing     Problem: CARDIOVASCULAR - ADULT  Goal: Maintains optimal cardiac output and hemodynamic stability  Description  INTERVENTIONS:  - Monitor I/O, vital signs and rhythm  - Monitor for S/S and trends of decreased cardiac output  - Administer and titrate ordered vasoactive medications to optimize hemodynamic stability  - Assess quality of pulses, skin color and temperature  - Assess for signs of decreased coronary artery perfusion  - Instruct patient to report change in severity of symptoms  Outcome: Progressing  Goal: Absence of cardiac dysrhythmias or at baseline rhythm  Description  INTERVENTIONS:  - Continuous cardiac monitoring, vital signs, obtain 12 lead EKG if ordered  - Administer antiarrhythmic and heart rate control medications as ordered  - Monitor electrolytes and administer replacement therapy as ordered  Outcome: Progressing     Problem: METABOLIC, FLUID AND ELECTROLYTES - ADULT  Goal: Electrolytes maintained within normal limits  Description  INTERVENTIONS:  - Monitor labs and assess patient for signs and symptoms of electrolyte imbalances  - Administer electrolyte replacement as ordered  - Monitor response to electrolyte replacements, including repeat lab results as appropriate  - Instruct patient on fluid and nutrition as appropriate  Outcome: Progressing  Goal: Fluid balance maintained  Description  INTERVENTIONS:  - Monitor labs   - Monitor I/O and WT  - Instruct patient on fluid and nutrition as appropriate  - Assess for signs & symptoms of volume excess or deficit  Outcome: Progressing  Goal: Glucose maintained within target range  Description  INTERVENTIONS:  - Monitor Blood Glucose as ordered  - Assess for signs and symptoms of hyperglycemia and hypoglycemia  - Administer ordered medications to maintain glucose within target range  - Assess nutritional intake and initiate nutrition service referral as needed  Outcome: Progressing     Problem: SKIN/TISSUE INTEGRITY - ADULT  Goal: Skin integrity remains intact  Description  INTERVENTIONS  - Identify patients at risk for skin breakdown  - Assess and monitor skin integrity  - Assess and monitor nutrition and hydration status  - Monitor labs (i e  albumin)  - Assist with mobility/ambulation  - Relieve pressure over bony prominences  - Avoid friction and shearing  - Provide appropriate hygiene as needed including keeping skin clean and dry  - Evaluate need for skin moisturizer/barrier cream  - Collaborate with interdisciplinary team (i e  Nutrition, Rehabilitation, etc )   - Patient/family teaching   Outcome: Progressing  Goal: Incision(s), wounds(s) or drain site(s) healing without S/S of infection  Description  INTERVENTIONS  - Assess and document risk factors for skin impairment   - Assess and document dressing, incision, wound bed, drain sites and surrounding tissue  - Consider nutrition services referral as needed  - Oral mucous membranes remain intact  - Provide patient/ family education  Outcome: Progressing  Goal: Oral mucous membranes remain intact  Description  INTERVENTIONS  - Assess oral mucosa and hygiene practices  - Implement preventative oral hygiene regimen  - Implement oral medicated treatments as ordered     Outcome: Progressing     Problem: Potential for Falls  Goal: Patient will remain free of falls  Description  INTERVENTIONS:  - Assess patient frequently for physical needs  -  Identify cognitive and physical deficits and behaviors that affect risk of falls    -  [place] fall precautions as indicated by assessment   - Educate patient/family on patient safety including physical limitations  - Instruct patient to call for assistance with activity based on assessment  - Modify environment to reduce risk of injury  - Consider OT/PT consult to assist with strengthening/mobility  Outcome: Progressing     Problem: Nutrition/Hydration-ADULT  Goal: Nutrient/Hydration intake appropriate for improving, restoring or maintaining nutritional needs  Description  Monitor and assess patient's nutrition/hydration status for malnutrition  Collaborate with interdisciplinary team and initiate plan and interventions as ordered  Monitor patient's weight and dietary intake as ordered or per policy  Utilize nutrition screening tool and intervene as necessary  Determine patient's food preferences and provide high-protein, high-caloric foods as appropriate       INTERVENTIONS:  - Monitor oral intake, urinary output, labs, and treatment plans  - Assess nutrition and hydration status and recommend course of action  - Evaluate amount of meals eaten  - Assist patient with eating if necessary   - Allow adequate time for meals  - Recommend/ encourage appropriate diets, oral nutritional supplements, and vitamin/mineral supplements  - Order, calculate, and assess calorie counts as needed  - Recommend, monitor, and adjust tube feedings and TPN/PPN based on assessed needs  - Assess need for intravenous fluids  - Provide specific nutrition/hydration education as appropriate  - Include patient/family/caregiver in decisions related to nutrition  Outcome: Progressing     Problem: Prexisting or High Potential for Compromised Skin Integrity  Goal: Skin integrity is maintained or improved  Description  INTERVENTIONS:  - Identify patients at risk for skin breakdown  - Assess and monitor skin integrity  - Assess and monitor nutrition and hydration status  - Monitor labs   - Assess for incontinence   - Turn and reposition patient  - Assist with mobility/ambulation  - Relieve pressure over bony prominences  - Avoid friction and shearing  - Provide appropriate hygiene as needed including keeping skin clean and dry  - Evaluate need for skin moisturizer/barrier cream  - Collaborate with interdisciplinary team   - Patient/family teaching  - Consider wound care consult   Outcome: Progressing

## 2019-10-17 NOTE — ASSESSMENT & PLAN NOTE
· Sepsis POA as evidenced by fever, leukocytosis and tachycardia  · She is immunosuppressed - recently started on Chemo  · Lactic acid level normal   · Continue vancomycin IV and IV cefepime  · Blood cultures x2 negative for growth at 72 hours  · Monitor CBC (WBC trending down)  Procalcitonin trended up yesterday 1 69 repeat procalcitonin today pending  · Monitor vitals, IVFs, supportive care  · Cath tip negative for growth  · ID following concerned given negative tip growth that sources possibly could be due to infection behind the Staghorn calculi  Discussed with Urology awaiting re-evaluation patient currently NPO as patient may need a nephrostomy tube  · Continue NPO status appreciate ID and Urology recommendations

## 2019-10-17 NOTE — ASSESSMENT & PLAN NOTE
· Patient reports diarrhea since starting chemotherapy - likely secondary to chemo - rule out infection  · Stool studies via C diff negative  Overall clinically improved  · Supportive measures

## 2019-10-17 NOTE — BRIEF OP NOTE (RAD/CATH)
Left percutaneous nephrostomy tube Procedure Note    PATIENT NAME: Taiwo Carranza  : 1955  MRN: [id number]     Pre-op Diagnosis:   1  Fever    2  Pneumonia    3  History of cancer chemotherapy    4  Staghorn calculus    5  Sepsis ([address] )    6  Infection due to Port-A-Cath      Post-op Diagnosis:   1  Fever    2  Pneumonia    3  History of cancer chemotherapy    4  Staghorn calculus    5  Sepsis ([address] )    6  Infection due to Port-A-Cath        Surgeon:   Jay Matos MD  Assistants:     No qualified resident was available, Resident is only observing    Estimated Blood Loss:  Minimal  Findings:  Successful placement of a left percutaneous nephrostomy tube into dilated calyces secondary to a large staghorn calculus  Specimens:  Clear appearing urine sent for culture and sensitivity      Complications:  None    Anesthesia: Conscious sedation    Jay Matos MD     Date: 10/17/2019  Time: 4:33 PM

## 2019-10-17 NOTE — NURSING NOTE
Patient temperature was 102 0, I notified [facility] and gave the patient her PRN dose of tylenol 650 mg  Will recheck temp in one hour

## 2019-10-17 NOTE — PROGRESS NOTES
Progress Note - Infectious Disease   Jess Pascal 62 y o  female MRN: [id number]  Unit/Bed#: -21 Encounter: [id number]      Impression/Recommendations:  1  Sepsis, POA   Fever, leukocytosis   Likely due to # 2 although continues to have high-grade fever despite D/C of port, vancomycin, and improvement in cellulitis  UA, blood cultures, C  Diff, Flu PCR negative   ROS and exam otherwise negative   CT suggests possible lingular infiltrate but patient without significant respiratory symptoms   Seems too late for chemo-related fever  WBC normal but procalcitonin rising  Consider role of #3  Consider drug fever (on IV Reglan)  Despite ongoing fevers, actually seems improved since admission  2  Port-A-Cath infection   In setting of recently placed Port-A-Cath 10/02/2019   Now status post removal with improving cellulitis  3  Staghorn calculus left kidney   With hydronephrosis  Chronic based on review of prior imaging   Has negative UA and lack of symptoms, but consider infection given ongoing fevers  4  Diarrhea   Suspect chemotherapy induced  C  Diff negative  5  Metastatic leiomyosarcoma   Recently started on palliative chemotherapy 10/09/2019  Gemcitabine, Taxotere      Continue vancomycin and cefepime for now   Pharmacy consult for vanco dosing   Follow temperatures closely  Await IR consult for aspiration versus drainage of renal collecting system   Supportive care as per the primary service      Antibiotics:  Vancomycin #4  Cefepime #2  Antibiotics #5    Subjective:  Patient seen on PM rounds  Sleeping but arousable  Denies any new complaints  Continues to have intermittent fever at night  24 Hour Events:  Fever again overnight  Was seen by Urology and IR consultation for PCN is pending      Objective:  Vitals:  Temp:  [98 6 °F (37 °C)-102 °F (38 9 °C)] 98 6 °F (37 °C)  HR:  [88-89] 88  Resp:  [19-20] 19  BP: (101-136)/(55-75) 126/74  SpO2:  [94 %-98 %] 98 %  Temp (24hrs), Av 6 °F (37 6 °C), Min:98 6 °F (37 °C), Max:102 °F (38 9 °C)  Current: Temperature: 98 6 °F (37 °C)    Physical Exam:   General:  No acute distress  Psychiatric:  Awake and alert  Pulmonary:  Normal respiratory excursion without accessory muscle use  Abdomen:  Soft, nontender  Extremities:  No edema  Skin:  No rashes    Lab Results:  I have personally reviewed pertinent labs  Results from last 7 days   Lab Units 10/17/19  0624 10/16/19  0558 10/15/19  0506  10/12/19  2123   POTASSIUM mmol/L 3 4* 3 3* 3 4*   < > 3 4*   CHLORIDE mmol/L 104 103 102   < > 99*   CO2 mmol/L 21 20* 21   < > 27   BUN mg/dL 11 10 10   < > 16   CREATININE mg/dL 0 98 1 00 1 00   < > 1 02   EGFR ml/min/1 73sq m 62 60 60   < > 59   CALCIUM mg/dL 8 7 8 4 8 7   < > 9 4   AST U/L  --   --   --   --  19   ALT U/L  --   --   --   --  25   ALK PHOS U/L  --   --   --   --  83    < > = values in this interval not displayed  Results from last 7 days   Lab Units 10/17/19  0624 10/16/19  0558 10/15/19  0506   WBC Thousand/uL 8 46 8 53 7 14   HEMOGLOBIN g/dL 7 9* 8 2* 9 2*   PLATELETS Thousands/uL 86* 89* 120*     Results from last 7 days   Lab Units 10/13/19  2122 10/13/19  1724 10/13/19  1136 10/13/19  1002 10/12/19  2123   BLOOD CULTURE   --   --   --   --  No Growth After 4 Days  No Growth After 4 Days  SPUTUM CULTURE   --  4+ Growth of   --   --   --    GRAM STAIN RESULT   --  Rare Epithelial Cells*  Rare Polys*  2+ Gram negative rods*  2+ Gram positive cocci in pairs*  1+ Gram positive cocci in clusters*  --   --   --    INFLUENZA B PCR   --   --   --  Not Detected  --    RSV PCR   --   --   --  Not Detected  --    LEGIONELLA URINARY ANTIGEN   --   --  Negative  --   --    C DIFF TOXIN B  NEGATIVE for C difficle toxin by PCR    --   --   --   --        Imaging Studies:   I have personally reviewed pertinent imaging study reports and images in PACS  EKG, Pathology, and Other Studies:   I have personally reviewed pertinent reports

## 2019-10-17 NOTE — PROGRESS NOTES
Progress Note - Chantell Hunter 62 y o  female MRN: [id number]    Unit/Bed#: -01 Encounter: [id number]      Assessment:  Chantell Hunter is a medically complex 13-year-old female mid for pneumonia with history of tobacco use and recent adduction of palliative chemotherapy for leiomyosarcoma   history significant for nephrolithiasis  Patient is known remotely to Dr Chris Adamson for prior lithotripsy  CT of the abdomen and pelvis have demonstrated large left staghorn calculus with dilated collecting system  Patient has spike nocturnal fever times for 2 nights in a row despite treatment for pneumonia  Id requested follow-up  Plan:  Discussed rationale for renal unit decompression  Ureteral stent Will prove both technically difficult and ineffective secondary to stone burden  Distal end of stent may not reach renal pelvis which is filled with stone  Percutaneous nephrostomy will provide maximal drainage and can be use for follow-up PCNL once patient is medically optimized  NPO  Consult IR for PCN  Subjective:   Endorses nocturnal fever, chills and diaphoresis  Denies flank, abdominal, suprapubic pain, dysuria or dysuria    Objective:     Vitals: Blood pressure 126/74, pulse 88, temperature 98 6 °F (37 °C), resp  rate 19, height 5' 8" (1 727 m), weight 129 kg (284 lb 13 4 oz), SpO2 95 %  ,Body mass index is 43 31 kg/m²        Intake/Output Summary (Last 24 hours) at 10/17/2019 1012  Last data filed at 10/16/2019 1838  Gross per 24 hour   Intake 540 ml   Output    Net 540 ml       Physical Exam: General appearance: alert and oriented, in no acute distress, appears stated age, cooperative, no distress and morbidly obese  Head: Normocephalic, without obvious abnormality, atraumatic  Neck: no adenopathy, no carotid bruit, no JVD, supple, symmetrical, trachea midline and thyroid not enlarged, symmetric, no tenderness/mass/nodules  Lungs: diminished breath sounds  Heart: regular rate and rhythm, S1, S2 normal, no murmur, click, rub or gallop  Abdomen: soft, non-tender; bowel sounds normal; no masses,  no organomegaly  Extremities: extremities normal, warm and well-perfused; no cyanosis, clubbing, or edema  Pulses: 2+ and symmetric  Neurologic: Grossly normal  No urinary drains     Invasive Devices     Peripheral Intravenous Line            Peripheral IV 10/16/19 Left;Upper;Ventral (anterior) Arm less than 1 day              Lab Results   Component Value Date    WBC 8 46 10/17/2019    HGB 7 9 (L) 10/17/2019    HCT 23 9 (L) 10/17/2019    MCV 95 10/17/2019    PLT 86 (L) 10/17/2019     Lab Results   Component Value Date    SODIUM 138 10/17/2019    K 3 4 (L) 10/17/2019     10/17/2019    CO2 21 10/17/2019    BUN 11 10/17/2019    CREATININE 0 98 10/17/2019    GLUC 119 10/17/2019    CALCIUM 8 7 10/17/2019       Lab, Imaging and other studies: I have personally reviewed pertinent reports

## 2019-10-17 NOTE — ASSESSMENT & PLAN NOTE
· CT A/P shows a large staghorn calculus on the left which occupies the majority of the left renal pelvis and extends into the left UPJ and proximal left ureter with extension of  the staghorn calculus into several lower pole calyces and marked dilatation of the left upper pole collecting system   · UA negative however given ongoing nocturnal fevers there is concern behind the obstructing uropathy for possible infectious source  · Urology to re-evaluate  · Ongoing recommendations from Urology in IV appreciated

## 2019-10-18 ENCOUNTER — TELEPHONE (OUTPATIENT)
Dept: OTHER | Facility: HOSPITAL | Age: 64
End: 2019-10-18

## 2019-10-18 LAB
BACTERIA BLD CULT: NORMAL
BACTERIA BLD CULT: NORMAL
BACTERIA UR CULT: NORMAL
ERYTHROCYTE [DISTWIDTH] IN BLOOD BY AUTOMATED COUNT: 14.3 % (ref 11.6–15.1)
HCT VFR BLD AUTO: 26.5 % (ref 34.8–46.1)
HGB BLD-MCNC: 8.7 G/DL (ref 11.5–15.4)
MCH RBC QN AUTO: 31.6 PG (ref 26.8–34.3)
MCHC RBC AUTO-ENTMCNC: 32.8 G/DL (ref 31.4–37.4)
MCV RBC AUTO: 96 FL (ref 82–98)
PLATELET # BLD AUTO: 133 THOUSANDS/UL (ref 149–390)
PMV BLD AUTO: 10.2 FL (ref 8.9–12.7)
RBC # BLD AUTO: 2.75 MILLION/UL (ref 3.81–5.12)
WBC # BLD AUTO: 9.62 THOUSAND/UL (ref 4.31–10.16)

## 2019-10-18 PROCEDURE — 99232 SBSQ HOSP IP/OBS MODERATE 35: CPT | Performed by: PHYSICIAN ASSISTANT

## 2019-10-18 PROCEDURE — 99232 SBSQ HOSP IP/OBS MODERATE 35: CPT | Performed by: UROLOGY

## 2019-10-18 PROCEDURE — 99233 SBSQ HOSP IP/OBS HIGH 50: CPT | Performed by: DENTIST

## 2019-10-18 PROCEDURE — 85027 COMPLETE CBC AUTOMATED: CPT | Performed by: NURSE PRACTITIONER

## 2019-10-18 RX ORDER — POTASSIUM CHLORIDE 20 MEQ/1
40 TABLET, EXTENDED RELEASE ORAL ONCE
Status: COMPLETED | OUTPATIENT
Start: 2019-10-18 | End: 2019-10-18

## 2019-10-18 RX ORDER — LEVOFLOXACIN 500 MG/1
500 TABLET, FILM COATED ORAL EVERY 24 HOURS
Status: DISCONTINUED | OUTPATIENT
Start: 2019-10-18 | End: 2019-10-20 | Stop reason: HOSPADM

## 2019-10-18 RX ADMIN — MONTELUKAST 10 MG: 10 TABLET, FILM COATED ORAL at 22:06

## 2019-10-18 RX ADMIN — BUPROPION HYDROCHLORIDE 150 MG: 150 TABLET, FILM COATED, EXTENDED RELEASE ORAL at 17:31

## 2019-10-18 RX ADMIN — GABAPENTIN 600 MG: 300 CAPSULE ORAL at 22:06

## 2019-10-18 RX ADMIN — Medication 2000 MG: at 04:07

## 2019-10-18 RX ADMIN — HEPARIN SODIUM 5000 UNITS: 5000 INJECTION INTRAVENOUS; SUBCUTANEOUS at 14:22

## 2019-10-18 RX ADMIN — CYCLOBENZAPRINE HYDROCHLORIDE 5 MG: 10 TABLET, FILM COATED ORAL at 08:37

## 2019-10-18 RX ADMIN — VANCOMYCIN HYDROCHLORIDE 1500 MG: 5 INJECTION, POWDER, LYOPHILIZED, FOR SOLUTION INTRAVENOUS at 06:04

## 2019-10-18 RX ADMIN — POTASSIUM CHLORIDE 40 MEQ: 1500 TABLET, EXTENDED RELEASE ORAL at 11:24

## 2019-10-18 RX ADMIN — ASPIRIN 81 MG 81 MG: 81 TABLET ORAL at 08:33

## 2019-10-18 RX ADMIN — SERTRALINE HYDROCHLORIDE 100 MG: 50 TABLET ORAL at 22:07

## 2019-10-18 RX ADMIN — TRAMADOL HYDROCHLORIDE 50 MG: 50 TABLET, FILM COATED ORAL at 11:24

## 2019-10-18 RX ADMIN — NICOTINE 1 PATCH: 14 PATCH TRANSDERMAL at 08:33

## 2019-10-18 RX ADMIN — BUPROPION HYDROCHLORIDE 150 MG: 150 TABLET, FILM COATED, EXTENDED RELEASE ORAL at 08:33

## 2019-10-18 RX ADMIN — PRAVASTATIN SODIUM 40 MG: 40 TABLET ORAL at 17:31

## 2019-10-18 RX ADMIN — HEPARIN SODIUM 5000 UNITS: 5000 INJECTION INTRAVENOUS; SUBCUTANEOUS at 06:04

## 2019-10-18 RX ADMIN — TRAMADOL HYDROCHLORIDE 50 MG: 50 TABLET, FILM COATED ORAL at 22:06

## 2019-10-18 RX ADMIN — LEVOFLOXACIN 500 MG: 500 TABLET, FILM COATED ORAL at 14:21

## 2019-10-18 RX ADMIN — ACETAMINOPHEN 650 MG: 325 TABLET, FILM COATED ORAL at 23:25

## 2019-10-18 RX ADMIN — CYCLOBENZAPRINE HYDROCHLORIDE 5 MG: 10 TABLET, FILM COATED ORAL at 17:31

## 2019-10-18 RX ADMIN — TRAMADOL HYDROCHLORIDE 50 MG: 50 TABLET, FILM COATED ORAL at 05:06

## 2019-10-18 RX ADMIN — HEPARIN SODIUM 5000 UNITS: 5000 INJECTION INTRAVENOUS; SUBCUTANEOUS at 22:03

## 2019-10-18 RX ADMIN — ONDANSETRON 4 MG: 2 INJECTION INTRAMUSCULAR; INTRAVENOUS at 05:02

## 2019-10-18 RX ADMIN — TRAMADOL HYDROCHLORIDE 50 MG: 50 TABLET, FILM COATED ORAL at 17:31

## 2019-10-18 NOTE — ASSESSMENT & PLAN NOTE
· Patient reports diarrhea since starting chemotherapy - likely secondary to chemo  · Stool studies via C diff negative  Overall clinically improved  · Supportive measures

## 2019-10-18 NOTE — ASSESSMENT & PLAN NOTE
· Linisinpril/HCTZ held as BP low on admission and is within normal limits  · Continue to hold at this time

## 2019-10-18 NOTE — TELEPHONE ENCOUNTER
Cris Marrero is a 59-year-old female seen in consultation at [facility] for left staghorn calculus and left hydronephrosis and UTI, status post left percutaneous nephrostomy insertion  Patient will require follow-up with attending provider, (not seen by attending during admission (ease, to discuss surgical fitness and coordinate left future PCN now  Patient should be contacted for appointment in approximately 3--4 weeks  Please explained to patient that she will need extended recovery, time frame is acceptable and drain may stay in place for several weeks without concern

## 2019-10-18 NOTE — ASSESSMENT & PLAN NOTE
· Patient with metastatic leiomyosarcoma and patient recently started on Chemo  · She follows with an Oncologist through [facility]      · Oncology team - Adonis Holter -[phone]

## 2019-10-18 NOTE — PROGRESS NOTES
Progress Note - Benjamin Reynaga 1955, 62 y o  female MRN: [id number]    Unit/Bed#: -01 Encounter: [id number]    Primary Care Provider: No primary care provider on file  Date and time admitted to hospital: 10/12/2019  8:56 PM        * Sepsis ([address] )  Assessment & Plan  · Sepsis POA as evidenced by fever, leukocytosis and tachycardia  · Suspect secondary to Port infection and possible staghorn calculus with hydronephrosis  · She is immunosuppressed - recently started on Chemo  · Continue vancomycin IV and IV cefepime  · Blood cultures x2 negative for growth at 72 hours  · Temps improved over last 24 hours  · D/W ID, monitor another 24 hours, and if remains afebrile, can likely switch to oral antibiotics and discharge  Staghorn calculus  Assessment & Plan  · CT A/P shows a large staghorn calculus on the left which occupies the majority of the left renal pelvis and extends into the left UPJ and proximal left ureter with extension of  the staghorn calculus into several lower pole calyces and marked dilatation of the left upper pole collecting system   · Urology input appreciated  · S/P PCN tube on 10/17  · Urine culture pending  · Outpatient Urology follow up    Diarrhea  Assessment & Plan  · Patient reports diarrhea since starting chemotherapy - likely secondary to chemo  · Stool studies via C diff negative  Overall clinically improved  · Supportive measures  Pneumonia  Assessment & Plan  · Patient presented with fever and with evidence of a LLL infiltrate on CT of the chest   ·  IV vancomycin continue and IV cefepime add per ID  · Strep PNA and Legionella negative  RSV/Flu negative     · Sputum culture showing normal respiratory sarkis  · ID input appreciated: less suspicious of a bacterial pneumonia as the cause of her Sepsis - suspected to be from other sources at this point obstructive uropathy given negative cath tip growth from culture    Hypokalemia  Assessment & Plan  · Continue supplementation   · Hypokalemia improved slightly will repeat BMP in the morning    Leiomyosarcoma [facility])  Assessment & Plan  · Patient with metastatic leiomyosarcoma and patient recently started on Chemo  · She follows with an Oncologist through [facility]  · Oncology team - Veronique Arias -[phone]    Hypertension  Assessment & Plan  · Linisinpril/HCTZ held as BP low on admission and is within normal limits  · Continue to hold at this time  Tobacco abuse  Assessment & Plan  · Cessation counseled  · Continue nicotine patch      VTE Pharmacologic Prophylaxis:   Pharmacologic: Heparin  Mechanical VTE Prophylaxis in Place: Yes    Patient Centered Rounds: I have performed bedside rounds with nursing staff today  Discussions with Specialists or Other Care Team Provider: ID    Education and Discussions with Family / Patient: patient    Time Spent for Care: 30 minutes  More than 50% of total time spent on counseling and coordination of care as described above  Current Length of Stay: 5 day(s)    Current Patient Status: Inpatient   Certification Statement: The patient will continue to require additional inpatient hospital stay due to IV antibiotics    Discharge Plan: Hopeful discharge 10/19 if remains afebrile  Code Status: Level 1 - Full Code      Subjective:   Has some spasms around PCN tube site  Sore where port was removed  Objective:     Vitals:   Temp (24hrs), Av 5 °F (36 9 °C), Min:97 6 °F (36 4 °C), Max:99 7 °F (37 6 °C)    Temp:  [97 6 °F (36 4 °C)-99 7 °F (37 6 °C)] 97 9 °F (36 6 °C)  HR:  [67-98] 82  Resp:  [17-20] 17  BP: (113-138)/(58-77) 113/58  SpO2:  [98 %-100 %] 98 %  Body mass index is 43 31 kg/m²  Input and Output Summary (last 24 hours):        Intake/Output Summary (Last 24 hours) at 10/18/2019 1045  Last data filed at 10/18/2019 0900  Gross per 24 hour   Intake 540 ml   Output 225 ml   Net 315 ml       Physical Exam:     Physical Exam   Constitutional: She is oriented to person, place, and time  She appears well-developed and well-nourished  No distress  HENT:   Head: Normocephalic and atraumatic  Cardiovascular: Normal rate and regular rhythm  Exam reveals no friction rub  No murmur heard  Pulmonary/Chest: Effort normal and breath sounds normal  No respiratory distress  She has no wheezes  Abdominal: Soft  Bowel sounds are normal  She exhibits no distension  There is no tenderness  There is no rebound and no guarding  Left PCN tube in place   Musculoskeletal: She exhibits no edema  Neurological: She is alert and oriented to person, place, and time  No cranial nerve deficit  Skin: Skin is warm and dry  No rash noted  Port incision clean and dry without drainage  Psychiatric: She has a normal mood and affect  Nursing note and vitals reviewed  Additional Data:     Labs:    Results from last 7 days   Lab Units 10/18/19  0503  10/15/19  0506  10/12/19  2123   WBC Thousand/uL 9 62   < > 7 14   < > 17 39*   HEMOGLOBIN g/dL 8 7*   < > 9 2*   < > 13 0   HEMATOCRIT % 26 5*   < > 27 2*   < > 37 8   PLATELETS Thousands/uL 133*   < > 120*   < > 176   BANDS PCT %  --   --  1   < >  --    NEUTROS PCT %  --   --   --   --  91*   LYMPHS PCT %  --   --   --   --  7*   LYMPHO PCT %  --   --  7*   < >  --    MONOS PCT %  --   --   --   --  1*   MONO PCT %  --   --  1*   < >  --    EOS PCT %  --   --  0   < > 0    < > = values in this interval not displayed       Results from last 7 days   Lab Units 10/17/19  0624  10/12/19  2123   SODIUM mmol/L 138   < > 136   POTASSIUM mmol/L 3 4*   < > 3 4*   CHLORIDE mmol/L 104   < > 99*   CO2 mmol/L 21   < > 27   BUN mg/dL 11   < > 16   CREATININE mg/dL 0 98   < > 1 02   ANION GAP mmol/L 13   < > 10   CALCIUM mg/dL 8 7   < > 9 4   ALBUMIN g/dL  --   --  3 3*   TOTAL BILIRUBIN mg/dL  --   --  0 70   ALK PHOS U/L  --   --  83   ALT U/L  --   --  25   AST U/L  --   --  19   GLUCOSE RANDOM mg/dL 119   < > 130    < > = values in this interval not displayed  Results from last 7 days   Lab Units 10/12/19  2123   INR  1 04             Results from last 7 days   Lab Units 10/17/19  0624 10/16/19  0558 10/15/19  0506 10/14/19  0521 10/12/19  2124 10/12/19  2123   LACTIC ACID mmol/L  --   --   --   --  1 9  --    PROCALCITONIN ng/ml 1 40* 1 69* 1 32* 0 67*  --  <0 05           * I Have Reviewed All Lab Data Listed Above  * Additional Pertinent Lab Tests Reviewed: All Labs Within Last 24 Hours Reviewed    Imaging:    Imaging Reports Reviewed Today Include: none  Imaging Personally Reviewed by Myself Includes:  none    Recent Cultures (last 7 days):     Results from last 7 days   Lab Units 10/13/19  2122 10/13/19  1724 10/13/19  1136 10/13/19  1002 10/12/19  2123   BLOOD CULTURE   --   --   --   --  No Growth After 4 Days  No Growth After 4 Days     SPUTUM CULTURE   --  4+ Growth of   --   --   --    GRAM STAIN RESULT   --  Rare Epithelial Cells*  Rare Polys*  2+ Gram negative rods*  2+ Gram positive cocci in pairs*  1+ Gram positive cocci in clusters*  --   --   --    INFLUENZA B PCR   --   --   --  Not Detected  --    RSV PCR   --   --   --  Not Detected  --    LEGIONELLA URINARY ANTIGEN   --   --  Negative  --   --    C DIFF TOXIN B  NEGATIVE for C difficle toxin by PCR    --   --   --   --        Last 24 Hours Medication List:     Current Facility-Administered Medications:  acetaminophen 650 mg Oral Q6H PRN Reji Chavez MD    aspirin 81 mg Oral Daily Roxy Nose PA-C    buPROPion 150 mg Oral BID Roxy Nose, PA-C    butalbital-acetaminophen-caffeine 1 tablet Oral Q4H PRN BRITTNY Alberts    cefepime 2,000 mg Intravenous Q12H Nicolette Robledo MD Last Rate: 2,000 mg (10/18/19 0407)   cyclobenzaprine 5 mg Oral TID PRN Roxy Nose, PA-C    gabapentin 600 mg Oral HS Orin Velasquez PA-C    heparin (porcine) 5,000 Units Subcutaneous [facility] Reji Chavez MD    montelukast 10 mg Oral HS Roxy Nose, PA-C    nicotine 1 patch Transdermal Daily Yaya Castañeda MD    ondansetron 4 mg Intravenous Q6H PRN Khari Lund PA-C    pneumococcal 13-valent conjugate vaccine 0 5 mL Intramuscular Prior to discharge Yaya Castañeda MD    pravastatin 40 mg Oral Daily With SYSCOHUA    sertraline 100 mg Oral HS Khari Lund PA-C    traMADol 50 mg Oral Q6H PRN BRITTNY Ignacio    vancomycin 17 5 mg/kg (Adjusted) Intravenous Q12H Liz Garcia MD Last Rate: 1,500 mg (10/18/19 0604)        Today, Patient Was Seen By: Brenda Guzmán PA-C    ** Please Note: Dictation voice to text software may have been used in the creation of this document   **

## 2019-10-18 NOTE — ASSESSMENT & PLAN NOTE
· CT A/P shows a large staghorn calculus on the left which occupies the majority of the left renal pelvis and extends into the left UPJ and proximal left ureter with extension of  the staghorn calculus into several lower pole calyces and marked dilatation of the left upper pole collecting system   · Urology input appreciated  · S/P PCN tube on 10/17  · Urine culture pending  · Outpatient Urology follow up

## 2019-10-18 NOTE — TELEPHONE ENCOUNTER
BRITTNY Garcia routed conversation to [facility] [address] [place] [address] 2 hours ago (10:22 AM)      BRITTNY Garcia 2 hours ago (10:22 AM)         Marine Forbes is a 78-year-old female seen in consultation at [facility] for left staghorn calculus and left hydronephrosis and UTI, status post left percutaneous nephrostomy insertion  Patient will require follow-up with attending provider, (not seen by attending during admission (ease, to discuss surgical fitness and coordinate left future PCN now  Patient should be contacted for appointment in approximately 3--4 weeks    Please explained to patient that she will need extended recovery, time frame is acceptable and drain may stay in place for several weeks without concern

## 2019-10-18 NOTE — PROGRESS NOTES
Progress Note - Infectious Disease   Elvin Bullard 62 y o  female MRN: [id number]  Unit/Bed#: -69 Encounter: [id number]      Impression/Recommendations:  1  Sepsis, POA   Fever, leukocytosis  Tuan McKinney due initially to # 2   Consider also role of # 3 as fevers persisted, now improved status post PCN placement  UA, blood cultures, C  Diff, Flu PCR negative   ROS and exam otherwise negative   CT suggests possible lingular infiltrate but patient without significant respiratory symptoms   Seems too late for chemo-related fever   Now improving  2  Port-A-Cath infection   In setting of recently placed Port-A-Cath 10/02/2019   Now status post removal with resolved cellulitis  3  Staghorn calculus left kidney   With hydronephrosis   Chronic based on review of prior imaging   Has negative UA and lack of symptoms, but consider infection given ongoing fevers  Now status post left PCN  4  Diarrhea   Suspect chemotherapy induced   C  Diff negative  5  Metastatic leiomyosarcoma   Recently started on palliative chemotherapy 10/09/2019   Gemcitabine, Taxotere      D/C vancomycin to complete treatment course as chest wall cellulitis is now resolved  Will change cefepime to levofloxacin with plan to continue through 10/22 for 7 days total of GNR coverage in setting of possible UTI  Follow-up final PCN urine culture although suspect may be negative given pre-collection antibiotic exposure  Follow temperatures closely  If patient remains afebrile over the next 24 hours, would consider her stable from an ID perspective for discharge home with close outpatient follow-up  As the patient was never bacteremic, would be clear from an ID perspective for outpatient port placement at any time  The above plan discussed in detail with the patient and the SLIM service      Antibiotics:  Vancomycin #5  Cefepime #3  Antibiotics #6    Subjective:  Patient seen on AM rounds  Feels better today    States she is in 8/10 on a scale of feeling better  Denies fevers, chills, sweats, nausea, vomiting, or diarrhea  Having some soreness related to her PCN  24 Hour Events:  Had left PCN placed which yielded clear urine which was sent for culture  No documented fevers, chills, sweats, nausea, vomiting, or diarrhea  Objective:  Vitals:  Temp:  [97 6 °F (36 4 °C)-99 7 °F (37 6 °C)] 97 9 °F (36 6 °C)  HR:  [67-98] 82  Resp:  [17-20] 17  BP: (113-138)/(58-77) 113/58  SpO2:  [98 %-100 %] 98 %  Temp (24hrs), Av 5 °F (36 9 °C), Min:97 6 °F (36 4 °C), Max:99 7 °F (37 6 °C)  Current: Temperature: 97 9 °F (36 6 °C)    Physical Exam:   General:  No acute distress  Psychiatric:  Awake and alert  Chest:  Resolved right chest wall cellulitis around prior port site  Pulmonary:  Normal respiratory excursion without accessory muscle use  Abdomen:  Soft, nontender  Extremities:  No edema  Skin:  No rashes  :  Left PCN with blood-tinged clear yellow urine    Lab Results:  I have personally reviewed pertinent labs  Results from last 7 days   Lab Units 10/17/19  0624 10/16/19  0558 10/15/19  0506  10/12/19  2123   POTASSIUM mmol/L 3 4* 3 3* 3 4*   < > 3 4*   CHLORIDE mmol/L 104 103 102   < > 99*   CO2 mmol/L 21 20* 21   < > 27   BUN mg/dL 11 10 10   < > 16   CREATININE mg/dL 0 98 1 00 1 00   < > 1 02   EGFR ml/min/1 73sq m 62 60 60   < > 59   CALCIUM mg/dL 8 7 8 4 8 7   < > 9 4   AST U/L  --   --   --   --  19   ALT U/L  --   --   --   --  25   ALK PHOS U/L  --   --   --   --  83    < > = values in this interval not displayed  Results from last 7 days   Lab Units 10/18/19  0503 10/17/19  0624 10/16/19  0558   WBC Thousand/uL 9 62 8 46 8 53   HEMOGLOBIN g/dL 8 7* 7 9* 8 2*   PLATELETS Thousands/uL 133* 86* 89*     Results from last 7 days   Lab Units 10/13/19  2122 10/13/19  1724 10/13/19  1136 10/13/19  1002 10/12/19  2123   BLOOD CULTURE   --   --   --   --  No Growth After 4 Days  No Growth After 4 Days     SPUTUM CULTURE   --  4+ Growth of   --   -- --    GRAM STAIN RESULT   --  Rare Epithelial Cells*  Rare Polys*  2+ Gram negative rods*  2+ Gram positive cocci in pairs*  1+ Gram positive cocci in clusters*  --   --   --    INFLUENZA B PCR   --   --   --  Not Detected  --    RSV PCR   --   --   --  Not Detected  --    LEGIONELLA URINARY ANTIGEN   --   --  Negative  --   --    C DIFF TOXIN B  NEGATIVE for C difficle toxin by PCR    --   --   --   --        Imaging Studies:   I have personally reviewed pertinent imaging study reports and images in PACS  EKG, Pathology, and Other Studies:   I have personally reviewed pertinent reports

## 2019-10-18 NOTE — PROGRESS NOTES
Vancomycin IV Pharmacy-to-Dose Consultation    Irwin Kruse is a 62 y o   who is currently receiving Vancomycin IV with management by the Pharmacy Consult service  Assessment/Plan:  The patient was reviewed  Renal function is stable and no signs or symptoms of nephrotoxicity and/or infusion reactions were documented in the chart  Based on todays assessment, continue current vancomycin day # 5 of regimen 1500 mg q12h,  with a therapeutic trough of 18 and a plan for trough to be drawn at 0500 on 10/23/19  Total Days of Vancomycin therapy received to date: 5 days  We will continue to follow the patients culture results and clinical progress daily      Idania Muñoz, Pharmacist

## 2019-10-18 NOTE — PROGRESS NOTES
Progress Note - Mckenzie Billings 62 y o  female MRN: [id number]    Unit/Bed#: -01 Encounter: [id number]      Assessment:  Mckenzie Billings is a medically complex 58-year-old female mid for pneumonia with history of tobacco use and recent adduction of palliative chemotherapy for leiomyosarcoma   history significant for nephrolithiasis  Patient is known remotely to Dr Ewelina Clemens for prior lithotripsy  CT of the abdomen and pelvis have demonstrated large left staghorn calculus with dilated collecting system  Patient has spike nocturnal fever times for 2 nights in a row despite treatment for pneumonia  ID requested follow-up  She use now postprocedure day 1 successful IR insertion of left percutaneous nephrostomy  T-max 99 6 at 10:36 p m   hours  Afebrile this a m  Without leukocytosis or LINDA  Only 175 ml overnight  Urine was not purulent on drain insertion  Plan:  Continue medical optimization, antibiotics and symptom management  Maintain PCN to straight drainage  May flush daily with 10 mL normal saline to keep drain patent  No further  intervention indicated during this hospital stay  Our service will contact patient to arrange for discussion of possible percutaneous nephrolithotomy when patient is medically optimized and infection free  Subjective:   Endorses nocturnal fever, chills and diaphoresis  Denies flank, abdominal, suprapubic pain, dysuria or dysuria    Objective:     Vitals: Blood pressure 113/58, pulse 82, temperature 97 9 °F (36 6 °C), resp  rate 17, height 5' 8" (1 727 m), weight 129 kg (284 lb 13 4 oz), SpO2 98 %  ,Body mass index is 43 31 kg/m²        Intake/Output Summary (Last 24 hours) at 10/18/2019 0835  Last data filed at 10/18/2019 0455  Gross per 24 hour   Intake 420 ml   Output 175 ml   Net 245 ml       Physical Exam: General appearance: alert and oriented, in no acute distress, appears stated age, cooperative, no distress and morbidly obese  Head: Normocephalic, without obvious abnormality, atraumatic  Neck: no adenopathy, no carotid bruit, no JVD, supple, symmetrical, trachea midline and thyroid not enlarged, symmetric, no tenderness/mass/nodules  Lungs: diminished breath sounds  Heart: regular rate and rhythm, S1, S2 normal, no murmur, click, rub or gallop  Abdomen: soft, non-tender; bowel sounds normal; no masses,  no organomegaly  Extremities: extremities normal, warm and well-perfused; no cyanosis, clubbing, or edema  Pulses: 2+ and symmetric  Neurologic: Grossly normal  Left PCN--sick urine patent for yellow urine with slight neal tinge      Invasive Devices     Peripheral Intravenous Line            Peripheral IV 10/18/19 Right Forearm less than 1 day          Drain            Nephrostomy Left 1 8 5 Fr  less than 1 day              Lab Results   Component Value Date    WBC 9 62 10/18/2019    HGB 8 7 (L) 10/18/2019    HCT 26 5 (L) 10/18/2019    MCV 96 10/18/2019     (L) 10/18/2019     Lab Results   Component Value Date    SODIUM 138 10/17/2019    K 3 4 (L) 10/17/2019     10/17/2019    CO2 21 10/17/2019    BUN 11 10/17/2019    CREATININE 0 98 10/17/2019    GLUC 119 10/17/2019    CALCIUM 8 7 10/17/2019       Lab, Imaging and other studies: I have personally reviewed pertinent reports

## 2019-10-18 NOTE — ASSESSMENT & PLAN NOTE
· Sepsis POA as evidenced by fever, leukocytosis and tachycardia  · Suspect secondary to Port infection and possible staghorn calculus with hydronephrosis  · She is immunosuppressed - recently started on Chemo  · Continue vancomycin IV and IV cefepime  · Blood cultures x2 negative for growth at 72 hours  · Temps improved over last 24 hours  · D/W ID, monitor another 24 hours, and if remains afebrile, can likely switch to oral antibiotics and discharge

## 2019-10-19 LAB
ERYTHROCYTE [DISTWIDTH] IN BLOOD BY AUTOMATED COUNT: 14.1 % (ref 11.6–15.1)
HCT VFR BLD AUTO: 27.5 % (ref 34.8–46.1)
HGB BLD-MCNC: 9.1 G/DL (ref 11.5–15.4)
MCH RBC QN AUTO: 31.7 PG (ref 26.8–34.3)
MCHC RBC AUTO-ENTMCNC: 33.1 G/DL (ref 31.4–37.4)
MCV RBC AUTO: 96 FL (ref 82–98)
PLATELET # BLD AUTO: 209 THOUSANDS/UL (ref 149–390)
PMV BLD AUTO: 10.1 FL (ref 8.9–12.7)
RBC # BLD AUTO: 2.87 MILLION/UL (ref 3.81–5.12)
WBC # BLD AUTO: 12.04 THOUSAND/UL (ref 4.31–10.16)

## 2019-10-19 PROCEDURE — 99233 SBSQ HOSP IP/OBS HIGH 50: CPT | Performed by: NURSE PRACTITIONER

## 2019-10-19 PROCEDURE — 85027 COMPLETE CBC AUTOMATED: CPT | Performed by: PHYSICIAN ASSISTANT

## 2019-10-19 RX ORDER — LISINOPRIL 10 MG/1
10 TABLET ORAL DAILY
Status: DISCONTINUED | OUTPATIENT
Start: 2019-10-19 | End: 2019-10-20 | Stop reason: HOSPADM

## 2019-10-19 RX ORDER — HYDROCHLOROTHIAZIDE 12.5 MG/1
12.5 TABLET ORAL DAILY
Status: DISCONTINUED | OUTPATIENT
Start: 2019-10-19 | End: 2019-10-20 | Stop reason: HOSPADM

## 2019-10-19 RX ORDER — GINSENG 100 MG
1 CAPSULE ORAL 2 TIMES DAILY
Status: DISCONTINUED | OUTPATIENT
Start: 2019-10-19 | End: 2019-10-20 | Stop reason: HOSPADM

## 2019-10-19 RX ADMIN — ASPIRIN 81 MG 81 MG: 81 TABLET ORAL at 10:12

## 2019-10-19 RX ADMIN — SERTRALINE HYDROCHLORIDE 100 MG: 50 TABLET ORAL at 21:47

## 2019-10-19 RX ADMIN — NICOTINE 1 PATCH: 14 PATCH TRANSDERMAL at 10:16

## 2019-10-19 RX ADMIN — GABAPENTIN 600 MG: 300 CAPSULE ORAL at 21:46

## 2019-10-19 RX ADMIN — MONTELUKAST 10 MG: 10 TABLET, FILM COATED ORAL at 21:46

## 2019-10-19 RX ADMIN — HEPARIN SODIUM 5000 UNITS: 5000 INJECTION INTRAVENOUS; SUBCUTANEOUS at 05:10

## 2019-10-19 RX ADMIN — CYCLOBENZAPRINE HYDROCHLORIDE 5 MG: 10 TABLET, FILM COATED ORAL at 05:14

## 2019-10-19 RX ADMIN — BUPROPION HYDROCHLORIDE 150 MG: 150 TABLET, FILM COATED, EXTENDED RELEASE ORAL at 10:12

## 2019-10-19 RX ADMIN — HEPARIN SODIUM 5000 UNITS: 5000 INJECTION INTRAVENOUS; SUBCUTANEOUS at 14:05

## 2019-10-19 RX ADMIN — LEVOFLOXACIN 500 MG: 500 TABLET, FILM COATED ORAL at 14:05

## 2019-10-19 RX ADMIN — BUPROPION HYDROCHLORIDE 150 MG: 150 TABLET, FILM COATED, EXTENDED RELEASE ORAL at 18:25

## 2019-10-19 RX ADMIN — BACITRACIN 1 SMALL APPLICATION: 500 OINTMENT TOPICAL at 18:26

## 2019-10-19 RX ADMIN — LISINOPRIL 10 MG: 10 TABLET ORAL at 10:12

## 2019-10-19 RX ADMIN — PRAVASTATIN SODIUM 40 MG: 40 TABLET ORAL at 18:25

## 2019-10-19 RX ADMIN — HYDROCHLOROTHIAZIDE 12.5 MG: 12.5 TABLET ORAL at 10:12

## 2019-10-19 RX ADMIN — TRAMADOL HYDROCHLORIDE 50 MG: 50 TABLET, FILM COATED ORAL at 15:11

## 2019-10-19 RX ADMIN — HEPARIN SODIUM 5000 UNITS: 5000 INJECTION INTRAVENOUS; SUBCUTANEOUS at 21:46

## 2019-10-19 NOTE — PLAN OF CARE
Problem: PAIN - ADULT  Goal: Verbalizes/displays adequate comfort level or baseline comfort level  Description  Interventions:  - Encourage patient to monitor pain and request assistance  - Assess pain using appropriate pain scale  - Administer analgesics based on type and severity of pain and evaluate response  - Implement non-pharmacological measures as appropriate and evaluate response  - Consider cultural and social influences on pain and pain management  - Notify physician/advanced practitioner if interventions unsuccessful or patient reports new pain  Outcome: Progressing     Problem: INFECTION - ADULT  Goal: Absence or prevention of progression during hospitalization  Description  INTERVENTIONS:  - Assess and monitor for signs and symptoms of infection  - Monitor lab/diagnostic results  - Monitor all insertion sites, i e  indwelling lines, tubes, and drains  - [place] appropriate cooling/warming therapies per order  - Administer medications as ordered  - Instruct and encourage patient and family to use good hand hygiene technique     Outcome: Progressing     Problem: SAFETY ADULT  Goal: Patient will remain free of falls  Description  INTERVENTIONS:  - Assess patient frequently for physical needs  -  Identify cognitive and physical deficits and behaviors that affect risk of falls    -  [place] fall precautions as indicated by assessment   - Educate patient/family on patient safety including physical limitations  - Instruct patient to call for assistance with activity based on assessment  - Modify environment to reduce risk of injury  - Consider OT/PT consult to assist with strengthening/mobility  Outcome: Progressing  Goal: Maintain or return to baseline ADL function  Description  INTERVENTIONS:  -  Assess patient's ability to carry out ADLs; assess patient's baseline for ADL function and identify physical deficits which impact ability to perform ADLs (bathing, care of mouth/teeth, toileting, grooming, dressing, etc )  - Assess/evaluate cause of self-care deficits   - Assess range of motion  - Assess patient's mobility; develop plan if impaired  - Assess patient's need for assistive devices and provide as appropriate  - Encourage maximum independence but intervene and supervise when necessary  - Involve family in performance of ADLs  - Assess for home care needs following discharge   - Consider OT consult to assist with ADL evaluation and planning for discharge  - Provide patient education as appropriate  Outcome: Progressing  Goal: Maintain or return mobility status to optimal level  Description  INTERVENTIONS:  - Assess patient's baseline mobility status (ambulation, transfers, stairs, etc )    - Identify cognitive and physical deficits and behaviors that affect mobility  - [place] fall precautions as indicated by assessment  - Record patient progress and toleration of activity level on Mobility SBAR; progress patient to next Phase/Stage  - Instruct patient to call for assistance with activity based on assessment   Outcome: Progressing     Problem: DISCHARGE PLANNING  Goal: Discharge to home or other facility with appropriate resources  Description  INTERVENTIONS:  - Identify barriers to discharge w/patient and caregiver  - Arrange for needed discharge resources and transportation as appropriate  - Identify discharge learning needs (meds, wound care, etc )  - Refer to Case Management Department for coordinating discharge planning if the patient needs post-hospital services based on physician/advanced practitioner order or complex needs related to functional status, cognitive ability, or social support system   Outcome: Progressing     Problem: Knowledge Deficit  Goal: Patient/family/caregiver demonstrates understanding of disease process, treatment plan, medications, and discharge instructions  Description  Complete learning assessment and assess knowledge base    Interventions:  - Provide teaching at level of understanding  - Provide teaching via preferred learning methods  Outcome: Progressing     Problem: CARDIOVASCULAR - ADULT  Goal: Maintains optimal cardiac output and hemodynamic stability  Description  INTERVENTIONS:  - Monitor I/O, vital signs and rhythm  - Monitor for S/S and trends of decreased cardiac output  - Administer and titrate ordered vasoactive medications to optimize hemodynamic stability  - Assess quality of pulses, skin color and temperature  - Assess for signs of decreased coronary artery perfusion  - Instruct patient to report change in severity of symptoms  Outcome: Progressing  Goal: Absence of cardiac dysrhythmias or at baseline rhythm  Description  INTERVENTIONS:  - Continuous cardiac monitoring, vital signs, obtain 12 lead EKG if ordered  - Administer antiarrhythmic and heart rate control medications as ordered  - Monitor electrolytes and administer replacement therapy as ordered  Outcome: Progressing     Problem: METABOLIC, FLUID AND ELECTROLYTES - ADULT  Goal: Electrolytes maintained within normal limits  Description  INTERVENTIONS:  - Monitor labs and assess patient for signs and symptoms of electrolyte imbalances  - Administer electrolyte replacement as ordered  - Monitor response to electrolyte replacements, including repeat lab results as appropriate  - Instruct patient on fluid and nutrition as appropriate  Outcome: Progressing  Goal: Fluid balance maintained  Description  INTERVENTIONS:  - Monitor labs   - Monitor I/O and WT  - Instruct patient on fluid and nutrition as appropriate  - Assess for signs & symptoms of volume excess or deficit  Outcome: Progressing  Goal: Glucose maintained within target range  Description  INTERVENTIONS:  - Monitor Blood Glucose as ordered  - Assess for signs and symptoms of hyperglycemia and hypoglycemia  - Administer ordered medications to maintain glucose within target range  - Assess nutritional intake and initiate nutrition service referral as needed  Outcome: Progressing     Problem: SKIN/TISSUE INTEGRITY - ADULT  Goal: Skin integrity remains intact  Description  INTERVENTIONS  - Identify patients at risk for skin breakdown  - Assess and monitor skin integrity  - Assess and monitor nutrition and hydration status  - Monitor labs (i e  albumin)  - Assist with mobility/ambulation  - Relieve pressure over bony prominences  - Avoid friction and shearing  - Provide appropriate hygiene as needed including keeping skin clean and dry  - Evaluate need for skin moisturizer/barrier cream  - Collaborate with interdisciplinary team (i e  Nutrition, Rehabilitation, etc )   - Patient/family teaching   Outcome: Progressing  Goal: Incision(s), wounds(s) or drain site(s) healing without S/S of infection  Description  INTERVENTIONS  - Assess and document risk factors for skin impairment   - Assess and document dressing, incision, wound bed, drain sites and surrounding tissue  - Consider nutrition services referral as needed  - Oral mucous membranes remain intact  - Provide patient/ family education  Outcome: Progressing  Goal: Oral mucous membranes remain intact  Description  INTERVENTIONS  - Assess oral mucosa and hygiene practices  - Implement preventative oral hygiene regimen  - Implement oral medicated treatments as ordered     Outcome: Progressing     Problem: Potential for Falls  Goal: Patient will remain free of falls  Description  INTERVENTIONS:  - Assess patient frequently for physical needs  -  Identify cognitive and physical deficits and behaviors that affect risk of falls    -  [place] fall precautions as indicated by assessment   - Educate patient/family on patient safety including physical limitations  - Instruct patient to call for assistance with activity based on assessment  - Modify environment to reduce risk of injury  - Consider OT/PT consult to assist with strengthening/mobility  Outcome: Progressing     Problem: Nutrition/Hydration-ADULT  Goal: Nutrient/Hydration intake appropriate for improving, restoring or maintaining nutritional needs  Description  Monitor and assess patient's nutrition/hydration status for malnutrition  Collaborate with interdisciplinary team and initiate plan and interventions as ordered  Monitor patient's weight and dietary intake as ordered or per policy  Utilize nutrition screening tool and intervene as necessary  Determine patient's food preferences and provide high-protein, high-caloric foods as appropriate       INTERVENTIONS:  - Monitor oral intake, urinary output, labs, and treatment plans  - Assess nutrition and hydration status and recommend course of action  - Evaluate amount of meals eaten  - Assist patient with eating if necessary   - Allow adequate time for meals  - Recommend/ encourage appropriate diets, oral nutritional supplements, and vitamin/mineral supplements  - Order, calculate, and assess calorie counts as needed  - Recommend, monitor, and adjust tube feedings and TPN/PPN based on assessed needs  - Assess need for intravenous fluids  - Provide specific nutrition/hydration education as appropriate  - Include patient/family/caregiver in decisions related to nutrition  Outcome: Progressing     Problem: Prexisting or High Potential for Compromised Skin Integrity  Goal: Skin integrity is maintained or improved  Description  INTERVENTIONS:  - Identify patients at risk for skin breakdown  - Assess and monitor skin integrity  - Assess and monitor nutrition and hydration status  - Monitor labs   - Assess for incontinence   - Turn and reposition patient  - Assist with mobility/ambulation  - Relieve pressure over bony prominences  - Avoid friction and shearing  - Provide appropriate hygiene as needed including keeping skin clean and dry  - Evaluate need for skin moisturizer/barrier cream  - Collaborate with interdisciplinary team   - Patient/family teaching  - Consider wound care consult   Outcome: Progressing

## 2019-10-19 NOTE — ASSESSMENT & PLAN NOTE
· CT A/P shows a large staghorn calculus on the left which occupies the majority of the left renal pelvis and extends into the left UPJ and proximal left ureter with extension of  the staghorn calculus into several lower pole calyces and marked dilatation of the left upper pole collecting system   · Urology input appreciated  · S/P PCN tube on 10/17  · Urine culture showing no growth however this is likely in setting of patient currently receiving antibiotics  · Outpatient Urology follow up

## 2019-10-19 NOTE — ASSESSMENT & PLAN NOTE
· Patient with metastatic leiomyosarcoma and patient recently started on Chemo  · She follows with an Oncologist through [facility]      · Oncology team - Eladia Ramirez -[phone]

## 2019-10-19 NOTE — PROGRESS NOTES
Progress Note - Jono Gonzalez 1955, 62 y o  female MRN: [id number]    Unit/Bed#: -01 Encounter: [id number]    Primary Care Provider: No primary care provider on file  Date and time admitted to hospital: 10/12/2019  8:56 PM        * Sepsis ([address] )  Assessment & Plan  · Sepsis POA as evidenced by fever, leukocytosis and tachycardia  · Suspect secondary to Port infection and possible staghorn calculus with hydronephrosis  · She is immunosuppressed - recently started on Chemo  · Continue vancomycin IV and IV cefepime  · Blood cultures x2 negative for growth at 5 days  · Temps have overall improved however patient did have a fever overnight of 100 9 would hold on additional 24 hours if patient remains afebrile for 24 hours per previous provider discussion with ID patient can discharge home on oral antibiotics  Staghorn calculus  Assessment & Plan  · CT A/P shows a large staghorn calculus on the left which occupies the majority of the left renal pelvis and extends into the left UPJ and proximal left ureter with extension of  the staghorn calculus into several lower pole calyces and marked dilatation of the left upper pole collecting system   · Urology input appreciated  · S/P PCN tube on 10/17  · Urine culture showing no growth however this is likely in setting of patient currently receiving antibiotics  · Outpatient Urology follow up    Pneumonia  Assessment & Plan  · Patient presented with fever and with evidence of a LLL infiltrate on CT of the chest   ·  IV vancomycin continue and IV cefepime add per ID  · Strep PNA and Legionella negative  RSV/Flu negative     · Sputum culture showing normal respiratory sarkis  · ID input appreciated: less suspicious of a bacterial pneumonia as the cause of her Sepsis - suspected to be from other sources at this point obstructive uropathy given negative cath tip growth from culture    Hypokalemia  Assessment & Plan  · Continue supplementation   · Hypokalemia improved · BMP in a m  Hypertension  Assessment & Plan  · Linisinpril/HCTZ held as BP low on admission and is within normal limits  · Continue to hold at this time  Leiomyosarcoma [facility])  Assessment & Plan  · Patient with metastatic leiomyosarcoma and patient recently started on Chemo  · [place] [facility]  · Oncology team - Gurwinder aWlker -[phone]    Diarrhea  Assessment & Plan  · Patient reports diarrhea since starting chemotherapy - likely secondary to chemo  · Stool studies via C diff negative  Overall clinically improved  · Supportive measures  Tobacco abuse  Assessment & Plan  · Cessation counseled  · Continue nicotine patch        VTE Pharmacologic Prophylaxis:   Pharmacologic: Heparin  Mechanical VTE Prophylaxis in Place: Yes    Patient Centered Rounds: I have performed bedside rounds with nursing staff today  Discussions with Specialists or Other Care Team Provider:      Education and Discussions with Family / Patient:  Patient    Time Spent for Care: 30 minutes  More than 50% of total time spent on counseling and coordination of care as described above  Current Length of Stay: 6 day(s)    Current Patient Status: Inpatient   Certification Statement: The patient will continue to require additional inpatient hospital stay due to Ongoing treatment of sepsis in setting of Port-A-Cath infection versus calculus    Discharge Plan:  Not medically clear    Code Status: Level 1 - Full Code      Subjective:   Patient reports she has been having fevers not near as severe overnight  Patient reports overall generally not feeling well and feeling like part B/bloated all over  Patient reports she gave a list of the medication with RN the other day that was not on her list on admission    Patient made aware would initiate this medication to help assist if she is feeling she is retaining fluid and would continue to monitor she does deny any shortness of breath lightheaded or dizziness and generally states she has been having some discomfort at her nephrostomy site  Patient made aware would ordered daily dressing changes and close monitoring for the site  Objective:     Vitals:   Temp (24hrs), Av 3 °F (37 4 °C), Min:98 °F (36 7 °C), Max:100 9 °F (38 3 °C)    Temp:  [98 °F (36 7 °C)-100 9 °F (38 3 °C)] 99 °F (37 2 °C)  HR:  [85-87] 87  Resp:  [18] 18  BP: (117-139)/(59-82) 130/66  SpO2:  [93 %-96 %] 93 %  Body mass index is 43 31 kg/m²  Input and Output Summary (last 24 hours): Intake/Output Summary (Last 24 hours) at 10/19/2019 1045  Last data filed at 10/18/2019 2132  Gross per 24 hour   Intake 240 ml   Output 130 ml   Net 110 ml       Physical Exam:     Physical Exam   Constitutional: She is oriented to person, place, and time  She appears well-developed and well-nourished  HENT:   Head: Normocephalic and atraumatic  Eyes: Conjunctivae and EOM are normal    Neck: Normal range of motion  Cardiovascular: Normal rate, regular rhythm and normal heart sounds  Pulmonary/Chest: Effort normal and breath sounds normal    Abdominal: Soft  Bowel sounds are normal    Musculoskeletal: Normal range of motion  She exhibits edema  +1 pitting edema bilateral lower extremities nonpitting edema noted of bilateral upper extremities   Neurological: She is alert and oriented to person, place, and time  Skin: Skin is warm and dry  Erythema noted around nephrostomy to tenderness to palpation   Psychiatric: She has a normal mood and affect   Her behavior is normal          Additional Data:     Labs:    Results from last 7 days   Lab Units 10/18/19  0503  10/15/19  0506  10/12/19  2123   WBC Thousand/uL 9 62   < > 7 14   < > 17 39*   HEMOGLOBIN g/dL 8 7*   < > 9 2*   < > 13 0   HEMATOCRIT % 26 5*   < > 27 2*   < > 37 8   PLATELETS Thousands/uL 133*   < > 120*   < > 176   BANDS PCT %  --   --  1   < >  --    NEUTROS PCT %  --   --   --   --  91*   LYMPHS PCT %  --   --   --   -- 7*   LYMPHO PCT %  --   --  7*   < >  --    MONOS PCT %  --   --   --   --  1*   MONO PCT %  --   --  1*   < >  --    EOS PCT %  --   --  0   < > 0    < > = values in this interval not displayed  Results from last 7 days   Lab Units 10/17/19  0624  10/12/19  2123   SODIUM mmol/L 138   < > 136   POTASSIUM mmol/L 3 4*   < > 3 4*   CHLORIDE mmol/L 104   < > 99*   CO2 mmol/L 21   < > 27   BUN mg/dL 11   < > 16   CREATININE mg/dL 0 98   < > 1 02   ANION GAP mmol/L 13   < > 10   CALCIUM mg/dL 8 7   < > 9 4   ALBUMIN g/dL  --   --  3 3*   TOTAL BILIRUBIN mg/dL  --   --  0 70   ALK PHOS U/L  --   --  83   ALT U/L  --   --  25   AST U/L  --   --  19   GLUCOSE RANDOM mg/dL 119   < > 130    < > = values in this interval not displayed  Results from last 7 days   Lab Units 10/12/19  2123   INR  1 04             Results from last 7 days   Lab Units 10/17/19  0624 10/16/19  0558 10/15/19  0506 10/14/19  0521 10/12/19  2124 10/12/19  2123   LACTIC ACID mmol/L  --   --   --   --  1 9  --    PROCALCITONIN ng/ml 1 40* 1 69* 1 32* 0 67*  --  <0 05           * I Have Reviewed All Lab Data Listed Above  * Additional Pertinent Lab Tests Reviewed: All Labs Within Last 24 Hours Reviewed        Recent Cultures (last 7 days):     Results from last 7 days   Lab Units 10/17/19  1616 10/13/19  2122 10/13/19  1724 10/13/19  1136 10/13/19  1002 10/12/19  2123   BLOOD CULTURE   --   --   --   --   --  No Growth After 5 Days  No Growth After 5 Days     SPUTUM CULTURE   --   --  4+ Growth of   --   --   --    GRAM STAIN RESULT   --   --  Rare Epithelial Cells*  Rare Polys*  2+ Gram negative rods*  2+ Gram positive cocci in pairs*  1+ Gram positive cocci in clusters*  --   --   --    URINE CULTURE  No Growth <1000 cfu/mL  --   --   --   --   --    INFLUENZA B PCR   --   --   --   --  Not Detected  --    RSV PCR   --   --   --   --  Not Detected  --    LEGIONELLA URINARY ANTIGEN   --   --   --  Negative  --   --    C DIFF TOXIN B   -- NEGATIVE for C difficle toxin by PCR    --   --   --   --        Last 24 Hours Medication List:     Current Facility-Administered Medications:  acetaminophen 650 mg Oral Q6H PRN Constance Lee MD   aspirin 81 mg Oral Daily [employer]   buPROPion 150 mg Oral BID Suly SleetHUA   butalbital-acetaminophen-caffeine 1 tablet Oral Q4H PRN Duong Early, CRNP   cyclobenzaprine 5 mg Oral TID PRN Suly Sleet, HUA   gabapentin 600 mg Oral HS [employer], HUA   heparin (porcine) 5,000 Units Subcutaneous [facility] Constance Lee MD   hydrochlorothiazide 12 5 mg Oral Daily Duong Early, CRALEXYS   levofloxacin 500 mg Oral Q24H Celso Carr MD   lisinopril 10 mg Oral Daily Duong Early, CRALEXYS   montelukast 10 mg Oral HS Suly Sleet, HUA   nicotine 1 patch Transdermal Daily Constance Lee MD   ondansetron 4 mg Intravenous Q6H PRN Suly SleeHUA muhammad   pneumococcal 13-valent conjugate vaccine 0 5 mL Intramuscular Prior to discharge Constance Lee MD   pravastatin 40 mg Oral Daily With SYSCOHUA   sertraline 100 mg Oral HS Suly SleetHUA   traMADol 50 mg Oral Q6H PRN BRITTNY Parker        Today, Patient Was Seen By: BRITTNY Parker    ** Please Note: Dictation voice to text software may have been used in the creation of this document   **

## 2019-10-19 NOTE — ASSESSMENT & PLAN NOTE
· Sepsis POA as evidenced by fever, leukocytosis and tachycardia  · Suspect secondary to Port infection and possible staghorn calculus with hydronephrosis  · She is immunosuppressed - recently started on Chemo  · Continue vancomycin IV and IV cefepime  · Blood cultures x2 negative for growth at 5 days  · Temps have overall improved however patient did have a fever overnight of 100 9 would hold on additional 24 hours if patient remains afebrile for 24 hours per previous provider discussion with ID patient can discharge home on oral antibiotics

## 2019-10-20 VITALS
HEART RATE: 98 BPM | HEIGHT: 68 IN | BODY MASS INDEX: 43.17 KG/M2 | OXYGEN SATURATION: 90 % | WEIGHT: 284.83 LBS | SYSTOLIC BLOOD PRESSURE: 116 MMHG | RESPIRATION RATE: 18 BRPM | DIASTOLIC BLOOD PRESSURE: 64 MMHG | TEMPERATURE: 98.4 F

## 2019-10-20 PROBLEM — R19.7 DIARRHEA: Status: RESOLVED | Noted: 2019-10-13 | Resolved: 2019-10-20

## 2019-10-20 PROBLEM — R93.89 ABNORMAL CT OF THE CHEST: Status: ACTIVE | Noted: 2019-10-13

## 2019-10-20 LAB
ANION GAP SERPL CALCULATED.3IONS-SCNC: 8 MMOL/L (ref 4–13)
BUN SERPL-MCNC: 6 MG/DL (ref 5–25)
CALCIUM SERPL-MCNC: 8.9 MG/DL (ref 8.3–10.1)
CHLORIDE SERPL-SCNC: 100 MMOL/L (ref 100–108)
CO2 SERPL-SCNC: 28 MMOL/L (ref 21–32)
CREAT SERPL-MCNC: 0.96 MG/DL (ref 0.6–1.3)
GFR SERPL CREATININE-BSD FRML MDRD: 63 ML/MIN/1.73SQ M
GLUCOSE SERPL-MCNC: 154 MG/DL (ref 65–140)
POTASSIUM SERPL-SCNC: 3.1 MMOL/L (ref 3.5–5.3)
SODIUM SERPL-SCNC: 136 MMOL/L (ref 136–145)

## 2019-10-20 PROCEDURE — 99239 HOSP IP/OBS DSCHRG MGMT >30: CPT | Performed by: NURSE PRACTITIONER

## 2019-10-20 PROCEDURE — 80048 BASIC METABOLIC PNL TOTAL CA: CPT | Performed by: NURSE PRACTITIONER

## 2019-10-20 RX ORDER — POTASSIUM CHLORIDE 20 MEQ/1
20 TABLET, EXTENDED RELEASE ORAL DAILY
Status: DISCONTINUED | OUTPATIENT
Start: 2019-10-20 | End: 2019-10-20 | Stop reason: HOSPADM

## 2019-10-20 RX ORDER — LEVOFLOXACIN 500 MG/1
500 TABLET, FILM COATED ORAL EVERY 24 HOURS
Qty: 2 TABLET | Refills: 0 | Status: SHIPPED | OUTPATIENT
Start: 2019-10-20 | End: 2019-10-22

## 2019-10-20 RX ADMIN — POTASSIUM CHLORIDE 20 MEQ: 1500 TABLET, EXTENDED RELEASE ORAL at 12:00

## 2019-10-20 RX ADMIN — NICOTINE 1 PATCH: 14 PATCH TRANSDERMAL at 08:35

## 2019-10-20 RX ADMIN — ASPIRIN 81 MG 81 MG: 81 TABLET ORAL at 08:34

## 2019-10-20 RX ADMIN — LISINOPRIL 10 MG: 10 TABLET ORAL at 08:34

## 2019-10-20 RX ADMIN — LEVOFLOXACIN 500 MG: 500 TABLET, FILM COATED ORAL at 12:00

## 2019-10-20 RX ADMIN — BACITRACIN 1 SMALL APPLICATION: 500 OINTMENT TOPICAL at 08:33

## 2019-10-20 RX ADMIN — HYDROCHLOROTHIAZIDE 12.5 MG: 12.5 TABLET ORAL at 08:34

## 2019-10-20 RX ADMIN — BUPROPION HYDROCHLORIDE 150 MG: 150 TABLET, FILM COATED, EXTENDED RELEASE ORAL at 08:34

## 2019-10-20 RX ADMIN — VANCOMYCIN HYDROCHLORIDE 125 MG: 500 INJECTION, POWDER, LYOPHILIZED, FOR SOLUTION INTRAVENOUS at 12:00

## 2019-10-20 NOTE — DISCHARGE SUMMARY
Discharge- Maricruz De La Rosa 1955, 62 y o  female MRN: [id number]    Unit/Bed#: -01 Encounter: [id number]    Primary Care Provider: No primary care provider on file  Date and time admitted to hospital: 10/12/2019  8:56 PM        * Sepsis ([address] )  Assessment & Plan  · Sepsis POA as evidenced by fever, leukocytosis and tachycardia  · Suspect secondary to Port infection and possible staghorn calculus with hydronephrosis  · She is immunosuppressed - recently started on Chemo  · Continue levofloxacin until 10/22/2019  · Blood cultures x2 negative for growth at 5 days  · Patient has remained afebrile greater than 24 hours she is stable for discharge  · Close outpatient follow-up with Infectious Disease, Oncology, and Urology    Staghorn calculus  Assessment & Plan  · CT A/P shows a large staghorn calculus on the left which occupies the majority of the left renal pelvis and extends into the left UPJ and proximal left ureter with extension of  the staghorn calculus into several lower pole calyces and marked dilatation of the left upper pole collecting system   · Urology input appreciated  · S/P PCN tube on 10/17  · Urine culture showing no growth however this is likely in setting of patient currently receiving antibiotics  · Outpatient Urology follow up    Abnormal CT of the chest  Assessment & Plan  · Patient presented with fever and with evidence of a LLL infiltrate on CT of the chest   · Strep PNA and Legionella negative  RSV/Flu negative     · Sputum culture showing normal respiratory sarkis  · ID input appreciated:  Patient showing no respiratory symptoms which makes CT findings less suspicious of a bacterial pneumonia as the cause of her Sepsis - suspected to be from other sources at this point obstructive uropathy given negative cath tip growth from culture  · Continue levofloxacin to complete treatment through 10/22/2019    Hypokalemia  Assessment & Plan  · Continue supplementation   · Persist will initiate K-Dur 20 mEq daily  · BMP is outpatient follow-up with PCP    Hypertension  Assessment & Plan  · Blood pressure was re-initiated yesterday blood pressure is normal    Leiomyosarcoma (HCC)  Assessment & Plan  · Patient with metastatic leiomyosarcoma and patient recently started on Chemo  · She follows with an Oncologist through [facility]  · Oncology team - Alessia Goss -[phone]  · Patient is cleared from ID perspective to explore putting a port back in place    Tobacco abuse  Assessment & Plan  · Cessation counseled  · Continue nicotine patch        Discharging Physician / Practitioner: Gladystine Eisenmenger, CRNP  PCP: No primary care provider on file  Admission Date:   Admission Orders (From admission, onward)     Ordered        10/13/19 0155  Inpatient Admission  Once                   Discharge Date: 10/20/19    Resolved Problems  Date Reviewed: 10/20/2019          Resolved    Diarrhea 10/20/2019     Resolved by  Gladystine Eisenmenger, [address] [place] [address] Stay:  · Infectious Disease  · Urology    Procedures Performed:   · Port-A-Cath removal  · Blood cultures negative for growth x5 days  · Nephrostomy tube placement  · Cath tip culture negative    Significant Findings / Test Results:   · Sepsis  · Port-A-Cath infection  · Staghorn calculus left kidney  · Diarrhea  · Metastatic leiomyosarcoma    Incidental Findings:   · CT imaging suggestive of pneumonia no clinical correlation seen on admission  · Hypokalemia     Test Results Pending at Discharge (will require follow up):    · None     Outpatient Tests Requested:  · Per Urology  · Per Oncology    Complications:  Fever    Reason for Admission:  Sepsis    Hospital Course:     Juju Hernandez is a 62 y o  female patient who originally presented to the hospital on 10/12/2019 due to known history of leiomyosarcoma on palliative chemotherapy presenting with a one-day history fever further developed chills diarrhea headache nausea vomiting and myalgias  Patient had a CT of the chest abdomen pelvis suggestive of a pneumonia, there was a large staghorn calculus in the left kidney with a marked dilation of the left upper pole collecting system  There was some noted stranding of the fat posterior medial aspect of the right scapula  Patient had a right chest wall Port-A-Cath in setting of her cancer treatments had significant erythema tenderness around the site suspicious for cellulitis  Patient had a negative UA microscopic  Patient was initiated on IV antibiotics infectious disease consult was placed and patient was receiving initial treatment of vancomycin it was recommended by Infectious Disease the Port-A-Cath be removed patient went to IR with successful removal of the catheterization  Catheterization was sent for culture which came back negative however patient's symptoms of erythema tenderness and swelling improved  Of note however patient's fevers persisted which was concerning as Infectious Disease reported could be likely in setting of the staghorn calculus even though the UA microscopic was normal as there may be infection behind and was recommended for a nephrostomy tube  Urology was consulted who placed in IR consult for a nephrostomy tube placement patient did slowly see improvement of her fevers and on day 6 in to 7 remained afebrile for 20 more than 24 hours  Patient was eventually by Infectious Disease transition to levofloxacin to complete treatment through 10/22/2019  Patient will need close outpatient follow-up with Infectious Disease, Urology, and her oncology team   Patient from an infectious disease standpoint is clear to have a Port-A-Cath placed  Patient was discharged with ambulatory referral to Infectious Disease and Urology  Patient is to maintain her appointments with her oncology team       Please see above list of diagnoses and related plan for additional information       Condition at Discharge: good     Discharge Day Visit / Exam:     Subjective:  Patient denies any fever states she feels generally well I am going home  Patient makes no other generalized complaints and generally states she feels well  Vitals: Blood Pressure: 116/64 (10/20/19 0700)  Pulse: 98 (10/20/19 0700)  Temperature: 98 4 °F (36 9 °C) (10/20/19 0700)  Temp Source: Oral (10/20/19 0700)  Respirations: 18 (10/20/19 0700)  Height: 5' 8" (172 7 cm) (10/14/19 1538)  Weight - Scale: 129 kg (284 lb 13 4 oz) (10/14/19 1538)  SpO2: 90 % (10/20/19 0700)  Exam:   Physical Exam   Constitutional: She is oriented to person, place, and time  She appears well-developed and well-nourished  HENT:   Head: Normocephalic and atraumatic  Eyes: Conjunctivae and EOM are normal    Neck: Normal range of motion  Cardiovascular: Normal rate, regular rhythm and normal heart sounds  Pulmonary/Chest: Effort normal and breath sounds normal    Abdominal: Soft  Bowel sounds are normal    Genitourinary:   Genitourinary Comments: Nephrostomy tube intact mild erythema noted around tube site overall improved in the last 24 hours from previous exam   Collection bag has clear yellow urine   Musculoskeletal: Normal range of motion  Neurological: She is alert and oriented to person, place, and time  Skin: Skin is warm and dry  There is erythema  Improving erythema of Port-A-Cath site removal    Psychiatric: She has a normal mood and affect  Her behavior is normal        Discussion with Family:  Family where plan of care    Discharge instructions/Information to patient and family:   See after visit summary for information provided to patient and family  Provisions for Follow-Up Care:  See after visit summary for information related to follow-up care and any pertinent home health orders        Disposition:     Home    For Discharges to [facility] SNF:   · Not Applicable to this Patient - Not Applicable to this Patient    Planned Readmission:  None     Discharge Statement:  I spent 45 minutes discharging the patient  This time was spent on the day of discharge  I had direct contact with the patient on the day of discharge  Greater than 50% of the total time was spent examining patient, answering all patient questions, arranging and discussing plan of care with patient as well as directly providing post-discharge instructions  Additional time then spent on discharge activities  Discharge Medications:  See after visit summary for reconciled discharge medications provided to patient and family        ** Please Note: This note has been constructed using a voice recognition system **

## 2019-10-20 NOTE — PROGRESS NOTES
Progress Note - Rain Berman 1955, 62 y o  female MRN: [id number]    Unit/Bed#: -01 Encounter: [id number]    Primary Care Provider: No primary care provider on file  Date and time admitted to hospital: 10/12/2019  8:56 PM        * Sepsis ([address] )  Assessment & Plan  · Sepsis POA as evidenced by fever, leukocytosis and tachycardia  · Suspect secondary to Port infection and possible staghorn calculus with hydronephrosis  · She is immunosuppressed - recently started on Chemo  · Continue levofloxacin until 10/22/2019  · Blood cultures x2 negative for growth at 5 days  · Patient has remained afebrile greater than 24 hours she is stable for discharge  · Close outpatient follow-up with Infectious Disease, Oncology, and Urology    Staghorn calculus  Assessment & Plan  · CT A/P shows a large staghorn calculus on the left which occupies the majority of the left renal pelvis and extends into the left UPJ and proximal left ureter with extension of  the staghorn calculus into several lower pole calyces and marked dilatation of the left upper pole collecting system   · Urology input appreciated  · S/P PCN tube on 10/17  · Urine culture showing no growth however this is likely in setting of patient currently receiving antibiotics  · Outpatient Urology follow up    Abnormal CT of the chest  Assessment & Plan  · Patient presented with fever and with evidence of a LLL infiltrate on CT of the chest   · Strep PNA and Legionella negative  RSV/Flu negative     · Sputum culture showing normal respiratory sarkis  · ID input appreciated:  Patient showing no respiratory symptoms which makes CT findings less suspicious of a bacterial pneumonia as the cause of her Sepsis - suspected to be from other sources at this point obstructive uropathy given negative cath tip growth from culture  · Continue levofloxacin to complete treatment through 10/22/2019    Hypokalemia  Assessment & Plan  · Continue supplementation   · Persist will initiate K-Dur 20 mEq daily  · BMP is outpatient follow-up with PCP    Hypertension  Assessment & Plan  · Blood pressure was re-initiated yesterday blood pressure is normal    Leiomyosarcoma (HCC)  Assessment & Plan  · Patient with metastatic leiomyosarcoma and patient recently started on Chemo  · She follows with an Oncologist through [facility]  · Oncology team - Rachel Umana -[phone]  · Patient is cleared from ID perspective to explore putting a port back in place    Tobacco abuse  Assessment & Plan  · Cessation counseled  · Continue nicotine patch      Discharging Physician / Practitioner: BRITTNY Urban  PCP: No primary care provider on file  Admission Date:   Admission Orders (From admission, onward)     Ordered        10/13/19 0155  Inpatient Admission  Once                   Discharge Date: 10/20/19    Resolved Problems  Date Reviewed: 10/20/2019          Resolved    Diarrhea 10/20/2019     Resolved by  Neno Urban [facility] Stay:  · Infectious Disease  · Urology    Procedures Performed:   · Port-A-Cath removal  · Blood cultures negative for growth x5 days  · Nephrostomy tube placement  · Cath tip culture negative    Significant Findings / Test Results:   · Sepsis  · Port-A-Cath infection  · Staghorn calculus left kidney  · Diarrhea  · Metastatic leiomyosarcoma    Incidental Findings:   · CT imaging suggestive of pneumonia no clinical correlation seen on admission  · Hypokalemia     Test Results Pending at Discharge (will require follow up):    · None     Outpatient Tests Requested:  · Per Urology  · Per Oncology    Complications:  Fever    Reason for Admission:  Sepsis    Hospital Course:     Shayne Dennis is a 62 y o  female patient who originally presented to the hospital on 10/12/2019 due to known history of leiomyosarcoma on palliative chemotherapy presenting with a one-day history fever further developed chills diarrhea headache nausea vomiting and myalgias  Patient had a CT of the chest abdomen pelvis suggestive of a pneumonia, there was a large staghorn calculus in the left kidney with a marked dilation of the left upper pole collecting system  There was some noted stranding of the fat posterior medial aspect of the right scapula  Patient had a right chest wall Port-A-Cath in setting of her cancer treatments had significant erythema tenderness around the site suspicious for cellulitis  Patient had a negative UA microscopic  Patient was initiated on IV antibiotics infectious disease consult was placed and patient was receiving initial treatment of vancomycin it was recommended by Infectious Disease the Port-A-Cath be removed patient went to IR with successful removal of the catheterization  Catheterization was sent for culture which came back negative however patient's symptoms of erythema tenderness and swelling improved  Of note however patient's fevers persisted which was concerning as Infectious Disease reported could be likely in setting of the staghorn calculus even though the UA microscopic was normal as there may be infection behind and was recommended for a nephrostomy tube  Urology was consulted who placed in IR consult for a nephrostomy tube placement patient did slowly see improvement of her fevers and on day 6 in to 7 remained afebrile for 20 more than 24 hours  Patient was eventually by Infectious Disease transition to levofloxacin to complete treatment through 10/22/2019  Patient will need close outpatient follow-up with Infectious Disease, Urology, and her oncology team   Patient from an infectious disease standpoint is clear to have a Port-A-Cath placed  Patient was discharged with ambulatory referral to Infectious Disease and Urology  Patient is to maintain her appointments with her oncology team       Please see above list of diagnoses and related plan for additional information       Condition at Discharge: good     Discharge Day Visit / Exam:     Subjective:  Patient denies any fever states she feels generally well I am going home  Patient makes no other generalized complaints and generally states she feels well  Vitals: Blood Pressure: 116/64 (10/20/19 0700)  Pulse: 98 (10/20/19 0700)  Temperature: 98 4 °F (36 9 °C) (10/20/19 0700)  Temp Source: Oral (10/20/19 0700)  Respirations: 18 (10/20/19 0700)  Height: 5' 8" (172 7 cm) (10/14/19 1538)  Weight - Scale: 129 kg (284 lb 13 4 oz) (10/14/19 1538)  SpO2: 90 % (10/20/19 0700)  Exam:   Physical Exam   Constitutional: She is oriented to person, place, and time  She appears well-developed and well-nourished  HENT:   Head: Normocephalic and atraumatic  Eyes: Conjunctivae and EOM are normal    Neck: Normal range of motion  Cardiovascular: Normal rate, regular rhythm and normal heart sounds  Pulmonary/Chest: Effort normal and breath sounds normal    Abdominal: Soft  Bowel sounds are normal    Genitourinary:   Genitourinary Comments: Nephrostomy tube intact mild erythema noted around tube site overall improved in the last 24 hours from previous exam   Collection bag has clear yellow urine   Musculoskeletal: Normal range of motion  Neurological: She is alert and oriented to person, place, and time  Skin: Skin is warm and dry  There is erythema  Improving erythema of Port-A-Cath site removal    Psychiatric: She has a normal mood and affect  Her behavior is normal        Discussion with Family:  Family where plan of care    Discharge instructions/Information to patient and family:   See after visit summary for information provided to patient and family  Provisions for Follow-Up Care:  See after visit summary for information related to follow-up care and any pertinent home health orders        Disposition:     Home    For Discharges to [address] SNF:   · Not Applicable to this Patient - Not Applicable to this Patient    Planned Readmission:  None     Discharge Statement:  I spent 45 minutes discharging the patient  This time was spent on the day of discharge  I had direct contact with the patient on the day of discharge  Greater than 50% of the total time was spent examining patient, answering all patient questions, arranging and discussing plan of care with patient as well as directly providing post-discharge instructions  Additional time then spent on discharge activities  Discharge Medications:  See after visit summary for reconciled discharge medications provided to patient and family        ** Please Note: This note has been constructed using a voice recognition system **

## 2019-10-20 NOTE — PLAN OF CARE
Problem: PAIN - ADULT  Goal: Verbalizes/displays adequate comfort level or baseline comfort level  Description  Interventions:  - Encourage patient to monitor pain and request assistance  - Assess pain using appropriate pain scale  - Administer analgesics based on type and severity of pain and evaluate response  - Implement non-pharmacological measures as appropriate and evaluate response  - Consider cultural and social influences on pain and pain management  - Notify physician/advanced practitioner if interventions unsuccessful or patient reports new pain  Outcome: Progressing     Problem: INFECTION - ADULT  Goal: Absence or prevention of progression during hospitalization  Description  INTERVENTIONS:  - Assess and monitor for signs and symptoms of infection  - Monitor lab/diagnostic results  - Monitor all insertion sites, i e  indwelling lines, tubes, and drains  - [place] appropriate cooling/warming therapies per order  - Administer medications as ordered  - Instruct and encourage patient and family to use good hand hygiene technique     Outcome: Progressing     Problem: SAFETY ADULT  Goal: Patient will remain free of falls  Description  INTERVENTIONS:  - Assess patient frequently for physical needs  -  Identify cognitive and physical deficits and behaviors that affect risk of falls    -  [place] fall precautions as indicated by assessment   - Educate patient/family on patient safety including physical limitations  - Instruct patient to call for assistance with activity based on assessment  - Modify environment to reduce risk of injury  - Consider OT/PT consult to assist with strengthening/mobility  Outcome: Progressing  Goal: Maintain or return to baseline ADL function  Description  INTERVENTIONS:  -  Assess patient's ability to carry out ADLs; assess patient's baseline for ADL function and identify physical deficits which impact ability to perform ADLs (bathing, care of mouth/teeth, toileting, grooming, dressing, etc )  - Assess/evaluate cause of self-care deficits   - Assess range of motion  - Assess patient's mobility; develop plan if impaired  - Assess patient's need for assistive devices and provide as appropriate  - Encourage maximum independence but intervene and supervise when necessary  - Involve family in performance of ADLs  - Assess for home care needs following discharge   - Consider OT consult to assist with ADL evaluation and planning for discharge  - Provide patient education as appropriate  Outcome: Progressing  Goal: Maintain or return mobility status to optimal level  Description  INTERVENTIONS:  - Assess patient's baseline mobility status (ambulation, transfers, stairs, etc )    - Identify cognitive and physical deficits and behaviors that affect mobility  - [place] fall precautions as indicated by assessment  - Record patient progress and toleration of activity level on Mobility SBAR; progress patient to next Phase/Stage  - Instruct patient to call for assistance with activity based on assessment   Outcome: Progressing     Problem: DISCHARGE PLANNING  Goal: Discharge to home or other facility with appropriate resources  Description  INTERVENTIONS:  - Identify barriers to discharge w/patient and caregiver  - Arrange for needed discharge resources and transportation as appropriate  - Identify discharge learning needs (meds, wound care, etc )  - Refer to Case Management Department for coordinating discharge planning if the patient needs post-hospital services based on physician/advanced practitioner order or complex needs related to functional status, cognitive ability, or social support system   Outcome: Progressing     Problem: Knowledge Deficit  Goal: Patient/family/caregiver demonstrates understanding of disease process, treatment plan, medications, and discharge instructions  Description  Complete learning assessment and assess knowledge base    Interventions:  - Provide teaching at level of understanding  - Provide teaching via preferred learning methods  Outcome: Progressing     Problem: CARDIOVASCULAR - ADULT  Goal: Maintains optimal cardiac output and hemodynamic stability  Description  INTERVENTIONS:  - Monitor I/O, vital signs and rhythm  - Monitor for S/S and trends of decreased cardiac output  - Administer and titrate ordered vasoactive medications to optimize hemodynamic stability  - Assess quality of pulses, skin color and temperature  - Assess for signs of decreased coronary artery perfusion  - Instruct patient to report change in severity of symptoms  Outcome: Progressing  Goal: Absence of cardiac dysrhythmias or at baseline rhythm  Description  INTERVENTIONS:  - Continuous cardiac monitoring, vital signs, obtain 12 lead EKG if ordered  - Administer antiarrhythmic and heart rate control medications as ordered  - Monitor electrolytes and administer replacement therapy as ordered  Outcome: Progressing     Problem: METABOLIC, FLUID AND ELECTROLYTES - ADULT  Goal: Electrolytes maintained within normal limits  Description  INTERVENTIONS:  - Monitor labs and assess patient for signs and symptoms of electrolyte imbalances  - Administer electrolyte replacement as ordered  - Monitor response to electrolyte replacements, including repeat lab results as appropriate  - Instruct patient on fluid and nutrition as appropriate  Outcome: Progressing  Goal: Fluid balance maintained  Description  INTERVENTIONS:  - Monitor labs   - Monitor I/O and WT  - Instruct patient on fluid and nutrition as appropriate  - Assess for signs & symptoms of volume excess or deficit  Outcome: Progressing  Goal: Glucose maintained within target range  Description  INTERVENTIONS:  - Monitor Blood Glucose as ordered  - Assess for signs and symptoms of hyperglycemia and hypoglycemia  - Administer ordered medications to maintain glucose within target range  - Assess nutritional intake and initiate nutrition service referral as needed  Outcome: Progressing     Problem: SKIN/TISSUE INTEGRITY - ADULT  Goal: Skin integrity remains intact  Description  INTERVENTIONS  - Identify patients at risk for skin breakdown  - Assess and monitor skin integrity  - Assess and monitor nutrition and hydration status  - Monitor labs (i e  albumin)  - Assist with mobility/ambulation  - Relieve pressure over bony prominences  - Avoid friction and shearing  - Provide appropriate hygiene as needed including keeping skin clean and dry  - Evaluate need for skin moisturizer/barrier cream  - Collaborate with interdisciplinary team (i e  Nutrition, Rehabilitation, etc )   - Patient/family teaching   Outcome: Progressing  Goal: Incision(s), wounds(s) or drain site(s) healing without S/S of infection  Description  INTERVENTIONS  - Assess and document risk factors for skin impairment   - Assess and document dressing, incision, wound bed, drain sites and surrounding tissue  - Consider nutrition services referral as needed  - Oral mucous membranes remain intact  - Provide patient/ family education  Outcome: Progressing  Goal: Oral mucous membranes remain intact  Description  INTERVENTIONS  - Assess oral mucosa and hygiene practices  - Implement preventative oral hygiene regimen  - Implement oral medicated treatments as ordered     Outcome: Progressing     Problem: Potential for Falls  Goal: Patient will remain free of falls  Description  INTERVENTIONS:  - Assess patient frequently for physical needs  -  Identify cognitive and physical deficits and behaviors that affect risk of falls    -  [place] fall precautions as indicated by assessment   - Educate patient/family on patient safety including physical limitations  - Instruct patient to call for assistance with activity based on assessment  - Modify environment to reduce risk of injury  - Consider OT/PT consult to assist with strengthening/mobility  Outcome: Progressing     Problem: Nutrition/Hydration-ADULT  Goal: Nutrient/Hydration intake appropriate for improving, restoring or maintaining nutritional needs  Description  Monitor and assess patient's nutrition/hydration status for malnutrition  Collaborate with interdisciplinary team and initiate plan and interventions as ordered  Monitor patient's weight and dietary intake as ordered or per policy  Utilize nutrition screening tool and intervene as necessary  Determine patient's food preferences and provide high-protein, high-caloric foods as appropriate       INTERVENTIONS:  - Monitor oral intake, urinary output, labs, and treatment plans  - Assess nutrition and hydration status and recommend course of action  - Evaluate amount of meals eaten  - Assist patient with eating if necessary   - Allow adequate time for meals  - Recommend/ encourage appropriate diets, oral nutritional supplements, and vitamin/mineral supplements  - Order, calculate, and assess calorie counts as needed  - Recommend, monitor, and adjust tube feedings and TPN/PPN based on assessed needs  - Assess need for intravenous fluids  - Provide specific nutrition/hydration education as appropriate  - Include patient/family/caregiver in decisions related to nutrition  Outcome: Progressing     Problem: Prexisting or High Potential for Compromised Skin Integrity  Goal: Skin integrity is maintained or improved  Description  INTERVENTIONS:  - Identify patients at risk for skin breakdown  - Assess and monitor skin integrity  - Assess and monitor nutrition and hydration status  - Monitor labs   - Assess for incontinence   - Turn and reposition patient  - Assist with mobility/ambulation  - Relieve pressure over bony prominences  - Avoid friction and shearing  - Provide appropriate hygiene as needed including keeping skin clean and dry  - Evaluate need for skin moisturizer/barrier cream  - Collaborate with interdisciplinary team   - Patient/family teaching  - Consider wound care consult   Outcome: Progressing

## 2019-10-20 NOTE — ASSESSMENT & PLAN NOTE
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

## 2019-10-20 NOTE — ASSESSMENT & PLAN NOTE
· Patient presented with fever and with evidence of a LLL infiltrate on CT of the chest   · Strep PNA and Legionella negative  RSV/Flu negative     · Sputum culture showing normal respiratory sarkis  · ID input appreciated:  Patient showing no respiratory symptoms which makes CT findings less suspicious of a bacterial pneumonia as the cause of her Sepsis - suspected to be from other sources at this point obstructive uropathy given negative cath tip growth from culture  · Continue levofloxacin to complete treatment through 10/22/2019

## 2019-10-20 NOTE — SOCIAL WORK
Cm offered pt [facility] for tube care  Pt refusing stating her daughter is a RN and will do the care   MD notified

## 2019-10-20 NOTE — ASSESSMENT & PLAN NOTE
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

## 2019-10-20 NOTE — ASSESSMENT & PLAN NOTE
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

## 2019-10-20 NOTE — ASSESSMENT & PLAN NOTE
· Continue supplementation   · Persist will initiate K-Dur 20 mEq daily  · BMP is outpatient follow-up with PCP

## 2019-10-20 NOTE — DISCHARGE INSTRUCTIONS
TUBE CARE INSTRUCTIONS    Care after your procedure:    Resume your normal diet  Small sips of flat soda will help with nausea  1  The properly functioning catheter should be forward flushed once (1x) daily with 10ml of normal saline using clean technique  You will be given a prescription for flushes  To flush the tube, clean both connections with alcohol swab  Twist off the drainage bag/ bulb  tubing and twist the saline syringe into the drainage tube and flush  Remove the syringe and twist the drainage bag / bulb tubing tubing back on     2  The drainage bag/bulb may be emptied as necessary  Keep a record of the amount of fluid you drain from your tube  This should be done with clean technique as well  3  A fresh dressing should be applied daily over the tube insertion site  4  As the tube is secured to the skin with only a suture,try not to pull on your tube  Tub baths are not permitted  Showers are permitted if the patient's skin entry site is prevented from getting wet  Similarly, washcloth "baths" are acceptable  Contact Interventional Radiology at [phone] Kwame PATIENTS: Contact Interventional Radiology at [phone]) Cristal Schwartz PATIENTS: Contact Interventional Radiology at [phone]) if:    1  Leakage or large amounts of liquid around the catheter  2  Fever of 101 degrees lasting several hours without other obvious cause (such as sore throat, flu, etc)  3  Persistent nausea or vomiting  4  Diminished drainage, which may be associated with pressure or pain  Or when the     drainage from your tube is less than 10mls for 48 hours  5  Catheter pulled back or falls out  The following pharmacies carry the flush syringes         [facility]                     [facility]  [address]                         [address] [facility]  Phone [phone]            Phone [phone]   [place] [address]                                [phone]  [address] [facility] Janina BORGES                      [address] [place]  Phone [phone]            Phone [phone]                      Sarah Purvis                                                                                                          [phone]  [facility]  [facility] [address]  [facility]  Phone [phone] Venous Access Port Removal    WHAT YOU NEED TO KNOW:   An implanted venous access port is a device used to give treatments and take blood  It may also be called a central venous access device (CVAD)  The port is a small container that is placed under your skin, usually in your upper chest  A port can also be placed in your arm or abdomen  The port is attached to a catheter that enters a large vein  DISCHARGE INSTRUCTIONS:   Resume your normal diet  Small sips of flat soda will help with mild nausea  Prevent an infection:     Wash your hands often  Use soap and water  Clean your hands before and  after you care for your incision  Check your skin for infection every day  Look for redness, swelling, or fluid oozing from the incision site  Dressing may come off in 24 hours  Medical glue will peel off on its own in 5 to 10 days  You may shower 24 hours after procedure  Follow up with your healthcare provider as directed  Write down your questions so you remember to ask them during your visits  Activity:  You may return to your daily activities when the area heals       Contact Interventional Radiology at [phone] Kwame PATIENTS: Contact Interventional Radiology at [phone]) Daniel Addison PATIENTS: Contact Interventional Radiology at [phone]) if:     You have a fever  You have persistent nausea  Your inciscion site is red, swollen, or draining pus  You have questions or concerns about your condition or care  Seek care immediately or call 911 if:  Blood soaks through your bandage  The skin over or around your incision breaks open  Your heart is jumping or fluttering  You have a headache, blurred vision, and feel confused  You have pain in your arm, neck, shoulder, or chest     You have trouble breathing that is getting worse over time  Nephrostomy site care:  Cleanse nephrostomy site with normal saline pat dry  Apply bacitracin cover with a gauze drain dressing                Levofloxacin (By mouth)   Levofloxacin (kxk-ovj-PFVZ-a-sin)  Treats infections  This medicine is a quinolone antibiotic  Brand Name(s): Levaquin   There may be other brand names for this medicine  When This Medicine Should Not Be Used: This medicine is not right for everyone  Do not use it if you had an allergic reaction to levofloxacin or to similar medicines  How to Use This Medicine:   Liquid, Tablet  · Your doctor will tell you how much medicine to use  Do not use more than directed  Take your medicine at the same time each day  · Tablet: Take it with or without food  · Liquid: Take it 1 hour before or 2 hours after you eat  Measure the oral liquid medicine with a marked measuring spoon, oral syringe, or medicine cup  · Take all of the medicine in your prescription to clear up your infection, even if you feel better after the first few doses  · Drink extra fluids so you will urinate more often and help prevent kidney problems  · This medicine should come with a Medication Guide  Ask your pharmacist for a copy if you do not have one  · Missed dose: Take a dose as soon as you remember   If it is almost time for your next dose, wait until then and take a regular dose  Do not take extra medicine to make up for a missed dose  · Store the medicine in a closed container at room temperature, away from heat, moisture, and direct light  Drugs and Foods to Avoid:   Ask your doctor or pharmacist before using any other medicine, including over-the-counter medicines, vitamins, and herbal products  · Some foods and medicines can affect how levofloxacin works  Tell your doctor if you are using any of the following:  ¨ Theophylline  ¨ Blood thinner (including warfarin)  ¨ Diabetes medicine  ¨ Medicine for heart rhythm problems (including amiodarone, procainamide, quinidine, sotalol)  ¨ NSAID pain or arthritis medicine (including aspirin, celecoxib, diclofenac, ibuprofen, naproxen)  ¨ Steroid medicine (including hydrocortisone, methylprednisolone, prednisone)  · Take levofloxacin at least 2 hours before or 2 hours after you take antacids that contain magnesium or aluminum, zinc, or iron supplements, sucralfate, or didanosine  Warnings While Using This Medicine:   · Tell your doctor if you are pregnant or breastfeeding, or if you have kidney disease, liver disease, diabetes, heart disease, myasthenia gravis, or a history of heart rhythm problems (such as QT prolongation) or seizures  Tell your doctor if you have ever had tendon or joint problems, including rheumatoid arthritis, or if you have received a transplant  · This medicine may cause the following problems:  ¨ Tendinitis and tendon rupture (may happen after treatment ends)  ¨ Liver damage  ¨ Nerve damage in the arms or legs  ¨ Heart rhythm changes  ¨ Changes in blood sugar levels  · This medicine may make you feel dizzy or lightheaded  Do not drive or do anything else that could be dangerous until you know how this medicine affects you  · This medicine can cause diarrhea  Call your doctor if the diarrhea becomes severe, does not stop, or is bloody   Do not take any medicine to stop diarrhea until you have talked to your doctor  Diarrhea can occur 2 months or more after you stop taking this medicine  · Tell any doctor or dentist who treats you that you are using this medicine  This medicine may affect certain medical test results  · This medicine may make your skin more sensitive to sunlight  Wear sunscreen  Do not use sunlamps or tanning beds  · Call your doctor if your symptoms do not improve or if they get worse  · Keep all medicine out of the reach of children  Never share your medicine with anyone  Possible Side Effects While Using This Medicine:   Call your doctor right away if you notice any of these side effects:  · Allergic reaction: Itching or hives, swelling in your face or hands, swelling or tingling in your mouth or throat, chest tightness, trouble breathing  · Blistering, peeling, red skin rash  · Change in how much or how often you urinate  · Dark urine or pale stools, nausea, vomiting, loss of appetite, stomach pain, yellow skin or eyes  · Diarrhea that may contain blood  · Fainting, dizziness, or lightheadedness  · Fast, slow, or uneven heartbeat, chest pain  · Numbness, tingling, or burning pain in your hands, arms, legs, or feet  · Pain, stiffness, swelling, or bruises around your ankle, leg, shoulder, or other joint  · Seizures, severe headache, unusual thoughts or behaviors, trouble sleeping, feeling anxious, confused, or depressed, seeing, hearing, or feeling things that are not there  · Unusual bleeding, bruising, or weakness  If you notice these less serious side effects, talk with your doctor:   · Mild headache or nausea  If you notice other side effects that you think are caused by this medicine, tell your doctor  Call your doctor for medical advice about side effects   You may report side effects to FDA at 1-097-FDA-4464  © 2017 [address] [employer] Information is for End User's use only and may not be sold, redistributed or otherwise used for commercial purposes  The above information is an  only  It is not intended as medical advice for individual conditions or treatments  Talk to your doctor, nurse or pharmacist before following any medical regimen to see if it is safe and effective for you  Vancomycin (By mouth)   Vancomycin (tet-nyq-OFK-sin)  Treats infections  Brand Name(s): Vancocin, Vancomycin HCl Novaplus   There may be other brand names for this medicine  When This Medicine Should Not Be Used:   Do not use this medicine if you have had an allergic reaction to vancomycin  How to Use This Medicine:   Capsule, Liquid  · Your doctor will tell you how much medicine to use  Do not use more than directed  · Swallow the capsule whole  Do not crush, open, or chew it  · Shake the oral liquid well before each use  Measure the oral liquid medicine with a marked measuring spoon, oral syringe, or medicine cup  · You may mix your oral liquid dose with 2 tablespoons of water  You may add a flavoring syrup to improve the taste of the medicine  · Take all of the medicine in your prescription to clear up your infection, even if you feel better after the first few doses  If a dose is missed:   · Take a dose as soon as you remember  If it is almost time for your next dose, wait until then and take a regular dose  Do not take extra medicine to make up for a missed dose  How to Store and Dispose of This Medicine:   · Store your capsules at room temperature, away from heat, moisture, and direct light  · Store the oral liquid in a refrigerator for up to 14 days  Do not freeze  After 14 days throw away any medicine you do not use  · Ask your pharmacist, doctor, or health caregiver about the best way to dispose of any outdated medicine or medicine no longer needed  · Keep all medicine out of the reach of children  Never share your medicine with anyone    Drugs and Foods to Avoid:   Ask your doctor or pharmacist before using any other medicine, including over-the-counter medicines, vitamins, and herbal products  · Make sure your doctor knows if you also use amikacin, gentamicin, streptomycin, Amikin®, or Garamycin®  Warnings While Using This Medicine:   · Make sure your doctor knows if you are pregnant or breastfeeding or if you have kidney disease, hearing problems, or other bowel problems  · Check with your doctor right away if you have the following symptoms during or after treatment with this medicine: bloody urine, change in how much or how often you urinate, nausea, vomiting, swelling, or weakness  These could be symptoms of kidney or electrolyte problems  · Check with your doctor right away if you have changes in hearing, dizziness or ringing in the ears, or a feeling of movement or spinning  These may be symptoms of damage to your ears  · Call your doctor if your symptoms do not improve or if they get worse  · Your doctor will do lab tests at regular visits to check on the effects of this medicine  Keep all appointments  · Use this medicine only to treat the infection your doctor has prescribed it for  Do not use this medicine for any infection or condition that has not been checked by a doctor  This medicine will not treat the flu or the common cold    Possible Side Effects While Using This Medicine:   Call your doctor right away if you notice any of these side effects:  · Allergic reaction: Itching or hives, swelling in your face or hands, swelling or tingling in your mouth or throat, chest tightness, trouble breathing  · Blistering, peeling, or red skin rash  · Bloody urine, change in how much or how often you urinate  · Loss of hearing, ringing or buzzing in the ears, dizziness  · Dry mouth, increased thirst, muscle cramps, unusual tiredness or weakness  · Fever  · Swelling of your feet or lower legs  If you notice these less serious side effects, talk with your doctor:   · Back pain  · Diarrhea, nausea, vomiting, gas, or stomach pain  · Headache  If you notice other side effects that you think are caused by this medicine, tell your doctor  Call your doctor for medical advice about side effects  You may report side effects to FDA at 5-952-XZB-1450  © 2017 [address] [employer] Information is for End User's use only and may not be sold, redistributed or otherwise used for commercial purposes  The above information is an  only  It is not intended as medical advice for individual conditions or treatments  Talk to your doctor, nurse or pharmacist before following any medical regimen to see if it is safe and effective for you  How to Stop Smoking   WHAT YOU NEED TO KNOW:   You will improve your health and the health of others around you if you stop smoking  Your risk for heart and lung disease, cancer, stroke, heart attack, and vision problems will also decrease  You can benefit from quitting no matter how long you have smoked  DISCHARGE INSTRUCTIONS:   Prepare to stop smoking:  Nicotine is a highly addictive drug found in cigarettes  Withdrawal symptoms can happen when you stop smoking and make it hard to quit  These include anxiety, depression, irritability, trouble sleeping, and increased appetite  You increase your chances of success if you prepare to quit  · Set a quit date  Threasa Katja a date that is within the next 2 weeks  Do not pick a day that you think may be stressful or busy  Write down the day or [place] it on your calender  · Tell friends and family that you plan to quit  Explain that you may have withdrawal symptoms when you try to quit  Ask them to support you  They may be able to encourage you and help reduce your stress to make it easier for you to quit  · Make a list of your reasons for quitting  Put the list somewhere you will see it every day, such as your refrigerator  You can look at the list when you have a craving       · Remove all tobacco and nicotine products from your home, car, and workplace  Also, remove anything else that will tempt you to smoke, such as lighters, matches, or ashtrays  Clean your car, home, and places at work that smell like smoke  The smell of smoke can trigger a craving  · Identify triggers that make you want to smoke  This may include activities, feelings, or people  Also write down 1 way you can deal with each of your triggers  For example, if you want to smoke as soon as you wake up, plan another activity during this time, such as exercise  · Make a plan for how you will quit  Learn about the tools that can help you quit, such as medicine, counseling, or nicotine replacement therapy  Choose at least 2 options to help you quit  Tools to help you stop smoking:   · Counseling  from a trained healthcare provider can provide you with support and skills to quit smoking  The provider will also teach you to manage your withdrawal symptoms and cravings  You may receive counseling from one counselor, in group therapy, or through phone therapy called a quit line  · Nicotine replacement therapy (NRT)  such as nicotine patches, gum, or lozenges may help reduce your nicotine cravings  You may get these without a doctor's order  Do not use e-cigarettes or smokeless tobacco in place of cigarettes or to help you quit  They still contain nicotine  · Prescription medicines  such as nasal sprays or nicotine inhalers may help reduce your withdrawal symptoms  Other medicines may also be used to reduce your urge to smoke  Ask your healthcare provider about these medicines  You may need to start certain medicines 2 weeks before your quit date for them to work well  · Hypnosis  is a practice that helps guide you through thoughts and feelings  Hypnosis may help decrease your cravings and make you more willing to quit  · Acupuncture therapy  uses very thin needles to balance energy channels in the body   This is thought to help decrease cravings and symptoms of nicotine withdrawal      · Support groups  let you talk to others who are trying to quit or have already quit  It may be helpful to speak with others about how they quit  Manage your cravings:   · Avoid situations, people, and places that tempt you to smoke  Go to nonsmoking places, such as libraries or restaurants  Understand what tempts you and try to avoid these things  · Keep your hands busy  Hold things such as a stress ball or pen  · Put candy or toothpicks in your mouth  Keep lollipops, sugarless gum, or toothpicks with you at all times  · Do not have alcohol or caffeine  These drinks may tempt you to smoke  Drink healthy liquids such as water or juice instead  · Reward yourself when you resist your cravings  Rewards will motivate you and help you stay positive  · Do an activity that distracts you from your craving  Examples include going for a walk, exercising, or cleaning  Prevent weight gain after you quit:  You may gain a few pounds after you quit smoking  It is healthier for you to gain a few pounds than to continue to smoke  The following can help you prevent weight gain:  · Eat healthy foods  These include fruits, vegetables, whole-grain breads, low-fat dairy products, beans, lean meats, and fish  Eat healthy snacks, such as low-fat yogurt, if you get hungry between meals  · Drink water before, during, and between meals  This will make your stomach feel full and help prevent you from overeating  Ask your healthcare provider how much liquid to drink each day and which liquids are best for you  · Exercise  Take a walk or do some kind of exercise every day  Ask your healthcare provider what exercise is right for you  This may help reduce your cravings and reduce stress  For support and more information:   · Smokefree  gov  Phone: 7- 019 - 864-4032  Web Address: www smokefree  gov  © 2017 [address] [employer] Information is for End User's use only and may not be sold, redistributed or otherwise used for commercial purposes  All illustrations and images included in CareNotes® are the copyrighted property of A D A M , Inc  or Jaden Bashir  The above information is an  only  It is not intended as medical advice for individual conditions or treatments  Talk to your doctor, nurse or pharmacist before following any medical regimen to see if it is safe and effective for you  Cigarette Smoking and Your Health   WHAT YOU NEED TO KNOW:   What are the risks to my health if I smoke tobacco?  Nicotine and other chemicals found in tobacco damage every cell in your body  Even if you are a light smoker, you have an increased risk for cancer, heart disease, and lung disease  If you are pregnant or have diabetes, smoking increases your risk for complications  What are the benefits to my health if I stop smoking? · You decrease respiratory symptoms such as coughing, wheezing, and shortness of breath  · You reduce your risk for cancers of the lung, mouth, throat, kidney, bladder, pancreas, stomach, and cervix  If you already have cancer, you increase the benefits of chemotherapy  You also reduce your risk for cancer returning or a second cancer from developing  · You reduce your risk for heart disease, blood clots, heart attack, and stroke  · You reduce your risk for lung infections, and diseases such as pneumonia, asthma, chronic bronchitis, and emphysema  · Your circulation improves  More oxygen can be delivered to your body  If you have diabetes, you lower your risk for complications, such as kidney, artery, and eye diseases  You also lower your risk for nerve damage  Nerve damage can lead to amputations, poor vision, and blindness  · You improve your body's ability to heal and to fight infections  What are the health benefits to others if I stop smoking? Tobacco is harmful to nonsmokers who breathe in your secondhand smoke   The following are ways the health of others around you may improve when you stop smoking:  · You lower the risks for lung cancer and heart disease in nonsmoking adults  · If you are pregnant, you lower the risk for miscarriage, early delivery, low birth weight, and stillbirth  You also lower your baby's risk for SIDS, obesity, developmental delay, and neurobehavioral problems, such as ADHD  · If you have children, you lower their risk for ear infections, colds, pneumonia, bronchitis, and asthma  Where can I find more information and support to stop smoking? · [employer]  Phone: 2- 888 - 347-2612  Web Address: www [employer]  CARE AGREEMENT:   You have the right to help plan your care  Learn about your health condition and how it may be treated  Discuss treatment options with your caregivers to decide what care you want to receive  You always have the right to refuse treatment  The above information is an  only  It is not intended as medical advice for individual conditions or treatments  Talk to your doctor, nurse or pharmacist before following any medical regimen to see if it is safe and effective for you  © 2017 [address] [employer] Information is for End User's use only and may not be sold, redistributed or otherwise used for commercial purposes  All illustrations and images included in CareNotes® are the copyrighted property of [employer]  or Jaden Bashir

## 2019-10-22 NOTE — TELEPHONE ENCOUNTER
Called and left message for patient that she is scheduled for an appointment for Thursday at 8:15 at the [facility] office with [facility]  Patient is to call back and confirm appointment

## 2019-10-22 NOTE — TELEPHONE ENCOUNTER
Patient was discharged on Sunday  She has a catheter bag and would like to get kidney stone removed as soon as possible  She stated she will be started Chemo therapy soon  Needs appointment as soon as possible  Please advise

## 2019-10-23 NOTE — TELEPHONE ENCOUNTER
DUPLICATE ENCOUNTERS CREATED ON THIS PATIENT  PLEASE DO NOT ADD ANYTHING MORE TO THIS ENCOUNTER TO AVOID CONFUSION

## 2019-10-23 NOTE — TELEPHONE ENCOUNTER
DUPLICATE ENCOUNTERS CREATED ON THIS PATIENT  Maribell Fang RN routed conversation to [facility] (1:44 PM)      Maribell Fang RN Yesterday (1:44 PM)         Called and left message for patient that she is scheduled for an appointment for Thursday at 8:15 at the [facility] office with [facility]  Patient is to call back and confirm appointment                 Documentation       Tiffany Torres routed conversation to [facility] (12:15 PM)      Tiffany Torres Yesterday (12:15 PM)         Patient was discharged on Sunday  She has a catheter bag and would like to get kidney stone removed as soon as possible  She stated she will be started Chemo therapy soon  Needs appointment as soon as possible  Please advise            Documentation

## 2019-10-24 ENCOUNTER — TELEPHONE (OUTPATIENT)
Dept: UROLOGY | Facility: CLINIC | Age: 64
End: 2019-10-24

## 2019-10-24 ENCOUNTER — OFFICE VISIT (OUTPATIENT)
Dept: UROLOGY | Facility: CLINIC | Age: 64
End: 2019-10-24
Payer: COMMERCIAL

## 2019-10-24 VITALS
HEART RATE: 80 BPM | SYSTOLIC BLOOD PRESSURE: 124 MMHG | BODY MASS INDEX: 42.92 KG/M2 | WEIGHT: 283.2 LBS | HEIGHT: 68 IN | DIASTOLIC BLOOD PRESSURE: 68 MMHG

## 2019-10-24 DIAGNOSIS — N20.0 STAGHORN CALCULUS: Primary | ICD-10-CM

## 2019-10-24 LAB
SL AMB  POCT GLUCOSE, UA: NORMAL
SL AMB LEUKOCYTE ESTERASE,UA: NORMAL
SL AMB POCT BILIRUBIN,UA: NORMAL
SL AMB POCT BLOOD,UA: NORMAL
SL AMB POCT CLARITY,UA: CLEAR
SL AMB POCT COLOR,UA: YELLOW
SL AMB POCT KETONES,UA: NORMAL
SL AMB POCT NITRITE,UA: NORMAL
SL AMB POCT PH,UA: 6
SL AMB POCT SPECIFIC GRAVITY,UA: 1.01
SL AMB POCT URINE PROTEIN: NORMAL
SL AMB POCT UROBILINOGEN: NORMAL

## 2019-10-24 PROCEDURE — 81002 URINALYSIS NONAUTO W/O SCOPE: CPT | Performed by: PHYSICIAN ASSISTANT

## 2019-10-24 PROCEDURE — 99214 OFFICE O/P EST MOD 30 MIN: CPT | Performed by: PHYSICIAN ASSISTANT

## 2019-10-24 RX ORDER — OXYCODONE HYDROCHLORIDE AND ACETAMINOPHEN 5; 325 MG/1; MG/1
2 TABLET ORAL EVERY 6 HOURS PRN
Qty: 20 TABLET | Refills: 0 | Status: SHIPPED | OUTPATIENT
Start: 2019-10-24 | End: 2019-11-03

## 2019-10-24 RX ORDER — CYCLOBENZAPRINE HCL 5 MG
10 TABLET ORAL
Refills: 0 | COMMUNITY
Start: 2019-10-22

## 2019-10-24 NOTE — TELEPHONE ENCOUNTER
[facility] calling with questions regarding request she received as pt just had placement last week    [phone] #[phone]

## 2019-10-24 NOTE — PROGRESS NOTES
1  Staghorn calculus  POCT urine dip    oxyCODONE-acetaminophen (PERCOCET) 5-325 mg per tablet    IR tube change         Assessment and plan:       1  Staghorn calculus  - patient's case was reviewed remotely with on-call Dr Heriberto Woodard  - I reviewed the complexity of patient's stone and medical comorbidities during her office appointment today  We reviewed options for routine nephrostomy tube exchanges, internalization with a PCNU through Interventional Radiology, ureteroscopy and retrograde stent placement, ureteroscopy, and even percutaneous nephrolithotomy  We discussed the risks and benefits of these options  Ultimately at this time patient's kidney is drained via her nephrostomy tube which is allowing for renal protection  - patient is currently on chemotherapy and would need clearance to further pursue any operative intervention such as percutaneous nephrolithotomy  Will try to correspond with her oncologist for further planning   - patient will continue with nephrostomy tube flushing once daily  - she does endorse pain with this and she was prescribed a short course of Percocet, however encouraged her to discuss with her oncologist referral to palliative medicine given pain in the long term  - patient will follow up in the next few weeks for further surgical planning  We will try to coordinate potential PCNU for her next nephrostomy tube exchange for the hopes for of internalization, however this will be difficult given the large stone burden in the location near her ureteropelvic junction  2  Metastatic leiomyosarcoma on palliative chemotherapy  - follows with oncology is through Hope Mooney MD ([phone])  - patient reports that she sees Oncology tomorrow and will further review  We will reach out to them for continued planning          Mckay Williamson PA-C      Chief Complaint     Chief Complaint   Patient presents with    L Staghorn Calculus         History of Present Brian Gao is a 62 y o  female presenting today for hospital follow-up for staghorn calculus  Patient had presented to the hospital septic  Originally was felt to be secondary to pneumonia and or her port placement  A CT did reveal a staghorn calculus with a dilated upper pole  Patient was febrile at that time  Patient's fevers did not subside and ultimately underwent a left percutaneous nephrostomy tube placement  Patient improved thereafter and was discharged home recently  She has since completed her antibiotics  Patient has a family member who is a nurse to flushes her to once daily  No issues with nephrostomy true drainage  Patient does report that she has some flank pain associated with a nephrostomy tube however  Denies any gross hematuria, fevers, or chills  She does endorse some nausea however  She does have a history of nephrolithiasis and previously has undergone surgeries with Dr Prabhjot Contreras through [facility]  Laboratory     Lab Results   Component Value Date    CREATININE 0 96 10/20/2019         Review of Systems     Review of Systems   Constitutional: Negative for activity change, appetite change, chills, diaphoresis, fatigue, fever and unexpected weight change  Respiratory: Negative for chest tightness and shortness of breath  Cardiovascular: Negative for chest pain, palpitations and leg swelling  Gastrointestinal: Negative for abdominal distention, abdominal pain, constipation, diarrhea, nausea and vomiting  Genitourinary: Negative for decreased urine volume, difficulty urinating, dysuria, enuresis, flank pain, frequency, genital sores, hematuria and urgency  Musculoskeletal: Negative for back pain, gait problem and myalgias  Skin: Negative for color change, pallor, rash and wound  Psychiatric/Behavioral: Negative for behavioral problems  The patient is not nervous/anxious            Allergies     Allergies   Allergen Reactions    Morphine Vomiting Other reaction(s): Vomiting  Other reaction(s): (PIMS) vomiting  Makes her vomit      Sulfa Antibiotics Rash       Physical Exam     Physical Exam   Constitutional: She is oriented to person, place, and time  She appears well-developed and well-nourished  No distress  HENT:   Head: Normocephalic  Right Ear: External ear normal    Left Ear: External ear normal    Nose: Nose normal    Eyes: Conjunctivae are normal  Right eye exhibits no discharge  Left eye exhibits no discharge  Neck: Normal range of motion  No tracheal deviation present  Pulmonary/Chest: Effort normal  No respiratory distress  Abdominal:   Obese  Nephrostomy tube in place with suture still present  Nephrostomy was flushed with 10 cc sterile water with excellent return  No hematuria present  Incision is clean, dry, no purulence or erythema  Musculoskeletal: Normal range of motion  She exhibits no edema or deformity  Neurological: She is alert and oriented to person, place, and time  Skin: Skin is warm and dry  She is not diaphoretic  No erythema  No pallor  Psychiatric: She has a normal mood and affect   Her behavior is normal          Vital Signs     Vitals:    10/24/19 0819   BP: 124/68   Pulse: 80   Weight: 128 kg (283 lb 3 2 oz)   Height: 5' 8" (1 727 m)         Current Medications       Current Outpatient Medications:     albuterol (2 5 mg/3 mL) 0 083 % nebulizer solution, Take 2 5 mg by nebulization every 6 (six) hours as needed for wheezing or shortness of breath, Disp: , Rfl:     aspirin 81 mg chewable tablet, Chew 81 mg daily, Disp: , Rfl:     buPROPion (WELLBUTRIN SR) 150 mg 12 hr tablet, Take 150 mg by mouth 2 (two) times a day, Disp: , Rfl:     cyclobenzaprine (FLEXERIL) 5 mg tablet, , Disp: , Rfl: 0    gabapentin (NEURONTIN) 600 MG tablet, Take 600 mg by mouth daily at bedtime, Disp: , Rfl:     lisinopril-hydrochlorothiazide (PRINZIDE,ZESTORETIC) 10-12 5 MG per tablet, Take 1 tablet by mouth daily, Disp: , Rfl:   Misc   Devices KIT, Oxygen at night, Disp: , Rfl:     montelukast (SINGULAIR) 10 mg tablet, Take 10 mg by mouth daily at bedtime, Disp: , Rfl:     Multiple Vitamins-Minerals (CENTRUM SILVER 50+WOMEN) TABS, Centrum Silver, Disp: , Rfl:     nicotine (NICODERM CQ) 21 mg/24 hr TD 24 hr patch, Place 1 patch on the skin every 24 hours, Disp: , Rfl:     ondansetron (ZOFRAN) 8 mg tablet, Take 8 mg by mouth every 8 (eight) hours as needed for nausea or vomiting, Disp: , Rfl:     potassium chloride (K-DUR,KLOR-CON) 10 mEq tablet, Take 10 mEq by mouth daily, Disp: , Rfl:     sertraline (ZOLOFT) 100 mg tablet, Take 100 mg by mouth daily at bedtime, Disp: , Rfl:     simvastatin (ZOCOR) 20 mg tablet, Take 20 mg by mouth daily at bedtime, Disp: , Rfl:     cyclobenzaprine (FLEXERIL) 10 mg tablet, Take 5 mg by mouth 3 (three) times a day as needed for muscle spasms, Disp: , Rfl:     oxyCODONE-acetaminophen (PERCOCET) 5-325 mg per tablet, Take 2 tablets by mouth every 6 (six) hours as needed for moderate pain for up to 10 daysMax Daily Amount: 8 tablets, Disp: 20 tablet, Rfl: 0    vancomycin (VANCOCIN) 50mg/mL SOLN, Take 2 5 mL (125 mg total) by mouth every 12 (twelve) hours for 5 days (Patient not taking: Reported on 10/24/2019), Disp: 30 mL, Rfl: 0      Active Problems     Patient Active Problem List   Diagnosis    Hypokalemia    Sepsis ([address] )    Tobacco abuse    Abnormal CT of the chest    [facility]omy[facility]    Hypertension    Staghorn calculus         Past Medical History     Past Medical History:   Diagnosis Date    Asthma     Cancer [facility] [facility]     COPD (chronic obstructive pulmonary disease) ([facility] [address] [facility]     Hyperlipidemia     Hypertension     Psychiatric disorder     Depression         Surgical History     Past Surgical History:   Procedure Laterality Date    IR PORT REMOVAL  10/14/2019    IR TUBE PLACEMENT NEPHROSTOMY  10/17/2019    LUNG REMOVAL, PARTIAL      lower lobe         Family History     History reviewed  No pertinent family history        Social History     Social History       Radiology

## 2019-10-24 NOTE — TELEPHONE ENCOUNTER
Requested records & received conf from Oncologist Dr Maria Victoria Mckenzie office on our request   ( p ) [phone] ( f ) [phone]    LM for IR to pls call PT to sched for next tube exchange, left all details and PT info for sched  PT is sched w Dr Aniceto Bravo in 3 wks for follow-up

## 2019-10-25 NOTE — TELEPHONE ENCOUNTER
Left msg for Paddy Grey advising tube exchange was to be sched for 2 months out, asked to pls contact PT in re to sched this apt   All details noted on referral

## 2019-10-29 NOTE — TELEPHONE ENCOUNTER
Patient called in regard to need for surgical removal of a kidney stone  This needs to be completed ASAP in order for patient to continue with chemotherapy  Please call to discuss  She can be reached at [phone]    Thank you

## 2019-10-29 NOTE — TELEPHONE ENCOUNTER
Per last note on 10/24/19 by Mirza Farrar PA-C  It looks like since patient is on chemotherapy we would need clearance to schedule any operative intervention  Please advise is patient can be scheduled for surgery

## 2019-10-30 NOTE — TELEPHONE ENCOUNTER
Called and left message for patient  In message stated that Carmen Wright has tried calling her oncologist twice to confirm that it is ok to do surgery and is still awaiting to hear back from them

## 2019-11-01 NOTE — TELEPHONE ENCOUNTER
Did you hear back from the oncologist and do we have a plan for patient yet she is calling for her treatment plan?

## 2019-11-01 NOTE — TELEPHONE ENCOUNTER
Called and spoke to patient  Made her aware that mariela Long PA-C has a discussion with her oncologist as well as Dr Alexei Archibald  Made her aware that the recommendation is to maintain nephrostomy tube at this time and that she should continue with palliative chemotherapy with her oncologist as planned  Made her aware that she would not recommend percutaneous nephrostomy and or ureteroscopy at this time given the large stone burden and her comorbidities  Made her aware that she should follow-up as scheduled for further discussion with her appointment with Dr Alexei Archibald  Patient was not happy with the recommendations so she was offered a sooner appointment with Kendall Vick on 11/7/19 at 10:45 to further discuss with him in the office

## 2019-11-01 NOTE — TELEPHONE ENCOUNTER
I reviewed patient's case with her oncologist as well as Dr Quenton Severin  Recommend maintaining nephrostomy tube at this time  Patient should continue with palliative chemotherapy with her oncologist as planned  Would not recommend percutaneous nephrostomy and or ureteroscopy at this time given the large stone burden and her comorbidities  She should follow-up as scheduled for further discussion with her appointment with Dr Quenton Severin  If she is having discomfort with her nephrostomy tube in the meantime we can provide her referral to palliative medicine

## 2019-11-07 ENCOUNTER — OFFICE VISIT (OUTPATIENT)
Dept: UROLOGY | Facility: CLINIC | Age: 64
End: 2019-11-07
Payer: COMMERCIAL

## 2019-11-07 VITALS
BODY MASS INDEX: 41.25 KG/M2 | DIASTOLIC BLOOD PRESSURE: 68 MMHG | SYSTOLIC BLOOD PRESSURE: 114 MMHG | WEIGHT: 272.2 LBS | HEIGHT: 68 IN | HEART RATE: 104 BPM

## 2019-11-07 DIAGNOSIS — A41.9 SEPSIS WITHOUT ACUTE ORGAN DYSFUNCTION, DUE TO UNSPECIFIED ORGANISM (HCC): ICD-10-CM

## 2019-11-07 DIAGNOSIS — N20.0 STAGHORN CALCULUS: Primary | ICD-10-CM

## 2019-11-07 DIAGNOSIS — C49.9 LEIOMYOSARCOMA (HCC): ICD-10-CM

## 2019-11-07 LAB
SL AMB  POCT GLUCOSE, UA: NORMAL
SL AMB LEUKOCYTE ESTERASE,UA: NORMAL
SL AMB POCT BILIRUBIN,UA: NORMAL
SL AMB POCT BLOOD,UA: NORMAL
SL AMB POCT CLARITY,UA: CLEAR
SL AMB POCT COLOR,UA: YELLOW
SL AMB POCT KETONES,UA: NORMAL
SL AMB POCT NITRITE,UA: NORMAL
SL AMB POCT PH,UA: 5
SL AMB POCT SPECIFIC GRAVITY,UA: 1.01
SL AMB POCT URINE PROTEIN: NORMAL
SL AMB POCT UROBILINOGEN: NORMAL

## 2019-11-07 PROCEDURE — 99215 OFFICE O/P EST HI 40 MIN: CPT | Performed by: UROLOGY

## 2019-11-07 PROCEDURE — 81002 URINALYSIS NONAUTO W/O SCOPE: CPT | Performed by: UROLOGY

## 2019-11-07 RX ORDER — OXYCODONE HYDROCHLORIDE AND ACETAMINOPHEN 5; 325 MG/1; MG/1
2 TABLET ORAL EVERY 6 HOURS PRN
Qty: 10 TABLET | Refills: 0 | Status: SHIPPED | OUTPATIENT
Start: 2019-11-07 | End: 2019-01-01 | Stop reason: ALTCHOICE

## 2019-11-07 RX ORDER — OXYCODONE HYDROCHLORIDE AND ACETAMINOPHEN 5; 325 MG/1; MG/1
2 TABLET ORAL
COMMUNITY
End: 2019-11-07 | Stop reason: SDUPTHER

## 2019-11-07 NOTE — LETTER
November 7, 2019     Patrick Corcoran MD  [address] [facility] [address]    Patient: Jono Gonzalez   YOB: 1955   Date of Visit: 11/7/2019       Dear Dr Jesus Torrez: Thank you for referring Jono Gonzalez to me for evaluation  Below are my notes for this consultation  If you have questions, please do not hesitate to call me  I look forward to following your patient along with you  Sincerely,        Marquis Yamil MD        CC: No Recipients  Marquis Yamil MD  11/7/2019 12:22 PM  Sign at close encounter    [address]   Jono Gonzalez is a 62 y o  female with a complaint of   Chief Complaint   Patient presents with    staghorn calculus       History of Present Illness:     62 y o  female with a history of Stage IV metastatic leiomyosarcoma now undergoing palliative chemotherapy x1 round  Chemotherapy was interrupted when the patient presented to the hospital with sepsis picture  She has a history of significant stone disease and previously saw Dr Jace Velazquez  At the time of her recent hospitalization, staghorn calculus with dilation of the upper pole was noted  Patient underwent left percutaneous nephrostomy tube with eventual resolution  Patient also had an indwelling port that was removed for concern for bacteremia  Unfortunately her urine and blood cultures did not return positive with any formal  Bacteria  Given the patient's ongoing malignancy and metastatic disease to her ribcage and scalp as well as the pain associated with these, her Oncology team  Would like to restart her chemotherapy  There is significant concerned that persistent stone or indwelling foreign body in the form of stent or nephrostomy tube may represent a nidus for infection that could compromise the patient is status if she becomes significantly immunocompromised during chemotherapy        Patient has been offered palliative care referral but has not yet sought out this referral   She is having some flank pain around the site of the nephrostomy tube  She has some intermittent hematuria at this site as well  She does have  pain  She presents for the visit with her sister Cam Solorio  Past Medical History:     Past Medical History:   Diagnosis Date    Asthma     [facility] [address] [facility]     [facility]     Hyperlipidemia     Hypertension     Psychiatric disorder     Depression       PAST SURGICAL HISTORY:     Past Surgical History:   Procedure Laterality Date    IR PORT REMOVAL  10/14/2019    IR TUBE PLACEMENT NEPHROSTOMY  10/17/2019    LUNG REMOVAL, PARTIAL      lower lobe       CURRENT MEDICATIONS:     Current Outpatient Medications   Medication Sig Dispense Refill    albuterol (2 5 mg/3 mL) 0 083 % nebulizer solution Take 2 5 mg by nebulization every 6 (six) hours as needed for wheezing or shortness of breath      aspirin 81 mg chewable tablet Chew 81 mg daily      buPROPion (WELLBUTRIN SR) 150 mg 12 hr tablet Take 150 mg by mouth 2 (two) times a day      cyclobenzaprine (FLEXERIL) 5 mg tablet   0    gabapentin (NEURONTIN) 600 MG tablet Take 600 mg by mouth daily at bedtime      lisinopril-hydrochlorothiazide (PRINZIDE,ZESTORETIC) 10-12 5 MG per tablet Take 1 tablet by mouth daily      Misc   Devices KIT Oxygen at night      montelukast (SINGULAIR) 10 mg tablet Take 10 mg by mouth daily at bedtime      Multiple Vitamins-Minerals (CENTRUM SILVER 50+WOMEN) TABS Centrum Silver      nicotine (NICODERM CQ) 21 mg/24 hr TD 24 hr patch Place 1 patch on the skin every 24 hours      ondansetron (ZOFRAN) 8 mg tablet Take 8 mg by mouth every 8 (eight) hours as needed for nausea or vomiting      oxyCODONE-acetaminophen (PERCOCET) 5-325 mg per tablet Take 2 tablets by mouth      potassium chloride (K-DUR,KLOR-CON) 10 mEq tablet Take 10 mEq by mouth daily      sertraline (ZOLOFT) 100 mg tablet Take 100 mg by mouth daily at bedtime  simvastatin (ZOCOR) 20 mg tablet Take 20 mg by mouth daily at bedtime      cyclobenzaprine (FLEXERIL) 10 mg tablet Take 5 mg by mouth 3 (three) times a day as needed for muscle spasms       No current facility-administered medications for this visit  ALLERGIES:     Allergies   Allergen Reactions    Morphine Vomiting     Other reaction(s): Vomiting  Other reaction(s): (PIMS) vomiting  Makes her vomit      Sulfa Antibiotics Rash       SOCIAL HISTORY:     Social History     Socioeconomic History    Marital status:      Spouse name: None    Number of children: None    Years of education: None    Highest education level: None   Occupational History    None   Social Needs    Financial resource strain: None    Food insecurity:     Worry: None     Inability: None    Transportation needs:     Medical: None     Non-medical: None   Tobacco Use    Smoking status: Current Every Day Smoker     Packs/day: 0 50     Types: Cigarettes    Smokeless tobacco: Never Used   Substance and Sexual Activity    Alcohol use: Never     Frequency: Never    Drug use: Not Currently    Sexual activity: None   Lifestyle    Physical activity:     Days per week: None     Minutes per session: None    Stress: None   Relationships    Social connections:     Talks on phone: None     Gets together: None     Attends Sabianism service: None     Active member of club or organization: None     Attends meetings of clubs or organizations: None     Relationship status: None    Intimate partner violence:     Fear of current or ex partner: None     Emotionally abused: None     Physically abused: None     Forced sexual activity: None   Other Topics Concern    None   Social History Narrative    None       SOCIAL HISTORY:   History reviewed  No pertinent family history  REVIEW OF SYSTEMS:     Review of Systems   Constitutional: Negative  Respiratory: Negative  Cardiovascular: Negative  Gastrointestinal: Negative  Genitourinary: Positive for frequency and hematuria  Musculoskeletal: Negative  Skin: Positive for rash (  Tumor of the scalp)  Neurological: Positive for weakness  Psychiatric/Behavioral: Positive for dysphoric mood  The patient is nervous/anxious  PHYSICAL EXAM:     /68 (BP Location: Right arm, Patient Position: Sitting, Cuff Size: Standard)   Pulse 104   Ht 5' 8" (1 727 m)   Wt 123 kg (272 lb 3 2 oz)   BMI 41 39 kg/m²      General:   Pale female, appears older than stated age, in no acute distress  They have a  tearful affect  There is not appear to be any gross neurologic defects or abnormalities  Scalp lesion visualized  HEENT:  Normocephalic, atraumatic  Neck is supple without any palpable lymphadenopathy  Cardiovascular:  Patient has normal palpable distal radial pulses  There is no significant peripheral edema  No JVD is noted  Respiratory:  Patient has unlabored respirations  There is no audible wheeze or rhonchi  Abdomen:   Abdomen is soft and nontender  There is no tympany  Inguinal and umbilical hernia are not appreciated  Genitourinary:  Left nephrostomy tube in place draining clear yellow urine, no irritation or infectious signs are erythema around the tube insertion site    Musculoskeletal:  Patient does not have significant CVA tenderness in the  flank with palpation or percussion  They full range of motion in all 4 extremities  Strength in all 4 extremities appears congruent  Patient is able to ambulate without assistance or difficulty  Dermatologic:  Patient has no skin abnormalities or rashes        LABS:     CBC:   Lab Results   Component Value Date    WBC 12 04 (H) 10/19/2019    HGB 9 1 (L) 10/19/2019    HCT 27 5 (L) 10/19/2019    MCV 96 10/19/2019     10/19/2019       BMP:   Lab Results   Component Value Date    CALCIUM 8 9 10/20/2019    K 3 1 (L) 10/20/2019    CO2 28 10/20/2019     10/20/2019    BUN 6 10/20/2019    CREATININE 0 96 10/20/2019       IMAGING:     10/12/19  CT CHEST, ABDOMEN AND PELVIS WITH IV CONTRAST     INDICATION:   fever/vomiting/diarrhea, lipomyosarcoma stage 4, just started palliative [facility]  History of hypertension, tobacco use, prior partial pneumonectomy      COMPARISON:  None available      TECHNIQUE: CT examination of the chest, abdomen and pelvis was performed  Axial, sagittal, and coronal 2D reformatted images were created from the source data and submitted for interpretation      Radiation dose length product (DLP) for this visit:  2087 mGy-cm   This examination, like all CT scans performed in the [facility], was performed utilizing techniques to minimize radiation dose exposure, including the use of iterative   reconstruction and automated exposure control      IV Contrast:  100 mL of iohexol (OMNIPAQUE)  Enteric Contrast: Enteric contrast was not administered      FINDINGS:     CHEST     LUNGS:  There is some atelectasis and infiltrate within the left lower lobe of the lung and left lingula; infection should be excluded clinically  There is mild atelectasis at the right lung base  There is some postoperative scarring and suture   material within the medial aspect of the lower right lung extending into the region of the azygoesophageal recess, consistent with the history of prior partial pneumonectomy  There is focal bronchiectasis at the right apex      PLEURA:  Unremarkable      HEART/GREAT VESSELS:  Unremarkable for patient's age      MEDIASTINUM AND SIVAN:  Unremarkable      CHEST WALL AND LOWER NECK:   A Port-A-Cath is present on the right  There is some stranding of the fat posterior to the medial aspect right scapula, best demonstrated on series 2 image 1-4  Clinical correlation is recommended      ABDOMEN     LIVER/BILIARY TREE:  Unremarkable      GALLBLADDER:  Possible tiny gallstones    No pericholecystic inflammatory change      SPLEEN:  Unremarkable      PANCREAS: Unremarkable      ADRENAL GLANDS:  Unremarkable      KIDNEYS/URETERS:  Bilateral renal cysts  No right hydronephrosis  There is a large staghorn calculus on the left which occupies the majority of the left renal pelvis and extends into the left UPJ and proximal left ureter  There is also extension of   the staghorn calculus into several lower pole calyces  There is marked dilatation of the left upper pole collecting system and there is renal cortical thinning involving the left kidney, most pronounced involving the upper pole, suggesting that this may   be a chronic process  Superimposed infection should be excluded clinically  Of note, the left nephrogram is only minimally diminished compared with the right      STOMACH AND BOWEL:  There is no evidence of bowel obstruction  There is some residual oral contrast material within a few bowel loops, possibly related to prior imaging study at another facility      APPENDIX:  No findings to suggest appendicitis      ABDOMINOPELVIC CAVITY:  No significant ascites  No pneumoperitoneum      VESSELS:  There is atherosclerosis  There is no abdominal aortic aneurysm      PELVIS     REPRODUCTIVE ORGANS:  Unremarkable for patient's age      URINARY BLADDER:  The bladder is nearly empty, limiting its evaluation  There is a small amount of gas layering non-dependently within the most anterior superior aspect of the urinary bladder  Clinical correlation regarding the possibility of recent   manipulation is recommended  Correlation with urinalysis is suggested      ABDOMINAL WALL/INGUINAL REGIONS:  Unremarkable      OSSEOUS STRUCTURES:  No acute fracture or destructive osseous lesion      IMPRESSION:     There is a large staghorn calculus within the left kidney  There is marked dilatation of the left upper pole collecting system and there is associated left renal cortical thinning  Please see above description and discussion    Superimposed infection   should be excluded clinically      The urinary bladder is nearly empty, limiting its evaluation, however, there is a small amount of gas layering non-dependently within the most anterior superior aspect of the urinary bladder  Clinical correlation regarding the possibility of recent   manipulation is recommended  Correlation with urinalysis is suggested      There is a combination of atelectasis and infiltrate within the left lower lobe of the lung and left lingula  Infection should be excluded clinically  There is mild atelectasis at the right lung base      There is some stranding of the fat posterior to the medial aspect right scapula  Clinical correlation is recommended      Possible tiny gallstones  No CT evidence of acute cholecystitis      Bilateral renal cysts  No right hydronephrosis  ASSESSMENT:     62 y o  female with Stage IV metastatic leiomyosarcoma and LEFT staghorn calculus, now nephrostomy dependent    PLAN:       The patient has requested additional pain medication  I have refilled her oxycodone for 1 short prescription for 10 pills  Further narcotics will have to be prescribed by her oncologist or the patient can consider evaluation by palliative care to better help frame medical therapy from her metastatic cancer  This is an extremely challenging case  I certainly understand that persistent stone and persistent foreign body in the form of stent or percutaneous nephrostomy will represent a potential source of infection moving forward  However, given the patient's incurable stage IV metastatic disease, the risk of percutaneous  Nephrolithotomy and or nephrectomy to remove her large burden staghorn calculus far outweighs the risk of infection for this terminal patient  I have emphasized quality of life to the patient and her sister    My recommendation would be to leave the nephrostomy tube in place for renal protection and for drainage of any trapped urine or alternatively to convert to an indwelling stent  Patient and sister understand that these tubes are not permanent and need to be exchanged and removed on a 3 month basis  They also understand that with the stone and stents in place, the risks for infection certainly exists  I would not delay the patient's chemotherapy at this point in time especially given the lack of prior positive culture data  Certainly with immune compromise from the cytotoxic medications, the patient could experience a devastating infection  They understand this risk  Briefly discussed alternative surgical options including staged ureteroscopy to try to treat the large stone burden with a left surgically risky procedure  They understand that multiple procedures with likely be required in this would ultimately delay her palliative treatments  They are not interested at this point time but we could certainly consider this down the road  At this point time, the patient would like to convert the nephrostomy to an indwelling stent primarily for comfort purposes  I will arrange in IR  I have most strongly emphasized to the patient and her sister  The benefit of seeking out the palliative Care team's opinions and assistance as she continues to navigate these very challenging issues from her terminal diagnosis  If there are questions from the patient's outside Oncology Service, I would be more than happy to speak to them directly  Patient will return to see me after her nephrostomy tube is been internalized

## 2019-11-07 NOTE — PROGRESS NOTES
UROLOGY FOLLOWUP NOTE     CHIEF COMPLAINT   Sheridan Mckinney is a 62 y o  female with a complaint of   Chief Complaint   Patient presents with    staghorn calculus       History of Present Illness:     62 y o  female with a history of Stage IV metastatic leiomyosarcoma now undergoing palliative chemotherapy x1 round  Chemotherapy was interrupted when the patient presented to the hospital with sepsis picture  She has a history of significant stone disease and previously saw Dr Ingrid Willard  At the time of her recent hospitalization, staghorn calculus with dilation of the upper pole was noted  Patient underwent left percutaneous nephrostomy tube with eventual resolution  Patient also had an indwelling port that was removed for concern for bacteremia  Unfortunately her urine and blood cultures did not return positive with any formal  Bacteria  Given the patient's ongoing malignancy and metastatic disease to her ribcage and scalp as well as the pain associated with these, her Oncology team  Would like to restart her chemotherapy  There is significant concerned that persistent stone or indwelling foreign body in the form of stent or nephrostomy tube may represent a nidus for infection that could compromise the patient is status if she becomes significantly immunocompromised during chemotherapy  Patient has been offered palliative care referral but has not yet sought out this referral   She is having some flank pain around the site of the nephrostomy tube  She has some intermittent hematuria at this site as well  She does have  pain  She presents for the visit with her sister Cora Herron      Past Medical History:     Past Medical History:   Diagnosis Date    Asthma     Cancer [facility] [address] [facility]     COPD [facility]chronic obstructive pulmonary disease) [facility] [address] [facility]     Hyperlipidemia     Hypertension     Psychiatric disorder     Depression       PAST SURGICAL HISTORY:     Past Surgical History:   Procedure Laterality Date    IR PORT REMOVAL  10/14/2019    IR TUBE PLACEMENT NEPHROSTOMY  10/17/2019    LUNG REMOVAL, PARTIAL      lower lobe       CURRENT MEDICATIONS:     Current Outpatient Medications   Medication Sig Dispense Refill    albuterol (2 5 mg/3 mL) 0 083 % nebulizer solution Take 2 5 mg by nebulization every 6 (six) hours as needed for wheezing or shortness of breath      aspirin 81 mg chewable tablet Chew 81 mg daily      buPROPion (WELLBUTRIN SR) 150 mg 12 hr tablet Take 150 mg by mouth 2 (two) times a day      cyclobenzaprine (FLEXERIL) 5 mg tablet   0    gabapentin (NEURONTIN) 600 MG tablet Take 600 mg by mouth daily at bedtime      lisinopril-hydrochlorothiazide (PRINZIDE,ZESTORETIC) 10-12 5 MG per tablet Take 1 tablet by mouth daily      Misc  Devices KIT Oxygen at night      montelukast (SINGULAIR) 10 mg tablet Take 10 mg by mouth daily at bedtime      Multiple Vitamins-Minerals (CENTRUM SILVER 50+WOMEN) TABS Centrum Silver      nicotine (NICODERM CQ) 21 mg/24 hr TD 24 hr patch Place 1 patch on the skin every 24 hours      ondansetron (ZOFRAN) 8 mg tablet Take 8 mg by mouth every 8 (eight) hours as needed for nausea or vomiting      oxyCODONE-acetaminophen (PERCOCET) 5-325 mg per tablet Take 2 tablets by mouth      potassium chloride (K-DUR,KLOR-CON) 10 mEq tablet Take 10 mEq by mouth daily      sertraline (ZOLOFT) 100 mg tablet Take 100 mg by mouth daily at bedtime      simvastatin (ZOCOR) 20 mg tablet Take 20 mg by mouth daily at bedtime      cyclobenzaprine (FLEXERIL) 10 mg tablet Take 5 mg by mouth 3 (three) times a day as needed for muscle spasms       No current facility-administered medications for this visit          ALLERGIES:     Allergies   Allergen Reactions    Morphine Vomiting     Other reaction(s): Vomiting  Other reaction(s): (PIMS) vomiting  Makes her vomit      Sulfa Antibiotics Rash       SOCIAL HISTORY:     Social History     Socioeconomic History    Marital status:      Spouse name: None    Number of children: None    Years of education: None    Highest education level: None   Occupational History    None   Social Needs    Financial resource strain: None    Food insecurity:     Worry: None     Inability: None    Transportation needs:     Medical: None     Non-medical: None   Tobacco Use    Smoking status: Current Every Day Smoker     Packs/day: 0 50     Types: Cigarettes    Smokeless tobacco: Never Used   Substance and Sexual Activity    Alcohol use: Never     Frequency: Never    Drug use: Not Currently    Sexual activity: None   Lifestyle    Physical activity:     Days per week: None     Minutes per session: None    Stress: None   Relationships    Social connections:     Talks on phone: None     Gets together: None     Attends [employer] service: None     Active member of club or organization: None     Attends meetings of clubs or organizations: None     Relationship status: None    Intimate partner violence:     Fear of current or ex partner: None     Emotionally abused: None     Physically abused: None     Forced sexual activity: None   Other Topics Concern    None   Social History Narrative    None       SOCIAL HISTORY:   History reviewed  No pertinent family history  REVIEW OF SYSTEMS:     Review of Systems   Constitutional: Negative  Respiratory: Negative  Cardiovascular: Negative  Gastrointestinal: Negative  Genitourinary: Positive for frequency and hematuria  Musculoskeletal: Negative  Skin: Positive for rash (  Tumor of the scalp)  Neurological: Positive for weakness  Psychiatric/Behavioral: Positive for dysphoric mood  The patient is nervous/anxious  PHYSICAL EXAM:     /68 (BP Location: Right arm, Patient Position: Sitting, Cuff Size: Standard)   Pulse 104   Ht 5' 8" (1 727 m)   Wt 123 kg (272 lb 3 2 oz)   BMI 41 39 kg/m²     General:   Pale female, appears older than stated age, in no acute distress    They have a tearful affect  There is not appear to be any gross neurologic defects or abnormalities  Scalp lesion visualized  HEENT:  Normocephalic, atraumatic  Neck is supple without any palpable lymphadenopathy  Cardiovascular:  Patient has normal palpable distal radial pulses  There is no significant peripheral edema  No JVD is noted  Respiratory:  Patient has unlabored respirations  There is no audible wheeze or rhonchi  Abdomen:   Abdomen is soft and nontender  There is no tympany  Inguinal and umbilical hernia are not appreciated  Genitourinary:  Left nephrostomy tube in place draining clear yellow urine, no irritation or infectious signs are erythema around the tube insertion site    Musculoskeletal:  Patient does not have significant CVA tenderness in the  flank with palpation or percussion  They full range of motion in all 4 extremities  Strength in all 4 extremities appears congruent  Patient is able to ambulate without assistance or difficulty  Dermatologic:  Patient has no skin abnormalities or rashes  LABS:     CBC:   Lab Results   Component Value Date    WBC 12 04 (H) 10/19/2019    HGB 9 1 (L) 10/19/2019    HCT 27 5 (L) 10/19/2019    MCV 96 10/19/2019     10/19/2019       BMP:   Lab Results   Component Value Date    CALCIUM 8 9 10/20/2019    K 3 1 (L) 10/20/2019    CO2 28 10/20/2019     10/20/2019    BUN 6 10/20/2019    CREATININE 0 96 10/20/2019       IMAGING:     10/12/19  CT CHEST, ABDOMEN AND PELVIS WITH IV CONTRAST     INDICATION:   fever/vomiting/diarrhea, lipomyosarcoma stage 4, just started palliative chemo at [facility]  History of hypertension, tobacco use, prior partial pneumonectomy      COMPARISON:  None available      TECHNIQUE: CT examination of the chest, abdomen and pelvis was performed   Axial, sagittal, and coronal 2D reformatted images were created from the source data and submitted for interpretation      Radiation dose length product (DLP) for this visit:  2087 mGy-cm   This examination, like all CT scans performed in the [facility], was performed utilizing techniques to minimize radiation dose exposure, including the use of iterative   reconstruction and automated exposure control      IV Contrast:  100 mL of iohexol (OMNIPAQUE)  Enteric Contrast: Enteric contrast was not administered      FINDINGS:     CHEST     LUNGS:  There is some atelectasis and infiltrate within the left lower lobe of the lung and left lingula; infection should be excluded clinically  There is mild atelectasis at the right lung base  There is some postoperative scarring and suture   material within the medial aspect of the lower right lung extending into the region of the azygoesophageal recess, consistent with the history of prior partial pneumonectomy  There is focal bronchiectasis at the right apex      PLEURA:  Unremarkable      HEART/GREAT VESSELS:  Unremarkable for patient's age      MEDIASTINUM AND SIVAN:  Unremarkable      CHEST WALL AND LOWER NECK:   A Port-A-Cath is present on the right  There is some stranding of the fat posterior to the medial aspect right scapula, best demonstrated on series 2 image 1-4  Clinical correlation is recommended      ABDOMEN     LIVER/BILIARY TREE:  Unremarkable      GALLBLADDER:  Possible tiny gallstones  No pericholecystic inflammatory change      SPLEEN:  Unremarkable      PANCREAS:  Unremarkable      ADRENAL GLANDS:  Unremarkable      KIDNEYS/URETERS:  Bilateral renal cysts  No right hydronephrosis  There is a large staghorn calculus on the left which occupies the majority of the left renal pelvis and extends into the left UPJ and proximal left ureter  There is also extension of   the staghorn calculus into several lower pole calyces    There is marked dilatation of the left upper pole collecting system and there is renal cortical thinning involving the left kidney, most pronounced involving the upper pole, suggesting that this may   be a chronic process  Superimposed infection should be excluded clinically  Of note, the left nephrogram is only minimally diminished compared with the right      STOMACH AND BOWEL:  There is no evidence of bowel obstruction  There is some residual oral contrast material within a few bowel loops, possibly related to prior imaging study at another facility      APPENDIX:  No findings to suggest appendicitis      ABDOMINOPELVIC CAVITY:  No significant ascites  No pneumoperitoneum      VESSELS:  There is atherosclerosis  There is no abdominal aortic aneurysm      PELVIS     REPRODUCTIVE ORGANS:  Unremarkable for patient's age      URINARY BLADDER:  The bladder is nearly empty, limiting its evaluation  There is a small amount of gas layering non-dependently within the most anterior superior aspect of the urinary bladder  Clinical correlation regarding the possibility of recent   manipulation is recommended  Correlation with urinalysis is suggested      ABDOMINAL WALL/INGUINAL REGIONS:  Unremarkable      OSSEOUS STRUCTURES:  No acute fracture or destructive osseous lesion      IMPRESSION:     There is a large staghorn calculus within the left kidney  There is marked dilatation of the left upper pole collecting system and there is associated left renal cortical thinning  Please see above description and discussion  Superimposed infection   should be excluded clinically      The urinary bladder is nearly empty, limiting its evaluation, however, there is a small amount of gas layering non-dependently within the most anterior superior aspect of the urinary bladder  Clinical correlation regarding the possibility of recent   manipulation is recommended  Correlation with urinalysis is suggested      There is a combination of atelectasis and infiltrate within the left lower lobe of the lung and left lingula  Infection should be excluded clinically    There is mild atelectasis at the right lung base      There is some stranding of the fat posterior to the medial aspect right scapula  Clinical correlation is recommended      Possible tiny gallstones  No CT evidence of acute cholecystitis      Bilateral renal cysts  No right hydronephrosis  ASSESSMENT:     62 y o  female with Stage IV metastatic leiomyosarcoma and LEFT staghorn calculus, now nephrostomy dependent    PLAN:       The patient has requested additional pain medication  I have refilled her oxycodone for 1 short prescription for 10 pills  Further narcotics will have to be prescribed by her oncologist or the patient can consider evaluation by palliative care to better help frame medical therapy from her metastatic cancer  This is an extremely challenging case  I certainly understand that persistent stone and persistent foreign body in the form of stent or percutaneous nephrostomy will represent a potential source of infection moving forward  However, given the patient's incurable stage IV metastatic disease, the risk of percutaneous  Nephrolithotomy and or nephrectomy to remove her large burden staghorn calculus far outweighs the risk of infection for this terminal patient  I have emphasized quality of life to the patient and her sister  My recommendation would be to leave the nephrostomy tube in place for renal protection and for drainage of any trapped urine or alternatively to convert to an indwelling stent  Patient and sister understand that these tubes are not permanent and need to be exchanged and removed on a 3 month basis  They also understand that with the stone and stents in place, the risks for infection certainly exists  I would not delay the patient's chemotherapy at this point in time especially given the lack of prior positive culture data  Certainly with immune compromise from the cytotoxic medications, the patient could experience a devastating infection  They understand this risk        Briefly discussed alternative surgical options including staged ureteroscopy to try to treat the large stone burden with a left surgically risky procedure  They understand that multiple procedures with likely be required in this would ultimately delay her palliative treatments  They are not interested at this point time but we could certainly consider this down the road  At this point time, the patient would like to convert the nephrostomy to an indwelling stent primarily for comfort purposes  I will arrange in IR  I have most strongly emphasized to the patient and her sister  The benefit of seeking out the palliative Care team's opinions and assistance as she continues to navigate these very challenging issues from her terminal diagnosis  If there are questions from the patient's outside Oncology Service, I would be more than happy to speak to them directly  Patient will return to see me after her nephrostomy tube is been internalized

## 2019-11-07 NOTE — LETTER
November 7, 2019     Capri Yap MD  [address] [facility] [address]    Patient: Kj Fang   YOB: 1955   Date of Visit: 11/7/2019       Dear Dr Acosta Crew: Thank you for referring Kj Fang to me for evaluation  Below are my notes for this consultation  If you have questions, please do not hesitate to call me  I look forward to following your patient along with you  Sincerely,        Klaus Victoria MD        CC: No Recipients  Klaus Victoria MD  11/7/2019 12:21 PM  Incomplete    UROLOGY FOLLOWUP NOTE     CHIEF COMPLAINT   Kj Fang is a 62 y o  female with a complaint of   Chief Complaint   Patient presents with    staghorn calculus       History of Present Illness:     62 y o  female with a history of Stage IV metastatic leiomyosarcoma now undergoing palliative chemotherapy x1 round  Chemotherapy was interrupted when the patient presented to the hospital with sepsis picture  She has a history of significant stone disease and previously saw Dr Kasey Price  At the time of her recent hospitalization, staghorn calculus with dilation of the upper pole was noted  Patient underwent left percutaneous nephrostomy tube with eventual resolution  Patient also had an indwelling port that was removed for concern for bacteremia  Unfortunately her urine and blood cultures did not return positive with any formal  Bacteria  Given the patient's ongoing malignancy and metastatic disease to her ribcage and scalp as well as the pain associated with these, her Oncology team  Would like to restart her chemotherapy  There is significant concerned that persistent stone or indwelling foreign body in the form of stent or nephrostomy tube may represent a nidus for infection that could compromise the patient is status if she becomes significantly immunocompromised during chemotherapy        Patient has been offered palliative care referral but has not yet sought out this referral   She is having some flank pain around the site of the nephrostomy tube  She has some intermittent hematuria at this site as well  She does have  pain  She presents for the visit with her sister Deepti Card  Past Medical History:     Past Medical History:   Diagnosis Date    Asthma     [facility] [address] [facility]     [facility]     Hyperlipidemia     Hypertension     Psychiatric disorder     Depression       PAST SURGICAL HISTORY:     Past Surgical History:   Procedure Laterality Date    IR PORT REMOVAL  10/14/2019    IR TUBE PLACEMENT NEPHROSTOMY  10/17/2019    LUNG REMOVAL, PARTIAL      lower lobe       CURRENT MEDICATIONS:     Current Outpatient Medications   Medication Sig Dispense Refill    albuterol (2 5 mg/3 mL) 0 083 % nebulizer solution Take 2 5 mg by nebulization every 6 (six) hours as needed for wheezing or shortness of breath      aspirin 81 mg chewable tablet Chew 81 mg daily      buPROPion (WELLBUTRIN SR) 150 mg 12 hr tablet Take 150 mg by mouth 2 (two) times a day      cyclobenzaprine (FLEXERIL) 5 mg tablet   0    gabapentin (NEURONTIN) 600 MG tablet Take 600 mg by mouth daily at bedtime      lisinopril-hydrochlorothiazide (PRINZIDE,ZESTORETIC) 10-12 5 MG per tablet Take 1 tablet by mouth daily      Misc   Devices KIT Oxygen at night      montelukast (SINGULAIR) 10 mg tablet Take 10 mg by mouth daily at bedtime      Multiple Vitamins-Minerals (CENTRUM SILVER 50+WOMEN) TABS Centrum Silver      nicotine (NICODERM CQ) 21 mg/24 hr TD 24 hr patch Place 1 patch on the skin every 24 hours      ondansetron (ZOFRAN) 8 mg tablet Take 8 mg by mouth every 8 (eight) hours as needed for nausea or vomiting      oxyCODONE-acetaminophen (PERCOCET) 5-325 mg per tablet Take 2 tablets by mouth      potassium chloride (K-DUR,KLOR-CON) 10 mEq tablet Take 10 mEq by mouth daily      sertraline (ZOLOFT) 100 mg tablet Take 100 mg by mouth daily at bedtime      simvastatin (ZOCOR) 20 mg tablet Take 20 mg by mouth daily at bedtime      cyclobenzaprine (FLEXERIL) 10 mg tablet Take 5 mg by mouth 3 (three) times a day as needed for muscle spasms       No current facility-administered medications for this visit  ALLERGIES:     Allergies   Allergen Reactions    Morphine Vomiting     Other reaction(s): Vomiting  Other reaction(s): (PIMS) vomiting  Makes her vomit      Sulfa Antibiotics Rash       SOCIAL HISTORY:     Social History     Socioeconomic History    Marital status:      Spouse name: None    Number of children: None    Years of education: None    Highest education level: None   Occupational History    None   Social Needs    Financial resource strain: None    Food insecurity:     Worry: None     Inability: None    Transportation needs:     Medical: None     Non-medical: None   Tobacco Use    Smoking status: Current Every Day Smoker     Packs/day: 0 50     Types: Cigarettes    Smokeless tobacco: Never Used   Substance and Sexual Activity    Alcohol use: Never     Frequency: Never    Drug use: Not Currently    Sexual activity: None   Lifestyle    Physical activity:     Days per week: None     Minutes per session: None    Stress: None   Relationships    Social connections:     Talks on phone: None     Gets together: None     Attends Alevism service: None     Active member of club or organization: None     Attends meetings of clubs or organizations: None     Relationship status: None    Intimate partner violence:     Fear of current or ex partner: None     Emotionally abused: None     Physically abused: None     Forced sexual activity: None   Other Topics Concern    None   Social History Narrative    None       SOCIAL HISTORY:   History reviewed  No pertinent family history  REVIEW OF SYSTEMS:     Review of Systems   Constitutional: Negative  Respiratory: Negative  Cardiovascular: Negative  Gastrointestinal: Negative  Genitourinary: Positive for frequency and hematuria  Musculoskeletal: Negative  Skin: Positive for rash (  Tumor of the scalp)  Neurological: Positive for weakness  Psychiatric/Behavioral: Positive for dysphoric mood  The patient is nervous/anxious  PHYSICAL EXAM:     /68 (BP Location: Right arm, Patient Position: Sitting, Cuff Size: Standard)   Pulse 104   Ht 5' 8" (1 727 m)   Wt 123 kg (272 lb 3 2 oz)   BMI 41 39 kg/m²      General:   Pale female, appears older than stated age, in no acute distress  They have a  tearful affect  There is not appear to be any gross neurologic defects or abnormalities  Scalp lesion visualized  HEENT:  Normocephalic, atraumatic  Neck is supple without any palpable lymphadenopathy  Cardiovascular:  Patient has normal palpable distal radial pulses  There is no significant peripheral edema  No JVD is noted  Respiratory:  Patient has unlabored respirations  There is no audible wheeze or rhonchi  Abdomen:   Abdomen is soft and nontender  There is no tympany  Inguinal and umbilical hernia are not appreciated  Genitourinary:  Left nephrostomy tube in place draining clear yellow urine, no irritation or infectious signs are erythema around the tube insertion site    Musculoskeletal:  Patient does not have significant CVA tenderness in the  flank with palpation or percussion  They full range of motion in all 4 extremities  Strength in all 4 extremities appears congruent  Patient is able to ambulate without assistance or difficulty  Dermatologic:  Patient has no skin abnormalities or rashes        LABS:     CBC:   Lab Results   Component Value Date    WBC 12 04 (H) 10/19/2019    HGB 9 1 (L) 10/19/2019    HCT 27 5 (L) 10/19/2019    MCV 96 10/19/2019     10/19/2019       BMP:   Lab Results   Component Value Date    CALCIUM 8 9 10/20/2019    K 3 1 (L) 10/20/2019    CO2 28 10/20/2019     10/20/2019    BUN 6 10/20/2019    CREATININE 0 96 10/20/2019       IMAGING:     10/12/19  CT CHEST, ABDOMEN AND PELVIS WITH IV CONTRAST     INDICATION:   fever/vomiting/diarrhea, lipomyosarcoma stage 4, just started palliative chemo at [facility]  History of hypertension, tobacco use, prior partial pneumonectomy      COMPARISON:  None available      TECHNIQUE: CT examination of the chest, abdomen and pelvis was performed  Axial, sagittal, and coronal 2D reformatted images were created from the source data and submitted for interpretation      Radiation dose length product (DLP) for this visit:  2087 mGy-cm   This examination, like all CT scans performed in the [facility], was performed utilizing techniques to minimize radiation dose exposure, including the use of iterative   reconstruction and automated exposure control      IV Contrast:  100 mL of iohexol (OMNIPAQUE)  Enteric Contrast: Enteric contrast was not administered      FINDINGS:     CHEST     LUNGS:  There is some atelectasis and infiltrate within the left lower lobe of the lung and left lingula; infection should be excluded clinically  There is mild atelectasis at the right lung base  There is some postoperative scarring and suture   material within the medial aspect of the lower right lung extending into the region of the azygoesophageal recess, consistent with the history of prior partial pneumonectomy  There is focal bronchiectasis at the right apex      PLEURA:  Unremarkable      HEART/GREAT VESSELS:  Unremarkable for patient's age      MEDIASTINUM AND SIVAN:  Unremarkable      CHEST WALL AND LOWER NECK:   A Port-A-Cath is present on the right  There is some stranding of the fat posterior to the medial aspect right scapula, best demonstrated on series 2 image 1-4  Clinical correlation is recommended      ABDOMEN     LIVER/BILIARY TREE:  Unremarkable      GALLBLADDER:  Possible tiny gallstones    No pericholecystic inflammatory change      SPLEEN:  Unremarkable      PANCREAS: Unremarkable      ADRENAL GLANDS:  Unremarkable      KIDNEYS/URETERS:  Bilateral renal cysts  No right hydronephrosis  There is a large staghorn calculus on the left which occupies the majority of the left renal pelvis and extends into the left UPJ and proximal left ureter  There is also extension of   the staghorn calculus into several lower pole calyces  There is marked dilatation of the left upper pole collecting system and there is renal cortical thinning involving the left kidney, most pronounced involving the upper pole, suggesting that this may   be a chronic process  Superimposed infection should be excluded clinically  Of note, the left nephrogram is only minimally diminished compared with the right      STOMACH AND BOWEL:  There is no evidence of bowel obstruction  There is some residual oral contrast material within a few bowel loops, possibly related to prior imaging study at another facility      APPENDIX:  No findings to suggest appendicitis      ABDOMINOPELVIC CAVITY:  No significant ascites  No pneumoperitoneum      VESSELS:  There is atherosclerosis  There is no abdominal aortic aneurysm      PELVIS     REPRODUCTIVE ORGANS:  Unremarkable for patient's age      URINARY BLADDER:  The bladder is nearly empty, limiting its evaluation  There is a small amount of gas layering non-dependently within the most anterior superior aspect of the urinary bladder  Clinical correlation regarding the possibility of recent   manipulation is recommended  Correlation with urinalysis is suggested      ABDOMINAL WALL/INGUINAL REGIONS:  Unremarkable      OSSEOUS STRUCTURES:  No acute fracture or destructive osseous lesion      IMPRESSION:     There is a large staghorn calculus within the left kidney  There is marked dilatation of the left upper pole collecting system and there is associated left renal cortical thinning  Please see above description and discussion    Superimposed infection   should be excluded clinically      The urinary bladder is nearly empty, limiting its evaluation, however, there is a small amount of gas layering non-dependently within the most anterior superior aspect of the urinary bladder  Clinical correlation regarding the possibility of recent   manipulation is recommended  Correlation with urinalysis is suggested      There is a combination of atelectasis and infiltrate within the left lower lobe of the lung and left lingula  Infection should be excluded clinically  There is mild atelectasis at the right lung base      There is some stranding of the fat posterior to the medial aspect right scapula  Clinical correlation is recommended      Possible tiny gallstones  No CT evidence of acute cholecystitis      Bilateral renal cysts  No right hydronephrosis  ASSESSMENT:     62 y o  female with Stage IV metastatic leiomyosarcoma and LEFT staghorn calculus, now nephrostomy dependent    PLAN:       The patient has requested additional pain medication  I have refilled her oxycodone for 1 short prescription for 10 pills  Further narcotics will have to be prescribed by her oncologist or the patient can consider evaluation by palliative care to better help frame medical therapy from her metastatic cancer  This is an extremely challenging case  I certainly understand that persistent stone and persistent foreign body in the form of stent or percutaneous nephrostomy will represent a potential source of infection moving forward  However, given the patient's incurable stage IV metastatic disease, the risk of percutaneous  Nephrolithotomy and or nephrectomy to remove her large burden staghorn calculus far outweighs the risk of infection for this terminal patient  I have emphasized quality of life to the patient and her sister    My recommendation would be to leave the nephrostomy tube in place for renal protection and for drainage of any trapped urine or alternatively to convert to an indwelling stent  Patient and sister understand that these tubes are not permanent and need to be exchanged and removed on a 3 month basis  They also understand that with the stone and stents in place, the risks for infection certainly exists  I would not delay the patient's chemotherapy at this point in time especially given the lack of prior positive culture data  Certainly with immune compromise from the cytotoxic medications, the patient could experience a devastating infection  They understand this risk  Briefly discussed alternative surgical options including staged ureteroscopy to try to treat the large stone burden with a left surgically risky procedure  They understand that multiple procedures with likely be required in this would ultimately delay her palliative treatments  They are not interested at this point time but we could certainly consider this down the road  At this point time, the patient would like to convert the nephrostomy to an indwelling stent primarily for comfort purposes  I will arrange in IR  I have most strongly emphasized to the patient and her sister  The benefit of seeking out the palliative Care team's opinions and assistance as she continues to navigate these very challenging issues from her terminal diagnosis  If there are questions from the patient's outside Oncology Service, I would be more than happy to speak to them directly  Patient will return to see me after her nephrostomy tube is been internalized  Nicole Benedict MD  11/7/2019 11:00 AM  Sign at close encounter    [address] [facility] NOTE     CHIEF COMPLAINT   Leigh Gao is a 62 y o  female with a complaint of   Chief Complaint   Patient presents with    staghorn calculus       History of Present Illness:     62 y o  female with a history of metastatic leiomyosarcoma on now palliative chemotherapy with potential plan for transition to some a palliative immune therapy    Patient presented to the hospital with urosepsis  She has a history of significant stone disease and previously saw   More year  At the time of her recent hospitalization, staghorn calculus with dilation of the upper pole was noted  Patient underwent left percutaneous nephrostomy tube with eventual resolution  Given the patient's ongoing malignancy and metastatic disease, her Oncology team has requested evaluation for potential management of her stone and nephrostomy drainage  Past Medical History:     Past Medical History:   Diagnosis Date    Asthma     [facility] [address] [facility]     [facility]     Hyperlipidemia     Hypertension     Psychiatric disorder     Depression       PAST SURGICAL HISTORY:     Past Surgical History:   Procedure Laterality Date    IR PORT REMOVAL  10/14/2019    IR TUBE PLACEMENT NEPHROSTOMY  10/17/2019    LUNG REMOVAL, PARTIAL      lower lobe       CURRENT MEDICATIONS:     Current Outpatient Medications   Medication Sig Dispense Refill    albuterol (2 5 mg/3 mL) 0 083 % nebulizer solution Take 2 5 mg by nebulization every 6 (six) hours as needed for wheezing or shortness of breath      aspirin 81 mg chewable tablet Chew 81 mg daily      buPROPion (WELLBUTRIN SR) 150 mg 12 hr tablet Take 150 mg by mouth 2 (two) times a day      cyclobenzaprine (FLEXERIL) 5 mg tablet   0    gabapentin (NEURONTIN) 600 MG tablet Take 600 mg by mouth daily at bedtime      lisinopril-hydrochlorothiazide (PRINZIDE,ZESTORETIC) 10-12 5 MG per tablet Take 1 tablet by mouth daily      Misc   Devices KIT Oxygen at night      montelukast (SINGULAIR) 10 mg tablet Take 10 mg by mouth daily at bedtime      Multiple Vitamins-Minerals (CENTRUM SILVER 50+WOMEN) TABS Centrum Silver      nicotine (NICODERM CQ) 21 mg/24 hr TD 24 hr patch Place 1 patch on the skin every 24 hours      ondansetron (ZOFRAN) 8 mg tablet Take 8 mg by mouth every 8 (eight) hours as needed for nausea or vomiting      oxyCODONE-acetaminophen (PERCOCET) 5-325 mg per tablet Take 2 tablets by mouth      potassium chloride (K-DUR,KLOR-CON) 10 mEq tablet Take 10 mEq by mouth daily      sertraline (ZOLOFT) 100 mg tablet Take 100 mg by mouth daily at bedtime      simvastatin (ZOCOR) 20 mg tablet Take 20 mg by mouth daily at bedtime      cyclobenzaprine (FLEXERIL) 10 mg tablet Take 5 mg by mouth 3 (three) times a day as needed for muscle spasms       No current facility-administered medications for this visit          ALLERGIES:     Allergies   Allergen Reactions    Morphine Vomiting     Other reaction(s): Vomiting  Other reaction(s): (PIMS) vomiting  Makes her vomit      Sulfa Antibiotics Rash       SOCIAL HISTORY:     Social History     Socioeconomic History    Marital status:      Spouse name: None    Number of children: None    Years of education: None    Highest education level: None   Occupational History    None   Social Needs    Financial resource strain: None    Food insecurity:     Worry: None     Inability: None    Transportation needs:     Medical: None     Non-medical: None   Tobacco Use    Smoking status: Current Every Day Smoker     Packs/day: 0 50     Types: Cigarettes    Smokeless tobacco: Never Used   Substance and Sexual Activity    Alcohol use: Never     Frequency: Never    Drug use: Not Currently    Sexual activity: None   Lifestyle    Physical activity:     Days per week: None     Minutes per session: None    Stress: None   Relationships    Social connections:     Talks on phone: None     Gets together: None     Attends Confucianism service: None     Active member of club or organization: None     Attends meetings of clubs or organizations: None     Relationship status: None    Intimate partner violence:     Fear of current or ex partner: None     Emotionally abused: None     Physically abused: None     Forced sexual activity: None   Other Topics Concern    None   Social History Narrative    None       SOCIAL HISTORY:   History reviewed  No pertinent family history  REVIEW OF SYSTEMS:     Review of Systems      PHYSICAL EXAM:     /68 (BP Location: Right arm, Patient Position: Sitting, Cuff Size: Standard)   Pulse 104   Ht 5' 8" (1 727 m)   Wt 123 kg (272 lb 3 2 oz)   BMI 41 39 kg/m²      General:  Healthy appearing {Desc; male/female:1::"male"} in no acute distress  They have a normal affect  There is not appear to be any gross neurologic defects or abnormalities  HEENT:  Normocephalic, atraumatic  Neck is supple without any palpable lymphadenopathy  Cardiovascular:  Patient has normal palpable distal radial pulses  There is no significant peripheral edema  No JVD is noted  Respiratory:  Patient has unlabored respirations  There is no audible wheeze or rhonchi  Abdomen:  Abdomen is *** surgical scars  Abdomen is soft and nontender  There is no tympany  Inguinal and umbilical hernia are not appreciated  Genitourinary: {pe male genitalia:903710::"no penile lesions or discharge, no testicular masses or tenderness, no hernias"}    Musculoskeletal:  Patient {DOES NOT does:24001::"does not"} have significant CVA tenderness in the {RIGHT/LEFT:20294} flank with palpation or percussion  They full range of motion in all 4 extremities  Strength in all 4 extremities appears congruent  Patient is able to ambulate without assistance or difficulty  Dermatologic:  Patient has no skin abnormalities or rashes        LABS:     CBC:   Lab Results   Component Value Date    WBC 12 04 (H) 10/19/2019    HGB 9 1 (L) 10/19/2019    HCT 27 5 (L) 10/19/2019    MCV 96 10/19/2019     10/19/2019       BMP:   Lab Results   Component Value Date    CALCIUM 8 9 10/20/2019    K 3 1 (L) 10/20/2019    CO2 28 10/20/2019     10/20/2019    BUN 6 10/20/2019    CREATININE 0 96 10/20/2019       IMAGING:     10/12/19  CT CHEST, ABDOMEN AND PELVIS WITH IV CONTRAST     INDICATION: fever/vomiting/diarrhea, lipomyosarcoma stage 4, [place] [facility]  History of hypertension, tobacco use, prior partial pneumonectomy      COMPARISON:  None available      TECHNIQUE: CT examination of the chest, abdomen and pelvis was performed  Axial, sagittal, and coronal 2D reformatted images were created from the source data and submitted for interpretation      Radiation dose length product (DLP) for this visit:  2087 mGy-cm   This examination, like all CT scans performed in the [facility], was performed utilizing techniques to minimize radiation dose exposure, including the use of iterative   reconstruction and automated exposure control      IV Contrast:  100 mL of iohexol (OMNIPAQUE)  Enteric Contrast: Enteric contrast was not administered      FINDINGS:     CHEST     LUNGS:  There is some atelectasis and infiltrate within the left lower lobe of the lung and left lingula; infection should be excluded clinically  There is mild atelectasis at the right lung base  There is some postoperative scarring and suture   material within the medial aspect of the lower right lung extending into the region of the azygoesophageal recess, consistent with the history of prior partial pneumonectomy  There is focal bronchiectasis at the right apex      PLEURA:  Unremarkable      HEART/GREAT VESSELS:  Unremarkable for patient's age      MEDIASTINUM AND SIVAN:  Unremarkable      CHEST WALL AND LOWER NECK:   A Port-A-Cath is present on the right  There is some stranding of the fat posterior to the medial aspect right scapula, best demonstrated on series 2 image 1-4  Clinical correlation is recommended      ABDOMEN     LIVER/BILIARY TREE:  Unremarkable      GALLBLADDER:  Possible tiny gallstones  No pericholecystic inflammatory change      SPLEEN:  Unremarkable      PANCREAS:  Unremarkable      ADRENAL GLANDS:  Unremarkable      KIDNEYS/URETERS:  Bilateral renal cysts    No right hydronephrosis  There is a large staghorn calculus on the left which occupies the majority of the left renal pelvis and extends into the left UPJ and proximal left ureter  There is also extension of   the staghorn calculus into several lower pole calyces  There is marked dilatation of the left upper pole collecting system and there is renal cortical thinning involving the left kidney, most pronounced involving the upper pole, suggesting that this may   be a chronic process  Superimposed infection should be excluded clinically  Of note, the left nephrogram is only minimally diminished compared with the right      STOMACH AND BOWEL:  There is no evidence of bowel obstruction  There is some residual oral contrast material within a few bowel loops, possibly related to prior imaging study at another facility      APPENDIX:  No findings to suggest appendicitis      ABDOMINOPELVIC CAVITY:  No significant ascites  No pneumoperitoneum      VESSELS:  There is atherosclerosis  There is no abdominal aortic aneurysm      PELVIS     REPRODUCTIVE ORGANS:  Unremarkable for patient's age      URINARY BLADDER:  The bladder is nearly empty, limiting its evaluation  There is a small amount of gas layering non-dependently within the most anterior superior aspect of the urinary bladder  Clinical correlation regarding the possibility of recent   manipulation is recommended  Correlation with urinalysis is suggested      ABDOMINAL WALL/INGUINAL REGIONS:  Unremarkable      OSSEOUS STRUCTURES:  No acute fracture or destructive osseous lesion      IMPRESSION:     There is a large staghorn calculus within the left kidney  There is marked dilatation of the left upper pole collecting system and there is associated left renal cortical thinning  Please see above description and discussion    Superimposed infection   should be excluded clinically      The urinary bladder is nearly empty, limiting its evaluation, however, there is a small amount of gas layering non-dependently within the most anterior superior aspect of the urinary bladder  Clinical correlation regarding the possibility of recent   manipulation is recommended  Correlation with urinalysis is suggested      There is a combination of atelectasis and infiltrate within the left lower lobe of the lung and left lingula  Infection should be excluded clinically  There is mild atelectasis at the right lung base      There is some stranding of the fat posterior to the medial aspect right scapula  Clinical correlation is recommended      Possible tiny gallstones  No CT evidence of acute cholecystitis      Bilateral renal cysts  No right hydronephrosis      ASSESSMENT:     62 y o  female with ***    PLAN:     ***

## 2019-11-11 ENCOUNTER — TELEPHONE (OUTPATIENT)
Dept: INTERVENTIONAL RADIOLOGY/VASCULAR | Facility: HOSPITAL | Age: 64
End: 2019-11-11

## 2019-11-11 RX ORDER — SODIUM CHLORIDE 9 MG/ML
50 INJECTION, SOLUTION INTRAVENOUS CONTINUOUS
Status: CANCELLED | OUTPATIENT
Start: 2019-11-11

## 2019-11-12 ENCOUNTER — TELEPHONE (OUTPATIENT)
Dept: SURGERY | Facility: HOSPITAL | Age: 64
End: 2019-11-12

## 2019-11-13 ENCOUNTER — HOSPITAL ENCOUNTER (OUTPATIENT)
Dept: INTERVENTIONAL RADIOLOGY/VASCULAR | Facility: HOSPITAL | Age: 64
Discharge: HOME | End: 2019-11-13
Attending: UROLOGY
Payer: COMMERCIAL

## 2019-11-13 VITALS
BODY MASS INDEX: 41.39 KG/M2 | HEART RATE: 75 BPM | OXYGEN SATURATION: 97 % | DIASTOLIC BLOOD PRESSURE: 61 MMHG | RESPIRATION RATE: 18 BRPM | HEIGHT: 68 IN | TEMPERATURE: 97.9 F | SYSTOLIC BLOOD PRESSURE: 118 MMHG

## 2019-11-13 DIAGNOSIS — N20.0 STAGHORN CALCULUS: ICD-10-CM

## 2019-11-13 LAB
INR PPP: 1.04 (ref 0.84–1.19)
PROTHROMBIN TIME: 13.7 SECONDS (ref 11.6–14.5)

## 2019-11-13 PROCEDURE — C1729 CATH, DRAINAGE: HCPCS

## 2019-11-13 PROCEDURE — 99152 MOD SED SAME PHYS/QHP 5/>YRS: CPT | Performed by: STUDENT IN AN ORGANIZED HEALTH CARE EDUCATION/TRAINING PROGRAM

## 2019-11-13 PROCEDURE — 50693 PLMT URETERAL STENT PRQ: CPT | Performed by: STUDENT IN AN ORGANIZED HEALTH CARE EDUCATION/TRAINING PROGRAM

## 2019-11-13 PROCEDURE — 50693 PLMT URETERAL STENT PRQ: CPT

## 2019-11-13 PROCEDURE — C2617 STENT, NON-COR, TEM W/O DEL: HCPCS

## 2019-11-13 PROCEDURE — 85610 PROTHROMBIN TIME: CPT | Performed by: STUDENT IN AN ORGANIZED HEALTH CARE EDUCATION/TRAINING PROGRAM

## 2019-11-13 PROCEDURE — C1769 GUIDE WIRE: HCPCS

## 2019-11-13 RX ORDER — SODIUM CHLORIDE 9 MG/ML
50 INJECTION, SOLUTION INTRAVENOUS CONTINUOUS
Status: DISCONTINUED | OUTPATIENT
Start: 2019-11-13 | End: 2019-11-17 | Stop reason: HOSPADM

## 2019-11-13 RX ORDER — MIDAZOLAM HYDROCHLORIDE 2 MG/2ML
INJECTION, SOLUTION INTRAMUSCULAR; INTRAVENOUS CODE/TRAUMA/SEDATION MEDICATION
Status: COMPLETED | OUTPATIENT
Start: 2019-11-13 | End: 2019-11-13

## 2019-11-13 RX ORDER — ONDANSETRON 2 MG/ML
INJECTION INTRAMUSCULAR; INTRAVENOUS
Status: COMPLETED
Start: 2019-11-13 | End: 2019-11-13

## 2019-11-13 RX ORDER — CEFAZOLIN SODIUM 1 G/50ML
SOLUTION INTRAVENOUS
Status: COMPLETED | OUTPATIENT
Start: 2019-11-13 | End: 2019-11-13

## 2019-11-13 RX ORDER — FENTANYL CITRATE 50 UG/ML
INJECTION, SOLUTION INTRAMUSCULAR; INTRAVENOUS CODE/TRAUMA/SEDATION MEDICATION
Status: COMPLETED | OUTPATIENT
Start: 2019-11-13 | End: 2019-11-13

## 2019-11-13 RX ORDER — PHENAZOPYRIDINE HYDROCHLORIDE 100 MG/1
200 TABLET, FILM COATED ORAL ONCE
Status: COMPLETED | OUTPATIENT
Start: 2019-11-13 | End: 2019-11-13

## 2019-11-13 RX ORDER — LIDOCAINE WITH 8.4% SOD BICARB 0.9%(10ML)
SYRINGE (ML) INJECTION CODE/TRAUMA/SEDATION MEDICATION
Status: COMPLETED | OUTPATIENT
Start: 2019-11-13 | End: 2019-11-13

## 2019-11-13 RX ORDER — ONDANSETRON 2 MG/ML
4 INJECTION INTRAMUSCULAR; INTRAVENOUS ONCE
Status: COMPLETED | OUTPATIENT
Start: 2019-11-13 | End: 2019-11-13

## 2019-11-13 RX ORDER — LANOLIN ALCOHOL/MO/W.PET/CERES
1000 CREAM (GRAM) TOPICAL DAILY
COMMUNITY

## 2019-11-13 RX ORDER — DIPHENHYDRAMINE HYDROCHLORIDE 50 MG/ML
INJECTION INTRAMUSCULAR; INTRAVENOUS CODE/TRAUMA/SEDATION MEDICATION
Status: COMPLETED | OUTPATIENT
Start: 2019-11-13 | End: 2019-11-13

## 2019-11-13 RX ADMIN — FENTANYL CITRATE 50 MCG: 50 INJECTION, SOLUTION INTRAMUSCULAR; INTRAVENOUS at 14:25

## 2019-11-13 RX ADMIN — MIDAZOLAM HYDROCHLORIDE 1 MG: 1 INJECTION, SOLUTION INTRAMUSCULAR; INTRAVENOUS at 13:37

## 2019-11-13 RX ADMIN — FENTANYL CITRATE 50 MCG: 50 INJECTION, SOLUTION INTRAMUSCULAR; INTRAVENOUS at 13:42

## 2019-11-13 RX ADMIN — CEFAZOLIN SODIUM 2000 MG: 1 SOLUTION INTRAVENOUS at 13:57

## 2019-11-13 RX ADMIN — FENTANYL CITRATE 50 MCG: 50 INJECTION, SOLUTION INTRAMUSCULAR; INTRAVENOUS at 13:58

## 2019-11-13 RX ADMIN — ONDANSETRON 4 MG: 2 INJECTION INTRAMUSCULAR; INTRAVENOUS at 15:03

## 2019-11-13 RX ADMIN — MIDAZOLAM HYDROCHLORIDE 1 MG: 1 INJECTION, SOLUTION INTRAMUSCULAR; INTRAVENOUS at 13:42

## 2019-11-13 RX ADMIN — DIPHENHYDRAMINE HYDROCHLORIDE 50 MG: 50 INJECTION, SOLUTION INTRAMUSCULAR; INTRAVENOUS at 13:37

## 2019-11-13 RX ADMIN — FENTANYL CITRATE 50 MCG: 50 INJECTION, SOLUTION INTRAMUSCULAR; INTRAVENOUS at 13:38

## 2019-11-13 RX ADMIN — PHENAZOPYRIDINE 200 MG: 100 TABLET ORAL at 16:53

## 2019-11-13 RX ADMIN — PHENAZOPYRIDINE 200 MG: 100 TABLET ORAL at 15:51

## 2019-11-13 RX ADMIN — IOHEXOL 35 ML: 350 INJECTION, SOLUTION INTRAVENOUS at 15:11

## 2019-11-13 RX ADMIN — MIDAZOLAM HYDROCHLORIDE 1 MG: 1 INJECTION, SOLUTION INTRAMUSCULAR; INTRAVENOUS at 14:25

## 2019-11-13 RX ADMIN — MIDAZOLAM HYDROCHLORIDE 1 MG: 1 INJECTION, SOLUTION INTRAMUSCULAR; INTRAVENOUS at 13:58

## 2019-11-13 RX ADMIN — Medication 5 ML: at 13:58

## 2019-11-13 NOTE — BRIEF OP NOTE (RAD/CATH)
IR PCN TO JJ CONVERSION Procedure Note    PATIENT NAME: Jessie Patel  : 1955  MRN: [id number]    Pre-op Diagnosis:   1  Staghorn calculus      Post-op Diagnosis:   1  Staghorn calculus        Surgeon:   Akosua Pagan MD  Assistants:     No qualified resident was available, Resident is only observing    Estimated Blood Loss: 2 mL  Findings:   Placement of 7 Fr, 28 cm JJ catheter  Replacement of 8 5 Fr PCN, capped  Specimens: None  Complications:  None  Anesthesia: Conscious sedation    Akosua Pagan MD     Date: 2019  Time: 3:08 PM     Thank you for allowing me to participate in the care of Jessie Patel  Please don't hesitate to call, text, email, or TigerText with any questions  Akosua Pagan MD  Interventional Radiologist  [facility]  Personal Cell: [phone]  [email or web address]

## 2019-11-13 NOTE — DISCHARGE INSTRUCTIONS
Nephrostomy Tube Care     WHAT YOU NEED TO KNOW:   A nephrostomy tube is a catheter (thin plastic tube) that is inserted through your skin and into your kidney  The nephrostomy tube drains urine from your kidney into a collecting bag outside your body  You may need a nephrostomy tube when something is blocking the normal flow of urine  A nephrostomy tube may be used for a short or a long period of time  The nephrostomy tube comes out of your back, so you will need someone to help care for your nephrostomy tube  DISCHARGE INSTRUCTIONS:     Resume your normal diet  Small sips of flat soda will help with mild nausea  How to clean the skin around the nephrostomy tube and change the bandage:  Since the nephrostomy tube comes out of your back, you will not be able to care for it by yourself  Ask someone to follow the general directions below to check and care for your nephrostomy tube  Gather the items you will need  Disposable (single use) under-pad, and a clean washcloth  ¨ Plain soap, warm water, and new medical gloves  ¨ Sterile gauze bandages  ¨ Clear adhesive dressing or medical tape  ¨ Skin barrier  ¨ Protective skin film  ¨ Trash bag  · Remove the old bandage, and check the tube entry site  ¨ Have the patient lie on his side with the nephrostomy tube entry site facing up  Place the under-pad where it will catch drainage as you are working with the nephrostomy tube  ¨ Wash your hands with soap and water  Put on new medical gloves  ¨ Gently remove the old bandage, without pulling on the tube  Do this by holding the skin beside the tube with one hand  With the other hand, gently remove sticky tape and the skin barrier by pulling in the same direction as hair growth  Do not touch the side of the bandage that is placed over or around the tube  Throw the bandage and skin barrier away in a trash bag  ¨ Look for signs of infection, such as skin redness and swelling   Report any skin changes to healthcare providers  ¨ Clean the tube entry site  ¨ Hold the tube in place to keep it from being pulled out while you are cleaning around it  ¨ You will need to clean the area twice  For the first cleaning, wet a new gauze bandage with soap and water  Begin at the entry site of the tube  Wipe the skin in circles, moving away from the entry site  Remove blood and any other material with the gauze  Do this as often as needed  Use a new gauze bandage each time you clean the area, moving away from the entry site  ¨ For the second cleaning, wet a new gauze bandage with water  Begin at the entry site of the tube  Wipe the skin in circles, moving away from the entry site  Use a new gauze bandage each time you clean the area, moving away from the entry site  ¨ Gently pat the skin with a clean washcloth to dry it  · Apply the skin barrier and bandages  ¨ Roll up a bandage to make it thick, and place it under  the place where the tube enters the skin  Place it to support the tube, and stop it from kinking or bending  Tape the bandage in place, and apply more bandages if directed by a healthcare provider  ¨ Bring the tubing forward to the front and tape it to the skin  Do not stretch the tube tight, because this may pull the nephrostomy tube out  How often to change the bandage  Change the bandage around the tube, every other day  If your bandages  get dirty or wet, change them right away, and as often as needed  If your nephrostomy tube is to be used for a long period of time, the tube needs to be changed every 2 to 3 months  Healthcare providers will tell you when you need to make an appointment to have your tube changed  How to care for the urine drainage bag:   · Ask if you need to measure and write down how much urine is in the bag before you empty it  Drain urine out of the drainage bag when it is ½ to ? full  Open the spout at the bottom of the bag to empty the urine into the toilet     · You may need to detach the drainage bag from the nephrostomy tube to change it    If so, attach a new drainage bag tightly to the nephrostomy tube  ·   How to prevent problems with your nephrostomy tube:   · Change bandages, directed  This helps to prevent infection  Throw away or clean your drainage bag as directed by your healthcare provider  · Wipe the connecting ends of the drainage bag with alcohol before you reconnect the bag to the tube  This helps prevent infection  Keep the tube taped to your skin and connected to a drainage bag placed below the level of your kidneys  This helps prevent urine from backing up into your kidneys  You may wear a small drainage bag strapped to your leg to let you move around more easily  · Check the catheter to be sure it is in place after you change your clothes or do other activities  Do not wear tight clothing over the tube  Place the tubing over your thigh rather than under it when you are sitting down  Be sure that nothing is pulling on the nephrostomy tube when you move around  · Change positions if you see little or no urine in your drainage bag  Check to see if the urine tube is twisted or bent  Be sure that you are not sitting or lying on the tube  If there are no kinks and there is little or no urine in the drainage bag, tell your healthcare provider  · Flush out the tube as directed  Some tubes get flushed one time a day with 10 mls of NSS You will be given a prescription for the flushes  To flush the nephrostomy tube, clean both connections with alcohol swap  Twist off the drainage bag tube and twist the saline syringe into the nephrostomy tube and flush briskly  Remove the syringe and twist the drainage bag tube back into the nephrostomy tube  · Keep the site covered while you shower  Tape a piece of clear adhesive plastic over the dressing to keep it dry while you shower  Do not take tub baths      Contact Interventional Radiology at [phone] Kwame PATIENTS: Contact Interventional Radiology at [phone]) Ernie Vandakelsie PATIENTS: Contact Interventional Radiology at [phone]) if:  · The skin around the nephrostomy tube is red, swollen, itches, or has a rash  · You have a fever  · You have lower back or hip pain  · There are changes in how your urine looks or smells  · You have little or no urine draining from the nephrostomy tube  · You have nausea and are vomiting  · The black louisa on your tube has moved, or the tube is longer than when it was put in    · You have questions or concerns about your condition or care  · The nephrostomy tube comes out completely  · There is blood, pus, or a bad smell coming from the place where the tube enters your skin  · Urine is leaking around the tube  Tube Capping Trial        If your tube is capped and has no drainage bag, the urine will flow through the ureter         and into the bladder for you to urinate normally  This is called a capping trial                    Continue to flush your tube one time per day  You will have an extra drainage bag to       keep at home in case the capping trial fails  Call the IR department if you experience        any of the following: Pain, fever greater than 101  Persistent nausea or vomiting  The following pharmacies carry the flush syringes  [facility]                     [facility]       [address]  Phone [phone]            Phone [phone] 290 [address] [place]                                 [phone]  Phone [phone]            Phone [phone]    [facility]                                                                         [facility] [phone]  [address]   Phone [phone]

## 2019-11-13 NOTE — PROGRESS NOTES
Interventional Radiology Preprocedure Note    History/Indication for procedure:   Alexander Obrien is a 62 y o  female with a PMH of stage 4 leiomyosarcoma and L staghorn calculus who presents for attempt at PCN to [address] [place] [address]      Relevant past medical history:    Past Medical History:   Diagnosis Date    Asthma     Cancer ([facility] )     COPD (chronic obstructive pulmonary disease) ([facility] )     Hyperlipidemia     Hypertension     Psychiatric disorder     Depression     Patient Active Problem List   Diagnosis    Hypokalemia    Sepsis ([facility] )    Tobacco abuse    Abnormal CT of the chest    Leiomyosarcoma (HCC)    Hypertension    Staghorn calculus       /81   Pulse 92   Temp 97 9 °F (36 6 °C) (Oral)   Resp 18   Ht 5' 8" (1 727 m)   SpO2 100%   BMI 41 39 kg/m²     Medications:    Inpatient Medications:     Scheduled Medications:    Current Facility-Administered Medications:  diphenhydrAMINE  Intravenous Code/Trauma/Sedation Med Katherine Washington MD   fentanyl citrate (PF)  Intravenous Code/Trauma/Sedation Med Katherine Washington MD   midazolam   Code/Trauma/Sedation Med Katherine Washington MD   sodium chloride 50 mL/hr Intravenous Continuous Katherine Washington MD       Infusions:    sodium chloride 50 mL/hr       PRN:    diphenhydrAMINE    fentanyl citrate (PF)    midazolam    Outpatient Medications:  Current Outpatient Medications on File Prior to Encounter   Medication Sig Dispense Refill    albuterol (2 5 mg/3 mL) 0 083 % nebulizer solution Take 2 5 mg by nebulization every 6 (six) hours as needed for wheezing or shortness of breath      aspirin 81 mg chewable tablet Chew 81 mg daily      buPROPion (WELLBUTRIN SR) 150 mg 12 hr tablet Take 150 mg by mouth 2 (two) times a day      cyclobenzaprine (FLEXERIL) 10 mg tablet Take 5 mg by mouth 3 (three) times a day as needed for muscle spasms      gabapentin (NEURONTIN) 600 MG tablet Take 600 mg by mouth daily at bedtime      lisinopril-hydrochlorothiazide (PRINZIDE,ZESTORETIC) 10-12 5 MG per tablet Take 1 tablet by mouth daily      montelukast (SINGULAIR) 10 mg tablet Take 10 mg by mouth daily at bedtime      Multiple Vitamins-Minerals (CENTRUM SILVER 50+WOMEN) TABS Centrum Silver      nicotine (NICODERM CQ) 21 mg/24 hr TD 24 hr patch Place 1 patch on the skin every 24 hours      ondansetron (ZOFRAN) 8 mg tablet Take 8 mg by mouth every 8 (eight) hours as needed for nausea or vomiting      oxyCODONE-acetaminophen (PERCOCET) 5-325 mg per tablet Take 2 tablets by mouth every 6 (six) hours as needed for moderate painMax Daily Amount: 8 tablets 10 tablet 0    potassium chloride (K-DUR,KLOR-CON) 10 mEq tablet Take 10 mEq by mouth daily      sertraline (ZOLOFT) 100 mg tablet Take 100 mg by mouth daily at bedtime      simvastatin (ZOCOR) 20 mg tablet Take 20 mg by mouth daily at bedtime      vitamin B-12 (VITAMIN B-12) 1,000 mcg tablet Take 1,000 mcg by mouth daily      cyclobenzaprine (FLEXERIL) 5 mg tablet   0    Misc  Devices KIT Oxygen at night       No current facility-administered medications on file prior to encounter  Allergies   Allergen Reactions    Morphine Vomiting     Other reaction(s): Vomiting  Other reaction(s): (PIMS) vomiting  Makes her vomit      Sulfa Antibiotics Rash       Anticoagulants: none    ASA classification: ASA 3 - Patient with moderate systemic disease with functional limitations    Airway Assessment: II (hard and soft palate, upper portion of tonsils anduvula visible)    Relevant family history: None    Relevant review of systems: None    Prior sedation/anesthesia: yes    Can the patient lie flat? Yes     Does patient have sleep apnea?  No    If yes, does patient use CPAP? not applicable    NPO Status: yes    Labs:   CBC with diff: Lab Results   Component Value Date    WBC 12 04 (H) 10/19/2019    HGB 9 1 (L) 10/19/2019    HCT 27 5 (L) 10/19/2019    MCV 96 10/19/2019     10/19/2019    MCH 31 7 10/19/2019    MCHC 33 1 10/19/2019    RDW 14 1 10/19/2019    MPV 10 1 10/19/2019    NRBC 0 10/15/2019     BMP/CMP:  Lab Results   Component Value Date    K 3 1 (L) 10/20/2019     10/20/2019    CO2 28 10/20/2019    BUN 6 10/20/2019    CREATININE 0 96 10/20/2019    CALCIUM 8 9 10/20/2019    AST 19 10/12/2019    ALT 25 10/12/2019    ALKPHOS 83 10/12/2019    EGFR 63 10/20/2019   ,     Coags:   Lab Results   Component Value Date    PTT 38 (H) 10/12/2019    INR 1 04 11/13/2019   ,   Results from last 7 days   Lab Units 11/13/19  1254   INR  1 04        Relevant imaging studies:   Reviewed  Directed physical examination:  I agree with the physical exam performed on 11/7/19 with the following changes L nephrostomy c/d/i    Assessment/Plan: For PCN to [address] [place] [address] conversion  Sedation/Anesthesia plan: Moderate sedation will be used as needed for procedure      Consent with alternatives to the procedure, risks and benefits have been explained and discussed with the patient/patient's family: yes

## 2019-11-14 ENCOUNTER — HOSPITAL ENCOUNTER (OUTPATIENT)
Dept: INTERVENTIONAL RADIOLOGY/VASCULAR | Facility: HOSPITAL | Age: 64
Discharge: HOME | End: 2019-11-14
Attending: UROLOGY | Admitting: STUDENT IN AN ORGANIZED HEALTH CARE EDUCATION/TRAINING PROGRAM
Payer: COMMERCIAL

## 2019-11-14 DIAGNOSIS — N20.0 STAGHORN CALCULUS: ICD-10-CM

## 2019-11-14 PROCEDURE — 50431 NJX PX NFROSGRM &/URTRGRM: CPT

## 2019-11-14 PROCEDURE — 50389 REMOVE RENAL TUBE W/FLUORO: CPT | Performed by: STUDENT IN AN ORGANIZED HEALTH CARE EDUCATION/TRAINING PROGRAM

## 2019-11-14 RX ORDER — PHENAZOPYRIDINE HYDROCHLORIDE 200 MG/1
200 TABLET, FILM COATED ORAL
Qty: 90 TABLET | Refills: 1 | Status: SHIPPED | OUTPATIENT
Start: 2019-11-14 | End: 2019-01-01 | Stop reason: ALTCHOICE

## 2019-11-14 RX ADMIN — IOHEXOL 5 ML: 350 INJECTION, SOLUTION INTRAVENOUS at 12:24

## 2019-11-14 NOTE — BRIEF OP NOTE (RAD/CATH)
IR TUBE CHECK Procedure Note    PATIENT NAME: Khushboo Pennington  : 1955  MRN: [id number]    Pre-op Diagnosis:   1  Staghorn calculus      Post-op Diagnosis:   1  Staghorn calculus        Surgeon:   Margaret Abbasi MD  Assistants:     No qualified resident was available, Resident is only observing    Estimated Blood Loss: 0 ml  Findings: removal of PCN  Specimens: None  Complications:  None      Anesthesia: Local    Margaret Abbasi MD     Date: 2019  Time: 12:33 PM

## 2019-11-14 NOTE — PROGRESS NOTES
Interventional Radiology Preprocedure Note    History/Indication for procedure:   Michele Shen is a 62 y o  female with a PMH of staghorn calc who had a JJ stent placed yesterday  She presents for PCN removal     There were no vitals taken for this visit  Relevant past medical history:    Past Medical History:   Diagnosis Date    Asthma     Cancer ([facility] )     COPD (chronic obstructive pulmonary disease) ([place] [address] )     Hyperlipidemia     Hypertension     Psychiatric disorder     Depression     Patient Active Problem List   Diagnosis    Hypokalemia    Sepsis ([place] [address] )    Tobacco abuse    Abnormal CT of the chest    Leiomyosarcoma ([place] [address] )    Hypertension    Staghorn calculus       Medications:    Inpatient Medications:     Scheduled Medications:    Facility-Administered Medications Ordered in Other Encounters:  sodium chloride 50 mL/hr Intravenous Continuous Matthew Holt MD       Infusions:    No current facility-administered medications for this encounter  PRN:      Outpatient Medications:  Current Outpatient Medications on File Prior to Encounter   Medication Sig Dispense Refill    albuterol (2 5 mg/3 mL) 0 083 % nebulizer solution Take 2 5 mg by nebulization every 6 (six) hours as needed for wheezing or shortness of breath      aspirin 81 mg chewable tablet Chew 81 mg daily      buPROPion (WELLBUTRIN SR) 150 mg 12 hr tablet Take 150 mg by mouth 2 (two) times a day      cyclobenzaprine (FLEXERIL) 10 mg tablet Take 5 mg by mouth 3 (three) times a day as needed for muscle spasms      cyclobenzaprine (FLEXERIL) 5 mg tablet   0    gabapentin (NEURONTIN) 600 MG tablet Take 600 mg by mouth daily at bedtime      lisinopril-hydrochlorothiazide (PRINZIDE,ZESTORETIC) 10-12 5 MG per tablet Take 1 tablet by mouth daily      Misc   Devices KIT Oxygen at night      montelukast (SINGULAIR) 10 mg tablet Take 10 mg by mouth daily at bedtime      Multiple Vitamins-Minerals (CENTRUM SILVER 50+WOMEN) TABS Centrum Silver      nicotine (NICODERM CQ) 21 mg/24 hr TD 24 hr patch Place 1 patch on the skin every 24 hours      ondansetron (ZOFRAN) 8 mg tablet Take 8 mg by mouth every 8 (eight) hours as needed for nausea or vomiting      oxyCODONE-acetaminophen (PERCOCET) 5-325 mg per tablet Take 2 tablets by mouth every 6 (six) hours as needed for moderate painMax Daily Amount: 8 tablets 10 tablet 0    potassium chloride (K-DUR,KLOR-CON) 10 mEq tablet Take 10 mEq by mouth daily      sertraline (ZOLOFT) 100 mg tablet Take 100 mg by mouth daily at bedtime      simvastatin (ZOCOR) 20 mg tablet Take 20 mg by mouth daily at bedtime      vitamin B-12 (VITAMIN B-12) 1,000 mcg tablet Take 1,000 mcg by mouth daily       Current Facility-Administered Medications on File Prior to Encounter   Medication Dose Route Frequency Provider Last Rate Last Dose    [COMPLETED] ceFAZolin (ANCEF) IVPB (premix)    Code/Trauma/Sedation Continuous Med Regina Zapien MD   Stopped at 11/13/19 1457    [COMPLETED] diphenhydrAMINE (BENADRYL) injection   Intravenous Code/Trauma/Sedation Med Regina Zapien MD   50 mg at 11/13/19 1337    [COMPLETED] fentanyl citrate (PF) 100 MCG/2ML   Intravenous Code/Trauma/Sedation Med Regina Zapien MD   50 mcg at 11/13/19 1425    [COMPLETED] iohexol (OMNIPAQUE) 350 MG/ML injection (SINGLE-DOSE) 35 mL  35 mL Intravenous Once in imaging Regina Zapien MD   35 mL at 11/13/19 1511    [COMPLETED] lidocaine 1% buffered    Code/Trauma/Sedation Med Regina Zapien MD   5 mL at 11/13/19 1358    [COMPLETED] midazolam (VERSED) injection    Code/Trauma/Sedation Med Regina Zapien MD   1 mg at 11/13/19 1425    [COMPLETED] ondansetron (ZOFRAN) injection 4 mg  4 mg Intravenous Once Regina Zapien MD   4 mg at 11/13/19 1503    [COMPLETED] phenazopyridine (PYRIDIUM) tablet 200 mg  200 mg Oral Once Regina Zapien MD   200 mg at 11/13/19 1551    [COMPLETED] phenazopyridine (PYRIDIUM) tablet 200 mg  200 mg Oral Once Solange Bile Chris Lockett MD   200 mg at 11/13/19 1653    sodium chloride 0 9 % infusion  50 mL/hr Intravenous Continuous Harry Rice MD           Allergies   Allergen Reactions    Morphine Vomiting     Other reaction(s): Vomiting  Other reaction(s): (PIMS) vomiting  Makes her vomit      Sulfa Antibiotics Rash       Anticoagulants: none    Labs:   CBC with diff: Lab Results   Component Value Date    WBC 12 04 (H) 10/19/2019    HGB 9 1 (L) 10/19/2019    HCT 27 5 (L) 10/19/2019    MCV 96 10/19/2019     10/19/2019    MCH 31 7 10/19/2019    MCHC 33 1 10/19/2019    RDW 14 1 10/19/2019    MPV 10 1 10/19/2019    NRBC 0 10/15/2019     BMP/CMP:  Lab Results   Component Value Date    K 3 1 (L) 10/20/2019     10/20/2019    CO2 28 10/20/2019    BUN 6 10/20/2019    CREATININE 0 96 10/20/2019    CALCIUM 8 9 10/20/2019    AST 19 10/12/2019    ALT 25 10/12/2019    ALKPHOS 83 10/12/2019    EGFR 63 10/20/2019   ,     Coags:   Lab Results   Component Value Date    PTT 38 (H) 10/12/2019    INR 1 04 11/13/2019   ,   Results from last 7 days   Lab Units 11/13/19  1254   INR  1 04        Relevant imaging studies:   Reviewed  Directed physical examination:  I agree with the physical exam performed on 11/7/19 and there are no additional changes  Assessment/Plan: For tube check and removal    Sedation/Anesthesia plan:  Local sedation will be used as needed for procedure  Consent with alternatives to the procedure, risks and benefits have been explained and discussed with the patient/patient's family: Continuation of care

## 2019-11-25 ENCOUNTER — TELEPHONE (OUTPATIENT)
Dept: UROLOGY | Facility: CLINIC | Age: 64
End: 2019-11-25

## 2019-11-25 NOTE — TELEPHONE ENCOUNTER
Received records from [facility]        Faxed to central faxing    In [address] cabinet

## 2019-12-03 NOTE — PROGRESS NOTES
UROLOGY FOLLOWUP NOTE     CHIEF COMPLAINT   Matilde Sawyer is a 62 y o  female with a complaint of   No chief complaint on file  History of Present Illness:     62 y o  female with a history of Stage IV metastatic leiomyosarcoma now undergoing palliative chemotherapy x1 round  Chemotherapy was interrupted when the patient presented to the hospital with sepsis picture  She has a history of significant stone disease and previously saw Dr Yaakov Barnett  At the time of her recent hospitalization, staghorn calculus with dilation of the upper pole was noted  Patient underwent left percutaneous nephrostomy tube with eventual resolution  Patient also had an indwelling port that was removed for concern for bacteremia  Unfortunately her urine and blood cultures did not return positive with any formal  Bacteria  Given the patient's ongoing malignancy and metastatic disease to her ribcage and scalp as well as the pain associated with these, her Oncology team  Would like to restart her chemotherapy  There is significant concerned that persistent stone or indwelling foreign body in the form of stent or nephrostomy tube may represent a nidus for infection that could compromise the patient is status if she becomes significantly immunocompromised during chemotherapy  Following our last visit, the patient was referred interventional radiology  For improvement in her quality of life, her nephrostomy tube was converted to an indwelling stent  The patient did have some mild stent discomfort with frequency and urgency immediately after the procedure but is now feeling dramatically improved  She is due to see her Oncology service within the next week to consider moving forward with additional therapy  She had a port replaced in the left chest and has developed some skin blisters related to the surgical dressing      Past Medical History:     Past Medical History:   Diagnosis Date    Asthma     Cancer ([facility] [address] )     COPD (chronic obstructive pulmonary disease) (HCC)     Hyperlipidemia     Hypertension     Psychiatric disorder     Depression       PAST SURGICAL HISTORY:     Past Surgical History:   Procedure Laterality Date    IR PCN TO PCNU CONVERSION  11/13/2019    IR PORT REMOVAL  10/14/2019    IR TUBE PLACEMENT NEPHROSTOMY  10/17/2019    LUNG REMOVAL, PARTIAL      lower lobe       CURRENT MEDICATIONS:     Current Outpatient Medications   Medication Sig Dispense Refill    albuterol (2 5 mg/3 mL) 0 083 % nebulizer solution Take 2 5 mg by nebulization every 6 (six) hours as needed for wheezing or shortness of breath      aspirin 81 mg chewable tablet Chew 81 mg daily      buPROPion (WELLBUTRIN SR) 150 mg 12 hr tablet Take 150 mg by mouth 2 (two) times a day      cyclobenzaprine (FLEXERIL) 10 mg tablet Take 5 mg by mouth 3 (three) times a day as needed for muscle spasms      cyclobenzaprine (FLEXERIL) 5 mg tablet   0    gabapentin (NEURONTIN) 600 MG tablet Take 600 mg by mouth daily at bedtime      lisinopril-hydrochlorothiazide (PRINZIDE,ZESTORETIC) 10-12 5 MG per tablet Take 1 tablet by mouth daily      Misc   Devices KIT Oxygen at night      montelukast (SINGULAIR) 10 mg tablet Take 10 mg by mouth daily at bedtime      Multiple Vitamins-Minerals (CENTRUM SILVER 50+WOMEN) TABS Centrum Silver      nicotine (NICODERM CQ) 21 mg/24 hr TD 24 hr patch Place 1 patch on the skin every 24 hours      ondansetron (ZOFRAN) 8 mg tablet Take 8 mg by mouth every 8 (eight) hours as needed for nausea or vomiting      potassium chloride (K-DUR,KLOR-CON) 10 mEq tablet Take 10 mEq by mouth daily      sertraline (ZOLOFT) 100 mg tablet Take 100 mg by mouth daily at bedtime      simvastatin (ZOCOR) 20 mg tablet Take 20 mg by mouth daily at bedtime      vitamin B-12 (VITAMIN B-12) 1,000 mcg tablet Take 1,000 mcg by mouth daily      oxyCODONE-acetaminophen (PERCOCET) 5-325 mg per tablet Take 2 tablets by mouth every 6 (six) hours as needed for moderate painMax Daily Amount: 8 tablets (Patient not taking: Reported on 12/3/2019) 10 tablet 0    phenazopyridine (PYRIDIUM) 200 mg tablet Take 1 tablet (200 mg total) by mouth 3 (three) times a day with meals (Patient not taking: Reported on 12/3/2019) 90 tablet 1     No current facility-administered medications for this visit  ALLERGIES:     Allergies   Allergen Reactions    Morphine Vomiting     Other reaction(s): Vomiting  Other reaction(s): (PIMS) vomiting  Makes her vomit      Sulfa Antibiotics Rash       SOCIAL HISTORY:     Social History     Socioeconomic History    Marital status:      Spouse name: None    Number of children: None    Years of education: None    Highest education level: None   Occupational History    None   Social Needs    Financial resource strain: None    Food insecurity:     Worry: None     Inability: None    Transportation needs:     Medical: None     Non-medical: None   Tobacco Use    Smoking status: Current Every Day Smoker     Packs/day: 0 50     Types: Cigarettes    Smokeless tobacco: Never Used   Substance and Sexual Activity    Alcohol use: Never     Frequency: Never    Drug use: Not Currently    Sexual activity: None   Lifestyle    Physical activity:     Days per week: None     Minutes per session: None    Stress: None   Relationships    Social connections:     Talks on phone: None     Gets together: None     Attends Anabaptism service: None     Active member of club or organization: None     Attends meetings of clubs or organizations: None     Relationship status: None    Intimate partner violence:     Fear of current or ex partner: None     Emotionally abused: None     Physically abused: None     Forced sexual activity: None   Other Topics Concern    None   Social History Narrative    None       SOCIAL HISTORY:   History reviewed  No pertinent family history  REVIEW OF SYSTEMS:     Review of Systems   Constitutional: Negative  Respiratory: Negative  Cardiovascular: Negative  Gastrointestinal: Negative  Genitourinary: Positive for dysuria  Musculoskeletal: Negative  Skin: Positive for rash  Psychiatric/Behavioral: Negative  PHYSICAL EXAM:     /80   Pulse (!) 18   Ht 5' 8" (1 727 m)   Wt 123 kg (271 lb 9 6 oz)   BMI 41 30 kg/m²     General:  Ill appearing female in no acute distress  They have a normal affect  There is not appear to be any gross neurologic defects or abnormalities  HEENT:  Normocephalic, atraumatic  Neck is supple without any palpable lymphadenopathy  Cardiovascular:  Patient has normal palpable distal radial pulses  There is no significant peripheral edema  No JVD is noted  Left chest wall port site with clean incision, some blisters around the Steri-Strips  Respiratory:  Patient has unlabored respirations  There is no audible wheeze or rhonchi  Abdomen:    Abdomen is soft and nontender  There is no tympany  Inguinal and umbilical hernia are not appreciated  Musculoskeletal:  Patient does not have significant CVA tenderness in the  flank with palpation or percussion  They full range of motion in all 4 extremities  Strength in all 4 extremities appears congruent  Patient is able to ambulate without assistance or difficulty  Dermatologic:  Patient has no skin abnormalities or rashes        LABS:     CBC:   Lab Results   Component Value Date    WBC 12 04 (H) 10/19/2019    HGB 9 1 (L) 10/19/2019    HCT 27 5 (L) 10/19/2019    MCV 96 10/19/2019     10/19/2019       BMP:   Lab Results   Component Value Date    CALCIUM 8 9 10/20/2019    K 3 1 (L) 10/20/2019    CO2 28 10/20/2019     10/20/2019    BUN 6 10/20/2019    CREATININE 0 96 10/20/2019       IMAGIN/14/19  PROCEDURE:  Left nephrostogram with nephrostomy catheter removal under fluoroscopic guidance     STAFF:  PRIYA Slater      CONTRAST:  5 mL Omnipaque into collecting system      FLUOROSCOPY TIME:  0 4 minutes pulsed at 7 5 frames per second      NUMBER OF IMAGES:  Multiple      COMPLICATIONS:   None      MEDICATIONS:   1% lidocaine subcutaneous      INDICATION:   History of a left staghorn calculus  A left double-J ureteral stent was placed yesterday, with a left percutaneous nephrostomy left in place as a precautionary measure  The patient presents for removal      PROCEDURE:   Diagnostic nephrostogram was performed  Over a wire and under fluoroscopic guidance, the nephrostomy catheter was removed      FINDINGS:   1  Initial nephrostogram showed the double-J ureteral stent to be unchanged in position  The left percutaneous nephrostomy was present in an upper pole renal calyx  2   Final fluoroscopy shows the double-J ureteral stent to be unchanged in position      IMPRESSION:  Fluoroscopic removal of a left percutaneous nephrostomy catheter         Workstation performed: [id number]    ASSESSMENT:     62 y o  female with Stage IV metastatic leiomyosarcoma and LEFT staghorn calculus, nephrostomy converted to internal JJ stent    PLAN:       The patient, her sister and I discussed the plan to move forward with a routine stent exchange 3 months after the double-J stent was placed  This would be around February  The procedure of a cystoscopy, left retrograde pyelogram and left double-J stent exchange was discussed  Risks and benefits were reviewed  The patient did sign informed consent  Patient knows to contact me with any concerns for urinary infection  She will let me know if there are any questions or concerns after she sees her Oncology Service this coming week

## 2019-12-15 PROBLEM — F17.210 CIGARETTE NICOTINE DEPENDENCE WITHOUT COMPLICATION: Status: ACTIVE | Noted: 2019-10-13

## 2019-12-15 PROBLEM — R50.9 FEVER: Status: ACTIVE | Noted: 2019-01-01

## 2019-12-15 NOTE — ED PROVIDER NOTES
History  Chief Complaint   Patient presents with    Fever - 9 weeks to 74 years     has has a fever since last night and slight dizziness     One day of fever and generalized weakness and fatigue  No cough, sob, abd pain, chest pain, syncope or focal weakness  Mild diffuse associated aching HA and b/l eye pain similar to sxs when she gets a fever and also similar to her chronic migraines  Sxs occur in context of active chemo treatment for leiomyosarcoma, last chemo 2 days ago, similar fevers in past 1-2 days after chemo but also with h/o sepsis requiring prolonged hospitalization  Has indwelling ureteral stent for prior staghorn calculus but denies abd pain, flank pain, and urinary sxs  Prior to Admission Medications   Prescriptions Last Dose Informant Patient Reported? Taking? Misc   Devices KIT  Self Yes No   Sig: Oxygen at night   Multiple Vitamins-Minerals (CENTRUM SILVER 50+WOMEN) TABS  Self Yes No   Sig: Centrum Silver   albuterol (2 5 mg/3 mL) 0 083 % nebulizer solution  Self Yes No   Sig: Take 2 5 mg by nebulization every 6 (six) hours as needed for wheezing or shortness of breath   aspirin 81 mg chewable tablet  Self Yes No   Sig: Chew 81 mg daily   buPROPion (WELLBUTRIN SR) 150 mg 12 hr tablet  Self Yes No   Sig: Take 150 mg by mouth 2 (two) times a day   cyclobenzaprine (FLEXERIL) 10 mg tablet  Self Yes No   Sig: Take 5 mg by mouth 3 (three) times a day as needed for muscle spasms   cyclobenzaprine (FLEXERIL) 5 mg tablet  Self Yes No   gabapentin (NEURONTIN) 600 MG tablet  Self Yes No   Sig: Take 600 mg by mouth daily at bedtime   lisinopril-hydrochlorothiazide (PRINZIDE,ZESTORETIC) 10-12 5 MG per tablet  Self Yes No   Sig: Take 1 tablet by mouth daily   montelukast (SINGULAIR) 10 mg tablet  Self Yes No   Sig: Take 10 mg by mouth daily at bedtime   nicotine (NICODERM CQ) 21 mg/24 hr TD 24 hr patch  Self Yes No   Sig: Place 1 patch on the skin every 24 hours   ondansetron (ZOFRAN) 8 mg tablet Self Yes No   Sig: Take 8 mg by mouth every 8 (eight) hours as needed for nausea or vomiting   oxyCODONE-acetaminophen (PERCOCET) 5-325 mg per tablet   No No   Sig: Take 2 tablets by mouth every 6 (six) hours as needed for moderate painMax Daily Amount: 8 tablets   Patient not taking: Reported on 12/3/2019   phenazopyridine (PYRIDIUM) 200 mg tablet   No No   Sig: Take 1 tablet (200 mg total) by mouth 3 (three) times a day with meals   Patient not taking: Reported on 12/3/2019   potassium chloride (K-DUR,KLOR-CON) 10 mEq tablet  Self Yes No   Sig: Take 10 mEq by mouth daily   sertraline (ZOLOFT) 100 mg tablet  Self Yes No   Sig: Take 100 mg by mouth daily at bedtime   simvastatin (ZOCOR) 20 mg tablet  Self Yes No   Sig: Take 20 mg by mouth daily at bedtime   vitamin B-12 (VITAMIN B-12) 1,000 mcg tablet   Yes No   Sig: Take 1,000 mcg by mouth daily      Facility-Administered Medications: None       Past Medical History:   Diagnosis Date    Asthma     Cancer ([facility] )     COPD (chronic obstructive pulmonary disease) ([facility] )     Hyperlipidemia     Hypertension     Psychiatric disorder     Depression       Past Surgical History:   Procedure Laterality Date    IR PCN TO PCNU CONVERSION  11/13/2019    IR PORT REMOVAL  10/14/2019    IR TUBE PLACEMENT NEPHROSTOMY  10/17/2019    LUNG REMOVAL, PARTIAL      lower lobe       History reviewed  No pertinent family history  I have reviewed and agree with the history as documented  Social History     Tobacco Use    Smoking status: Current Every Day Smoker     Packs/day: 0 50     Types: Cigarettes    Smokeless tobacco: Never Used   Substance Use Topics    Alcohol use: Never     Frequency: Never    Drug use: Not Currently        Review of Systems   Constitutional: Positive for fever  Neurological: Positive for weakness  Physical Exam  Physical Exam   Constitutional: She is oriented to person, place, and time  She appears well-developed and well-nourished   No distress  HENT:   Head: Normocephalic and atraumatic  Eyes: Pupils are equal, round, and reactive to light  Conjunctivae and EOM are normal    Neck: Normal range of motion  Neck supple  No JVD present  Cardiovascular: Regular rhythm, normal heart sounds and intact distal pulses  Exam reveals no gallop and no friction rub  No murmur heard  Tachycardic  Pulmonary/Chest: Effort normal and breath sounds normal  No stridor  She exhibits no tenderness  Well appearing L chest wall port site  No overlying cellulitis or induration or crepitus or tenderness or skin breakdown or drainage  Abdominal: Soft  She exhibits no distension  There is no tenderness  There is no rebound and no guarding  Musculoskeletal: Normal range of motion  She exhibits no edema, tenderness or deformity  Neurological: She is alert and oriented to person, place, and time  No cranial nerve deficit or sensory deficit  She exhibits normal muscle tone  Coordination normal    Skin: Skin is warm and dry  Capillary refill takes less than 2 seconds  No rash noted  She is not diaphoretic  No erythema  No pallor  Nursing note and vitals reviewed        Vital Signs  ED Triage Vitals   Temperature Pulse Respirations Blood Pressure SpO2   12/14/19 2238 12/14/19 2238 12/14/19 2238 12/14/19 2238 12/14/19 2238   100 4 °F (38 °C) (!) 106 22 147/64 92 %      Temp Source Heart Rate Source Patient Position - Orthostatic VS BP Location FiO2 (%)   12/14/19 2238 12/15/19 0000 12/14/19 2238 12/14/19 2238 --   Oral Monitor Sitting Right arm       Pain Score       --                  Vitals:    12/14/19 2238 12/15/19 0000   BP: 147/64 123/58   Pulse: (!) 106 93   Patient Position - Orthostatic VS: Sitting Lying         Visual Acuity      ED Medications  Medications   acetaminophen (TYLENOL) tablet 650 mg (has no administration in time range)   vancomycin (VANCOCIN) 1,250 mg in sodium chloride 0 9 % 250 mL IVPB (1,250 mg Intravenous New Bag 12/14/19 6054) sodium chloride 0 9 % bolus 1,000 mL (has no administration in time range)   sodium chloride 0 9 % bolus 1,000 mL (1,000 mL Intravenous New Bag 12/14/19 2308)   cefepime (MAXIPIME) 2,000 mg in dextrose 5 % 50 mL IVPB (0 mg Intravenous Stopped 12/14/19 2348)   ibuprofen (MOTRIN) tablet 600 mg (600 mg Oral Given 12/14/19 2252)   ondansetron (ZOFRAN-ODT) dispersible tablet 4 mg (4 mg Oral Given 12/15/19 0020)       Diagnostic Studies  Results Reviewed     Procedure Component Value Units Date/Time    Troponin I [[id number]]     Lab Status:  No result Specimen:  Blood     Urine Microscopic [[id number]]  (Abnormal) Collected:  12/15/19 0013    Lab Status:  Final result Specimen:  Urine, Other Updated:  12/15/19 0027     RBC, UA None Seen /hpf      WBC, UA Innumerable /hpf      Epithelial Cells Occasional /hpf      Bacteria, UA Occasional /hpf     UA (URINE) with reflex to Scope [[id number]]  (Abnormal) Collected:  12/15/19 0013    Lab Status:  Final result Specimen:  Urine, Other Updated:  12/15/19 0020     Color, UA Yellow     Clarity, UA Slightly Cloudy     Specific Gravity, UA 1 025     pH, UA 5 5     Leukocytes, UA Large     Nitrite, UA Negative     Protein, UA Trace mg/dl      Glucose, UA Negative mg/dl      Ketones, UA Negative mg/dl      Urobilinogen, UA 0 2 E U /dl      Bilirubin, UA Negative     Blood, UA Moderate    Urine culture [[id number]] Collected:  12/15/19 0014    Lab Status:   In process Specimen:  Urine, Clean Catch Updated:  12/15/19 1216    Basic metabolic panel [[id number]]  (Abnormal) Collected:  12/14/19 2302    Lab Status:  Final result Specimen:  Blood from Arm, Left Updated:  12/14/19 2339     Sodium 137 mmol/L      Potassium 3 6 mmol/L      Chloride 99 mmol/L      CO2 30 mmol/L      ANION GAP 8 mmol/L      BUN 15 mg/dL      Creatinine 1 04 mg/dL      Glucose 137 mg/dL      Calcium 9 0 mg/dL      eGFR 57 ml/min/1 73sq m     Narrative:       [place] guidelines for Chronic Kidney Disease (CKD):     Stage 1 with normal or high GFR (GFR > 90 mL/min/1 73 square meters)    Stage 2 Mild CKD (GFR = 60-89 mL/min/1 73 square meters)    Stage 3A Moderate CKD (GFR = 45-59 mL/min/1 73 square meters)    Stage 3B Moderate CKD (GFR = 30-44 mL/min/1 73 square meters)    Stage 4 Severe CKD (GFR = 15-29 mL/min/1 73 square meters)    Stage 5 End Stage CKD (GFR <15 mL/min/1 73 square meters)  Note: GFR calculation is accurate only with a steady state creatinine    Hepatic function panel [[id number]]  (Abnormal) Collected:  12/14/19 2302    Lab Status:  Final result Specimen:  Blood from Arm, Left Updated:  12/14/19 2339     Total Bilirubin 0 50 mg/dL      Bilirubin, Direct 0 17 mg/dL      Alkaline Phosphatase 104 U/L      AST 32 U/L      ALT 54 U/L      Total Protein 7 1 g/dL      Albumin 3 2 g/dL     Lactic acid, plasma x2 [[id number]]  (Normal) Collected:  12/14/19 2302    Lab Status:  Final result Specimen:  Blood from Arm, Left Updated:  12/14/19 2332     LACTIC ACID 1 2 mmol/L     Narrative:       Result may be elevated if tourniquet was used during collection      Troponin I [[id number]]  (Normal) Collected:  12/14/19 2302    Lab Status:  Final result Specimen:  Blood from Arm, Left Updated:  12/14/19 2332     Troponin I <0 02 ng/mL     CBC and differential [[id number]]  (Abnormal) Collected:  12/14/19 2302    Lab Status:  Final result Specimen:  Blood from Arm, Left Updated:  12/14/19 2330     WBC 11 07 Thousand/uL      RBC 3 71 Million/uL      Hemoglobin 12 2 g/dL      Hematocrit 36 7 %      MCV 99 fL      MCH 32 9 pg      MCHC 33 2 g/dL      RDW 14 6 %      MPV 9 6 fL      Platelets 125 Thousands/uL      nRBC 0 /100 WBCs      Neutrophils Relative 91 %      Immat GRANS % 0 %      Lymphocytes Relative 8 %      Monocytes Relative 1 %      Eosinophils Relative 0 %      Basophils Relative 0 %      Neutrophils Absolute 9 95 Thousands/µL      Immature Grans Absolute 0 04 Thousand/uL      Lymphocytes Absolute 0 91 Thousands/µL      Monocytes Absolute 0 11 Thousand/µL      Eosinophils Absolute 0 04 Thousand/µL      Basophils Absolute 0 02 Thousands/µL     Protime-INR [[id number]]  (Normal) Collected:  12/14/19 2302    Lab Status:  Final result Specimen:  Blood from Arm, Left Updated:  12/14/19 2325     Protime 13 4 seconds      INR 1 02    APTT [[id number]]  (Normal) Collected:  12/14/19 2302    Lab Status:  Final result Specimen:  Blood from Arm, Left Updated:  12/14/19 2325     PTT 34 seconds     Blood culture #2 [[id number]] Collected:  12/14/19 2306    Lab Status: In process Specimen:  Blood from Arm, Right Updated:  12/14/19 2313    Blood culture #1 [[id number]] Collected:  12/14/19 2302    Lab Status: In process Specimen:  Blood from Arm, Left Updated:  12/14/19 2307    Lactic acid, plasma x2 [[id number]]     Lab Status:  No result Specimen:  Blood                  XR chest 2 views    (Results Pending)        Interpreted by me  No acute process         Procedures  ECG 12 Lead Documentation Only  Date/Time: 12/14/2019 10:59 PM  Performed by: Luisa Shields MD  Authorized by: Luisa Shields MD     Indications / Diagnosis:  Weakness  ECG reviewed by me, the ED Provider: yes    Patient location:  ED  Previous ECG:     Previous ECG:  Compared to current    Comparison ECG info:  12 oct 2019    Similarity:  No change  Interpretation:     Interpretation: normal    Rate:     ECG rate:  99    ECG rate assessment: normal    Rhythm:     Rhythm: sinus rhythm               ED Course                               MDM      Disposition  Final diagnoses:   Fever   Weakness     Time reflects when diagnosis was documented in both MDM as applicable and the Disposition within this note     Time User Action Codes Description Comment    12/15/2019 12:53 AM Ashley Lizabeth Add [R50 9] Fever     12/15/2019 12:53 AM Ashley Lizabeth Add [R53 1] Weakness       ED Disposition     ED Disposition Condition Date/Time Comment    Admit Stable Sun Dec 15, 2019 12:53 AM Case was discussed with Dr Nidhi Mcallister and the patient's admission status was agreed to be Admission Status: observation status to the service of Dr Nidhi Mcallister   Follow-up Information    None         Patient's Medications   Discharge Prescriptions    No medications on file     No discharge procedures on file      ED Provider  Electronically Signed by           José Sánchez MD  12/15/19 9900

## 2019-12-15 NOTE — QUICK NOTE
Patient seen and examined  Denies chest pain, headaches, dizziness, worsening shortness of breath, worsening cough, urinary symptoms, abdominal pain  Reports feeling nauseous and has episodes of vomiting since chemo therapy on Thursday last week  Reports poor appetite  Lungs clear to auscultation  Heart regular  Abdomen soft nontender  No edema to lower extremities  Labs reviewed  WBC normalized  Potassium 3 3   UA shows bacteriuria  Urine culture, blood culture, procalcitonin pending  Vital signs reviewed  Temperature 100 4 this morning  Resume IV Vanco, cefepime while await ID consult  Gentle IV fluids  Hold hydrochlorothiazide, resume lisinopril for hypertension

## 2019-12-15 NOTE — PROGRESS NOTES
Vancomycin Assessment    Dave Simms is a 62 y o  female who is currently receiving vancomycin 15mg/kg q12h  Relevant clinical data and objective history reviewed:  Creatinine   Date Value Ref Range Status   12/15/2019 0 91 0 60 - 1 30 mg/dL Final     Comment:     Standardized to IDMS reference method   12/14/2019 1 04 0 60 - 1 30 mg/dL Final     Comment:     Standardized to IDMS reference method   10/20/2019 0 96 0 60 - 1 30 mg/dL Final     Comment:     Standardized to IDMS reference method     /70 (BP Location: Left arm)   Pulse (!) 112 Comment: RN aware of pulse  Temp (!) 102 9 °F (39 4 °C) (Oral) Comment: RN aware of temp  Resp (!) 27   Ht 5' 8" (1 727 m)   Wt 123 kg (271 lb 2 7 oz)   SpO2 98%   BMI 41 23 kg/m²   No intake/output data recorded  Lab Results   Component Value Date/Time    BUN 15 12/15/2019 05:23 AM    WBC 10 06 12/15/2019 05:22 AM    HGB 10 8 (L) 12/15/2019 05:22 AM    HCT 32 7 (L) 12/15/2019 05:22 AM    MCV 99 (H) 12/15/2019 05:22 AM     12/15/2019 05:22 AM     Temp Readings from Last 3 Encounters:   12/15/19 (!) 102 9 °F (39 4 °C) (Oral)   11/13/19 97 9 °F (36 6 °C) (Oral)   10/20/19 98 4 °F (36 9 °C) (Oral)     Vancomycin Days of Therapy: 1    Assessment/Plan  The patient is currently on vancomycin utilizing scheduled dosing based on adjusted body weight (due to obesity)  Baseline risks associated with therapy include: none  The patient is currently receiving 15mg/kg q12h and after clinical evaluation will be changed to 17 5mg/kg q12h  Pharmacy will also follow closely for s/sx of nephrotoxicity, infusion reactions and appropriateness of therapy  BMP and CBC will be ordered per protocol  Plan for trough as patient approaches steady state, prior to the 5th  dose at approximately 1500 on 12/17  Due to infection severity, will target a trough of 15-20 (appropriate for most indications)     Pharmacy will continue to follow the patients culture results and clinical progress daily      Jessica Dyer, Pharmacist

## 2019-12-15 NOTE — ASSESSMENT & PLAN NOTE
· With metastatic leiomyosarcoma, follows with oncologist in [facility] and is currently on palliative chemotherapy  · Outpatient follow-up as indicated

## 2019-12-15 NOTE — ASSESSMENT & PLAN NOTE
· Blood pressure low normal however maintaining MAP> 65  · Continue antihypertensives with strict hold parameters, blood pressure monitoring per protocol

## 2019-12-15 NOTE — H&P
H&P- Jessie Patel 1955, 62 y o  female MRN: [id number]    Unit/Bed#: ED 19 Encounter: [id number]    Primary Care Provider: Berhane Sahu MD   Date and time admitted to hospital: 12/14/2019 10:32 PM        * Fever  Assessment & Plan  · With 1 day duration of this with reported T-max at home 101 4° F   Has been afebrile throughout ED evaluation  · Additionally with leukocytosis and patient immunocompromised in the setting of chemotherapy  · Cannot exclude infection at this juncture although source unclear; UA noted and patient with history of staghorn calculi however patient without urinary symptoms  Additionally patient with history of port infection however current port without surrounding erythema or tenderness to palpation  · Received cefepime and vancomycin in ED; will request ID inputs for need for further antibiotic therapy  · Follow up results of blood cultures x2 and urine culture  · Trend WBC, temperature curve, hemodynamics    Hypertension  Assessment & Plan  · Blood pressure low normal however maintaining MAP> 65  · Continue antihypertensives with strict hold parameters, blood pressure monitoring per protocol    Leiomyosarcoma (HCC)  Assessment & Plan  · With metastatic leiomyosarcoma, follows with oncologist in [facility] and is currently on palliative chemotherapy  · Outpatient follow-up as indicated    Cigarette nicotine dependence without complication  Assessment & Plan  · Provide nicotine replacement therapy  · Smoking cessation counseling      VTE Prophylaxis: Enoxaparin (Lovenox)  / sequential compression device   Code Status: Level 3 - DNAR and DNI as discussed with patient  POLST: POLST form is not discussed and not completed at this time  Anticipated Length of Stay:  Patient will be admitted on an Observation basis with an anticipated length of stay of  less than 2 midnights  Justification for Hospital Stay: Please see detailed plans noted above      Chief Complaint: Fever  History of Present Illness:  Marine Forbes is a 62 y o  female who has a past medical history significant for leiomyosarcoma who is presently receiving palliative chemotherapy last received 3 days prior to this admission  Additionally had hospitalization 10/2019 at this hospital for sepsis secondary to suspected port a cath infection with Port-A-Cath removed during the hospitalization and since replaced on left side, and also with history of staghorn calculi  She presents with a one-day history of fever and generalized weakness  Reported steadily increasing temperatures throughout the day, with maximum temperature report for patient at 101 4° F  She discuss this with her physician, who advised patient seek medical attention  She admit to intermittent headache, but denied any cough, shortness breath, abdominal pain/nausea/vomiting/diarrhea, dysuria or urinary urgency/frequency  Admitted to some blistering with application of tape but otherwise denies any new rashes or lesions and notes no significant pain or swelling near port-a-cath site  Patient with temperature 100 4° F on arrival to ED; ED workup unable to locate clear source of infection however patient received dose of broad-spectrum antibiotics given concern for immunosuppression  At the time of my evaluation, patient is lying in bed in no acute distress  Reports improvement in headache at this time, and denies current subjective fever or chills  Offers no acute complaints at this time      Review of Systems:    Constitutional:  Endorses fever, chills, weakness, fatigue   Eyes:  Denies change in visual acuity   HENT:  Denies nasal congestion or sore throat   Respiratory:  Denies cough or shortness of breath   Cardiovascular:  Denies chest pain or edema   GI:  Denies abdominal pain, nausea, vomiting, bloody stools or diarrhea   :  Denies dysuria   Musculoskeletal:  Denies back pain or joint pain   Integument:  Denies rash   Neurologic: Denies headache, focal weakness or sensory changes   Endocrine:  Denies polyuria or polydipsia   Lymphatic:  Denies swollen glands   Psychiatric:  Denies depression or anxiety     Past Medical and Surgical History:   Past Medical History:   Diagnosis Date    Asthma     [facility] )     [facility]     Hyperlipidemia     Hypertension     Psychiatric disorder     Depression     Past Surgical History:   Procedure Laterality Date    IR PCN TO PCNU CONVERSION  11/13/2019    IR PORT REMOVAL  10/14/2019    IR TUBE PLACEMENT NEPHROSTOMY  10/17/2019    LUNG REMOVAL, PARTIAL      lower lobe       Meds/Allergies:    Current Facility-Administered Medications:     acetaminophen (TYLENOL) tablet 650 mg, 650 mg, Oral, Once, Steele Mapleton, DO    albuterol inhalation solution 2 5 mg, 2 5 mg, Nebulization, Q6H PRN, Steele Mapleton, DO    aspirin chewable tablet 81 mg, 81 mg, Oral, Daily, Steele Mapleton, DO    buPROPion [facility] SR) 12 hr tablet 150 mg, 150 mg, Oral, BID, Steele Mapleton, DO    cyanocobalamin (VITAMIN B-12) tablet 1,000 mcg, 1,000 mcg, Oral, Daily, Steele Mapleton, DO    enoxaparin (LOVENOX) subcutaneous injection 40 mg, 40 mg, Subcutaneous, Q24H [address], Steele Mapleton, DO    gabapentin (NEURONTIN) capsule 600 mg, 600 mg, Oral, HS, Steele Mapleton, DO    lisinopril-hydrochlorothiazide (PRINZIDE 10/12  5) combo dose, , Oral, Daily, Steele Mapleton, DO    melatonin tablet 3 mg, 3 mg, Oral, HS, Steele Mapleton, DO    montelukast (SINGULAIR) tablet 10 mg, 10 mg, Oral, HS, Steele Mapleton, DO    multivitamin-minerals (CENTRUM) tablet 1 tablet, 1 tablet, Oral, Daily, Steele Mapleton, DO    nicotine (NICODERM CQ) 21 mg/24 hr TD 24 hr patch 1 patch, 1 patch, Transdermal, Q24H, Steele Mapleton, DO    pravastatin (PRAVACHOL) tablet 40 mg, 40 mg, Oral, Daily With Dinner, Steele Mapleton, DO    sertraline (ZOLOFT) tablet 100 mg, 100 mg, Oral, HS, Steele Mapleton, DO    Current Outpatient Medications:     albuterol (2 5 mg/3 mL) 0 083 % nebulizer solution, Take 2 5 mg by nebulization every 6 (six) hours as needed for wheezing or shortness of breath, Disp: , Rfl:     aspirin 81 mg chewable tablet, Chew 81 mg daily, Disp: , Rfl:     buPROPion (WELLBUTRIN SR) 150 mg 12 hr tablet, Take 150 mg by mouth 2 (two) times a day, Disp: , Rfl:     cyclobenzaprine (FLEXERIL) 5 mg tablet, , Disp: , Rfl: 0    gabapentin (NEURONTIN) 600 MG tablet, Take 600 mg by mouth daily at bedtime, Disp: , Rfl:     lisinopril-hydrochlorothiazide (PRINZIDE,ZESTORETIC) 10-12 5 MG per tablet, Take 1 tablet by mouth daily, Disp: , Rfl:     montelukast (SINGULAIR) 10 mg tablet, Take 10 mg by mouth daily at bedtime, Disp: , Rfl:     Multiple Vitamins-Minerals (CENTRUM SILVER 50+WOMEN) TABS, Centrum Silver, Disp: , Rfl:     nicotine (NICODERM CQ) 21 mg/24 hr TD 24 hr patch, Place 1 patch on the skin every 24 hours, Disp: , Rfl:     ondansetron (ZOFRAN) 8 mg tablet, Take 8 mg by mouth every 8 (eight) hours as needed for nausea or vomiting, Disp: , Rfl:     potassium chloride (K-DUR,KLOR-CON) 10 mEq tablet, Take 10 mEq by mouth daily, Disp: , Rfl:     sertraline (ZOLOFT) 100 mg tablet, Take 100 mg by mouth daily at bedtime, Disp: , Rfl:     simvastatin (ZOCOR) 20 mg tablet, Take 20 mg by mouth daily at bedtime, Disp: , Rfl:     vitamin B-12 (VITAMIN B-12) 1,000 mcg tablet, Take 1,000 mcg by mouth daily, Disp: , Rfl:     Misc  Devices KIT, Oxygen at night, Disp: , Rfl:     Allergies:    Allergies   Allergen Reactions    Morphine Vomiting     Other reaction(s): Vomiting  Other reaction(s): (PIMS) vomiting  Makes her vomit      Sulfa Antibiotics Rash     History:  Marital Status:      Substance Use History:   Social History     Substance and Sexual Activity   Alcohol Use Never    Frequency: Never     Social History     Tobacco Use   Smoking Status Current Every Day Smoker    Packs/day: 0 50    Types: Cigarettes   Smokeless Tobacco Never Used     Social History     Substance and Sexual Activity   Drug Use Not Currently       Family History:  History reviewed  No pertinent family history  Physical Exam:     Vitals:   Blood Pressure: 109/61 (12/15/19 0200)  Pulse: 82 (12/15/19 0200)  Temperature: 100 4 °F (38 °C) (12/14/19 2238)  Temp Source: Oral (12/14/19 2238)  Respirations: 16 (12/15/19 0200)  Height: 5' 6" (167 6 cm) (12/14/19 2238)  Weight - Scale: 123 kg (270 lb 8 1 oz) (12/14/19 2238)  SpO2: 93 % (12/15/19 0200)    Constitutional:  Well developed, obese, no acute distress, non-toxic appearance   Eyes:  PERRL, conjunctiva normal   HENT:  Atraumatic, scalp mass present without surrounding erythema, external ears normal, nose normal, oropharynx moist, no pharyngeal exudates  Neck- normal range of motion, no tenderness, supple   Respiratory:  No respiratory distress, normal breath sounds, no rales, no wheezing   Cardiovascular:  Normal rate, normal rhythm, no murmurs, no gallops, no rubs   GI:  Soft, nondistended, normal bowel sounds, nontender, no organomegaly, no mass, no rebound, no guarding   :  No costovertebral angle tenderness   Musculoskeletal:  No edema, no tenderness, no deformities  Back- no tenderness  Integument:  Well hydrated, no rash, left chest wall port site without surrounding erythema, crepitus, or tenderness  Lymphatic:  No lymphadenopathy noted   Neurologic:  Alert &awake, communicative, CN 2-12 normal, normal motor function, normal sensory function, no focal deficits noted   Psychiatric:  Speech and behavior appropriate       Lab Results: I have personally reviewed pertinent reports        Results from last 7 days   Lab Units 12/14/19  2302   WBC Thousand/uL 11 07*   HEMOGLOBIN g/dL 12 2   HEMATOCRIT % 36 7   PLATELETS Thousands/uL 172   NEUTROS PCT % 91*   LYMPHS PCT % 8*   MONOS PCT % 1*   EOS PCT % 0     Results from last 7 days   Lab Units 12/14/19  2302   POTASSIUM mmol/L 3 6 CHLORIDE mmol/L 99*   CO2 mmol/L 30   BUN mg/dL 15   CREATININE mg/dL 1 04   CALCIUM mg/dL 9 0   ALK PHOS U/L 104   ALT U/L 54   AST U/L 32     Results from last 7 days   Lab Units 12/14/19  2302   INR  1 02       EKG:  Normal sinus rhythm    Imaging: I have personally reviewed pertinent films in PACS    CXR:  Personally reviewed, no acute cardiopulmonary disease visualized  Final radiologist read is pending  ** Please Note: Dragon 360 Dictation voice to text software was used in the creation of this document   **

## 2019-12-15 NOTE — ED NOTES
Patient transported to [address] [place] [address], [address] [place] [address]  12/14/19 1360

## 2019-12-15 NOTE — UTILIZATION REVIEW
Initial Clinical Review - admitted as Observation on 12/15/19 @ 0052  Changed to Inpatient on 12/15/19 @ for continued evaluation and treatment of fever in patient receiving palliative chemotherapy; administration of IV antibiotics, IV fluids  Admission: Date/Time/Statement:    Start   Ordered   12/15/19 1613  Inpatient Admission Once     Transfer Service: Hospitalist       Question Answer Comment   Admitting Physician Osbaldo Turner of Care Med Surg    Estimated length of stay More than 2 Midnights    Certification I certify that inpatient services are medically necessary for this patient for a duration of greater than two midnights  See H&P and MD Progress Notes for additional information about the patient's course of treatment  12/15/19 1613       ED Arrival Information     Expected Arrival Acuity Means of Arrival Escorted By Service Admission Type    - 12/14/2019 22:30 Emergent Walk-In Family [facility] Hospitalist Emergency    Arrival Complaint    Carmen 36 PT        Chief Complaint   Patient presents with    Fever - 9 weeks to 74 years     has has a fever since last night and slight dizziness     HPI:  Alexander Obrien is a 62 y o  female who has a past medical history significant for leiomyosarcoma who is presently receiving palliative chemotherapy, last received 3 days prior to this admission  Additionally had hospitalization 10/2019 at this hospital for sepsis secondary to suspected port a cath infection with Port-A-Cath removed during the hospitalization and since replaced on left side, and also with history of staghorn calculi  She presents with a one-day history of fever and generalized weakness  Reported steadily increasing temperatures throughout the day, with maximum temperature report for patient at 101 4° F  She discuss this with her physician, who advised patient seek medical attention    She admit to intermittent headache, but denied any cough, shortness breath, abdominal pain/nausea/vomiting/diarrhea, dysuria or urinary urgency/frequency  Admitted to some blistering with application of tape but otherwise denies any new rashes or lesions and notes no significant pain or swelling near port-a-cath site  Patient with temperature 100 4° F on arrival to ED; ED workup unable to locate clear source of infection however patient received dose of broad-spectrum antibiotics given concern for immunosuppression  Plan: Admit to Observation for evaluation and treatment of fever  Blood and urine cultures, trend WBC, temperature curve  Consult Infectious Disease  12/15 Hospitalist note:  Patient seen and examined  Denies chest pain, headaches, dizziness, worsening shortness of breath, worsening cough, urinary symptoms, abdominal pain  Reports feeling nauseous and has episodes of vomiting since chemo therapy on Thursday last week  Reports poor appetite      Lungs clear to auscultation  Heart regular  Abdomen soft nontender  No edema to lower extremities      Labs reviewed  WBC normalized  Potassium 3 3   UA shows bacteriuria  Urine culture, blood culture, procalcitonin pending  Vital signs reviewed  Temperature 100 4 this morning      Resume IV Vanco, cefepime while await ID consult  Gentle IV fluids  Hold hydrochlorothiazide, resume lisinopril for hypertension  On 12/15 @ 1425, patient developed a fever of 102 9       ED Triage Vitals   Temperature Pulse Respirations Blood Pressure SpO2   12/14/19 2238 12/14/19 2238 12/14/19 2238 12/14/19 2238 12/14/19 2238   100 4 °F (38 °C) (!) 106 22 147/64 92 %      Temp Source Heart Rate Source Patient Position - Orthostatic VS BP Location FiO2 (%)   12/14/19 2238 12/15/19 0000 12/14/19 2238 12/14/19 2238 --   Oral Monitor Sitting Right arm       Pain Score       12/15/19 0223       No Pain        Wt Readings from Last 1 Encounters:   12/14/19 123 kg (270 lb 8 1 oz)     Additional Vital Signs:     Date/Time  Temp  Pulse  Resp  BP  SpO2 12/15/19 1425  102 9 °F   112Abnormal     144/70  98 %      Date/Time  Temp  Pulse  Resp  BP  SpO2  O2 Device    12/15/19 0813  100 4   104  21  128/87  93 %  None (Room air)    12/15/19 0200    82  16  109/61  93 %  Nasal cannula    12/15/19 0100    93  22  115/57  92 %  None (Room air)    12/15/19 0000    93  18  123/58  92 %  None (Room air)        Pertinent Labs/Diagnostic Test Results:   Results from last 7 days   Lab Units 12/15/19  0522 12/14/19  2302   WBC Thousand/uL 10 06 11 07*   HEMOGLOBIN g/dL 10 8* 12 2   HEMATOCRIT % 32 7* 36 7   PLATELETS Thousands/uL 158 172   NEUTROS ABS Thousands/µL  --  9 95*         Results from last 7 days   Lab Units 12/15/19  0523 12/14/19  2302   SODIUM mmol/L 136 137   POTASSIUM mmol/L 3 3* 3 6   CHLORIDE mmol/L 101 99*   CO2 mmol/L 26 30   ANION GAP mmol/L 9 8   BUN mg/dL 15 15   CREATININE mg/dL 0 91 1 04   EGFR ml/min/1 73sq m 67 57   CALCIUM mg/dL 8 8 9 0   MAGNESIUM mg/dL 1 9  --      Results from last 7 days   Lab Units 12/14/19  2302   AST U/L 32   ALT U/L 54   ALK PHOS U/L 104   TOTAL PROTEIN g/dL 7 1   ALBUMIN g/dL 3 2*   TOTAL BILIRUBIN mg/dL 0 50   BILIRUBIN DIRECT mg/dL 0 17         Results from last 7 days   Lab Units 12/15/19  0523 12/14/19  2302   GLUCOSE RANDOM mg/dL 125 137       Results from last 7 days   Lab Units 12/14/19  2302   TROPONIN I ng/mL <0 02         Results from last 7 days   Lab Units 12/14/19  2302   PROTIME seconds 13 4   INR  1 02   PTT seconds 34             Results from last 7 days   Lab Units 12/14/19  2302   LACTIC ACID mmol/L 1 2     Results from last 7 days   Lab Units 12/15/19  0013   CLARITY UA  Slightly Cloudy   COLOR UA  Yellow   SPEC GRAV UA  1 025   PH UA  5 5   GLUCOSE UA mg/dl Negative   KETONES UA mg/dl Negative   BLOOD UA  Moderate*   PROTEIN UA mg/dl Trace*   NITRITE UA  Negative   BILIRUBIN UA  Negative   UROBILINOGEN UA E U /dl 0 2   LEUKOCYTES UA  Large*   WBC UA /hpf Innumerable*   RBC UA /hpf None Seen BACTERIA UA /hpf Occasional   EPITHELIAL CELLS WET PREP /hpf Occasional     12/14 Chest x-ray: no acute process  ED Treatment:   Medication Administration from 12/14/2019 2230 to 12/15/2019 8873       Date/Time Order Dose Route Action Comments     12/14/2019 2308 sodium chloride 0 9 % bolus 1,000 mL 1,000 mL Intravenous New Bag      12/14/2019 2356 vancomycin (VANCOCIN) 1,250 mg in sodium chloride 0 9 % 250 mL IVPB 1,250 mg Intravenous New Bag      12/14/2019 2308 cefepime (MAXIPIME) 2,000 mg in dextrose 5 % 50 mL IVPB 2,000 mg Intravenous New Bag      12/14/2019 2252 ibuprofen (MOTRIN) tablet 600 mg 600 mg Oral Given      12/15/2019 0230 sodium chloride 0 9 % bolus 1,000 mL 1,000 mL Intravenous New Bag      12/15/2019 0020 ondansetron (ZOFRAN-ODT) dispersible tablet 4 mg 4 mg Oral Given      12/15/2019 0544 gabapentin (NEURONTIN) capsule 600 mg 600 mg Oral Given      12/15/2019 0611 sertraline (ZOLOFT) tablet 100 mg 100 mg Oral Given      12/15/2019 0805 ondansetron (ZOFRAN) injection 4 mg 4 mg Intravenous Given         Past Medical History:   Diagnosis Date    Asthma     [facility] )     COPD (chronic obstructive pulmonary disease) ([facility] )     Hyperlipidemia     Hypertension     Psychiatric disorder     Depression     Present on Admission:   Fever   Leiomyosarcoma ([facility] )   Cigarette nicotine dependence without complication   Hypertension      Admitting Diagnosis: Fever [R50 9]  Age/Sex: 62 y o  female     Admission Orders: SCD, incentive spirometry       Scheduled Medications:    Scheduled Meds:  Current Facility-Administered Medications:  acetaminophen 650 mg Oral Once   acetaminophen 650 mg Oral Q6H PRN   albuterol 2 5 mg Nebulization Q6H PRN   aspirin 81 mg Oral Daily   buPROPion 150 mg Oral BID   cefepime 1,000 mg Intravenous Q12H   vitamin B-12 1,000 mcg Oral Daily   enoxaparin 40 mg Subcutaneous Q24H SHARRI   gabapentin 600 mg Oral HS   lisinopril 10 mg Oral Daily   melatonin 3 mg Oral HS montelukast 10 mg Oral HS   multivitamin-minerals 1 tablet Oral Daily   nicotine 1 patch Transdermal Q24H   ondansetron 4 mg Intravenous Q4H PRN   potassium chloride 40 mEq Oral TID With Meals   pravastatin 40 mg Oral Daily With Dinner   saccharomyces boulardii 250 mg Oral BID   sertraline 100 mg Oral HS   vancomycin 17 5 mg/kg (Adjusted) Intravenous Q12H     Continuous IV Infusions:    sodium chloride 0 9 % infusion   Rate: 50 mL/hr Dose: 50 mL/hr  Freq: Continuous Route: IV  Indications of Use: IV Hydration  Last Dose: 50 mL/hr (12/15/19 1427)  Start: 12/15/19 1015     PRN Meds:    albuterol 2 5 mg Nebulization Q6H PRN   ondansetron 4 mg Intravenous Q4H PRN       IP CONSULT TO INFECTIOUS DISEASES        Network Utilization Review Department  [email or web address]  ATTENTION: Please call with any questions or concerns to [phone] and carefully listen to the prompts so that you are directed to the right person  All voicemails are confidential   Shan Pineda all requests for admission clinical reviews, approved or denied determinations and any other requests to dedicated fax number below belonging to the campus where the patient is receiving treatment   List of dedicated fax numbers for the Facilities:  [address] DENIALS (Administrative/Medical Necessity) [phone]   [address] (Maternity/NICU/Pediatrics) [phone]   Corona Li [phone]   Tracey [place] [phone]   [address] [place] [address] [phone]   145 [facility] 1525 [facility] [phone]   Freditroy Dickerson [phone]   Trinda Liming [address] 1000 [facility] [phone]

## 2019-12-15 NOTE — ASSESSMENT & PLAN NOTE
· With 1 day duration of this with reported T-max at home 101 4° F   Has been afebrile throughout ED evaluation  · Additionally with leukocytosis and patient immunocompromised in the setting of chemotherapy  · Cannot exclude infection at this juncture although source unclear; UA noted and patient with history of staghorn calculi however patient without urinary symptoms    Additionally patient with history of port infection however current port without surrounding erythema or tenderness to palpation  · Received cefepime and vancomycin in ED; will request ID inputs for need for further antibiotic therapy  · Follow up results of blood cultures x2 and urine culture  · Trend WBC, temperature curve, hemodynamics

## 2019-12-16 PROBLEM — R65.10 SIRS (SYSTEMIC INFLAMMATORY RESPONSE SYNDROME) (HCC): Status: ACTIVE | Noted: 2019-10-13

## 2019-12-16 NOTE — PLAN OF CARE
Problem: PAIN - ADULT  Goal: Verbalizes/displays adequate comfort level or baseline comfort level  Description  Interventions:  - Encourage patient to monitor pain and request assistance  - Assess pain using appropriate pain scale  - Administer analgesics based on type and severity of pain and evaluate response  - Implement non-pharmacological measures as appropriate and evaluate response  - Consider cultural and social influences on pain and pain management  - Notify physician/advanced practitioner if interventions unsuccessful or patient reports new pain  Outcome: Progressing     Problem: INFECTION - ADULT  Goal: Absence or prevention of progression during hospitalization  Description  INTERVENTIONS:  - Assess and monitor for signs and symptoms of infection  - Monitor lab/diagnostic results  - Monitor all insertion sites, i e  indwelling lines, tubes, and drains  - Monitor endotracheal if appropriate and nasal secretions for changes in amount and color  - [place] appropriate cooling/warming therapies per order  - Administer medications as ordered  - Instruct and encourage patient and family to use good hand hygiene technique  - Identify and instruct in appropriate isolation precautions for identified infection/condition  Outcome: Progressing  Goal: Absence of fever/infection during neutropenic period  Description  INTERVENTIONS:  - Monitor WBC    Outcome: Progressing     Problem: SAFETY ADULT  Goal: Patient will remain free of falls  Description  INTERVENTIONS:  - Assess patient frequently for physical needs  -  Identify cognitive and physical deficits and behaviors that affect risk of falls    -  [place] fall precautions as indicated by assessment   - Educate patient/family on patient safety including physical limitations  - Instruct patient to call for assistance with activity based on assessment  - Modify environment to reduce risk of injury  - Consider OT/PT consult to assist with strengthening/mobility  Outcome: Progressing  Goal: Maintain or return to baseline ADL function  Description  INTERVENTIONS:  -  Assess patient's ability to carry out ADLs; assess patient's baseline for ADL function and identify physical deficits which impact ability to perform ADLs (bathing, care of mouth/teeth, toileting, grooming, dressing, etc )  - Assess/evaluate cause of self-care deficits   - Assess range of motion  - Assess patient's mobility; develop plan if impaired  - Assess patient's need for assistive devices and provide as appropriate  - Encourage maximum independence but intervene and supervise when necessary  - Involve family in performance of ADLs  - Assess for home care needs following discharge   - Consider OT consult to assist with ADL evaluation and planning for discharge  - Provide patient education as appropriate  Outcome: Progressing  Goal: Maintain or return mobility status to optimal level  Description  INTERVENTIONS:  - Assess patient's baseline mobility status (ambulation, transfers, stairs, etc )    - Identify cognitive and physical deficits and behaviors that affect mobility  - Identify mobility aids required to assist with transfers and/or ambulation (gait belt, sit-to-stand, lift, walker, cane, etc )  - [place] fall precautions as indicated by assessment  - Record patient progress and toleration of activity level on Mobility SBAR; progress patient to next Phase/Stage  - Instruct patient to call for assistance with activity based on assessment  - Consider rehabilitation consult to assist with strengthening/weightbearing, etc   Outcome: Progressing     Problem: DISCHARGE PLANNING  Goal: Discharge to home or other facility with appropriate resources  Description  INTERVENTIONS:  - Identify barriers to discharge w/patient and caregiver  - Arrange for needed discharge resources and transportation as appropriate  - Identify discharge learning needs (meds, wound care, etc )  - Arrange for interpretive services to assist at discharge as needed  - Refer to Case Management Department for coordinating discharge planning if the patient needs post-hospital services based on physician/advanced practitioner order or complex needs related to functional status, cognitive ability, or social support system  Outcome: Progressing     Problem: Knowledge Deficit  Goal: Patient/family/caregiver demonstrates understanding of disease process, treatment plan, medications, and discharge instructions  Description  Complete learning assessment and assess knowledge base  Interventions:  - Provide teaching at level of understanding  - Provide teaching via preferred learning methods  Outcome: Progressing     Problem: Potential for Falls  Goal: Patient will remain free of falls  Description  INTERVENTIONS:  - Assess patient frequently for physical needs  -  Identify cognitive and physical deficits and behaviors that affect risk of falls    -  [place] fall precautions as indicated by assessment   - Educate patient/family on patient safety including physical limitations  - Instruct patient to call for assistance with activity based on assessment  - Modify environment to reduce risk of injury  - Consider OT/PT consult to assist with strengthening/mobility  Outcome: Progressing

## 2019-12-16 NOTE — PROGRESS NOTES
Progress Note - Fito Alvarez 1955, 62 y o  female MRN: [id number]  Unit/Bed#: -01 Encounter: [id number]  Primary Care Provider: Bradford Guillory MD   Date and time admitted to hospital: 12/14/2019 10:32 PM    * Sepsis [facility])  Assessment & Plan  · POA, evidenced by fever, tachycardia, leukocytosis  Elevated procal  Suspect skin vs blood source  · Patient immunocompromised, recent Port-A-Cath replacement from R chest to L chest   · Lactic normal    · Follow blood cx  · CXR unremarkable  · UA with candida - ID not recommending treatment  · Continue vanco with cefepime  [place] calculus  Assessment & Plan  Seen on CT however patient asymptomatic  Continue ABX for sepsis as recommended by ID  Hypertension  Assessment & Plan  · Blood pressure low normal however maintaining MAP> 65  · Continue antihypertensives with strict hold parameters, blood pressure monitoring per protocol    Leiomyosarcoma (HCC)  Assessment & Plan  · With metastatic leiomyosarcoma, follows with oncologist in [facility] and is currently on palliative chemotherapy  · Outpatient follow-up as indicated    Cigarette nicotine dependence without complication  Assessment & Plan  · Provide nicotine replacement therapy  · Smoking cessation counseling    VTE Pharmacologic Prophylaxis:   Pharmacologic: Enoxaparin (Lovenox)  Mechanical VTE Prophylaxis in Place: Yes    Patient Centered Rounds: I have performed bedside rounds with nursing staff today  Discussions with Specialists or Other Care Team Provider: d/w CM, nursing    Education and Discussions with Family / Patient: d/w patient     Time Spent for Care: 30 minutes  More than 50% of total time spent on counseling and coordination of care as described above      Current Length of Stay: 1 day(s)    Current Patient Status: Inpatient   Certification Statement: The patient will continue to require additional inpatient hospital stay due to IV ABX, further work up for sepsis Discharge Plan: pending     Code Status: Level 3 - DNAR and DNI      Subjective:   Patient feels ill  No n/v/d/c  No H/a  No SOB or CP  Had some fevers but no longer  Objective:     Vitals:   Temp (24hrs), Av 9 °F (37 7 °C), Min:98 5 °F (36 9 °C), Max:102 6 °F (39 2 °C)    Temp:  [98 5 °F (36 9 °C)-102 6 °F (39 2 °C)] 98 5 °F (36 9 °C)  HR:  [] 108  Resp:  [18-22] 22  BP: (102-139)/(51-74) 132/68  SpO2:  [96 %-97 %] 97 %  Body mass index is 41 97 kg/m²  Input and Output Summary (last 24 hours): Intake/Output Summary (Last 24 hours) at 2019 1447  Last data filed at 2019 1300  Gross per 24 hour   Intake 1320 ml   Output 1650 ml   Net -330 ml       Physical Exam:     Physical Exam   Constitutional: She is oriented to person, place, and time  She appears well-developed and well-nourished  No distress  Patient is uncomfortable appearing  HENT:   Head: Normocephalic and atraumatic  Eyes: Right eye exhibits no discharge  Left eye exhibits no discharge  No scleral icterus  Cardiovascular: Normal rate and regular rhythm  Exam reveals no gallop and no friction rub  No murmur heard  Pulmonary/Chest: Effort normal and breath sounds normal  No respiratory distress  She has no wheezes  She has no rales  She exhibits tenderness (L chest port site tender and red)  Abdominal: Soft  Bowel sounds are normal  She exhibits no distension  There is no tenderness  Obese abdomen    Neurological: She is alert and oriented to person, place, and time  Skin: Skin is warm  She is diaphoretic  There is erythema (chest wall erythema)  Nursing note and vitals reviewed        Additional Data:     Labs:    Results from last 7 days   Lab Units 12/15/19  1626  19  2302   WBC Thousand/uL 13 60*   < > 11 07*   HEMOGLOBIN g/dL 10 9*   < > 12 2   HEMATOCRIT % 32 4*   < > 36 7   PLATELETS Thousands/uL 167   < > 172   BANDS PCT % 7  --   --    NEUTROS PCT %  --   --  91*   LYMPHS PCT %  --   -- 8*   LYMPHO PCT % 6*  --   --    MONOS PCT %  --   --  1*   MONO PCT % 0*  --   --    EOS PCT % 0  --  0    < > = values in this interval not displayed  Results from last 7 days   Lab Units 12/16/19  0613  12/14/19  2302   SODIUM mmol/L 136   < > 137   POTASSIUM mmol/L 4 3   < > 3 6   CHLORIDE mmol/L 102   < > 99*   CO2 mmol/L 24   < > 30   BUN mg/dL 15   < > 15   CREATININE mg/dL 1 05   < > 1 04   ANION GAP mmol/L 10   < > 8   CALCIUM mg/dL 8 7   < > 9 0   ALBUMIN g/dL  --   --  3 2*   TOTAL BILIRUBIN mg/dL  --   --  0 50   ALK PHOS U/L  --   --  104   ALT U/L  --   --  54   AST U/L  --   --  32   GLUCOSE RANDOM mg/dL 136   < > 137    < > = values in this interval not displayed  Results from last 7 days   Lab Units 12/14/19  2302   INR  1 02             Results from last 7 days   Lab Units 12/16/19  0613 12/15/19  0523 12/14/19  2302   LACTIC ACID mmol/L  --   --  1 2   PROCALCITONIN ng/ml 0 96* <0 05  --            * I Have Reviewed All Lab Data Listed Above  * Additional Pertinent Lab Tests Reviewed: All Labs Within Last 24 Hours Reviewed      Recent Cultures (last 7 days):     Results from last 7 days   Lab Units 12/15/19  0014 12/14/19  2306 12/14/19  2302   BLOOD CULTURE   --  No Growth at 24 hrs  No Growth at 24 hrs     URINE CULTURE  50,000-59,000 cfu/ml Candida sp  presumptively albicans*  --   --        Last 24 Hours Medication List:     Current Facility-Administered Medications:  acetaminophen 650 mg Oral Q6H PRN BRITTNY Das    albuterol 2 5 mg Nebulization Q6H PRN [place], DO    aspirin 81 mg Oral Daily [place], DO    buPROPion 150 mg Oral BID [place], DO    butalbital-acetaminophen-caffeine 1 tablet Oral Q4H PRN Clair Bird PA-C    cefepime 1,000 mg Intravenous Q12H BRITTNY Das Last Rate: 1,000 mg (12/16/19 0120)   vitamin B-12 1,000 mcg Oral Daily [place], DO    enoxaparin 40 mg Subcutaneous Q24H [address] [place] In[place], DO    gabapentin 600 mg Oral HS Lindsey Hidalgo, DO    ibuprofen 600 mg Oral Q6H PRN BRITTNY Harmon    lisinopril 10 mg Oral Daily BRITTNY Das    melatonin 3 mg Oral HS Lindsey Hidalgo, DO    montelukast 10 mg Oral HS Lindsey Hidalgo,     multivitamin-minerals 1 tablet Oral Daily Lindsey Hidalgo DO    nicotine 1 patch Transdermal Q24H Lindsey Hidalgo DO    ondansetron 4 mg Intravenous Q4H PRN Paulina Collazo MD    pravastatin 40 mg Oral Daily With [employer] Jerri,     saccharomyces boulardii 250 mg Oral BID BRITTNY Das    sertraline 100 mg Oral HS Lindsey Hidalgo,     sodium chloride 50 mL/hr Intravenous Continuous BRITTNY Das Last Rate: 50 mL/hr (12/16/19 1420)   vancomycin 17 5 mg/kg (Adjusted) Intravenous Q12H BRITTNY Das Last Rate: 1,500 mg (12/16/19 0437)        Today, Patient Was Seen By: Eder Fregoso PA-C    ** Please Note: Dictation voice to text software may have been used in the creation of this document   **

## 2019-12-16 NOTE — UTILIZATION REVIEW
Notification of Inpatient Admission/Inpatient Authorization Request   This is a Notification of Inpatient Admission for [address]  Be advised that this patient was admitted to our facility under Inpatient Status  Contact Nohemi Vizcaino at [phone] for additional admission information  [facility] DEPT DEDICATED Rebecca Alexander [phone]  Patient Name:   Jess Pascal   YOB: 1955       [address]   [address]:   [address]   Tax ID: [id number]  NPI: [id number] Attending Provider/NPI: Gordy Martines [[id number]]   Place of Service Code: 24     Place of Service Name:  [address] [place] [address]   Start Date: 12/15/19 1613     Discharge Date & Time: No discharge date for patient encounter  Type of Admission: Inpatient Status Discharge Disposition   (if discharged): Home/Self Care   Patient Diagnoses: Weakness [R53 1]  Fever [R50 9]     Orders: Admission Orders (From admission, onward)     Ordered        12/15/19 1613  Inpatient Admission  Once         12/15/19 0052  Place in Observation (expected length of stay for this patient is less than two midnights)  Once                    Assigned Utilization Review Contact: Nohemi Vizcaino  Utilization   Network Utilization Review Department  Phone: [phone]; Fax [phone]  Email: [email or web address]   ATTENTION PAYERS: Please call the assigned Utilization  directly with any questions or concerns ALL voicemails in the department are confidential  Send all requests for admission clinical reviews, approved or denied determinations and any other requests to dedicated fax number belonging to the campus where the patient is receiving treatment

## 2019-12-16 NOTE — PLAN OF CARE
Problem: PAIN - ADULT  Goal: Verbalizes/displays adequate comfort level or baseline comfort level  Description  Interventions:  - Encourage patient to monitor pain and request assistance  - Assess pain using appropriate pain scale  - Administer analgesics based on type and severity of pain and evaluate response  - Implement non-pharmacological measures as appropriate and evaluate response  - Consider cultural and social influences on pain and pain management  - Notify physician/advanced practitioner if interventions unsuccessful or patient reports new pain  Outcome: Progressing     Problem: INFECTION - ADULT  Goal: Absence or prevention of progression during hospitalization  Description  INTERVENTIONS:  - Assess and monitor for signs and symptoms of infection  - Monitor lab/diagnostic results  - Monitor all insertion sites, i e  indwelling lines, tubes, and drains  - Monitor endotracheal if appropriate and nasal secretions for changes in amount and color  - [place] appropriate cooling/warming therapies per order  - Administer medications as ordered  - Instruct and encourage patient and family to use good hand hygiene technique  - Identify and instruct in appropriate isolation precautions for identified infection/condition  Outcome: Progressing  Goal: Absence of fever/infection during neutropenic period  Description  INTERVENTIONS:  - Monitor WBC    Outcome: Progressing     Problem: SAFETY ADULT  Goal: Patient will remain free of falls  Description  INTERVENTIONS:  - Assess patient frequently for physical needs  -  Identify cognitive and physical deficits and behaviors that affect risk of falls    -  [place] fall precautions as indicated by assessment   - Educate patient/family on patient safety including physical limitations  - Instruct patient to call for assistance with activity based on assessment  - Modify environment to reduce risk of injury  - Consider OT/PT consult to assist with strengthening/mobility  Outcome: Progressing  Goal: Maintain or return to baseline ADL function  Description  INTERVENTIONS:  -  Assess patient's ability to carry out ADLs; assess patient's baseline for ADL function and identify physical deficits which impact ability to perform ADLs (bathing, care of mouth/teeth, toileting, grooming, dressing, etc )  - Assess/evaluate cause of self-care deficits   - Assess range of motion  - Assess patient's mobility; develop plan if impaired  - Assess patient's need for assistive devices and provide as appropriate  - Encourage maximum independence but intervene and supervise when necessary  - Involve family in performance of ADLs  - Assess for home care needs following discharge   - Consider OT consult to assist with ADL evaluation and planning for discharge  - Provide patient education as appropriate  Outcome: Progressing  Goal: Maintain or return mobility status to optimal level  Description  INTERVENTIONS:  - Assess patient's baseline mobility status (ambulation, transfers, stairs, etc )    - Identify cognitive and physical deficits and behaviors that affect mobility  - Identify mobility aids required to assist with transfers and/or ambulation (gait belt, sit-to-stand, lift, walker, cane, etc )  - [place] fall precautions as indicated by assessment  - Record patient progress and toleration of activity level on Mobility SBAR; progress patient to next Phase/Stage  - Instruct patient to call for assistance with activity based on assessment  - Consider rehabilitation consult to assist with strengthening/weightbearing, etc   Outcome: Progressing     Problem: DISCHARGE PLANNING  Goal: Discharge to home or other facility with appropriate resources  Description  INTERVENTIONS:  - Identify barriers to discharge w/patient and caregiver  - Arrange for needed discharge resources and transportation as appropriate  - Identify discharge learning needs (meds, wound care, etc )  - Arrange for interpretive services to assist at discharge as needed  - Refer to Case Management Department for coordinating discharge planning if the patient needs post-hospital services based on physician/advanced practitioner order or complex needs related to functional status, cognitive ability, or social support system  Outcome: Progressing     Problem: Knowledge Deficit  Goal: Patient/family/caregiver demonstrates understanding of disease process, treatment plan, medications, and discharge instructions  Description  Complete learning assessment and assess knowledge base  Interventions:  - Provide teaching at level of understanding  - Provide teaching via preferred learning methods  Outcome: Progressing     Problem: Potential for Falls  Goal: Patient will remain free of falls  Description  INTERVENTIONS:  - Assess patient frequently for physical needs  -  Identify cognitive and physical deficits and behaviors that affect risk of falls    -  [place] fall precautions as indicated by assessment   - Educate patient/family on patient safety including physical limitations  - Instruct patient to call for assistance with activity based on assessment  - Modify environment to reduce risk of injury  - Consider OT/PT consult to assist with strengthening/mobility  Outcome: Progressing

## 2019-12-16 NOTE — MALNUTRITION/BMI
This medical record reflects one or more clinical indicators suggestive of morbid obesity  BMI Findings:  BMI Classifications: Morbid Obesity 40-44 9     Body mass index is 41 97 kg/m²  See Nutrition note dated 12/16/19 for additional details  Completed nutrition assessment is viewable in the nutrition documentation

## 2019-12-16 NOTE — ASSESSMENT & PLAN NOTE
· POA, evidenced by fever, tachycardia, leukocytosis  Elevated procal  Suspect skin vs blood source  · Patient immunocompromised, recent Port-A-Cath replacement from R chest to L chest   · Lactic normal    · Follow blood cx  · CXR unremarkable  · UA with candida - ID not recommending treatment  · Continue vanco with cefepime

## 2019-12-16 NOTE — ASSESSMENT & PLAN NOTE
· With metastatic leiomyosarcoma, follows with oncologist in [facility] and is currently on palliative chemotherapy  · Outpatient follow-up as indicated

## 2019-12-16 NOTE — CONSULTS
Consultation - Infectious Disease   Elvin Bullard 62 y o  female MRN: [id number]  Unit/Bed#: -01 Encounter: [id number]      IMPRESSION & RECOMMENDATIONS:   Impression/Recommendations:  1  Sepsis, present on admission  Evidenced by fever, tachycardia, leukocytosis  Patient additionally with an elevated procalcitonin level  Patient immunocompromised after receiving her 1st dose of chemotherapy last week  So far patient's workup includes a negative chest x-ray, negative influenza study, positive UA with [phone] colonies of Candida, and blood cultures currently pending  Patient noted to have some erythema of the anterior chest wall which may be secondary to flushing from fever  Also need to consider cellulitis of the chest wall  Rec:   · Continue vancomycin and cefepime for now  · Pharmacy on board for vancomycin management  · Continue to monitor fever curve  · Continue to follow all outstanding cultures    2  Left-sided staghorn calculus with indwelling stent  Patient currently without any urinary complaints  She is noted to have innumerable WBCs on her UA with [phone] colonies of Candida on urine culture  Rec:   · May need to treat if no other source of fever is found  · Observe for now  3   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  At this time chemotherapy is considered palliative  Antibiotics:  Vancomycin/cefepime #2    Thank you for this consultation  We will follow along with you  HISTORY OF PRESENT ILLNESS:  Reason for Consult:  Fever  HPI: Elvin Bullard is a 62 y o  female with a past medical history significant for leiomyosarcoma presented to the hospital 2 days ago with fever  Patient is known to our services we treated her back in October 2019 for Port-A-Cath infection  At that time she underwent removal of her Port-A-Cath and completed the course of vancomycin  She also completed a course of Levaquin for possible UTI    Patient states 4 weeks ago she had Port-A-Cath replaced  Following procedure she had Steri-Strips in place  She developed blisters around Port-A-Cath due to reaction from Steri-Strips  She underwent her 1st dose of chemo since removal of Port-A-Cath in October 2019 on 12/12/2019  She states she felt fine the next day but on Saturday developed fever with shaking chills  She denies any other complaints  She has a history of staghorn calculus  She did initially have a percutaneous nephrostomy tube placed on her last admission which was eventually removed after a stent was placed  In the emergency department she was noted to have a fever of 100 4° and tachycardic at 106  She received vancomycin and cefepime on the emergency department  She was eventually admitted and remained on the same  Last night she had a T-max of 102 9°  She did have a mild leukocytosis of 11 07 which has increased to 13 6 today  She has a procalcitonin of 0 96 today  Blood cultures are currently pending  Influenza swab was negative  UA showed innumerable WBCs with a urine culture growing [phone] colonies of Sara  Darletta Pac REVIEW OF SYSTEMS:  Constitutional:  Positive fever and chills  HEENT:  Denies headache  Cardiovascular:  Denies chest pain  Pulmonary:  Denies shortness of breath or cough  GI:  Denies nausea vomiting diarrhea  :  Denies dysuria  Back:  Denies new onset back pains  Skin:  Denies rash  Extremities:  Denies edema  Musculoskeletal:  Denies arthralgias myalgias  A complete system-based review of systems is otherwise negative      PAST MEDICAL HISTORY:  Past Medical History:   Diagnosis Date    Asthma     Cancer ([facility] [address] [facility]     COPD (chronic obstructive pulmonary disease) ([facility] [address] [facility]     Hyperlipidemia     Hypertension     Psychiatric disorder     Depression     Past Surgical History:   Procedure Laterality Date    IR PCN TO PCNU CONVERSION  11/13/2019    IR PORT REMOVAL  10/14/2019    IR TUBE PLACEMENT NEPHROSTOMY  10/17/2019    LUNG REMOVAL, PARTIAL lower lobe       FAMILY HISTORY:  Non-contributory    SOCIAL HISTORY:  Social History     Substance and Sexual Activity   Alcohol Use Never    Frequency: Never     Social History     Substance and Sexual Activity   Drug Use Not Currently     Social History     Tobacco Use   Smoking Status Current Every Day Smoker    Packs/day: 0 50    Types: Cigarettes   Smokeless Tobacco Never Used       ALLERGIES:  Allergies   Allergen Reactions    Morphine Vomiting     Other reaction(s): Vomiting  Other reaction(s): (PIMS) vomiting  Makes her vomit      Sulfa Antibiotics Rash       MEDICATIONS:  All current active medications have been reviewed  PHYSICAL EXAM:  Vitals:  Temp:  [98 5 °F (36 9 °C)-102 9 °F (39 4 °C)] 98 5 °F (36 9 °C)  HR:  [] 108  Resp:  [18-27] 22  BP: (102-144)/(51-79) 132/68  SpO2:  [96 %-98 %] 97 %  Temp (24hrs), Av 4 °F (38 °C), Min:98 5 °F (36 9 °C), Max:102 9 °F (39 4 °C)  Current: Temperature: 98 5 °F (36 9 °C)     Physical Exam:  General:  Well-nourished, well-developed, in no acute distress  Eyes:  Conjunctive clear with no hemorrhages or effusions  Oropharynx:  No ulcers, no lesions  Neck:  Supple, no lymphadenopathy  Lungs:  Clear to auscultation bilaterally, no accessory muscle use  Cardiac:  Regular rate and rhythm, no murmurs  Abdomen:  Soft, non-tender, non-distended  Back:  Nontender to palpation  :  No Lugo  Extremities:  No peripheral cyanosis, clubbing, or edema  Skin:  No rashes, no ulcers, right chest wall Port-A-Cath in place, patient with erythema on the anterior chest wall difficult to interpret if this is flushing due to fever  Neurological:  Moves all four extremities spontaneously, sensation grossly intact      LABS, IMAGING, & OTHER STUDIES:  Lab Results:  I have personally reviewed pertinent labs    Results from last 7 days   Lab Units 19  0613 12/15/19  0523 19  2302   POTASSIUM mmol/L 4 3 3 3* 3 6   CHLORIDE mmol/L 102 101 99*   CO2 mmol/L 24 26 30   BUN mg/dL 15 15 15   CREATININE mg/dL 1 05 0 91 1 04   EGFR ml/min/1 73sq m 57 67 57   CALCIUM mg/dL 8 7 8 8 9 0   AST U/L  --   --  32   ALT U/L  --   --  54   ALK PHOS U/L  --   --  104     Results from last 7 days   Lab Units 12/15/19  1626 12/15/19  0522 12/14/19  2302   WBC Thousand/uL 13 60* 10 06 11 07*   HEMOGLOBIN g/dL 10 9* 10 8* 12 2   PLATELETS Thousands/uL 167 158 172     Results from last 7 days   Lab Units 12/15/19  0014 12/14/19  2306 12/14/19  2302   BLOOD CULTURE   --  Received in Microbiology Lab  Culture in Progress  Received in Microbiology Lab  Culture in Progress  URINE CULTURE  50,000-59,000 cfu/ml Candida sp  presumptively albicans*  --   --          Imaging Studies:   I have personally reviewed pertinent imaging study reports and images in PACS  EKG, Pathology, and Other Studies:   I have personally reviewed pertinent reports

## 2019-12-16 NOTE — PROGRESS NOTES
Vancomycin IV Pharmacy-to-Dose Consultation    Taiwo Carranza is a 62 y o  female who is currently receiving Vancomycin IV with management by the Pharmacy Consult service  Assessment/Plan:  The patient was reviewed  Renal function is stable and no signs or symptoms of nephrotoxicity and/or infusion reactions were documented in the chart  Based on todays assessment, continue current vancomycin (day # 2) dosing of 1500 mg IV q12h, with a plan for trough to be drawn at 30 min before 1600 dose tomorrow 12-17-19  We will continue to follow the patients culture results and clinical progress daily      Seema Faith, Pharmacist

## 2019-12-17 NOTE — ASSESSMENT & PLAN NOTE
· With metastatic leiomyosarcoma, follows with oncologist in [facility] and is currently on palliative chemotherapy  · Outpatient follow-up as indicated

## 2019-12-17 NOTE — PROGRESS NOTES
Progress Note - Cliff Puentes 1955, 62 y o  female MRN: [id number]  Unit/Bed#: -01 Encounter: [id number]  Primary Care Provider: Luis F Olmos MD   Date and time admitted to hospital: 12/14/2019 10:32 PM    * Sepsis [facility])  Assessment & Plan  · POA, evidenced by fever, tachycardia, leukocytosis  Elevated procal  Suspect skin vs blood source  · Patient immunocompromised, recent Port-A-Cath replacement from R chest to L chest   · Lactic normal    · Follow blood cx  · CXR unremarkable  · UA with candida - ID not recommending treatment  · Continue vanco with cefepime  Staghorn calculus  Assessment & Plan  Seen on CT however patient asymptomatic  Not treating  Continue ABX for sepsis as recommended by ID  Hypertension  Assessment & Plan  · Blood pressure low normal however maintaining MAP> 65  · Continue antihypertensives with strict hold parameters, blood pressure monitoring per protocol    Leiomyosarcoma (HCC)  Assessment & Plan  · With metastatic leiomyosarcoma, follows with oncologist in [facility] and is currently on palliative chemotherapy  · Outpatient follow-up as indicated    Cigarette nicotine dependence without complication  Assessment & Plan  · Provide nicotine replacement therapy  · Smoking cessation counseling      VTE Pharmacologic Prophylaxis:   Pharmacologic: Enoxaparin (Lovenox)  Mechanical VTE Prophylaxis in Place: Yes    Patient Centered Rounds: I have performed bedside rounds with nursing staff today  Discussions with Specialists or Other Care Team Provider:  Infectious Disease, nursing, case management    Education and Discussions with Family / Patient:  Discussed with patient    Time Spent for Care: 30 minutes  More than 50% of total time spent on counseling and coordination of care as described above      Current Length of Stay: 2 day(s)    Current Patient Status: Inpatient   Certification Statement: The patient will continue to require additional inpatient hospital stay due to Monitoring overnight for fever on IV antibiotics, hopeful discharge in a m  If remains afebrile and labs showed diminishing infection    Discharge Plan:  Pending    Code Status: Level 3 - DNAR and DNI      Subjective:   Patient is feeling better  She is interested in going home and would mostly like a shower today  No shortness of breath  No pain with urination  No chest pain  Objective:     Vitals:   Temp (24hrs), Av 2 °F (37 3 °C), Min:98 6 °F (37 °C), Max:99 8 °F (37 7 °C)    Temp:  [98 6 °F (37 °C)-99 8 °F (37 7 °C)] 98 6 °F (37 °C)  HR:  [] 95  Resp:  [18-20] 20  BP: (122-140)/(58-81) 122/67  SpO2:  [96 %-97 %] 96 %  Body mass index is 41 9 kg/m²  Input and Output Summary (last 24 hours): Intake/Output Summary (Last 24 hours) at 2019 1401  Last data filed at 2019 1341  Gross per 24 hour   Intake 1320 ml   Output 1700 ml   Net -380 ml       Physical Exam:     Physical Exam   Constitutional: She is oriented to person, place, and time  She appears well-developed and well-nourished  No distress  HENT:   Head: Normocephalic and atraumatic  Eyes: Right eye exhibits no discharge  Left eye exhibits no discharge  No scleral icterus  Cardiovascular: Normal rate and regular rhythm  Exam reveals no gallop and no friction rub  No murmur heard  Pulmonary/Chest: Effort normal and breath sounds normal  No respiratory distress  She has no wheezes  She has no rales  Abdominal: Soft  Bowel sounds are normal  She exhibits no distension  There is no tenderness  Obese abdomen   Neurological: She is alert and oriented to person, place, and time  Skin: Skin is warm and dry  There is erythema (Anterior chest)  Nursing note and vitals reviewed        Additional Data:     Labs:    Results from last 7 days   Lab Units 12/15/19  1626  19  2302   WBC Thousand/uL 13 60*   < > 11 07*   HEMOGLOBIN g/dL 10 9*   < > 12 2   HEMATOCRIT % 32 4*   < > 36 7   PLATELETS Thousands/uL 167   < > 172   BANDS PCT % 7  --   --    NEUTROS PCT %  --   --  91*   LYMPHS PCT %  --   --  8*   LYMPHO PCT % 6*  --   --    MONOS PCT %  --   --  1*   MONO PCT % 0*  --   --    EOS PCT % 0  --  0    < > = values in this interval not displayed  Results from last 7 days   Lab Units 12/16/19  0613  12/14/19  2302   SODIUM mmol/L 136   < > 137   POTASSIUM mmol/L 4 3   < > 3 6   CHLORIDE mmol/L 102   < > 99*   CO2 mmol/L 24   < > 30   BUN mg/dL 15   < > 15   CREATININE mg/dL 1 05   < > 1 04   ANION GAP mmol/L 10   < > 8   CALCIUM mg/dL 8 7   < > 9 0   ALBUMIN g/dL  --   --  3 2*   TOTAL BILIRUBIN mg/dL  --   --  0 50   ALK PHOS U/L  --   --  104   ALT U/L  --   --  54   AST U/L  --   --  32   GLUCOSE RANDOM mg/dL 136   < > 137    < > = values in this interval not displayed  Results from last 7 days   Lab Units 12/14/19  2302   INR  1 02             Results from last 7 days   Lab Units 12/16/19  0613 12/15/19  0523 12/14/19  2302   LACTIC ACID mmol/L  --   --  1 2   PROCALCITONIN ng/ml 0 96* <0 05  --            * I Have Reviewed All Lab Data Listed Above  * Additional Pertinent Lab Tests Reviewed: Nai [address] Admission Reviewed      Recent Cultures (last 7 days):     Results from last 7 days   Lab Units 12/15/19  0014 12/14/19  2306 12/14/19  2302   BLOOD CULTURE   --  No Growth at 24 hrs  No Growth at 24 hrs     URINE CULTURE  50,000-59,000 cfu/ml Candida albicans*  --   --        Last 24 Hours Medication List:     Current Facility-Administered Medications:  acetaminophen 650 mg Oral Q6H PRN BRITTNY Das    albuterol 2 5 mg Nebulization Q6H PRN Kathrine Walker, DO    aspirin 81 mg Oral Daily Kathrine Walker, DO    buPROPion 150 mg Oral BID Kathrine Walker, DO    butalbital-acetaminophen-caffeine 1 tablet Oral Q4H PRN Clair A Bird, PA-C    cefepime 1,000 mg Intravenous Q12H BRITTNY Das Last Rate: Stopped (12/17/19 0115)   vitamin B-12 1,000 mcg Oral Daily Tiffany Frausto Jerri, DO    enoxaparin 40 mg Subcutaneous Q24H [facility] Nadia Morfin, DO    gabapentin 600 mg Oral HS Krissykathy Morfin, DO    lisinopril 10 mg Oral Daily BRITTNY Das    melatonin 3 mg Oral HS Krissykathy Morfin, DO    montelukast 10 mg Oral HS Nadia Morfin, DO    multivitamin-minerals 1 tablet Oral Daily Krissykathy Morfin, DO    nicotine 1 patch Transdermal Q24H Krissykathy Navjot, DO    ondansetron 4 mg Intravenous Q4H PRN Sandra Coleman MD    pravastatin 40 mg Oral Daily [facility] [employer] Jerri, DO    saccharomyces boulardii 250 mg Oral BID BRITTNY Das    sertraline 100 mg Oral HS Krissykathy Morfin, DO    sodium chloride 50 mL/hr Intravenous Continuous BRITTNY Das Last Rate: 50 mL/hr (12/16/19 1420)   vancomycin 17 5 mg/kg (Adjusted) Intravenous Q12H BRITTNY Das Last Rate: 1,500 mg (12/17/19 0336)        Today, Patient Was Seen By: Myke Abdul PA-C    ** Please Note: Dictation voice to text software may have been used in the creation of this document   **

## 2019-12-17 NOTE — PLAN OF CARE
Problem: PAIN - ADULT  Goal: Verbalizes/displays adequate comfort level or baseline comfort level  Description  Interventions:  - Encourage patient to monitor pain and request assistance  - Assess pain using appropriate pain scale  - Administer analgesics based on type and severity of pain and evaluate response  - Implement non-pharmacological measures as appropriate and evaluate response  - Consider cultural and social influences on pain and pain management  - Notify physician/advanced practitioner if interventions unsuccessful or patient reports new pain  Outcome: Progressing     Problem: INFECTION - ADULT  Goal: Absence or prevention of progression during hospitalization  Description  INTERVENTIONS:  - Assess and monitor for signs and symptoms of infection  - Monitor lab/diagnostic results  - Monitor all insertion sites, i e  indwelling lines, tubes, and drains  - Monitor endotracheal if appropriate and nasal secretions for changes in amount and color  - [place] appropriate cooling/warming therapies per order  - Administer medications as ordered  - Instruct and encourage patient and family to use good hand hygiene technique  - Identify and instruct in appropriate isolation precautions for identified infection/condition  Outcome: Progressing  Goal: Absence of fever/infection during neutropenic period  Description  INTERVENTIONS:  - Monitor WBC    Outcome: Progressing     Problem: SAFETY ADULT  Goal: Patient will remain free of falls  Description  INTERVENTIONS:  - Assess patient frequently for physical needs  -  Identify cognitive and physical deficits and behaviors that affect risk of falls    -  [place] fall precautions as indicated by assessment   - Educate patient/family on patient safety including physical limitations  - Instruct patient to call for assistance with activity based on assessment  - Modify environment to reduce risk of injury  - Consider OT/PT consult to assist with strengthening/mobility  Outcome: Progressing  Goal: Maintain or return to baseline ADL function  Description  INTERVENTIONS:  -  Assess patient's ability to carry out ADLs; assess patient's baseline for ADL function and identify physical deficits which impact ability to perform ADLs (bathing, care of mouth/teeth, toileting, grooming, dressing, etc )  - Assess/evaluate cause of self-care deficits   - Assess range of motion  - Assess patient's mobility; develop plan if impaired  - Assess patient's need for assistive devices and provide as appropriate  - Encourage maximum independence but intervene and supervise when necessary  - Involve family in performance of ADLs  - Assess for home care needs following discharge   - Consider OT consult to assist with ADL evaluation and planning for discharge  - Provide patient education as appropriate  Outcome: Progressing  Goal: Maintain or return mobility status to optimal level  Description  INTERVENTIONS:  - Assess patient's baseline mobility status (ambulation, transfers, stairs, etc )    - Identify cognitive and physical deficits and behaviors that affect mobility  - Identify mobility aids required to assist with transfers and/or ambulation (gait belt, sit-to-stand, lift, walker, cane, etc )  - [place] fall precautions as indicated by assessment  - Record patient progress and toleration of activity level on Mobility SBAR; progress patient to next Phase/Stage  - Instruct patient to call for assistance with activity based on assessment  - Consider rehabilitation consult to assist with strengthening/weightbearing, etc   Outcome: Progressing     Problem: DISCHARGE PLANNING  Goal: Discharge to home or other facility with appropriate resources  Description  INTERVENTIONS:  - Identify barriers to discharge w/patient and caregiver  - Arrange for needed discharge resources and transportation as appropriate  - Identify discharge learning needs (meds, wound care, etc )  - Arrange for interpretive services to assist at discharge as needed  - Refer to Case Management Department for coordinating discharge planning if the patient needs post-hospital services based on physician/advanced practitioner order or complex needs related to functional status, cognitive ability, or social support system  Outcome: Progressing     Problem: Knowledge Deficit  Goal: Patient/family/caregiver demonstrates understanding of disease process, treatment plan, medications, and discharge instructions  Description  Complete learning assessment and assess knowledge base  Interventions:  - Provide teaching at level of understanding  - Provide teaching via preferred learning methods  Outcome: Progressing     Problem: Potential for Falls  Goal: Patient will remain free of falls  Description  INTERVENTIONS:  - Assess patient frequently for physical needs  -  Identify cognitive and physical deficits and behaviors that affect risk of falls  -  [place] fall precautions as indicated by assessment   - Educate patient/family on patient safety including physical limitations  - Instruct patient to call for assistance with activity based on assessment  - Modify environment to reduce risk of injury  - Consider OT/PT consult to assist with strengthening/mobility  Outcome: Progressing     Problem: Nutrition/Hydration-ADULT  Goal: Nutrient/Hydration intake appropriate for improving, restoring or maintaining nutritional needs  Description  Monitor and assess patient's nutrition/hydration status for malnutrition  Collaborate with interdisciplinary team and initiate plan and interventions as ordered  Monitor patient's weight and dietary intake as ordered or per policy  Utilize nutrition screening tool and intervene as necessary  Determine patient's food preferences and provide high-protein, high-caloric foods as appropriate       INTERVENTIONS:  - Monitor oral intake, urinary output, labs, and treatment plans  - Assess nutrition and hydration status and recommend course of action  - Evaluate amount of meals eaten  - Assist patient with eating if necessary   - Allow adequate time for meals  - Recommend/ encourage appropriate diets, oral nutritional supplements, and vitamin/mineral supplements  - Order, calculate, and assess calorie counts as needed  - Recommend, monitor, and adjust tube feedings and TPN/PPN based on assessed needs  - Assess need for intravenous fluids  - Provide specific nutrition/hydration education as appropriate  - Include patient/family/caregiver in decisions related to nutrition  Outcome: Progressing

## 2019-12-17 NOTE — PLAN OF CARE
Problem: PAIN - ADULT  Goal: Verbalizes/displays adequate comfort level or baseline comfort level  Description  Interventions:  - Encourage patient to monitor pain and request assistance  - Assess pain using appropriate pain scale  - Administer analgesics based on type and severity of pain and evaluate response  - Implement non-pharmacological measures as appropriate and evaluate response  - Consider cultural and social influences on pain and pain management  - Notify physician/advanced practitioner if interventions unsuccessful or patient reports new pain  Outcome: Progressing     Problem: INFECTION - ADULT  Goal: Absence or prevention of progression during hospitalization  Description  INTERVENTIONS:  - Assess and monitor for signs and symptoms of infection  - Monitor lab/diagnostic results  - Monitor all insertion sites, i e  indwelling lines, tubes, and drains  - Monitor endotracheal if appropriate and nasal secretions for changes in amount and color  - [place] appropriate cooling/warming therapies per order  - Administer medications as ordered  - Instruct and encourage patient and family to use good hand hygiene technique  - Identify and instruct in appropriate isolation precautions for identified infection/condition  Outcome: Progressing  Goal: Absence of fever/infection during neutropenic period  Description  INTERVENTIONS:  - Monitor WBC    Outcome: Progressing     Problem: SAFETY ADULT  Goal: Patient will remain free of falls  Description  INTERVENTIONS:  - Assess patient frequently for physical needs  -  Identify cognitive and physical deficits and behaviors that affect risk of falls    -  [place] fall precautions as indicated by assessment   - Educate patient/family on patient safety including physical limitations  - Instruct patient to call for assistance with activity based on assessment  - Modify environment to reduce risk of injury  - Consider OT/PT consult to assist with strengthening/mobility  Outcome: Progressing  Goal: Maintain or return to baseline ADL function  Description  INTERVENTIONS:  -  Assess patient's ability to carry out ADLs; assess patient's baseline for ADL function and identify physical deficits which impact ability to perform ADLs (bathing, care of mouth/teeth, toileting, grooming, dressing, etc )  - Assess/evaluate cause of self-care deficits   - Assess range of motion  - Assess patient's mobility; develop plan if impaired  - Assess patient's need for assistive devices and provide as appropriate  - Encourage maximum independence but intervene and supervise when necessary  - Involve family in performance of ADLs  - Assess for home care needs following discharge   - Consider OT consult to assist with ADL evaluation and planning for discharge  - Provide patient education as appropriate  Outcome: Progressing  Goal: Maintain or return mobility status to optimal level  Description  INTERVENTIONS:  - Assess patient's baseline mobility status (ambulation, transfers, stairs, etc )    - Identify cognitive and physical deficits and behaviors that affect mobility  - Identify mobility aids required to assist with transfers and/or ambulation (gait belt, sit-to-stand, lift, walker, cane, etc )  - [place] fall precautions as indicated by assessment  - Record patient progress and toleration of activity level on Mobility SBAR; progress patient to next Phase/Stage  - Instruct patient to call for assistance with activity based on assessment  - Consider rehabilitation consult to assist with strengthening/weightbearing, etc   Outcome: Progressing     Problem: DISCHARGE PLANNING  Goal: Discharge to home or other facility with appropriate resources  Description  INTERVENTIONS:  - Identify barriers to discharge w/patient and caregiver  - Arrange for needed discharge resources and transportation as appropriate  - Identify discharge learning needs (meds, wound care, etc )  - Arrange for interpretive services to assist at discharge as needed  - Refer to Case Management Department for coordinating discharge planning if the patient needs post-hospital services based on physician/advanced practitioner order or complex needs related to functional status, cognitive ability, or social support system  Outcome: Progressing     Problem: Knowledge Deficit  Goal: Patient/family/caregiver demonstrates understanding of disease process, treatment plan, medications, and discharge instructions  Description  Complete learning assessment and assess knowledge base  Interventions:  - Provide teaching at level of understanding  - Provide teaching via preferred learning methods  Outcome: Progressing     Problem: Potential for Falls  Goal: Patient will remain free of falls  Description  INTERVENTIONS:  - Assess patient frequently for physical needs  -  Identify cognitive and physical deficits and behaviors that affect risk of falls  -  [place] fall precautions as indicated by assessment   - Educate patient/family on patient safety including physical limitations  - Instruct patient to call for assistance with activity based on assessment  - Modify environment to reduce risk of injury  - Consider OT/PT consult to assist with strengthening/mobility  Outcome: Progressing     Problem: Nutrition/Hydration-ADULT  Goal: Nutrient/Hydration intake appropriate for improving, restoring or maintaining nutritional needs  Description  Monitor and assess patient's nutrition/hydration status for malnutrition  Collaborate with interdisciplinary team and initiate plan and interventions as ordered  Monitor patient's weight and dietary intake as ordered or per policy  Utilize nutrition screening tool and intervene as necessary  Determine patient's food preferences and provide high-protein, high-caloric foods as appropriate       INTERVENTIONS:  - Monitor oral intake, urinary output, labs, and treatment plans  - Assess nutrition and hydration status and recommend course of action  - Evaluate amount of meals eaten  - Assist patient with eating if necessary   - Allow adequate time for meals  - Recommend/ encourage appropriate diets, oral nutritional supplements, and vitamin/mineral supplements  - Order, calculate, and assess calorie counts as needed  - Recommend, monitor, and adjust tube feedings and TPN/PPN based on assessed needs  - Assess need for intravenous fluids  - Provide specific nutrition/hydration education as appropriate  - Include patient/family/caregiver in decisions related to nutrition  Outcome: Progressing

## 2019-12-17 NOTE — PROGRESS NOTES
Vancomycin Assessment    Jess Pascal is a 62 y o  female who is currently receiving vancomycin vancomycin 1500 mg IV q12h for other     Relevant clinical data and objective history reviewed:  Creatinine   Date Value Ref Range Status   12/16/2019 1 05 0 60 - 1 30 mg/dL Final     Comment:     Standardized to IDMS reference method   12/15/2019 0 91 0 60 - 1 30 mg/dL Final     Comment:     Standardized to IDMS reference method   12/14/2019 1 04 0 60 - 1 30 mg/dL Final     Comment:     Standardized to IDMS reference method     /62 (BP Location: Right arm)   Pulse 105   Temp 98 8 °F (37 1 °C) (Oral)   Resp 22   Ht 5' 8" (1 727 m)   Wt 125 kg (275 lb 9 2 oz)   SpO2 97%   BMI 41 90 kg/m²   I/O last 3 completed shifts: In: 1920 [P O :1920]  Out: 2750 [Urine:2750]  Lab Results   Component Value Date/Time    BUN 15 12/16/2019 06:13 AM    WBC 13 60 (H) 12/15/2019 04:26 PM    HGB 10 9 (L) 12/15/2019 04:26 PM    HCT 32 4 (L) 12/15/2019 04:26 PM    MCV 98 12/15/2019 04:26 PM     12/15/2019 04:26 PM     Temp Readings from Last 3 Encounters:   12/17/19 98 8 °F (37 1 °C) (Oral)   11/13/19 97 9 °F (36 6 °C) (Oral)   10/20/19 98 4 °F (36 9 °C) (Oral)     Vancomycin Days of Therapy: 3    Assessment/Plan  The patient is currently on vancomycin utilizing scheduled dosing  Baseline risks associated with therapy include: concomitant nephrotoxic medications and advanced age  The patient is receiving vancomycin 1500 mg IV q12h with the most recent vancomycin level being at steady-state and therapeutic based on a goal of 15-20 (appropriate for most indications) ; therefore, is clinically appropriate and dose will be continued   Pharmacy will continue to follow closely for s/sx of nephrotoxicity, infusion reactions and appropriateness of therapy  BMP and CBC will be ordered per protocol  Plan for trough 1 week from today on 12/24/19   Pharmacy will continue to follow the patients culture results and clinical progress daily      Faith Rios, Pharmacist

## 2019-12-17 NOTE — SOCIAL WORK
LOS 2 DAYS  HRR: 18  PATIENT IS NOT A BUNDLE  PATIENT IS NOT A READMISSION  CM met with patient at bedside, patient alert and oriented and seated in recliner  Patient resides alone in a 2nd story apartment with [address] with railing  Patient reports she needs to stop long term up the steps to catch her breath over the last month or so but reports she's independent with all ADLs and denies the use of DME  Patient denies history of VNA/SNF and reports that her daughter is actually an RN and they are trying to get it approved so that she gets paid to help care for patient  CM discussed and offered to arrange [facility] services for patient but she declined interest in services at this time  Patient utilizes [employer] in [place], has Rx plan and is able to afford all copays  Patient reports history of depression and anxiety which are being treated satisfactorily by her PCP  Patient denies history of substance use  Patient denies POA/AD and declines information at this time as she reports she'll arrange these services on her own at home  Patient identifies her daughter Ciaran Velasco and her sister Matthew Gray as her Health Care Representatives  Patient receives SSDI and she drives locally but her family will provide transportation to appointments  Patient follows with Urologist Dr Meng Downing as well as Oncologist Dr Alessia Goss through [facility] [place]  Patient reports palliative chemotherapy appointments on Thursdays  On one week, she'll receive one medication, on the second she'll receive that same medication plus another and on the third week she gets a break  Patient repeats this cycle  CM offered to arrange [facility] appointment for patient upon discharge and she declined and stated she'd rather arrange this herself once she feels better and is discharged from the hospital   Patient does not appear to have any CM needs at this time   CM Department will continue to assess for needs and will follow through discharge  CM reviewed discharge planning process including the following: identifying caregivers at home, preference for d/c planning needs, availability of treatment team to discuss questions or concerns patient and/or family may have regarding diagnosis, plan of care, old or new medications and discharge planning   CM will continue to follow for care coordination and update assessment as appropriate

## 2019-12-17 NOTE — PROGRESS NOTES
Progress Note - Infectious Disease   Julieta Henderson 62 y o  female MRN: [id number]  Unit/Bed#: -01 Encounter: [id number]      Impression/Plan:    81-year-old female patient with metastatic low myself, on palliative chemotherapy, admitted for fever and fatigue 2 days after receiving gemcitabine and Taxotere    1- SIRS/possible sepsis, POA:  Fever and tachycardia on admission; currently patient afebrile, hemodynamically stable;  Her symptoms could be secondary to chemotherapy side effects, versus sepsis  - Continue vancomycin cefepime for today  - Follow up results of blood culture sent 12/14/2019; this blood culture is negative at 24 hours incubation  - Repeat procalcitonin CBC in a m    - if patient remains afebrile and the next 24 hours, and blood cultures remained negative, would consider stopping antibiotics    2- candiduria:  Patient is known to have left-sided staghorn calculus and indwelling stent; no decrease in urine output, creatinine stable, no abdominal pain, nausea or vomiting  - do  not start antifungal treatment; will continue vancomycin cefepime  And monitor clinically       3- metastatic leiomyosarcoma:  Patient's presentation might be related to side effects from her chemotherapy  - palliative chemotherapy as per Oncology team     4- Port-A-Cath in place:  Minimal surrounding erythema  around left chest wall Port-A-Cath; erythema and blistering has been significantly improving as per patient; review of outpatient notes showed that erythema is more pronounced and was attributed to reaction to Steri-Strips; patient also reports that erythema is significantly better than 2 weeks ago;   - can keep Port-A-Cath in place for now  - follow-up clinically, follow-up blood culture sent 12/14/2019      Antibiotics:  Vancomycin day 4  Cefepime day 4    Subjective:  Patient has no fever, chills, sweats; no nausea, vomiting, diarrhea; no cough, shortness of breath; no pain  No new symptoms    Patient complains of fatigue    Objective:  Vitals:  Temp:  [98 6 °F (37 °C)-99 8 °F (37 7 °C)] 98 6 °F (37 °C)  HR:  [] 95  Resp:  [18-20] 20  BP: (122-140)/(58-81) 122/67  SpO2:  [96 %-97 %] 96 %  Temp (24hrs), Av 2 °F (37 3 °C), Min:98 6 °F (37 °C), Max:99 8 °F (37 7 °C)  Current: Temperature: 98 6 °F (37 °C)    Physical Exam:   General Appearance:  Alert, interactive, nontoxic, no acute distress  Throat: Oropharynx moist without lesions  Lungs:   Clear to auscultation bilaterally; no wheezes, rhonchi or rales; respirations unlabored   Heart:  RRR; no murmur, rub or gallop   Abdomen:   Soft, non-tender, non-distended, positive bowel sounds  Extremities: No clubbing, cyanosis or edema   Skin: No new rashes or lesions  No draining wounds noted  Port-A-Cath in left chest wall; very minimal erythema, no purulence, no erosion, no tenderness       Labs, Imaging, & Other studies:   All pertinent labs and imaging studies were personally reviewed  Results from last 7 days   Lab Units 12/15/19  1626 12/15/19  0522 19  2302   WBC Thousand/uL 13 60* 10 06 11 07*   HEMOGLOBIN g/dL 10 9* 10 8* 12 2   PLATELETS Thousands/uL 167 158 172     Results from last 7 days   Lab Units 19  0613 12/15/19  0523 19  2302   SODIUM mmol/L 136 136 137   POTASSIUM mmol/L 4 3 3 3* 3 6   CHLORIDE mmol/L 102 101 99*   CO2 mmol/L 24 26 30   BUN mg/dL 15 15 15   CREATININE mg/dL 1 05 0 91 1 04   EGFR ml/min/1 73sq m 57 67 57   CALCIUM mg/dL 8 7 8 8 9 0   AST U/L  --   --  32   ALT U/L  --   --  54   ALK PHOS U/L  --   --  104     Results from last 7 days   Lab Units 12/15/19  0014 19  2306 19  2302   BLOOD CULTURE   --  No Growth at 24 hrs  No Growth at 24 hrs     URINE CULTURE  50,000-59,000 cfu/ml Candida sp  presumptively albicans*  --   --      Results from last 7 days   Lab Units 19  0613 12/15/19  0523   PROCALCITONIN ng/ml 0 96* <0 05

## 2019-12-17 NOTE — ASSESSMENT & PLAN NOTE
Seen on CT however patient asymptomatic  Not treating  Continue ABX for sepsis as recommended by ID

## 2019-12-18 NOTE — PROGRESS NOTES
Vancomycin IV Pharmacy-to-Dose Consultation    Radha Atkinson is a 62 y o  female who is currently receiving Vancomycin IV with management by the Pharmacy Consult service  Assessment/Plan:  The patient was reviewed  Renal function is stable and no signs or symptoms of nephrotoxicity and/or infusion reactions were documented in the chart  Based on todays assessment, continue current vancomycin (day # 4) dosing of 1500 mg IV q12h, with a plan for trough to be drawn again on 12-24-19  We will continue to follow the patients culture results and clinical progress daily      Elisa Rivas, Pharmacist

## 2019-12-18 NOTE — PLAN OF CARE
Problem: PAIN - ADULT  Goal: Verbalizes/displays adequate comfort level or baseline comfort level  Description  Interventions:  - Encourage patient to monitor pain and request assistance  - Assess pain using appropriate pain scale  - Administer analgesics based on type and severity of pain and evaluate response  - Implement non-pharmacological measures as appropriate and evaluate response  - Consider cultural and social influences on pain and pain management  - Notify physician/advanced practitioner if interventions unsuccessful or patient reports new pain  Outcome: Progressing     Problem: INFECTION - ADULT  Goal: Absence or prevention of progression during hospitalization  Description  INTERVENTIONS:  - Assess and monitor for signs and symptoms of infection  - Monitor lab/diagnostic results  - Monitor all insertion sites, i e  indwelling lines, tubes, and drains  - Monitor endotracheal if appropriate and nasal secretions for changes in amount and color  - [place] appropriate cooling/warming therapies per order  - Administer medications as ordered  - Instruct and encourage patient and family to use good hand hygiene technique  - Identify and instruct in appropriate isolation precautions for identified infection/condition  Outcome: Progressing  Goal: Absence of fever/infection during neutropenic period  Description  INTERVENTIONS:  - Monitor WBC    Outcome: Progressing     Problem: SAFETY ADULT  Goal: Patient will remain free of falls  Description  INTERVENTIONS:  - Assess patient frequently for physical needs  -  Identify cognitive and physical deficits and behaviors that affect risk of falls    -  [place] fall precautions as indicated by assessment   - Educate patient/family on patient safety including physical limitations  - Instruct patient to call for assistance with activity based on assessment  - Modify environment to reduce risk of injury  - Consider OT/PT consult to assist with strengthening/mobility  Outcome: Progressing  Goal: Maintain or return to baseline ADL function  Description  INTERVENTIONS:  -  Assess patient's ability to carry out ADLs; assess patient's baseline for ADL function and identify physical deficits which impact ability to perform ADLs (bathing, care of mouth/teeth, toileting, grooming, dressing, etc )  - Assess/evaluate cause of self-care deficits   - Assess range of motion  - Assess patient's mobility; develop plan if impaired  - Assess patient's need for assistive devices and provide as appropriate  - Encourage maximum independence but intervene and supervise when necessary  - Involve family in performance of ADLs  - Assess for home care needs following discharge   - Consider OT consult to assist with ADL evaluation and planning for discharge  - Provide patient education as appropriate  Outcome: Progressing  Goal: Maintain or return mobility status to optimal level  Description  INTERVENTIONS:  - Assess patient's baseline mobility status (ambulation, transfers, stairs, etc )    - Identify cognitive and physical deficits and behaviors that affect mobility  - Identify mobility aids required to assist with transfers and/or ambulation (gait belt, sit-to-stand, lift, walker, cane, etc )  - [place] fall precautions as indicated by assessment  - Record patient progress and toleration of activity level on Mobility SBAR; progress patient to next Phase/Stage  - Instruct patient to call for assistance with activity based on assessment  - Consider rehabilitation consult to assist with strengthening/weightbearing, etc   Outcome: Progressing     Problem: DISCHARGE PLANNING  Goal: Discharge to home or other facility with appropriate resources  Description  INTERVENTIONS:  - Identify barriers to discharge w/patient and caregiver  - Arrange for needed discharge resources and transportation as appropriate  - Identify discharge learning needs (meds, wound care, etc )  - Arrange for interpretive services to assist at discharge as needed  - Refer to Case Management Department for coordinating discharge planning if the patient needs post-hospital services based on physician/advanced practitioner order or complex needs related to functional status, cognitive ability, or social support system  Outcome: Progressing     Problem: Knowledge Deficit  Goal: Patient/family/caregiver demonstrates understanding of disease process, treatment plan, medications, and discharge instructions  Description  Complete learning assessment and assess knowledge base  Interventions:  - Provide teaching at level of understanding  - Provide teaching via preferred learning methods  Outcome: Progressing     Problem: Potential for Falls  Goal: Patient will remain free of falls  Description  INTERVENTIONS:  - Assess patient frequently for physical needs  -  Identify cognitive and physical deficits and behaviors that affect risk of falls  -  [place] fall precautions as indicated by assessment   - Educate patient/family on patient safety including physical limitations  - Instruct patient to call for assistance with activity based on assessment  - Modify environment to reduce risk of injury  - Consider OT/PT consult to assist with strengthening/mobility  Outcome: Progressing     Problem: Nutrition/Hydration-ADULT  Goal: Nutrient/Hydration intake appropriate for improving, restoring or maintaining nutritional needs  Description  Monitor and assess patient's nutrition/hydration status for malnutrition  Collaborate with interdisciplinary team and initiate plan and interventions as ordered  Monitor patient's weight and dietary intake as ordered or per policy  Utilize nutrition screening tool and intervene as necessary  Determine patient's food preferences and provide high-protein, high-caloric foods as appropriate       INTERVENTIONS:  - Monitor oral intake, urinary output, labs, and treatment plans  - Assess nutrition and hydration status and recommend course of action  - Evaluate amount of meals eaten  - Assist patient with eating if necessary   - Allow adequate time for meals  - Recommend/ encourage appropriate diets, oral nutritional supplements, and vitamin/mineral supplements  - Order, calculate, and assess calorie counts as needed  - Recommend, monitor, and adjust tube feedings and TPN/PPN based on assessed needs  - Assess need for intravenous fluids  - Provide specific nutrition/hydration education as appropriate  - Include patient/family/caregiver in decisions related to nutrition  Outcome: Progressing

## 2019-12-18 NOTE — PROGRESS NOTES
Progress Note - Infectious Disease   Mckenzie Billings 62 y o  female MRN: [id number]  Unit/Bed#: -01 Encounter: [id number]      Impression/Plan:    52-year-old female patient with metastatic low myself, on palliative chemotherapy, admitted for fever and fatigue 2 days after receiving gemcitabine and Taxotere     1- SIRS/possible sepsis, POA:  Fever and tachycardia on admission; currently patient afebrile, hemodynamically stable;  Her symptoms are likely secondary to chemotherapy side effects; Her blood culture remains negative at 72h incubation, her fever subsided in the past 48 hours  Leukocytosis resolved  -  stop vancomycin, stop cefepime  - monitor off antibiotic      2- candiduria:  Patient is known to have left-sided staghorn calculus and indwelling stent; no decrease in urine output, creatinine stable, no abdominal pain, nausea or vomiting  - do  not start antifungal treatment;  - monitor clinically       3- metastatic leiomyosarcoma:  Patient's presentation might be related to side effects from her chemotherapy  - palliative chemotherapy as per Oncology team     4- Port-A-Cath in place:  Minimal surrounding erythema  around left chest wall Port-A-Cath; erythema and blistering has been significantly improving as per patient; review of outpatient notes showed that erythema is more pronounced and was attributed to reaction to Steri-Strips; patient also reports that erythema is significantly better than 2 weeks ago;   - can keep Port-A-Cath in place for now  - follow-up clinically, follow-up final result of blood culture sent 12/14/2019; this blood culture is negative to date        Antibiotics:  Vancomycin day 5  Cefepime day 5       Subjective:  Patient has no fever, chills, sweats; no nausea, vomiting, diarrhea; no cough, shortness of breath; no pain  No new symptoms     Patient complains of fatigue; she reports that she feels overall improvement compared to the time of admission    Objective:  Vitals:  Temp:  [98 3 °F (36 8 °C)-99 2 °F (37 3 °C)] 98 3 °F (36 8 °C)  HR:  [] 94  Resp:  [20-22] 22  BP: (118-130)/(56-62) 118/56  SpO2:  [95 %-99 %] 95 %  Temp (24hrs), Av 8 °F (37 1 °C), Min:98 3 °F (36 8 °C), Max:99 2 °F (37 3 °C)  Current: Temperature: 98 3 °F (36 8 °C)    Physical Exam:   General Appearance:  Alert, interactive, nontoxic, no acute distress  Throat: Oropharynx moist without lesions  Lungs:   Clear to auscultation bilaterally; no wheezes, rhonchi or rales; respirations unlabored   Heart:  RRR; no murmur, rub or gallop   Abdomen:   Soft, non-tender, non-distended, positive bowel sounds  Extremities: No clubbing, cyanosis or edema   Skin: No new rashes or lesions  No draining wounds noted  Mild left chest wall erythema close to the site of exit of the Port-A-Cath  Erythema is slowly improving       Labs, Imaging, & Other studies:   All pertinent labs and imaging studies were personally reviewed  Results from last 7 days   Lab Units 19  0454 12/15/19  1626 12/15/19  0522   WBC Thousand/uL 5 57 13 60* 10 06   HEMOGLOBIN g/dL 7 9* 10 9* 10 8*   PLATELETS Thousands/uL 130* 167 158     Results from last 7 days   Lab Units 19  0613 12/15/19  0523 19  2302   SODIUM mmol/L 136 136 137   POTASSIUM mmol/L 4 3 3 3* 3 6   CHLORIDE mmol/L 102 101 99*   CO2 mmol/L 24 26 30   BUN mg/dL 15 15 15   CREATININE mg/dL 1 05 0 91 1 04   EGFR ml/min/1 73sq m 57 67 57   CALCIUM mg/dL 8 7 8 8 9 0   AST U/L  --   --  32   ALT U/L  --   --  54   ALK PHOS U/L  --   --  104     Results from last 7 days   Lab Units 12/15/19  0014 19  2306 19  2302   BLOOD CULTURE   --  No Growth at 48 hrs  No Growth at 48 hrs     URINE CULTURE  50,000-59,000 cfu/ml Candida albicans*  --   --      Results from last 7 days   Lab Units 19  0454 19  0613 12/15/19  0523   PROCALCITONIN ng/ml 1 58* 0 96* <0 05

## 2019-12-19 NOTE — UTILIZATION REVIEW
Notification of Discharge  This is a Notification of Discharge from our facility [address]  Please be advised that this patient has been discharge from our facility  Below you will find the admission and discharge date and time including the patients disposition  PRESENTATION DATE: 12/14/2019 10:32 PM  OBS ADMISSION DATE: 12/15/2019  IP ADMISSION DATE: 12/15/19 1613   DISCHARGE DATE: 12/18/2019  2:20 PM  DISPOSITION: Home/Self Care Home/Self Care   Admission Orders listed below:  Admission Orders (From admission, onward)     Ordered        12/15/19 1613  Inpatient Admission  Once         12/15/19 0052  Place in Observation (expected length of stay for this patient is less than two midnights)  Once                   Please contact the UR Department if additional information is required to close this patient's authorization/case  [address] [facility] Utilization Review Department  Main: [phone] x carefully listen to the prompts  All voicemails are confidential   [email or web address]  Send all requests for admission clinical reviews, approved or denied determinations and any other requests to dedicated fax number below belonging to the campus where the patient is receiving treatment   List of dedicated fax numbers:  [address] [place] [address] DENIALS (Administrative/Medical Necessity) [phone]   [address]  (Maternity/NICU/Pediatrics) [phone]   Alvaro Flanagan [phone]   [facility] [phone]   [facility] [phone]   [facility] [phone]   Rhona Quant [phone]   [address] [facility] [address] [facility]  [address] [facility] [address] [facility] [phone]

## 2019-12-20 PROBLEM — R73.9 HYPERGLYCEMIA: Status: ACTIVE | Noted: 2019-01-01

## 2019-12-20 PROBLEM — R94.31 PROLONGED QT INTERVAL: Status: ACTIVE | Noted: 2019-01-01

## 2019-12-20 PROBLEM — R53.1 GENERALIZED WEAKNESS: Status: ACTIVE | Noted: 2019-01-01

## 2019-12-20 PROBLEM — D64.9 ANEMIA: Status: ACTIVE | Noted: 2019-01-01

## 2019-12-20 PROBLEM — E87.1 HYPONATREMIA: Status: ACTIVE | Noted: 2019-01-01

## 2019-12-20 NOTE — SEPSIS NOTE
Sepsis Note   Larissa De Leon 62 y o  female MRN: [id number]  Unit/Bed#: FT 05 Encounter: [id number]      qSOFA     [address] Name 12/20/19 1515                Altered mental status GCS < 15          Respiratory Rate > / =49  1        Systolic BP < / =490  0        Q Sofa Score  1            Initial Sepsis Screening     Row Name 12/20/19 5604                Is the patient's history suggestive of a new or worsening infection? (!) Yes (Proceed)  -RP        Suspected source of infection  soft tissue  -RP        Are two or more of the following signs & symptoms of infection both present and new to the patient? (!) Yes (Proceed)  -RP        Indicate SIRS criteria  Tachycardia > 90 bpm;Tachypnea > 20 resp per min  -RP        If the answer is yes to both questions, suspicion of sepsis is present          If severe sepsis is present AND tissue hypoperfusion perists in the hour after fluid resuscitation or lactate > 4, the patient meets criteria for SEPTIC SHOCK          Are any of the following organ dysfunction criteria present within 6 hours of suspected infection and SIRS criteria that are NOT considered to be chronic conditions? No  -RP        Organ dysfunction          Date of presentation of severe sepsis          Time of presentation of severe sepsis          Tissue hypoperfusion persists in the hour after crystalloid fluid administration, evidenced, by either:          Was hypotension present within one hour of the conclusion of crystalloid fluid administration?   No  -RP        Date of presentation of septic shock          Time of presentation of septic shock            User Key  (r) = Recorded By, (t) = Taken By, (c) = Cosigned By    [address] Name Provider Type    RP Crow Alvarado MD Physician

## 2019-12-20 NOTE — MEDICAL NECESSITY DOCUMENTATION
Admission to an inpatient setting is indicated using the Cellulitis guideline and is supported by the following indications:   - Immunosuppression (eg, long-term systemic steroids)  If indication criteria for inpatient admission have not been met, consider observation status  (These indications for admission were sourced from [facility] [facility], 23rd Edition using [employer]icia for Admission Documentation with Syn[employer] based on clinical documentation  )

## 2019-12-20 NOTE — ED NOTES
1  CC: Weakness    2  OS: alert and oriented x 4    3  Abnormal labs/focused assessment/vitals: See Flowsheets    4  Medication / Drips: Maxipime, Vancomycin, NSS    5  Narcotic time/ pain: none    6  IV lines/drains/etc : 20 left wrist (also port a cath left chest)    7  Isolation Status: standard    8  Skin: redness over port site    9  Ambulation Status: pt unable to ambulate in ED    10  TRAUMA? No    11   Phone Number: ext [phone]     Jose Pitt RN  12/20/19 9285

## 2019-12-20 NOTE — ASSESSMENT & PLAN NOTE
· With metastatic leiomyosarcoma, follows with oncologist in [facility] and is currently on palliative chemotherapy however is likely going to stop therapy entirely as it is just making her feel too sick, and she would prefer to have quality versus quantity  · Outpatient follow-up as scheduled for next week

## 2019-12-20 NOTE — ED PROVIDER NOTES
History  Chief Complaint   Patient presents with    Weakness - Generalized     pt reports "legs gave out" on her at home; reports "legs feel like rubber"; pt currently receiving chemo     This is a 25-year-old female who presents to the emergency department with generalized weakness today  Patient recent admission for concern of sepsis versus reaction to chemotherapy  She had been on Vanco and cefepime while in the hospital   She went home approximately 3 days ago  She has been doing well until today where she felt generally fatigued prior to going into the bathroom  While walking back from the bathroom she says her legs became weak and she lowered herself to the floor  She was unable to get up off the floor x2 hours until family came and assisted her  She was unable to stand without assistance due to generalized weakness  Patient does note some redness on her left upper chest over the area of a recent port  No itching  No chest pain  The patient did not hit her head  No loss of consciousness  No neck back pain  No abdominal pain  No dysuria frequency or urgency  Chart review shows that patient did have a staghorn calculus with originally requiring nephrostomy tube that was converted to an internal stent  No shortness of breath  Daughter notes that the patient has been at times confused over the past few days  Symptoms are moderate severity  No radiation of symptoms  No other aggravating or alleviating factors  Differential diagnosis includes infection, anemia, electrolyte abnormality, dehydration, intracranial process  Prior to Admission Medications   Prescriptions Last Dose Informant Patient Reported? Taking? Misc   Devices KIT  Self Yes No   Sig: Oxygen at night   Multiple Vitamins-Minerals (CENTRUM SILVER 50+WOMEN) TABS  Self Yes No   Sig: Centrum Silver   albuterol (2 5 mg/3 mL) 0 083 % nebulizer solution  Self Yes No   Sig: Take 2 5 mg by nebulization every 6 (six) hours as needed for wheezing or shortness of breath   aspirin 81 mg chewable tablet  Self Yes No   Sig: Chew 81 mg daily   buPROPion (WELLBUTRIN SR) 150 mg 12 hr tablet  Self Yes No   Sig: Take 150 mg by mouth 2 (two) times a day   cyclobenzaprine (FLEXERIL) 5 mg tablet  Self Yes No   gabapentin (NEURONTIN) 600 MG tablet  Self Yes No   Sig: Take 600 mg by mouth daily at bedtime   lisinopril-hydrochlorothiazide (PRINZIDE,ZESTORETIC) 10-12 5 MG per tablet  Self Yes No   Sig: Take 1 tablet by mouth daily   montelukast (SINGULAIR) 10 mg tablet  Self Yes No   Sig: Take 10 mg by mouth daily at bedtime   nicotine (NICODERM CQ) 21 mg/24 hr TD 24 hr patch  Self Yes No   Sig: Place 1 patch on the skin every 24 hours   ondansetron (ZOFRAN) 8 mg tablet  Self Yes No   Sig: Take 8 mg by mouth every 8 (eight) hours as needed for nausea or vomiting   potassium chloride (K-DUR,KLOR-CON) 10 mEq tablet  Self Yes No   Sig: Take 10 mEq by mouth daily   saccharomyces boulardii (FLORASTOR) 250 mg capsule   No No   Sig: Take 1 capsule (250 mg total) by mouth 2 (two) times a day   sertraline (ZOLOFT) 100 mg tablet  Self Yes No   Sig: Take 100 mg by mouth daily at bedtime   simvastatin (ZOCOR) 20 mg tablet  Self Yes No   Sig: Take 20 mg by mouth daily at bedtime   vitamin B-12 (VITAMIN B-12) 1,000 mcg tablet   Yes No   Sig: Take 1,000 mcg by mouth daily      Facility-Administered Medications: None       Past Medical History:   Diagnosis Date    Asthma     Cancer ([address] )     COPD (chronic obstructive pulmonary disease) (HCC)     Hyperlipidemia     Hypertension     Psychiatric disorder     Depression       Past Surgical History:   Procedure Laterality Date    IR PCN TO PCNU CONVERSION  11/13/2019    IR PORT REMOVAL  10/14/2019    IR TUBE PLACEMENT NEPHROSTOMY  10/17/2019    LUNG REMOVAL, PARTIAL      lower lobe       History reviewed  No pertinent family history  I have reviewed and agree with the history as documented      Social History Tobacco Use    Smoking status: Current Every Day Smoker     Packs/day: 0 50     Types: Cigarettes    Smokeless tobacco: Never Used   Substance Use Topics    Alcohol use: Never     Frequency: Never    Drug use: Not Currently        Review of Systems   Constitutional: Positive for fatigue  Negative for activity change, appetite change and fever  HENT: Negative for congestion, ear pain, rhinorrhea and sore throat  Eyes: Negative for pain and redness  Respiratory: Negative for cough, shortness of breath and wheezing  Cardiovascular: Negative for chest pain and palpitations  Gastrointestinal: Negative for abdominal pain, diarrhea, nausea and vomiting  Endocrine: Negative for polyuria  Genitourinary: Negative for difficulty urinating, dysuria, frequency and urgency  Musculoskeletal: Negative for arthralgias and myalgias  Skin: Negative for color change and rash  Allergic/Immunologic: Negative for immunocompromised state  Neurological: Positive for weakness  Negative for dizziness, seizures, syncope, facial asymmetry, speech difficulty, light-headedness, numbness and headaches  Hematological: Does not bruise/bleed easily  Psychiatric/Behavioral: Negative for confusion  Physical Exam  Physical Exam   Constitutional: She is oriented to person, place, and time  She appears well-developed  No distress  HENT:   Head: Normocephalic and atraumatic  Nose: Nose normal    Eyes: Pupils are equal, round, and reactive to light  Conjunctivae and EOM are normal  No scleral icterus  Neck: Normal range of motion  Neck supple  Cardiovascular: Normal rate, regular rhythm, normal heart sounds and intact distal pulses  Pulmonary/Chest: Effort normal and breath sounds normal  No stridor  No respiratory distress  She has no wheezes  Abdominal: Soft  She exhibits no distension  There is no tenderness  There is no rebound and no guarding  Musculoskeletal: She exhibits no edema or deformity  Neurological: She is alert and oriented to person, place, and time  No cranial nerve deficit or sensory deficit  She exhibits normal muscle tone  Coordination normal    Skin: Skin is warm and dry  No rash noted  There is erythema  Erythema surrounding port in left upper chest  Slightly tender  No drainage  Also erythema over posterior aspect of right upper arm  Warm to touch  Nontender  No crepitus  Psychiatric: She has a normal mood and affect  Thought content normal    Nursing note and vitals reviewed  Vital Signs  ED Triage Vitals   Temperature Pulse Respirations Blood Pressure SpO2   12/20/19 1514 12/20/19 1515 12/20/19 1515 12/20/19 1515 12/20/19 1515   100 °F (37 8 °C) 95 22 117/56 94 %      Temp Source Heart Rate Source Patient Position - Orthostatic VS BP Location FiO2 (%)   12/20/19 1514 12/20/19 1514 12/20/19 1737 12/20/19 1515 --   Oral Monitor Lying Right arm       Pain Score       --                  Vitals:    12/20/19 1515 12/20/19 1737   BP: 117/56 116/64   Pulse: 95 100   Patient Position - Orthostatic VS:  Lying         Visual Acuity      ED Medications  Medications   sodium chloride (PF) 0 9 % injection 3 mL (has no administration in time range)   cefepime (MAXIPIME) 2,000 mg in dextrose 5 % 50 mL IVPB (0 mg Intravenous Stopped 12/20/19 1730)   vancomycin (VANCOCIN) 1,500 mg in sodium chloride 0 9 % 250 mL IVPB (1,500 mg Intravenous New Bag 12/20/19 1734)   sodium chloride 0 9 % bolus 1,000 mL (1,000 mL Intravenous New Bag 12/20/19 1701)       Diagnostic Studies  Results Reviewed     Procedure Component Value Units Date/Time    Lactic acid x2 [[id number]]  (Normal) Collected:  12/20/19 1615    Lab Status:  Final result Specimen:  Blood from Arm, Right Updated:  12/20/19 1644     LACTIC ACID 1 2 mmol/L     Narrative:       Result may be elevated if tourniquet was used during collection      Comprehensive metabolic panel [[id number]]  (Abnormal) Collected:  12/20/19 1615    Lab Status: Final result Specimen:  Blood from Arm, Right Updated:  12/20/19 1641     Sodium 133 mmol/L      Potassium 3 2 mmol/L      Chloride 99 mmol/L      CO2 23 mmol/L      ANION GAP 11 mmol/L      BUN 8 mg/dL      Creatinine 1 02 mg/dL      Glucose 150 mg/dL      Calcium 8 9 mg/dL      AST 43 U/L      ALT 33 U/L      Alkaline Phosphatase 116 U/L      Total Protein 6 5 g/dL      Albumin 1 9 g/dL      Total Bilirubin 0 70 mg/dL      eGFR 59 ml/min/1 73sq m     Narrative:       [place] guidelines for Chronic Kidney Disease (CKD):     Stage 1 with normal or high GFR (GFR > 90 mL/min/1 73 square meters)    Stage 2 Mild CKD (GFR = 60-89 mL/min/1 73 square meters)    Stage 3A Moderate CKD (GFR = 45-59 mL/min/1 73 square meters)    Stage 3B Moderate CKD (GFR = 30-44 mL/min/1 73 square meters)    Stage 4 Severe CKD (GFR = 15-29 mL/min/1 73 square meters)    Stage 5 End Stage CKD (GFR <15 mL/min/1 73 square meters)  Note: GFR calculation is accurate only with a steady state creatinine    Protime-INR [[id number]]  (Normal) Collected:  12/20/19 1615    Lab Status:  Final result Specimen:  Blood from Arm, Right Updated:  12/20/19 1633     Protime 14 4 seconds      INR 1 12    APTT [[id number]]  (Abnormal) Collected:  12/20/19 1615    Lab Status:  Final result Specimen:  Blood from Arm, Right Updated:  12/20/19 1633     PTT 38 seconds     Procalcitonin [[id number]] Updated:  12/20/19 1629    Lab Status:  No result Specimen:  Blood from Arm, Right     CBC and differential [[id number]]  (Abnormal) Collected:  12/20/19 1615    Lab Status:  Final result Specimen:  Blood from Arm, Right Updated:  12/20/19 1625     WBC 11 92 Thousand/uL      RBC 2 50 Million/uL      Hemoglobin 8 3 g/dL      Hematocrit 24 3 %      MCV 97 fL      MCH 33 2 pg      MCHC 34 2 g/dL      RDW 14 4 %      MPV 10 7 fL      Platelets 417 Thousands/uL      nRBC 1 /100 WBCs      Neutrophils Relative 89 %      Immat GRANS % 2 % Lymphocytes Relative 6 %      Monocytes Relative 3 %      Eosinophils Relative 0 %      Basophils Relative 0 %      Neutrophils Absolute 10 66 Thousands/µL      Immature Grans Absolute 0 20 Thousand/uL      Lymphocytes Absolute 0 71 Thousands/µL      Monocytes Absolute 0 32 Thousand/µL      Eosinophils Absolute 0 01 Thousand/µL      Basophils Absolute 0 02 Thousands/µL     Blood culture #2 [[id number]] Collected:  12/20/19 1615    Lab Status: In process Specimen:  Blood from Arm, Right Updated:  12/20/19 1620    Blood culture #1 [[id number]] Collected:  12/20/19 1615    Lab Status: In process Specimen:  Blood from Arm, Right Updated:  12/20/19 1620    Lactic acid x2 [[id number]]     Lab Status:  No result Specimen:  Blood     UA w Reflex to Microscopic w Reflex to Culture [[id number]]     Lab Status:  No result Specimen:  Urine                  CT head without contrast   Final Result by Jaci Etienne MD (12/20 1716)         1  No acute intracranial abnormality noted  2   Abnormal scalp soft tissue mass in the high right parietal paramedian location abutting the calvarium or its periosteum and not present on a MRI dated 1/30/2014  While this may represent a benign lesion such as an epidermal inclusion cyst or    sebaceous cyst, the possibility of a neoplasm cannot be excluded and appropriate clinical correlation is advised /      The study was marked in EPIC for immediate notification  Workstation performed: [id number]         XR chest pa & lateral   ED Interpretation by Marecl Mcfarlane MD (12/20 1648)   No infiltrate  Final Result by Lori Kessler MD (12/20 1706)      No acute cardiopulmonary disease              Workstation performed: [id number]                    Procedures  ECG 12 Lead Documentation Only  Date/Time: 12/20/2019 6:00 PM  Performed by: Marcel Mcfarlane MD  Authorized by: Marcel Mcfarlane MD     Indications / Diagnosis:  Weakness  ECG reviewed by me, the ED Provider: yes    Patient location:  ED  Rhythm:     Rhythm: sinus rhythm    Ectopy:     Ectopy: none    QRS:     QRS axis:  Normal  Conduction:     Conduction: normal    ST segments:     ST segments:  Normal  T waves:     T waves: normal    Other findings:     Other findings: prolonged qTc interval               ED Course                   Initial Sepsis Screening     Row Name 12/20/19 5282                Is the patient's history suggestive of a new or worsening infection? (!) Yes (Proceed)  -RP        Suspected source of infection  soft tissue  -RP        Are two or more of the following signs & symptoms of infection both present and new to the patient? (!) Yes (Proceed)  -RP        Indicate SIRS criteria  Tachycardia > 90 bpm;Tachypnea > 20 resp per min  -RP        If the answer is yes to both questions, suspicion of sepsis is present          If severe sepsis is present AND tissue hypoperfusion perists in the hour after fluid resuscitation or lactate > 4, the patient meets criteria for SEPTIC SHOCK          Are any of the following organ dysfunction criteria present within 6 hours of suspected infection and SIRS criteria that are NOT considered to be chronic conditions? No  -RP        Organ dysfunction          Date of presentation of severe sepsis          Time of presentation of severe sepsis          Tissue hypoperfusion persists in the hour after crystalloid fluid administration, evidenced, by either:          Was hypotension present within one hour of the conclusion of crystalloid fluid administration?   No  -RP        Date of presentation of septic shock          Time of presentation of septic shock            User Key  (r) = Recorded By, (t) = Taken By, (c) = Cosigned By    [address] Name Provider Type    RP Jeffery Almonte MD Physician                  MDM  Number of Diagnoses or Management Options  Cellulitis of right upper arm:   Weakness:   Diagnosis management comments: Admission to an inpatient setting is indicated using the Cellulitis guideline and is supported by the following indications:  - Immunosuppression (eg, long-term systemic steroids)  If indication criteria for inpatient admission have not been met, consider observation status  (These indications for admission were sourced from [facility], 23rd Edition using [employer] for Admission Documentation with Synapse based on clinical documentation )           Amount and/or Complexity of Data Reviewed  Clinical lab tests: ordered and reviewed  Tests in the radiology section of CPT®: ordered and reviewed  Decide to obtain previous medical records or to obtain history from someone other than the patient: yes  Discuss the patient with other providers: yes  Independent visualization of images, tracings, or specimens: yes          Disposition  Final diagnoses:   Weakness   Cellulitis of right upper arm   Prolonged Q-T interval on ECG     Time reflects when diagnosis was documented in both MDM as applicable and the Disposition within this note     Time User Action Codes Description Comment    12/20/2019  5:53 PM Shirlean Gang Add [R53 1] Weakness     12/20/2019  5:53 PM Shirlean Gang Add [L03 113] Cellulitis of right upper arm     12/20/2019  5:53 PM Chago Bennettius [R53 1] Weakness     12/20/2019  5:53 PM Shirlean Gang Modify [L03 113] Cellulitis of right upper arm     12/20/2019  6:02 PM Shirlean Gang Add [R94 31] Prolonged Q-T interval on ECG       ED Disposition     ED Disposition Condition Date/Time Comment    Admit Stable Fri Dec 20, 2019  5:53 PM Case was discussed with Dr Saira George and the patient's admission status was agreed to be Admission Status: inpatient status to the service of Dr Saira George   Follow-up Information    None         Patient's Medications   Discharge Prescriptions    No medications on file     No discharge procedures on file      ED Provider  Electronically Signed by           Ema Mancuso Marcel Valdez MD  12/20/19 4623

## 2019-12-20 NOTE — ASSESSMENT & PLAN NOTE
· POA, evidenced by fever, tachycardia, leukocytosis  Elevated procal  Suspect skin vs blood source  · Patient immunocompromised, recent Port-A-Cath replacement from R chest to L chest   She had a local reaction secondary to tape but had no signs concerning for localized infection  · Lactic normal   Blood cultures no growth  CXR unremarkable  Urine showing Candida but Wei not recommend any treatment  · Suspect patient's fever actually SIRS and related to chemotherapy  Patient is being discharged on oral antibiotics  · Plan of care discussed with the patient, ID, and her primary oncologist at day of discharge

## 2019-12-21 PROBLEM — E87.1 HYPONATREMIA: Status: RESOLVED | Noted: 2019-01-01 | Resolved: 2019-01-01

## 2019-12-21 NOTE — ASSESSMENT & PLAN NOTE
· Previously noted on CT scan  · Pt asymptomatic currently  · Has follow up with urology in February   · Monitor

## 2019-12-21 NOTE — ASSESSMENT & PLAN NOTE
· Pt reports smoking 5-6 cigarettes a day   · Encourage cessation   · Nicotine patch while inpatient

## 2019-12-21 NOTE — ASSESSMENT & PLAN NOTE
· Baseline hgb appears to be around 7-9 on review  · Hgb 8 3 today   · Likely secondary to chronic disease, normocytic  · Obtain iron panel   · Monitor CBC, no signs of acute or active bleeding noted at this time  · Continue B12 supplementation

## 2019-12-21 NOTE — ASSESSMENT & PLAN NOTE
· Pt with metastatic Leiomyosarcoma  · Follows with oncologist at [facility], thinking about palliative therapy, has follow up to discuss options next week   · Has received 2 doses of chemotherapy at this point, last infusion was about 1 5 weeks ago   · Pt with left port-a-cath in place, erythema surrounding port, continue treatment as above with IV abx and ID consultation  · Blood cultures pending   · Continue with follow up with oncologist outpatient   · Discussed with patient today her wishes to continue with chemotherapy treatment, advised that if there is evidence of bacteremia or port infection, port will need to be taken out, however if she does not wish to receive chemotherapy any longer, will not replace port, she reports she will think about this in the interim

## 2019-12-21 NOTE — ASSESSMENT & PLAN NOTE
· Pt presented with generalized weakness and mechanical fall, unable to get up off the floor due to profound weakness   · Denies any numbness/tingling or unilateral weakness   · Likely secondary to acute infectious process   · Pt meeting sepsis criteria on admission with tachycardia, leukocytosis and erythema concerning for cellulitis   · Recent hospitalization for similar symptoms   · Place on IV cefepime/vancomycin   · ID consultation   · Fall precautions   · PT/OT consultations, encourage early ambulation as possible  · CT head negative for intracranial abnormalities  · Received 1 L bolus of NS in the ED, hold off on additional fluids for now as patient with mild lower extremity edema and hemodynamic stability

## 2019-12-21 NOTE — ASSESSMENT & PLAN NOTE
· EKG on admission noted to have severely prolonged QTc  · Cardiology following,  · Likely pseudo prolongation with possible U waves from hypokalemia   · Repeat EKG AM with normalization of QTc to 450   · Continue to hold Zoloft   · Continue telemetry   · ECHO ordered

## 2019-12-21 NOTE — UTILIZATION REVIEW
Initial Clinical Review    Admission: Date/Time/Statement: Inpatient Admission Orders (From admission, onward)     Ordered        12/20/19 1754  Inpatient Admission (expected length of stay for this patient Order details is greater than two midnights)  Once                   Orders Placed This Encounter   Procedures    Inpatient Admission (expected length of stay for this patient Order details is greater than two midnights)     Standing Status:   Standing     Number of Occurrences:   1     Order Specific Question:   Admitting Physician     Answer:   Ravindra Wilson     Order Specific Question:   Level of Care     Answer:   Med Surg [16]     Order Specific Question:   Estimated length of stay     Answer:   More than 2 Midnights     Order Specific Question:   Certification     Answer:   I certify that inpatient services are medically necessary for this patient for a duration of greater than two midnights  See H&P and MD Progress Notes for additional information about the patient's course of treatment  ED Arrival Information     Expected Arrival Acuity Means of Arrival Escorted By Service Admission Type    - 12/20/2019 15:12 Emergent Ambulance [address]  [facility] Hospitalist Emergency    Arrival Complaint    fall         Chief Complaint   Patient presents with    Weakness - Generalized     pt reports "legs gave out" on her at home; reports "legs feel like rubber"; pt currently receiving chemo     Assessment/Plan: 61 yo female to ED from home w/ generalized weakness and fall   Patient was recently discharged from the hospital a few days ago for similar symptoms  Patient had surrounding erythema and blistering around her left port and was placed on IV antibiotics  However patient was then evaluated by Infectious Disease and thought to be secondary to allergic reaction to tape that was overlying the area  Patient was discharged home off of antibiotics and had remained hemodynamically stable   unable to get herself up due to profound weakness and fatigue  Patient's sister reports that she must of been on the floor for about 2 to 2-1/2 hours  Admitted IP status w/ generalized weakness likely sec to acute infectious process  Place on iv cefepime /vanco , ID consult , f/u bld cx , temps and wbc  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 , bld cx pending   PE:  Left chest wall erythema with extension into anterior neck/chin - similar erythema noted on the right upper tricep without tenderness  ED Triage Vitals   Temperature Pulse Respirations Blood Pressure SpO2   12/20/19 1514 12/20/19 1515 12/20/19 1515 12/20/19 1515 12/20/19 1515   100 °F (37 8 °C) 95 22 117/56 94 %      Temp Source Heart Rate Source Patient Position - Orthostatic VS BP Location FiO2 (%)   12/20/19 1514 12/20/19 1514 12/20/19 1737 12/20/19 1515 --   Oral Monitor Lying Right arm       Pain Score       12/20/19 1917       No Pain        Wt Readings from Last 1 Encounters:   12/20/19 129 kg (283 lb 8 2 oz)     Additional Vital Signs:   12/21/19 0653    96  20      94 %  None (Room air)  Sitting   12/21/19 0300  99 7 °F (37 6 °C)  95  20  101/52    95 %  Nasal cannula  Lying   12/20/19 2300  99 3 °F (37 4 °C)  83  20  112/50    92 %  None (Room air)  Lying   12/20/19 1917  98 5 °F (36 9 °C)  84  20  113/52  75  100 %  None (Room air)  Lying   12/20/19 1737    100  18  116/64    95 %  None (Room air)  Lying   12/20/19 1515    95  22  117/56    94 %  None (Room air)         Pertinent Labs/Diagnostic Test Results:   12/20 EKG - NSR w/ PAC  12/20 CXR No acute cardiopulmonary disease  12/20 CT head    1  No acute intracranial abnormality noted  2   Abnormal scalp soft tissue mass in the high right parietal paramedian location abutting the calvarium or its periosteum and not present on a MRI dated 1/30/2014    While this may represent a benign lesion such as an epidermal inclusion cyst or   sebaceous cyst, the possibility of a neoplasm cannot be excluded and appropriate clinical correlation is advised /  Results from last 7 days   Lab Units 12/21/19  0517 12/20/19  2055 12/20/19  1615 12/18/19  0454 12/15/19  1626 12/15/19  0522 12/14/19  2302   WBC Thousand/uL 9 83  --  11 92* 5 57 13 60* 10 06 11 07*   HEMOGLOBIN g/dL 8 0*  --  8 3* 7 9* 10 9* 10 8* 12 2   HEMATOCRIT % 23 6*  --  24 3* 23 8* 32 4* 32 7* 36 7   PLATELETS Thousands/uL 158 135* 149 130* 167 158 172   NEUTROS ABS Thousands/µL 8 79*  --  10 66*  --   --   --  9 95*   BANDS PCT %  --   --   --   --  7  --   --      Results from last 7 days   Lab Units 12/21/19  0517 12/20/19  1615 12/16/19  0613 12/15/19  0523 12/14/19  2302   SODIUM mmol/L 136 133* 136 136 137   POTASSIUM mmol/L 3 1* 3 2* 4 3 3 3* 3 6   CHLORIDE mmol/L 102 99* 102 101 99*   CO2 mmol/L 24 23 24 26 30   ANION GAP mmol/L 10 11 10 9 8   BUN mg/dL 9 8 15 15 15   CREATININE mg/dL 1 05 1 02 1 05 0 91 1 04   EGFR ml/min/1 73sq m 57 59 57 67 57   CALCIUM mg/dL 8 4 8 9 8 7 8 8 9 0   MAGNESIUM mg/dL  --  1 9 1 6 1 9  --      Results from last 7 days   Lab Units 12/20/19  1615 12/14/19  2302   AST U/L 43 32   ALT U/L 33 54   ALK PHOS U/L 116 104   TOTAL PROTEIN g/dL 6 5 7 1   ALBUMIN g/dL 1 9* 3 2*   TOTAL BILIRUBIN mg/dL 0 70 0 50   BILIRUBIN DIRECT mg/dL  --  0 17     Results from last 7 days   Lab Units 12/21/19  0558 12/20/19  2109   POC GLUCOSE mg/dl 171* 149*     Results from last 7 days   Lab Units 12/21/19  0517 12/20/19  1615 12/16/19  0613 12/15/19  0523 12/14/19  2302   GLUCOSE RANDOM mg/dL 161* 150* 136 125 137     Results from last 7 days   Lab Units 12/20/19  2055 12/14/19  2302   TROPONIN I ng/mL <0 02 <0 02     Results from last 7 days   Lab Units 12/20/19  1615 12/14/19  2302   PROTIME seconds 14 4 13 4   INR  1 12 1 02   PTT seconds 38* 34     Results from last 7 days   Lab Units 12/20/19  1812 12/18/19  0454 12/16/19  0613 12/15/19  0523   PROCALCITONIN ng/ml 0 71* 1 58* 0 96* <0 05     Results from last 7 days   Lab Units 12/20/19  2055 12/20/19  1615 12/14/19  2302   LACTIC ACID mmol/L 0 9 1 2 1 2     Results from last 7 days   Lab Units 12/20/19  2159 12/15/19  0013   CLARITY UA  Clear Slightly Cloudy   COLOR UA  Yellow Yellow   SPEC GRAV UA  <=1 005 1 025   PH UA  5 5 5 5   GLUCOSE UA mg/dl Negative Negative   KETONES UA mg/dl Negative Negative   BLOOD UA  Trace-Intact* Moderate*   PROTEIN UA mg/dl Negative Trace*   NITRITE UA  Negative Negative   BILIRUBIN UA  Negative Negative   UROBILINOGEN UA E U /dl 0 2 0 2   LEUKOCYTES UA  Small* Large*   WBC UA /hpf 4-10* Innumerable*   RBC UA /hpf 0-1* None Seen   BACTERIA UA /hpf Occasional Occasional   EPITHELIAL CELLS WET PREP /hpf Occasional Occasional     Results from last 7 days   Lab Units 12/15/19  1558   INFLUENZA A PCR  None Detected   RSV PCR  None Detected     Results from last 7 days   Lab Units 12/20/19  1615 12/15/19  0014 12/14/19  2306 12/14/19  2302   BLOOD CULTURE  Received in Microbiology Lab  Culture in Progress  Received in Microbiology Lab  Culture in Progress  --  No Growth After 5 Days  No Growth After 5 Days     URINE CULTURE   --  50,000-59,000 cfu/ml Candida albicans*  --   --      Results from last 7 days   Lab Units 12/18/19  0454 12/15/19  1626   TOTAL COUNTED  100 100     ED Treatment:   Medication Administration from 12/20/2019 1512 to 12/20/2019 1911       Date/Time Order Dose Route Action     12/20/2019 1658 cefepime (MAXIPIME) 2,000 mg in dextrose 5 % 50 mL IVPB   Intravenous Restarted     12/20/2019 1734 vancomycin (VANCOCIN) 1,500 mg in sodium chloride 0 9 % 250 mL IVPB 1,500 mg Intravenous New Bag     12/20/2019 1701 sodium chloride 0 9 % bolus 1,000 mL 1,000 mL Intravenous New Bag        Past Medical History:   Diagnosis Date    Asthma     Cancer [facility]     [facility]     Hyperlipidemia     Hypertension     Psychiatric disorder     Depression     Present on Admission:   Hypokalemia   Cigarette nicotine dependence without complication   Sepsis ([facility] [address] [facility]   Leiomyosarcoma ([facility]   Hypertension   Staghorn calculus      Admitting Diagnosis: Leg pain [M79 606]  Weakness [R53 1]  Prolonged Q-T interval on ECG [R94 31]  Cellulitis of right upper arm [L03 113]  Sepsis without acute organ dysfunction, due to unspecified organism [facility]) [A41 9]  Age/Sex: 62 y o  female  Admission Orders:  Scheduled Medications:    Medications:  aspirin 81 mg Oral Daily   buPROPion 150 mg Oral BID   cefepime 2,000 mg Intravenous Once   cefepime 2,000 mg Intravenous Q12H   vitamin B-12 1,000 mcg Oral Daily   enoxaparin 40 mg Subcutaneous Daily   gabapentin 600 mg Oral HS   lisinopril-hydrochlorothiazide (PRINZIDE 10/12  5) combo dose  Oral Daily   melatonin 3 mg Oral HS   montelukast 10 mg Oral HS   multivitamin-minerals 1 tablet Oral Daily   nicotine 1 patch Transdermal Daily   potassium chloride 10 mEq Oral Daily   potassium chloride 20 mEq Intravenous Once   pravastatin 40 mg Oral Daily With Dinner   saccharomyces boulardii 250 mg Oral BID   vancomycin 17 5 mg/kg (Adjusted) Intravenous Q12H     Continuous IV Infusions:     PRN Meds:    acetaminophen 650 mg Oral Q6H PRN   albuterol 2 5 mg Nebulization Q6H PRN   cyclobenzaprine 5 mg Oral TID PRN   sodium chloride (PF) 3 mL Intravenous PRN     Fingerstick ac and hs  Tele   IP CONSULT TO CARDIOLOGY  IP CONSULT TO INFECTIOUS DISEASES  IP CONSULT TO PHARMACY    Network Utilization Review Department  [email or web address]  ATTENTION: Please call with any questions or concerns to [phone] and carefully listen to the prompts so that you are directed to the right person  All voicemails are confidential   Yasmeen Rudolph all requests for admission clinical reviews, approved or denied determinations and any other requests to dedicated fax number below belonging to the campus where the patient is receiving treatment   List of dedicated fax numbers for the Facilities:  FACILITY NAME UR FAX NUMBER   ADMISSION DENIALS (Administrative/Medical Necessity) [phone]   PARENT CHILD HEALTH (Maternity/NICU/Pediatrics) [phone]   [facility] [phone]   Corwin Salgado [phone]   Gaye Palm [phone]   [facility] [address] [place] [address] [phone]   [facility]  [phone]   [address] [facility]  [address] [phone]

## 2019-12-21 NOTE — ASSESSMENT & PLAN NOTE
· Baseline hgb appears to be around 7-9 on review  · Hgb 8 0 today   · Likely secondary to chronic disease, normocytic  · Iron panel not indicative of need for supplementation   · Monitor CBC, no signs of acute or active bleeding noted at this time  · Continue B12 supplementation

## 2019-12-21 NOTE — ASSESSMENT & PLAN NOTE
· K 3 1  · Replete with 20 mEq IV potassium, 1 x dose of 40 mEq KDUR and daily supplementation of 10 mEq  · Monitor BMP in the AM

## 2019-12-21 NOTE — H&P
H&P- Sofya Goss 1955, 62 y o  female MRN: [id number]    Unit/Bed#: -01 Encounter: [id number]    Primary Care Provider: Roge Cook MD   Date and time admitted to hospital: 12/20/2019  3:12 PM      DOS: 12/20/2019    * Generalized weakness  Assessment & Plan  · Pt presented with generalized weakness and mechanical fall, unable to get up off the floor due to profound weakness   · Denies any numbness/tingling or unilateral weakness   · Likely secondary to acute infectious process   · Pt meeting sepsis criteria on admission with tachycardia, leukocytosis and erythema concerning for cellulitis   · Recent hospitalization for similar symptoms   · Place on IV cefepime/vancomycin   · ID consultation   · Fall precautions   · PT/OT consultations, encourage early ambulation as possible  · CT head negative for intracranial abnormalities  · Received 1 L bolus of NS in the ED, hold off on additional fluids for now as patient with mild lower extremity edema and hemodynamic stability     Sepsis ([address] )  Assessment & Plan  · POA as evidenced by tachycardia, leukocytosis with erythema concerning for cellulitis   · Pt immunocompromised secondary to malignancy/chemotherapy   · Continue broad spectrum antibiotics with IV cefepime and vancomycin for now pending additional work up   · Procalcitonin pending  · Blood cultures pending   · Supportive care with antipyretics   · Received 1 L NS in the ED, hold off additional fluids for now and monitor  · Trend WBC and temps closely   · ID consultation is pending     Hyperglycemia  Assessment & Plan  · Random glucose noted to be 150 on admission  · Obtain hgbA1c  · QID glucose checks  · Monitor, if persistent consider addition of low dose SSI coverage    Prolonged QT interval  Assessment & Plan  · EKG on admission noted to have severely prolonged QTc  · Hold Zoloft   · Obtain cardiology consultation   · Telemetry monitoring   · Repeat EKG in the AM  · Avoid QT prolonging medications as able     Anemia  Assessment & Plan  · Baseline hgb appears to be around 7-9 on review  · Hgb 8 3 today   · Likely secondary to chronic disease, normocytic  · Obtain iron panel   · Monitor CBC, no signs of acute or active bleeding noted at this time  · Continue B12 supplementation     Hyponatremia  Assessment & Plan  · Sodium 133  · Could be secondary to hypovolemia, received I L bolus in the ER  · Obtain repeat BMP in the Am and monitor volume status    Staghorn calculus  Assessment & Plan  · Previously noted on CT scan  · Pt asymptomatic currently  · Has follow up with urology in February   · Monitor     Hypertension  Assessment & Plan  · BP appears stable on review   · Continue Prinzide 10/12 5 mg daily  · Monitor      Leiomyosarcoma (HCC)  Assessment & Plan  · Pt with metastatic Leiomyosarcoma  · Follows with onc[place] [facility] [place], thinking about palliative therapy, has follow up to discuss options next week   · Has received 2 doses of chemotherapy at this point, last was about 1 5 weeks ago   · Pt with left port-a-cath in place, erythema surrounding port, continue treatment as above with IV abx and ID consultation  · Blood cultures pending   · Continue with follow up with oncologist outpatient     Cigarette nicotine dependence without complication  Assessment & Plan  · Pt reports smoking 5-6 cigarettes a day   · Encourage cessation   · Nicotine patch while inpatient     Hypokalemia  Assessment & Plan  · K 3 2 on admission   · Replete with 20 mEq IV potassium and continue daily supplementation with KDUR 10 mEq daily   · Monitor BMP in the AM    VTE Prophylaxis: Enoxaparin (Lovenox)  / sequential compression device   Code Status:  Level 3 DNR/DNI - discussed with patient and patient's sister at bedside  POLST: There is no POLST form on file for this patient (pre-hospital)  Discussion with family:  Discussed with patient and patient's sister at bedside regarding plan of care    Anticipated Length of Stay:  Patient will be admitted on an Inpatient basis with an anticipated length of stay of  > 2 midnights  Justification for Hospital Stay:  Patient meeting sepsis criteria with concerns for cellulitis and immunocompromised state requiring broad-spectrum IV antibiotics    Total Time for Visit, including Counseling / Coordination of Care: 30 minutes  Greater than 50% of this total time spent on direct patient counseling and coordination of care  Chief Complaint:   "I felt so weak and tired "    History of Present Illness:    Kenyon Mcpherson is a 62 y o  female with significant past medical history of metastatic leiomyosarcoma, hyperlipidemia, hypertension, depression who presents with generalized weakness and fall  Patient was recently discharged from the hospital a few days ago for similar symptoms  Patient had surrounding erythema and blistering around her left port and was placed on IV antibiotics  However patient was then evaluated by Infectious Disease and thought to be secondary to allergic reaction to tape that was overlying the area  Patient was discharged home off of antibiotics and had remained hemodynamically stable  Patient reports that she was feeling fine at home but then today when she got up to walk to the kitchen she felt severe generalized weakness and that her legs gave out on her  She reports that she was unable to get herself up due to profound weakness and fatigue  Patient's sister reports that she must of been on the floor for about 2 to 2-1/2 hours  Patient reports that did not seem that long of time  Patient's sister also reports that she attempted to help the patient get up to chair but the patient was so weak she could not even lift her  Patient continues to report fatigue and generalized weakness  She reports that she lives by herself at home and is typically fully independent  She denies any upper respiratory complaints or urinary complaints    Reports that she feels as though the erythema surrounding her port and upper chest is worse than prior  Patient reports that she follows with an oncologist at [facility] and has an appointment upcoming next week to talk about palliative therapy an additional options  Patient denies any loss of consciousness  Review of Systems:    Review of Systems   Constitutional: Positive for fatigue and fever  Negative for chills and diaphoresis  HENT: Negative for congestion, sinus pressure, sneezing, sore throat, tinnitus and trouble swallowing  Eyes: Negative  Negative for visual disturbance  Respiratory: Negative for cough, chest tightness, shortness of breath and wheezing  Cardiovascular: Positive for leg swelling  Negative for chest pain and palpitations  Gastrointestinal: Negative for abdominal pain, constipation, diarrhea, nausea and vomiting  Genitourinary: Negative for difficulty urinating, dysuria, hematuria and urgency  Musculoskeletal: Positive for gait problem  Negative for back pain and joint swelling  Skin: Positive for color change  Negative for pallor and wound  Neurological: Negative for dizziness, syncope, light-headedness and headaches  Past Medical and Surgical History:     Past Medical History:   Diagnosis Date    Asthma     Cancer ([facility] )     COPD (chronic obstructive pulmonary disease) ([facility] )     Hyperlipidemia     Hypertension     Psychiatric disorder     Depression       Past Surgical History:   Procedure Laterality Date    IR PCN TO PCNU CONVERSION  11/13/2019    IR PORT REMOVAL  10/14/2019    IR TUBE PLACEMENT NEPHROSTOMY  10/17/2019    LUNG REMOVAL, PARTIAL      lower lobe       Meds/Allergies:    Prior to Admission medications    Medication Sig Start Date End Date Taking?  Authorizing Provider   albuterol (2 5 mg/3 mL) 0 083 % nebulizer solution Take 2 5 mg by nebulization every 6 (six) hours as needed for wheezing or shortness of breath    Historical Provider, MD   aspirin 81 mg chewable tablet Chew 81 mg daily    Historical Provider, MD   buPROPion [facility] [place] [facility] SR) 150 mg 12 hr tablet Take 150 mg by mouth 2 (two) times a day    Historical Provider, MD   cyclobenzaprine (FLEXERIL) 5 mg tablet  10/22/19   Historical Provider, MD   gabapentin (NEURONTIN) 600 MG tablet Take 600 mg by mouth daily at bedtime    Historical Provider, MD   lisinopril-hydrochlorothiazide (PRINZIDE,ZESTORETIC) 10-12 5 MG per tablet Take 1 tablet by mouth daily    Historical Provider, MD   Misc  Devices KIT Oxygen at night    Historical Provider, MD   montelukast (SINGULAIR) 10 mg tablet Take 10 mg by mouth daily at bedtime    Historical Provider, MD   Multiple Vitamins-Minerals (CENTRUM SILVER 50+WOMEN) TABS Centrum Silver    Historical Provider, MD   nicotine (NICODERM CQ) 21 mg/24 hr TD 24 hr patch Place 1 patch on the skin every 24 hours    Historical Provider, MD   ondansetron (ZOFRAN) 8 mg tablet Take 8 mg by mouth every 8 (eight) hours as needed for nausea or vomiting    Historical Provider, MD   potassium chloride (K-DUR,KLOR-CON) 10 mEq tablet Take 10 mEq by mouth daily    Historical Provider, MD   saccharomyces boulardii (FLORASTOR) 250 mg capsule Take 1 capsule (250 mg total) by mouth 2 (two) times a day 12/18/19   Millicent Wills PA-C   sertraline (ZOLOFT) 100 mg tablet Take 100 mg by mouth daily at bedtime    Historical Provider, MD   simvastatin (ZOCOR) 20 mg tablet Take 20 mg by mouth daily at bedtime    Historical Provider, MD   vitamin B-12 (VITAMIN B-12) 1,000 mcg tablet Take 1,000 mcg by mouth daily    Historical Provider, MD SAENZ have reviewed home medications using allscripts  Patient reports no changes to her medications since prior admission    Allergies:    Allergies   Allergen Reactions    Morphine Itching and Vomiting     Other reaction(s): Vomiting  Other reaction(s): (PIMS) vomiting  Makes her vomit      Sulfa Antibiotics Rash     12-18-19 Pt reported experiencing red face, skin peeling around eyes       Social History:     Marital Status:    Occupation:   Patient Pre-hospital Living Situation:  Patient lives at home alone  Patient Pre-hospital Level of Mobility:  Full mobility  Patient Pre-hospital Diet Restrictions:  None  Substance Use History:   Social History     Substance and Sexual Activity   Alcohol Use Never    Frequency: Never     Social History     Tobacco Use   Smoking Status Current Every Day Smoker    Packs/day: 0 50    Types: Cigarettes   Smokeless Tobacco Never Used     Social History     Substance and Sexual Activity   Drug Use Not Currently       Family History:    non-contributory    Physical Exam:     Vitals:   Blood Pressure: 116/64 (12/20/19 1737)  Pulse: 100 (12/20/19 1737)  Temperature: 100 °F (37 8 °C) (12/20/19 1514)  Temp Source: Oral (12/20/19 1514)  Respirations: 18 (12/20/19 1737)  Height: 5' 8" (172 7 cm) (12/20/19 1917)  Weight - Scale: 129 kg (283 lb 8 2 oz) (12/20/19 1517)  SpO2: 95 % (12/20/19 1737)    Physical Exam   Constitutional: No distress  Patient is in no acute distress sitting in her hospital bed resting comfortably accompanied by her sister  Alert and oriented  Answers questions appropriately  HENT:   Head: Normocephalic and atraumatic  Eyes: Pupils are equal, round, and reactive to light  Conjunctivae are normal    Cardiovascular: Normal rate, regular rhythm and intact distal pulses  Pulmonary/Chest: Effort normal and breath sounds normal  No stridor  No respiratory distress  She has no wheezes  Abdominal: Soft  Bowel sounds are normal  She exhibits no distension  There is no tenderness  There is no guarding  Musculoskeletal: She exhibits edema (Trace lower extremity edema bilaterally)  Neurological: She is alert  Able to lift both legs up off the bed   Skin: Skin is warm and dry  She is not diaphoretic   There is erythema (Anterior chest wall with erythema surrounding left port site and across the chest up into patient's throat just below the chin with warmth)  Right posterior arm erythema   Psychiatric: She has a normal mood and affect  Vitals reviewed  Additional Data:     Lab Results: I have personally reviewed pertinent reports  Results from last 7 days   Lab Units 12/20/19  1615  12/15/19  1626   WBC Thousand/uL 11 92*   < > 13 60*   HEMOGLOBIN g/dL 8 3*   < > 10 9*   HEMATOCRIT % 24 3*   < > 32 4*   PLATELETS Thousands/uL 149   < > 167   BANDS PCT %  --   --  7   NEUTROS PCT % 89*  --   --    LYMPHS PCT % 6*  --   --    LYMPHO PCT   --    < > 6*   MONOS PCT % 3*  --   --    MONO PCT   --    < > 0*   EOS PCT % 0   < > 0    < > = values in this interval not displayed  Results from last 7 days   Lab Units 12/20/19  1615   SODIUM mmol/L 133*   POTASSIUM mmol/L 3 2*   CHLORIDE mmol/L 99*   CO2 mmol/L 23   BUN mg/dL 8   CREATININE mg/dL 1 02   ANION GAP mmol/L 11   CALCIUM mg/dL 8 9   ALBUMIN g/dL 1 9*   TOTAL BILIRUBIN mg/dL 0 70   ALK PHOS U/L 116   ALT U/L 33   AST U/L 43   GLUCOSE RANDOM mg/dL 150*     Results from last 7 days   Lab Units 12/20/19  1615   INR  1 12             Results from last 7 days   Lab Units 12/20/19  1615 12/18/19  0454 12/16/19  0613 12/15/19  0523 12/14/19  2302   LACTIC ACID mmol/L 1 2  --   --   --  1 2   PROCALCITONIN ng/ml  --  1 58* 0 96* <0 05  --        Imaging: I have personally reviewed pertinent reports  CT head without contrast   Final Result by Justyna Landry MD (12/20 4458)         1  No acute intracranial abnormality noted  2   Abnormal scalp soft tissue mass in the high right parietal paramedian location abutting the calvarium or its periosteum and not present on a MRI dated 1/30/2014  While this may represent a benign lesion such as an epidermal inclusion cyst or    sebaceous cyst, the possibility of a neoplasm cannot be excluded and appropriate clinical correlation is advised /      The study was marked in EPIC for immediate notification                    Workstation performed: [id number]         XR chest pa & lateral   ED Interpretation by Anselmo Cagle MD (12/20 1648)   No infiltrate  Final Result by Karla Montes MD (12/20 1706)      No acute cardiopulmonary disease  Workstation performed: [id number]             EKG, Pathology, and Other Studies Reviewed on Admission:   · EKG:  Prolonged QT  · CT head, CXR results reviewed     Allscripts / Epic Records Reviewed: Yes     ** Please Note: This note has been constructed using a voice recognition system   **

## 2019-12-21 NOTE — ASSESSMENT & PLAN NOTE
· K 3 2 on admission   · Replete with 20 mEq IV potassium and continue daily supplementation with KDUR 10 mEq daily   · Monitor BMP in the AM

## 2019-12-21 NOTE — ASSESSMENT & PLAN NOTE
· Random glucose noted to be 150 on admission  · Obtain hgbA1c  · QID glucose checks  · Monitor, if persistent consider addition of low dose SSI coverage

## 2019-12-21 NOTE — CONSULTS
Consultation - Cardiology   Aries Nugent 62 y o  female MRN: [id number]  Unit/Bed#: -01 Encounter: [id number]  12/21/19  1:56 PM    Assessment/ Plan:  1- QTC prolongation, EKG shows possible pseudo prolongation of QT with possible U waves from hypokalemia  Patient was placed on potassium supplementation and Zoloft was held  Repeat EKG this AM shows normalization of QTc (450)  Continue to monitor for arrhythmias on telemetry, replete to K >4, Mg >2  Check TTE to rule out structural heart disease  Troponins negative x2  Recommend discontinuing QT prolonging medications if able  2- hypertension, stable on lisinopril-hydrochlorothiazide 10/12 5 mg daily,  3- hyperlipidemia, continue with pravastatin 40 mg daily  History of Present Illness   Physician Requesting Consult: Dave Nur DO  Reason for Consult / Principal Problem:  QTC prolongation  HPI: Aries Nugent is a 62y o  year old female with history of Leiomyosarcoma on chemotherapy, chronic anemia, hypertension, hyperlipidemia, depression, tobacco abuse who presents with progressive fatigue  She was recently admitted on 12/14/2019 for an allergic reaction to adhesive tape at her Port-A-Cath site  She was discharged in stable condition on 12/18/2018, since discharge she reportedly had been having increased generalized weakness and leg heaviness where she lowered herself down to the floor was unable to get herself up for about 2 hours  She denies any loss of consciousness  She was found to have significantly prolonged QTC interval on EKG and thus Cardiology was consulted for further investigation  Of note, she was found with hypokalemia and hypomagnesia and was placed on replacement in the ED  She denies any episodes of chest pain, discomfort, palpitations, lightheadedness/dizziness, shortness of breath, orthopnea, PND or lower extremity edema  She denies any cardiac history and denies family history of the same   She is a current 1/2 pack per day smoker  Inpatient consult to Cardiology  Consult performed by: Anca Castro PA-C  Consult ordered by: Gifty Tejada PA-C        Review of Systems   Constitutional: Positive for fatigue  Negative for appetite change, chills, diaphoresis and fever  Respiratory: Negative for cough, chest tightness and shortness of breath  Cardiovascular: Negative for chest pain, palpitations and leg swelling  Gastrointestinal: Negative for diarrhea, nausea and vomiting  Endocrine: Negative for cold intolerance and heat intolerance  Genitourinary: Negative for difficulty urinating, dysuria and enuresis  Musculoskeletal: Negative for arthralgias, back pain and gait problem  Allergic/Immunologic: Negative for environmental allergies and food allergies  Neurological: Negative for dizziness, facial asymmetry and headaches  Hematological: Negative for adenopathy  Does not bruise/bleed easily  Psychiatric/Behavioral: Negative for agitation, behavioral problems and confusion  Historical Information   Past Medical History:   Diagnosis Date    Asthma     Cancer ([facility] )     COPD (chronic obstructive pulmonary disease) ([facility] )     Hyperlipidemia     Hypertension     Psychiatric disorder     Depression     Past Surgical History:   Procedure Laterality Date    IR PCN TO PCNU CONVERSION  11/13/2019    IR PORT REMOVAL  10/14/2019    IR TUBE PLACEMENT NEPHROSTOMY  10/17/2019    LUNG REMOVAL, PARTIAL      lower lobe     Social History     Substance and Sexual Activity   Alcohol Use Never    Frequency: Never     Social History     Substance and Sexual Activity   Drug Use Not Currently     Social History     Tobacco Use   Smoking Status Current Every Day Smoker    Packs/day: 0 50    Types: Cigarettes   Smokeless Tobacco Never Used       Family History: History reviewed  No pertinent family history      Meds/Allergies   current meds:   Current Facility-Administered Medications   Medication Dose Route Frequency  acetaminophen (TYLENOL) tablet 650 mg  650 mg Oral Q6H PRN    albuterol inhalation solution 2 5 mg  2 5 mg Nebulization Q6H PRN    aspirin chewable tablet 81 mg  81 mg Oral Daily    buPROPion (WELLBUTRIN SR) 12 hr tablet 150 mg  150 mg Oral BID    cefepime (MAXIPIME) 2,000 mg in dextrose 5 % 50 mL IVPB  2,000 mg Intravenous Once    cefepime (MAXIPIME) 2,000 mg in dextrose 5 % 50 mL IVPB  2,000 mg Intravenous Q12H    cyanocobalamin (VITAMIN B-12) tablet 1,000 mcg  1,000 mcg Oral Daily    cyclobenzaprine (FLEXERIL) tablet 5 mg  5 mg Oral TID PRN    enoxaparin (LOVENOX) subcutaneous injection 40 mg  40 mg Subcutaneous Daily    gabapentin (NEURONTIN) capsule 600 mg  600 mg Oral HS    lisinopril-hydrochlorothiazide (PRINZIDE 10/12  5) combo dose   Oral Daily    melatonin tablet 3 mg  3 mg Oral HS    montelukast (SINGULAIR) tablet 10 mg  10 mg Oral HS    multivitamin-minerals (CENTRUM) tablet 1 tablet  1 tablet Oral Daily    nicotine (NICODERM CQ) 14 mg/24hr TD 24 hr patch 1 patch  1 patch Transdermal Daily    potassium chloride (K-DUR,KLOR-CON) CR tablet 10 mEq  10 mEq Oral Daily    potassium chloride 20 mEq IVPB (premix)  20 mEq Intravenous Once    pravastatin (PRAVACHOL) tablet 40 mg  40 mg Oral Daily With Dinner    saccharomyces boulardii (FLORASTOR) capsule 250 mg  250 mg Oral BID    sodium chloride (PF) 0 9 % injection 3 mL  3 mL Intravenous PRN    vancomycin (VANCOCIN) 1,500 mg in sodium chloride 0 9 % 250 mL IVPB  17 5 mg/kg (Adjusted) Intravenous Q12H     Allergies   Allergen Reactions    Morphine Itching and Vomiting     Other reaction(s): Vomiting  Other reaction(s): (PIMS) vomiting  Makes her vomit      Sulfa Antibiotics Rash     12-18-19 Pt reported experiencing red face, skin peeling around eyes       Objective   Vitals: Blood pressure 101/52, pulse 96, temperature 99 7 °F (37 6 °C), temperature source Oral, resp   rate 20, height 5' 8" (1 727 m), weight 129 kg (283 lb 8 2 oz), SpO2 94 % , Body mass index is 43 11 kg/m² ,   Orthostatic Blood Pressures      Most Recent Value   Blood Pressure  101/52 filed at 12/21/2019 0300   Patient Position - Orthostatic VS  Sitting filed at 12/21/2019 3922          Systolic (42UXK), ROM:640 , Min:101 , GVC:756     Diastolic (50IMV), LZE:74, Min:50, Max:64        Intake/Output Summary (Last 24 hours) at 12/21/2019 1356  Last data filed at 12/21/2019 1100  Gross per 24 hour   Intake 600 ml   Output 1300 ml   Net -700 ml       Invasive Devices     Central Venous Catheter Line            Port A Cath 11/28/19 Left Chest 23 days          Peripheral Intravenous Line            Peripheral IV 12/20/19 Right Antecubital less than 1 day          Drain            Ureteral Drain/Stent Left ureter 7 Fr  37 days                    Physical Exam:  GEN: Alert and oriented x 3, in no acute distress  Well appearing and well nourished  HEENT: Sclera anicteric, conjunctivae pink, mucous membranes moist  Oropharynx clear  NECK: Supple, no carotid bruits, no significant JVD  Trachea midline, no thyromegaly  HEART: Regular rhythm, normal S1 and S2, no murmurs, clicks, gallops or rubs  PMI nondisplaced, no thrills  LUNGS: Clear to auscultation bilaterally; no wheezes, rales, or rhonchi  No increased work of breathing or signs of respiratory distress  ABDOMEN: Soft, nontender, nondistended, normoactive bowel sounds  EXTREMITIES: Skin warm and well perfused, no clubbing, cyanosis, or edema  NEURO: No focal findings  Normal speech  Mood and affect normal    SKIN: Normal without suspicious lesions on exposed skin        Lab Results:     Troponins:   Results from last 7 days   Lab Units 12/20/19 2055 12/14/19  2302   TROPONIN I ng/mL <0 02 <0 02       CBC with diff:   Results from last 7 days   Lab Units 12/21/19  0517 12/20/19  2055 12/20/19  1615 12/18/19  0454 12/15/19  1626 12/15/19  0522 12/14/19  2302   WBC Thousand/uL 9 83  --  11 92* 5 57 13 60* 10 06 11 07* HEMOGLOBIN g/dL 8 0*  --  8 3* 7 9* 10 9* 10 8* 12 2   HEMATOCRIT % 23 6*  --  24 3* 23 8* 32 4* 32 7* 36 7   MCV fL 97  --  97 99* 98 99* 99*   PLATELETS Thousands/uL 158 135* 149 130* 167 158 172   MCH pg 32 9  --  33 2 32 9 33 0 32 7 32 9   MCHC g/dL 33 9  --  34 2 33 2 33 6 33 0 33 2   RDW % 14 7  --  14 4 14 6 14 7 14 7 14 6   MPV fL 10 3 10 2 10 7 9 4 9 8 9 6 9 6   NRBC AUTO /100 WBCs 1  --  1 0 0  --  0         CMP:   Results from last 7 days   Lab Units 12/21/19  0517 12/20/19  1615 12/16/19  0613 12/15/19  0523 12/14/19  2302   POTASSIUM mmol/L 3 1* 3 2* 4 3 3 3* 3 6   CHLORIDE mmol/L 102 99* 102 101 99*   CO2 mmol/L 24 23 24 26 30   BUN mg/dL 9 8 15 15 15   CREATININE mg/dL 1 05 1 02 1 05 0 91 1 04   CALCIUM mg/dL 8 4 8 9 8 7 8 8 9 0   AST U/L  --  43  --   --  32   ALT U/L  --  33  --   --  54   ALK PHOS U/L  --  116  --   --  104   EGFR ml/min/1 73sq m 57 59 57 67 57

## 2019-12-21 NOTE — ASSESSMENT & PLAN NOTE
· Sodium 133  · Could be secondary to hypovolemia, received I L bolus in the ER  · Obtain repeat BMP in the Am and monitor volume status

## 2019-12-21 NOTE — ASSESSMENT & PLAN NOTE
· POA as evidenced by tachycardia, leukocytosis with erythema concerning for cellulitis   · Pt immunocompromised secondary to malignancy/chemotherapy   · Continue broad spectrum antibiotics with IV cefepime and vancomycin for now pending additional work up   · Procalcitonin pending  · Blood cultures pending   · Supportive care with antipyretics   · Received 1 L NS in the ED, hold off additional fluids for now and monitor  · Trend WBC and temps closely   · ID consultation is pending

## 2019-12-21 NOTE — PROGRESS NOTES
Vancomycin IV Pharmacy-to-Dose Consultation    Ollie Calix is a 62 y o  female who is currently receiving Vancomycin IV with management by the Pharmacy Consult service  Assessment/Plan:  The patient was reviewed  Renal function is stable and no signs or symptoms of nephrotoxicity and/or infusion reactions were documented in the chart  Based on todays assessment, continue current vancomycin (day # 2) dosing of 1500mg q 12, with a plan for trough to be drawn at 0430 on 12/22  We will continue to follow the patients culture results and clinical progress daily      Ronan Jones, Pharmacist

## 2019-12-21 NOTE — ASSESSMENT & PLAN NOTE
· Pt presented with generalized weakness and mechanical fall, unable to get up off the floor due to profound weakness   · Denies any numbness/tingling or unilateral weakness   · Likely secondary to acute infectious process, improvement noted today, able to transfer from bed to chair without significant assistance    · Pt meeting sepsis criteria on admission with tachycardia, leukocytosis and erythema concerning for cellulitis   · Recent hospitalization for similar symptoms   · Continue IV cefepime/vancomycin   · ID consultation pending   · Fall precautions   · PT/OT consultations, encourage early ambulation as possible  · CT head negative for intracranial abnormalities

## 2019-12-21 NOTE — ASSESSMENT & PLAN NOTE
· EKG on admission noted to have severely prolonged QTc  · Hold Zoloft   · Obtain cardiology consultation   · Telemetry monitoring   · Repeat EKG in the AM  · Avoid QT prolonging medications as able

## 2019-12-21 NOTE — ASSESSMENT & PLAN NOTE
· Pt with metastatic Leiomyosarcoma  · Follows with oncologist at [facility], thinking about palliative therapy, has follow up to discuss options next week   · Has received 2 doses of chemotherapy at this point, last was about 1 5 weeks ago   · Pt with left port-a-cath in place, erythema surrounding port, continue treatment as above with IV abx and ID consultation  · Blood cultures pending   · Continue with follow up with oncologist outpatient

## 2019-12-21 NOTE — PROGRESS NOTES
Vancomycin Assessment    Kelly Sheldon is a 62 y o  female who is currently receiving vancomycin 2000mg q12h for a skin-soft tissue infection     Relevant clinical data and objective history reviewed:  Creatinine   Date Value Ref Range Status   12/20/2019 1 02 0 60 - 1 30 mg/dL Final     Comment:     Standardized to IDMS reference method   12/16/2019 1 05 0 60 - 1 30 mg/dL Final     Comment:     Standardized to IDMS reference method   12/15/2019 0 91 0 60 - 1 30 mg/dL Final     Comment:     Standardized to IDMS reference method     /64 (BP Location: Right arm)   Pulse 100   Temp 100 °F (37 8 °C) (Oral)   Resp 18   Ht 5' 8" (1 727 m)   Wt 129 kg (283 lb 8 2 oz)   SpO2 95%   BMI 43 11 kg/m²   No intake/output data recorded  Lab Results   Component Value Date/Time    BUN 8 12/20/2019 04:15 PM    WBC 11 92 (H) 12/20/2019 04:15 PM    HGB 8 3 (L) 12/20/2019 04:15 PM    HCT 24 3 (L) 12/20/2019 04:15 PM    MCV 97 12/20/2019 04:15 PM     12/20/2019 04:15 PM     Temp Readings from Last 3 Encounters:   12/20/19 100 °F (37 8 °C) (Oral)   12/18/19 98 3 °F (36 8 °C) (Oral)   11/13/19 97 9 °F (36 6 °C) (Oral)     Vancomycin Days of Therapy: 1    Assessment/Plan  The patient is currently on vancomycin utilizing scheduled dosing based on adjusted body weight (due to obesity)  Baseline risks associated with therapy include: advanced age  The patient is currently receiving 2000mg q12h and after clinical evaluation will be changed to 1500mg q12h  Pharmacy will also follow closely for s/sx of nephrotoxicity, infusion reactions and appropriateness of therapy  BMP and CBC will be ordered per protocol  Plan for trough as patient approaches steady state, prior to the 4th  dose at approximately 0430 on 12/22  Due to infection severity, will target a trough of 15-20 (appropriate for most indications)   Pharmacy will continue to follow the patients culture results and clinical progress daily      Inessa Bourne, Pharmacist

## 2019-12-21 NOTE — PHYSICAL THERAPY NOTE
Physical Therapy Cancellation Note    PT orders received + chart review completed  PT eval attempted  Pt OOB in chair upon arrival  Pt declining eval at this time 2* increased fatigue  Agreeable to have PT re-attempt 12/22/19   nsg notified  Will cont to follow       Trell Biggs, PT, DPT

## 2019-12-21 NOTE — PROGRESS NOTES
Progress Note - Nolan Solomon 1955, 62 y o  female MRN: [id number]    Unit/Bed#: -01 Encounter: [id number]    Primary Care Provider: Jennifer Tai MD   Date and time admitted to hospital: 12/20/2019  3:12 PM      DOS: 12/21/2019    * Generalized weakness  Assessment & Plan  · Pt presented with generalized weakness and mechanical fall, unable to get up off the floor due to profound weakness   · Denies any numbness/tingling or unilateral weakness   · Likely secondary to acute infectious process, improvement noted today, able to transfer from bed to chair without significant assistance    · Pt meeting sepsis criteria on admission with tachycardia, leukocytosis and erythema concerning for cellulitis   · Recent hospitalization for similar symptoms   · Continue IV cefepime/vancomycin   · ID consultation pending   · Fall precautions   · PT/OT consultations, encourage early ambulation as possible  · CT head negative for intracranial abnormalities    Sepsis ([facility] [address] )  Assessment & Plan  · POA as evidenced by tachycardia, leukocytosis with erythema concerning for cellulitis   · Pt immunocompromised secondary to malignancy/chemotherapy   · Continue broad spectrum antibiotics with IV cefepime and vancomycin for now pending additional work up, improvement noted on current regimen   · Procalcitonin elevated at 0 71, continue to trend   · Blood cultures pending   · Supportive care with antipyretics   · Trend WBC and temps closely   · ID consultation is pending, discussed with ID over the phone today, recommending if blood cultures are (+) will need to remove port and recommending upper extremity US to r/o thrombophlebitis, follow up Monday      Hyperglycemia  Assessment & Plan  · Random glucose noted to be 150 on admission  · Last hgbA1c 8/2019 - 5 3%  · Obtain repeat hgbA1c as patient with persistent hyperglycemia here  · QID glucose checks  · Monitor, consider adding low dose PRN SSI coverage     Prolonged QT interval  Assessment & Plan  · EKG on admission noted to have severely prolonged QTc  · Cardiology following,  · Likely pseudo prolongation with possible U waves from hypokalemia   · Repeat EKG AM with normalization of QTc to 450   · Continue to hold Zoloft   · Continue telemetry   · ECHO ordered    Anemia  Assessment & Plan  · Baseline hgb appears to be around 7-9 on review  · Hgb 8 0 today   · Likely secondary to chronic disease, normocytic  · Iron panel not indicative of need for supplementation   · Monitor CBC, no signs of acute or active bleeding noted at this time  · Continue B12 supplementation     [place] calculus  Assessment & Plan  · Previously noted on CT scan  · Pt asymptomatic currently  · Has follow up with urology in February   · Does have history of yeast in the urine culture  · Monitor     Hypertension  Assessment & Plan  · BP appears stable on review   · Continue Prinzide 10/12 5 mg daily  · Monitor      Leiomyosarcoma ([address] )  Assessment & Plan  · Pt with metastatic Leiomyosarcoma  · Follows with oncologist at [facility], thinking about palliative therapy, has follow up to discuss options next week   · Has received 2 doses of chemotherapy at this point, last infusion was about 1 5 weeks ago   · Pt with left port-a-cath in place, erythema surrounding port, continue treatment as above with IV abx and ID consultation  · Blood cultures pending   · Continue with follow up with oncologist outpatient   · Discussed with patient today her wishes to continue with chemotherapy treatment, advised that if there is evidence of bacteremia or port infection, port will need to be taken out, however if she does not wish to receive chemotherapy any longer, will not replace port, she reports she will think about this in the interim      Cigarette nicotine dependence without complication  Assessment & Plan  · Pt reports smoking 5-6 cigarettes a day   · Encourage cessation   · Nicotine patch while inpatient Hypokalemia  Assessment & Plan  · K 3 1  · Replete with 20 mEq IV potassium, 1 x dose of 40 mEq KDUR and daily supplementation of 10 mEq  · Monitor BMP in the AM    Hyponatremiaresolved as of 2019  Assessment & Plan  · Resolved, likely secondary to hypovolemia, improved s/p IV bolus in the ED       VTE Pharmacologic Prophylaxis:   Pharmacologic: Enoxaparin (Lovenox)  Mechanical VTE Prophylaxis in Place: Yes    Patient Centered Rounds: I have performed bedside rounds with nursing staff today  Nayeli Enriquez     Discussions with Specialists or Other Care Team Provider: Discussed with ID, RN, CM and reviewed previous notes     Education and Discussions with Family / Patient: Discussed with patient and patient's family member at bedside regarding plan of care  Time Spent for Care: 30 minutes  More than 50% of total time spent on counseling and coordination of care as described above  Current Length of Stay: 1 day(s)    Current Patient Status: Inpatient   Certification Statement: The patient will continue to require additional inpatient hospital stay due to continued IV abx for probable cellulitis, ID evaluation     Discharge Plan: Not medically stable as above, likely not for at least 48 hours     Code Status: Level 3 - DNAR and DNI      Subjective:   Pt reports some improvement of her fatigue today, reports she was able to stand and transfer to the chair without much help  Reports she is frustrated with the chemotherapy and the port, stating if she continues to be sick she likely will not want chemotherapy any more  Currently denies any chest pain, abdominal pain, nausea, vomiting  Denies any diarrhea, constipation or urinary difficulties  Reports she feels as though the erythema is slightly improved compared to yesterday      Objective:     Vitals:   Temp (24hrs), Av 4 °F (37 4 °C), Min:98 5 °F (36 9 °C), Max:100 °F (37 8 °C)    Temp:  [98 5 °F (36 9 °C)-100 °F (37 8 °C)] 99 7 °F (37 6 °C)  HR:  [] 96  Resp:  [18-22] 20  BP: (101-117)/(50-64) 101/52  SpO2:  [92 %-100 %] 94 %  Body mass index is 43 11 kg/m²  Input and Output Summary (last 24 hours): Intake/Output Summary (Last 24 hours) at 12/21/2019 1445  Last data filed at 12/21/2019 1100  Gross per 24 hour   Intake 600 ml   Output 1300 ml   Net -700 ml       Physical Exam:     Physical Exam   Constitutional: No distress  Pt is in no acute distress sitting in her hospital chair resting comfortably  Erythema surrounding left port mildly improved, continued erythema over the neck and posterior right arm with warmth  HENT:   Head: Normocephalic and atraumatic  Eyes: Pupils are equal, round, and reactive to light  Conjunctivae are normal    Cardiovascular: Normal rate, regular rhythm and intact distal pulses  Pulmonary/Chest: Effort normal and breath sounds normal  No stridor  No respiratory distress  She has no wheezes  Abdominal: Soft  Bowel sounds are normal  She exhibits no distension  There is no tenderness  There is no guarding  Musculoskeletal: She exhibits no edema  Neurological: She is alert  Skin: Skin is warm and dry  She is not diaphoretic  There is erythema  Vitals reviewed  Additional Data:     Labs:    Results from last 7 days   Lab Units 12/21/19  0517   WBC Thousand/uL 9 83   HEMOGLOBIN g/dL 8 0*   HEMATOCRIT % 23 6*   PLATELETS Thousands/uL 158   NEUTROS PCT % 90*   LYMPHS PCT % 6*   MONOS PCT % 2*   EOS PCT % 0     Results from last 7 days   Lab Units 12/21/19  0517 12/20/19  1615   POTASSIUM mmol/L 3 1* 3 2*   CHLORIDE mmol/L 102 99*   CO2 mmol/L 24 23   BUN mg/dL 9 8   CREATININE mg/dL 1 05 1 02   CALCIUM mg/dL 8 4 8 9   ALK PHOS U/L  --  116   ALT U/L  --  33   AST U/L  --  43     Results from last 7 days   Lab Units 12/20/19  1615   INR  1 12       * I Have Reviewed All Lab Data Listed Above  * Additional Pertinent Lab Tests Reviewed:  All Labs Within Last 24 Hours Reviewed    Imaging:    Imaging Reports Reviewed Today Include: CT head, CXR  Imaging Personally Reviewed by Myself Includes:  None    Recent Cultures (last 7 days):     Results from last 7 days   Lab Units 12/20/19  1615 12/15/19  0014 12/14/19  2306 12/14/19  2302   BLOOD CULTURE  Received in Microbiology Lab  Culture in Progress  Received in Microbiology Lab  Culture in Progress  --  No Growth After 5 Days  No Growth After 5 Days  URINE CULTURE   --  50,000-59,000 cfu/ml Candida albicans*  --   --        Last 24 Hours Medication List:     Current Facility-Administered Medications:  acetaminophen 650 mg Oral Q6H PRN Pawan Crowley PA-C    albuterol 2 5 mg Nebulization Q6H PRN Pawan Crowley PA-C    aspirin 81 mg Oral Daily Louise Stahl PA-C    buPROPion 150 mg Oral BID Louise Stahl PA-C    cefepime 2,000 mg Intravenous Once Pawan Crowley PA-C Last Rate: Stopped (12/20/19 8740)   cefepime 2,000 mg Intravenous Q12H Pawan Crowley PA-C Last Rate: 2,000 mg (12/21/19 9529)   vitamin B-12 1,000 mcg Oral Daily Louise Stahl PA-C    cyclobenzaprine 5 mg Oral TID PRN Pawan Crowley PA-C    enoxaparin 40 mg Subcutaneous Daily Louise Stahl PA-C    gabapentin 600 mg Oral HS Louise Stahl PA-C    lisinopril-hydrochlorothiazide (PRINZIDE 10/12  5) combo dose  Oral Daily Louise Stahl PA-C    melatonin 3 mg Oral HS BRITTNY Das    montelukast 10 mg Oral HS Pawan Crowley PA-C    multivitamin-minerals 1 tablet Oral Daily Pawan Crowley PA-C    nicotine 1 patch Transdermal Daily Louise Stahl PA-C    potassium chloride 10 mEq Oral Daily Louise Stahl PA-C    potassium chloride 20 mEq Intravenous Once Pawan Crowley PA-C Last Rate: 20 mEq (12/21/19 1407)   pravastatin 40 mg Oral Daily With AutoZomery Stahl PA-C    saccharomyces boulardii 250 mg Oral BID Louise Stahl PA-C    sodium chloride (PF) 3 mL Intravenous PRN Pawan Crowley PA-C    vancomycin 17 5 mg/kg (Adjusted) Intravenous Q12H Pawan Crowley, HUA Last Rate: 1,500 mg (12/21/19 0513)        Today, Patient Was Seen By: Donna Sharma PA-C    ** Please Note: Dictation voice to text software may have been used in the creation of this document   **

## 2019-12-21 NOTE — PLAN OF CARE
Problem: Potential for Falls  Goal: Patient will remain free of falls  Description  INTERVENTIONS:  - Assess patient frequently for physical needs  -  Identify cognitive and physical deficits and behaviors that affect risk of falls    -  [place] fall precautions as indicated by assessment   - Educate patient/family on patient safety including physical limitations  - Instruct patient to call for assistance with activity based on assessment  - Modify environment to reduce risk of injury  - Consider OT/PT consult to assist with strengthening/mobility  Outcome: Progressing     Problem: Prexisting or High Potential for Compromised Skin Integrity  Goal: Skin integrity is maintained or improved  Description  INTERVENTIONS:  - Identify patients at risk for skin breakdown  - Assess and monitor skin integrity  - Assess and monitor nutrition and hydration status  - Monitor labs   - Assess for incontinence   - Turn and reposition patient  - Assist with mobility/ambulation  - Relieve pressure over bony prominences  - Avoid friction and shearing  - Provide appropriate hygiene as needed including keeping skin clean and dry  - Evaluate need for skin moisturizer/barrier cream  - Collaborate with interdisciplinary team   - Patient/family teaching  - Consider wound care consult   Outcome: Progressing     Problem: PAIN - ADULT  Goal: Verbalizes/displays adequate comfort level or baseline comfort level  Description  Interventions:  - Encourage patient to monitor pain and request assistance  - Assess pain using appropriate pain scale  - Administer analgesics based on type and severity of pain and evaluate response  - Implement non-pharmacological measures as appropriate and evaluate response  - Consider cultural and social influences on pain and pain management  - Notify physician/advanced practitioner if interventions unsuccessful or patient reports new pain  Outcome: Progressing     Problem: INFECTION - ADULT  Goal: Absence or prevention of progression during hospitalization  Description  INTERVENTIONS:  - Assess and monitor for signs and symptoms of infection  - Monitor lab/diagnostic results  - Monitor all insertion sites, i e  indwelling lines, tubes, and drains  - Monitor endotracheal if appropriate and nasal secretions for changes in amount and color  - [place] appropriate cooling/warming therapies per order  - Administer medications as ordered  - Instruct and encourage patient and family to use good hand hygiene technique  - Identify and instruct in appropriate isolation precautions for identified infection/condition  Outcome: Progressing     Problem: SAFETY ADULT  Goal: Patient will remain free of falls  Description  INTERVENTIONS:  - Assess patient frequently for physical needs  -  Identify cognitive and physical deficits and behaviors that affect risk of falls  -  [place] fall precautions as indicated by assessment   - Educate patient/family on patient safety including physical limitations  - Instruct patient to call for assistance with activity based on assessment  - Modify environment to reduce risk of injury  - Consider OT/PT consult to assist with strengthening/mobility  Outcome: Progressing  Goal: Maintain or return to baseline ADL function  Description  INTERVENTIONS:  - Assess patient frequently for physical needs  -  Identify cognitive and physical deficits and behaviors that affect risk of falls    -  [place] fall precautions as indicated by assessment   - Educate patient/family on patient safety including physical limitations  - Instruct patient to call for assistance with activity based on assessment  - Modify environment to reduce risk of injury  - Consider OT/PT consult to assist with strengthening/mobility  Outcome: Progressing  Goal: Maintain or return mobility status to optimal level  Description  INTERVENTIONS:  -  Assess patient's ability to carry out ADLs; assess patient's baseline for ADL function and identify physical deficits which impact ability to perform ADLs (bathing, care of mouth/teeth, toileting, grooming, dressing, etc )  - Assess/evaluate cause of self-care deficits   - Assess range of motion  - Assess patient's mobility; develop plan if impaired  - Assess patient's need for assistive devices and provide as appropriate  - Encourage maximum independence but intervene and supervise when necessary  - Involve family in performance of ADLs  - Assess for home care needs following discharge   - Consider OT consult to assist with ADL evaluation and planning for discharge  - Provide patient education as appropriate  Outcome: Progressing     Problem: DISCHARGE PLANNING  Goal: Discharge to home or other facility with appropriate resources  Description  INTERVENTIONS:  - Identify barriers to discharge w/patient and caregiver  - Arrange for needed discharge resources and transportation as appropriate  - Identify discharge learning needs (meds, wound care, etc )  - Arrange for interpretive services to assist at discharge as needed  - Refer to Case Management Department for coordinating discharge planning if the patient needs post-hospital services based on physician/advanced practitioner order or complex needs related to functional status, cognitive ability, or social support system  Outcome: Progressing     Problem: Knowledge Deficit  Goal: Patient/family/caregiver demonstrates understanding of disease process, treatment plan, medications, and discharge instructions  Description  Complete learning assessment and assess knowledge base    Interventions:  - Provide teaching at level of understanding  - Provide teaching via preferred learning methods  Outcome: Progressing

## 2019-12-21 NOTE — ASSESSMENT & PLAN NOTE
· POA as evidenced by tachycardia, leukocytosis with erythema concerning for cellulitis   · Pt immunocompromised secondary to malignancy/chemotherapy   · Continue broad spectrum antibiotics with IV cefepime and vancomycin for now pending additional work up, improvement noted on current regimen   · Procalcitonin elevated at 0 71, continue to trend   · Blood cultures pending   · Supportive care with antipyretics   · Trend WBC and temps closely   · ID consultation is pending, discussed with ID over the phone today, recommending if blood cultures are (+) will need to remove port and recommending upper extremity US to r/o thrombophlebitis, follow up Monday

## 2019-12-22 NOTE — ASSESSMENT & PLAN NOTE
· Pt with metastatic Leiomyosarcoma  · Follows with oncologist at [facility], thinking about palliative therapy, has follow up to discuss options next week  Will attempt to reach Dr Bibi Mcgvoern tomorrow  · Has received 2 doses of chemotherapy at this point, last infusion was about 1 5 weeks ago   · Pt with left port-a-cath in place, erythema surrounding port which is improving, continue treatment as above with IV abx and ID consultation  · Blood cultures negative x 24 hours   · Discussed with patient her wishes to continue with chemotherapy treatment, advised that if there is evidence of bacteremia or port infection, port will need to be taken out, however if she does not wish to receive chemotherapy any longer, will not replace port, she reports she will think about this in the interim  Also requesting discussion with Dr Bibi Mcgovern tomorrow

## 2019-12-22 NOTE — ASSESSMENT & PLAN NOTE
· POA as evidenced by tachycardia, leukocytosis with erythema concerning for cellulitis   · Pt immunocompromised secondary to malignancy/chemotherapy   · Continue broad spectrum antibiotics with IV cefepime and vancomycin for now pending additional work up, improvement noted on current regimen   · Procalcitonin elevated at 0 71/0 59/0 40  · Blood cultures negative x 24 hours, continue to trend closely   · Supportive care with antipyretics   · Trend WBC and temps closely   · ID consultation is pending, discussed with ID over the phone, recommending if blood cultures are (+) will need to remove port and upper extremity US to r/o thrombophlebitis   Pt with recent IV infiltration of the left upper extremity, continue warm compresses

## 2019-12-22 NOTE — ASSESSMENT & PLAN NOTE
· Random glucose noted to be 150 on admission  · Last hgbA1c 8/2019 - 5 3%  · Repeat hgbA1c is pending, however glucose has been persistently elevated  · Place on PRN SSI coverage   · QID glucose checks  · Monitor

## 2019-12-22 NOTE — PROGRESS NOTES
Progress Note - Kimdanieldean Megan 1955, 62 y o  female MRN: [id number]    Unit/Bed#: -01 Encounter: [id number]    Primary Care Provider: Dian Alexander MD   Date and time admitted to hospital: 12/20/2019  3:12 PM      DOS: 12/22/2019    * Generalized weakness  Assessment & Plan  · Pt presented with generalized weakness and mechanical fall, unable to get up off the floor due to profound weakness   · Denies any numbness/tingling or unilateral weakness   · Likely secondary to acute infectious process, continued improvement noted today, able to walk to the bathroom without assistance  · Pt meeting sepsis criteria on admission with tachycardia, leukocytosis and erythema concerning for cellulitis   · Recent hospitalization for similar symptoms   · Continue IV cefepime/vancomycin   · ID consultation pending   · Fall precautions   · PT/OT recommending STR, pt refusing at this time, advise re-evaluation with PT once closer to discharge  · CT head negative for intracranial abnormalities    Sepsis ([facility] [address] )  Assessment & Plan  · POA as evidenced by tachycardia, leukocytosis with erythema concerning for cellulitis   · Pt immunocompromised secondary to malignancy/chemotherapy   · Continue broad spectrum antibiotics with IV cefepime and vancomycin for now pending additional work up, improvement noted on current regimen   · Procalcitonin elevated at 0 71/0 59/0 40  · Blood cultures negative x 24 hours, continue to trend closely   · Supportive care with antipyretics   · Trend WBC and temps closely   · ID consultation is pending, discussed with ID over the phone, recommending if blood cultures are (+) will need to remove port and upper extremity US to r/o thrombophlebitis   Pt with recent IV infiltration of the left upper extremity, continue warm compresses    Hyperglycemia  Assessment & Plan  · Random glucose noted to be 150 on admission  · Last hgbA1c 8/2019 - 5 3%  · Repeat hgbA1c is pending, however glucose has been persistently elevated  · Place on PRN SSI coverage   · QID glucose checks  · Monitor    Prolonged QT interval  Assessment & Plan  · EKG on admission noted to have severely prolonged QTc  · Cardiology following,  · Likely pseudo prolongation with possible U waves from hypokalemia   · Repeat EKG AM with normalization of QTc to 450   · Continue to hold Zoloft   · Continue telemetry   · ECHO pending  · Continue Wellbutrin for depression symptoms    Anemia  Assessment & Plan  · Baseline hgb appears to be around 7-9 on review  · Hgb 7 6 today, trend closely   · Likely secondary to chronic disease, normocytic  · Iron panel not indicative of need for supplementation   · Monitor CBC, no signs of acute or active bleeding noted at this time  · Continue B12 supplementation     Staghorn calculus  Assessment & Plan  · Previously noted on CT scan  · Pt asymptomatic currently  · Has follow up with urology in February   · Monitor     Hypertension  Assessment & Plan  · BP appears stable on review   · Continue Prinzide 10/12 5 mg daily  · Monitor      Leiomyosarcoma ([address] )  Assessment & Plan  · Pt with metastatic Leiomyosarcoma  · Follows with oncologist at [facility], thinking about palliative therapy, has follow up to discuss options next week  Will attempt to reach Dr Shilo Dougherty tomorrow  · Has received 2 doses of chemotherapy at this point, last infusion was about 1 5 weeks ago   · Pt with left port-a-cath in place, erythema surrounding port which is improving, continue treatment as above with IV abx and ID consultation  · Blood cultures negative x 24 hours   · Discussed with patient her wishes to continue with chemotherapy treatment, advised that if there is evidence of bacteremia or port infection, port will need to be taken out, however if she does not wish to receive chemotherapy any longer, will not replace port, she reports she will think about this in the interim  Also requesting discussion with Dr Shilo Dougherty tomorrow      Cigarette nicotine dependence without complication  Assessment & Plan  · Pt reports smoking 5-6 cigarettes a day   · Encourage cessation   · Nicotine patch while inpatient     Hypokalemia  Assessment & Plan  · K 3 1, persistent despite repletion   · S/p PO KDUR 50 mEq total today   · Obtain magnesium level   · Monitor BMP      VTE Pharmacologic Prophylaxis:   Pharmacologic: Enoxaparin (Lovenox)  Mechanical VTE Prophylaxis in Place: Yes    Patient Centered Rounds: I have performed bedside rounds with nursing staff today  Jimmy Mayer    Discussions with Specialists or Other Care Team Provider: Discussed with RN, CELESTINA and reviewed previous notes     Education and Discussions with Family / Patient: Discussed with patient and patient's daughter at bedside regarding plan of care  Time Spent for Care: 30 minutes  More than 50% of total time spent on counseling and coordination of care as described above  Current Length of Stay: 2 day(s)    Current Patient Status: Inpatient   Certification Statement: The patient will continue to require additional inpatient hospital stay due to continued IV antibiotic therapy, potassium supplementation, symptomatic improvement    Discharge Plan: Not medically stable as above  Pending ID evaluation     Code Status: Level 3 - DNAR and DNI      Subjective:   Pt reports that she is having pain secondary to an IV infiltration on the left arm with swelling  Also reporting pleuritic chest pain on the right rib cage where she reports she has metastatic disease  Currently denies any chest pain, shortness of breath, abdominal pain, nausea, vomiting  Reports she will not want to go to a short term rehab  Pt's daughter concerned that she will need increased help at home if she continues with weakness, however patient reporting improvement with ambulation and fatigue  Walked to the bathroom without assistance today and reports it went well       Objective:     Vitals:   Temp (24hrs), Av 9 °F (37 2 °C), Min:98 3 °F (36 8 °C), Max:100 2 °F (37 9 °C)    Temp:  [98 3 °F (36 8 °C)-100 2 °F (37 9 °C)] 98 5 °F (36 9 °C)  HR:  [70-92] 84  Resp:  [18-20] 18  BP: (100-121)/(50-57) 100/57  SpO2:  [91 %-99 %] 95 %  Body mass index is 43 11 kg/m²  Input and Output Summary (last 24 hours): Intake/Output Summary (Last 24 hours) at 12/22/2019 1522  Last data filed at 12/22/2019 1300  Gross per 24 hour   Intake 920 ml   Output 800 ml   Net 120 ml       Physical Exam:     Physical Exam   Constitutional: No distress  Pt is in no acute distress sitting in her hospital chair resting comfortably  Left posteriori arm swelling and tenderness from IV infiltration  Erythema significantly improved from left chest wall and right arm  HENT:   Head: Normocephalic and atraumatic  Eyes: Pupils are equal, round, and reactive to light  Conjunctivae are normal    Cardiovascular: Normal rate, regular rhythm and intact distal pulses  Pulmonary/Chest: Effort normal and breath sounds normal  No stridor  No respiratory distress  She has no wheezes  Abdominal: Soft  Bowel sounds are normal  She exhibits no distension  There is no tenderness  There is no guarding  Musculoskeletal: She exhibits edema (trace - 1+ pedal edema lower extremities bilaterally )  Neurological: She is alert  Skin: Skin is warm and dry  She is not diaphoretic  No erythema  Vitals reviewed        Additional Data:     Labs:    Results from last 7 days   Lab Units 12/22/19 0427 12/21/19  0517   WBC Thousand/uL 10 30* 9 83   HEMOGLOBIN g/dL 7 6* 8 0*   HEMATOCRIT % 22 5* 23 6*   PLATELETS Thousands/uL 208 158   NEUTROS PCT %  --  90*   LYMPHS PCT %  --  6*   MONOS PCT %  --  2*   EOS PCT %  --  0     Results from last 7 days   Lab Units 12/22/19  0427 12/20/19  1615   POTASSIUM mmol/L 3 1*   < > 3 2*   CHLORIDE mmol/L 103   < > 99*   CO2 mmol/L 25   < > 23   BUN mg/dL 10   < > 8   CREATININE mg/dL 0 97   < > 1 02   CALCIUM mg/dL 8 7   < > 8 9   ALK PHOS U/L --   --  116   ALT U/L  --   --  33   AST U/L  --   --  43    < > = values in this interval not displayed  Results from last 7 days   Lab Units 12/20/19  1615   INR  1 12       * I Have Reviewed All Lab Data Listed Above  * Additional Pertinent Lab Tests Reviewed: All Labs Within Last 24 Hours Reviewed    Imaging:    Imaging Reports Reviewed Today Include: None  Imaging Personally Reviewed by Myself Includes:  None    Recent Cultures (last 7 days):     Results from last 7 days   Lab Units 12/20/19  1615   BLOOD CULTURE  No Growth at 24 hrs  No Growth at 24 hrs  Last 24 Hours Medication List:     Current Facility-Administered Medications:  acetaminophen 650 mg Oral Q6H PRN Melisa Printers, PA-C    albuterol 2 5 mg Nebulization Q6H PRN Melisa Printsarah, PA-LIANA    aspirin 81 mg Oral Daily Melisa HUA Gaona    buPROPion 150 mg Oral BID Louise Stahl PA-C    cefepime 2,000 mg Intravenous Q12H Melisa Gaona PA-C Last Rate: 2,000 mg (12/22/19 0612)   vitamin B-12 1,000 mcg Oral Daily Louise Stahl PA-C    cyclobenzaprine 5 mg Oral TID PRN Melisa HUA Gaona    enoxaparin 40 mg Subcutaneous Daily Louise Stahl PA-C    gabapentin 600 mg Oral HS Louise Stahl PA-C    insulin lispro 1-5 Units Subcutaneous TID AC Louise Stahl PA-C    lidocaine 1 patch Topical Daily Melisa Gaona PA-C    lisinopril-hydrochlorothiazide (PRINZIDE 10/12  5) combo dose  Oral Daily Louise Stahl PA-C    melatonin 6 mg Oral HS Louise Stahl PA-C    montelukast 10 mg Oral HS Melisa HUA Gaona    multivitamin-minerals 1 tablet Oral Daily Louise Stahl PA-C    nicotine 1 patch Transdermal Daily KATERINA Beatty-LIANA    potassium chloride 10 mEq Oral Daily Louise KATERINA Nevarez-LIANA    potassium chloride 40 mEq Oral Once Louise Stahl PA-C    pravastatin 40 mg Oral Daily With [employer] LEEANNE NevarezC    saccharomyces boulardii 250 mg Oral BID Louise Stahl PA-C    sodium chloride (PF) 3 mL Intravenous PRN Melisa Gaona PA-C    traMADol 50 mg Oral Q6H PRN Melisa Gaona PA-C    vancomycin 17 5 mg/kg (Adjusted) Intravenous Q12H Melisa Gaona PA-C Last Rate: 1,500 mg (12/22/19 1139)        Today, Patient Was Seen By: Melisa Gaona PA-C    ** Please Note: Dictation voice to text software may have been used in the creation of this document   **

## 2019-12-22 NOTE — ASSESSMENT & PLAN NOTE
· EKG on admission noted to have severely prolonged QTc  · Cardiology following,  · Likely pseudo prolongation with possible U waves from hypokalemia   · Repeat EKG AM with normalization of QTc to 450   · Continue to hold Zoloft   · Continue telemetry   · ECHO pending  · Continue Wellbutrin for depression symptoms

## 2019-12-22 NOTE — PHYSICAL THERAPY NOTE
PT Evaluation (19min)  (11:55-12:14)    Past Medical History:   Diagnosis Date    Asthma     Cancer ([facility] [address] )     COPD (chronic obstructive pulmonary disease) ([facility] [address] )     Hyperlipidemia     Hypertension     Psychiatric disorder     Depression        12/22/19 1214   Note Type   Note type Eval only   Pain Assessment   Pain Assessment 0-10   Pain Score 6   Pain Type Acute pain   Pain Location Rib cage   Pain Orientation Right   Pain Descriptors Aching;Sore;Cramping   Pain Frequency Constant/continuous   Pain Onset Sudden   Clinical Progression Not changed   Hospital Pain Intervention(s) Ambulation/increased activity   Home Living   Type of [address] Two level;Performs ADLs on one level; Able to live on main level with bedroom/bathroom;Stairs to enter with rails  ([address])   Bathroom Shower/Tub Tub/shower unit   Bathroom Toilet Standard   Bathroom Equipment Tub transfer bench   Bathroom Accessibility Accessible   Prior Function   Level of [place] Independent with ADLs and functional mobility   Lives With Alone   Receives Help From Family   ADL Assistance Independent   IADLs Needs assistance   Falls in the last 6 months 1 to 4  (1 fall PTA- stayed on the floor for over 2 hours)   Vocational On disability   Comments (+) drives   Restrictions/Precautions   Weight Bearing Precautions Per Order No   Braces or Orthoses Other (Comment)   Other Precautions Chair Alarm; Bed Alarm;Multiple lines; Fall Risk;Pain   General   Additional Pertinent History pt presents to [facility] c weakness s/p fall  pt reports being on floor x2hrs c inability to get up  PT consulted for mobility + d/c planning  up c (A)     Family/Caregiver Present No   Cognition   Orientation Level Oriented X4   RUE Assessment   RUE Assessment X  (shldr ROM limited)   LUE Assessment   LUE Assessment X  (shldr ROM limited)   RLE Assessment   RLE Assessment WFL  (4-/5)   LLE Assessment   LLE Assessment WFL  (4-/5)   Coordination   Sensation WFL   Bed Mobility   Supine to Sit Unable to assess  (pt OOB in bathroom upon arrival)   Transfers   Sit to Stand 4  Minimal assistance   Additional items Assist x 1;Verbal cues  (CG )   Stand to Sit 4  Minimal assistance   Additional items Assist x 1; Armrests; Verbal cues; Increased time required  (CG )   Toilet transfer 4  Minimal assistance  (CG)   Additional items Assist x 1;Verbal cues;Standard toilet; Increased time required  (c grab bar)   Ambulation/Elevation   Gait pattern Antalgic;Decreased foot clearance; Short stride; Excessively slow   Gait Assistance 4  Minimal assist   Additional items Assist x 1;Verbal cues  (CG )   Assistive Device None   Distance 5' + 20' c standing rest for hand washing  (ambulation limited 2* fatigue/pain)   Stair Management Assistance Not tested   Balance   Static Sitting Fair +   Dynamic Sitting Fair +   Static Standing Fair   Dynamic Standing Fair   Ambulatory Fair   Endurance Deficit   Endurance Deficit Yes   Activity Tolerance   Activity Tolerance Patient limited by fatigue;Patient limited by pain   Nurse Made Aware AMAN Hilton aware pt ambulated CG (A)x1 from bathroom to chair  chair alarm activated once pt seated  Assessment   Prognosis Good   Problem List Decreased strength;Decreased range of motion;Decreased endurance; Impaired balance;Decreased mobility;Obesity;Orthopedic restrictions;Pain   Assessment pt is a 63y/o f who presents to [facility] c weakness s/p fall  pt reports being on floor c inability to stand up x2hrs  PMH significant for leiomyosarcoma, anemia, COPD, depression, + asthma  at baseline, pt (I) c functional mobility tasks s AD  resides alone in 2 story home c 1st floor set up + [address]  currently requires min (A)x1/CG to complete mobility tasks 2* deficits in strength, ROM, balance, gait quality, pain, + activity tolerance noted in PT exam above  Barthel Index 50/100  pt educated on safe transfer technique   in bathroom on toilet upon arrival for PT eval  ambulated 5' + 20' s AD c noted gait deviations above  able to sit OOB in chair at end of session c chair alarm activated  would benefit from skilled PT to maximize functional mobility + improve quality of life  upon d/c, will recommend STR at this time, however if pt achieves PT goals by d/c, recommend home c hhpt  PT eval of high complexity 2* unstable med status c pt requiring ongoing medical management s/p fall  pt c multiple co-morbidities + mobility deficits above  lives alone in 2 story home c [address]  remains c multiple lines  Barriers to Discharge Inaccessible home environment;Decreased caregiver support   Goals   Patient Goals "to get around s falling again"  STG Expiration Date 01/01/20   Short Term Goal #1 1  increase strength 1/2 grade to improve overall functional mobility, 2  perform transfers mod (I) to safely perform ADLs, 3  ambulate >150' (I) to safely navigate home environment, 4  negotiate 16 stairs mod (I) to safely enter home   Plan   Treatment/Interventions Functional transfer training;LE strengthening/ROM; Elevations; Therapeutic exercise; Endurance training;Patient/family training;Equipment eval/education; Bed mobility;Gait training;Spoke to nursing;OT   PT Frequency 5x/wk   Recommendation   Recommendation Short-term skilled PT  (pending progress)   PT - OK to Discharge No   Modified [place] Scale   Modified [place] Scale 4   Barthel Index   Feeding 5   Bathing 0   Grooming Score 5   Dressing Score 5   Bladder Score 10   Bowels Score 10   Toilet Use Score 5   Transfers (Bed/Chair) Score 10   Mobility (Level Surface) Score 0   Stairs Score 0   Barthel Index Score 50     Luciano Rico, PT, DPT

## 2019-12-22 NOTE — PLAN OF CARE
Problem: PHYSICAL THERAPY ADULT  Goal: Performs mobility at highest level of function for planned discharge setting  See evaluation for individualized goals  Description  Treatment/Interventions: Functional transfer training, LE strengthening/ROM, Elevations, Therapeutic exercise, Endurance training, Patient/family training, Equipment eval/education, Bed mobility, Gait training, Spoke to nursing, OT          See flowsheet documentation for full assessment, interventions and recommendations  Note:   Prognosis: Good  Problem List: Decreased strength, Decreased range of motion, Decreased endurance, Impaired balance, Decreased mobility, Obesity, Orthopedic restrictions, Pain  Assessment: pt is a 63y/o f who presents to [facility] c weakness s/p fall  pt reports being on floor c inability to stand up x2hrs  PMH significant for leiomyosarcoma, anemia, COPD, depression, + asthma  at baseline, pt (I) c functional mobility tasks s AD  resides alone in 2 story home c 1st floor set up + [address]  currently requires min (A)x1/CG to complete mobility tasks 2* deficits in strength, ROM, balance, gait quality, pain, + activity tolerance noted in PT exam above  Barthel Index 50/100  pt educated on safe transfer technique  in bathroom on toilet upon arrival for PT eval  ambulated 5' + 20' s AD c noted gait deviations above  able to sit OOB in chair at end of session c chair alarm activated  would benefit from skilled PT to maximize functional mobility + improve quality of life  upon d/c, will recommend STR at this time, however if pt achieves PT goals by d/c, recommend home c hhpt  PT eval of high complexity 2* unstable med status c pt requiring ongoing medical management s/p fall  pt c multiple co-morbidities + mobility deficits above  lives alone in 2 story home c [address]  remains c multiple lines    Barriers to Discharge: Inaccessible home environment, Decreased caregiver support     Recommendation: Short-term skilled PT(pending progress)     PT - OK to Discharge: No    See flowsheet documentation for full assessment

## 2019-12-22 NOTE — ASSESSMENT & PLAN NOTE
· Baseline hgb appears to be around 7-9 on review  · Hgb 7 6 today, trend closely   · Likely secondary to chronic disease, normocytic  · Iron panel not indicative of need for supplementation   · Monitor CBC, no signs of acute or active bleeding noted at this time  · Continue B12 supplementation

## 2019-12-22 NOTE — ASSESSMENT & PLAN NOTE
· K 3 1, persistent despite repletion   · S/p PO KDUR 50 mEq total today   · Obtain magnesium level   · Monitor BMP

## 2019-12-22 NOTE — PLAN OF CARE
Problem: OCCUPATIONAL THERAPY ADULT  Goal: Performs self-care activities at highest level of function for planned discharge setting  See evaluation for individualized goals  Description  Treatment Interventions: ADL retraining, Functional transfer training, UE strengthening/ROM, Endurance training, Patient/family training, Equipment evaluation/education, Fine motor coordination activities, Compensatory technique education, Continued evaluation, Energy conservation, Activityengagement          See flowsheet documentation for full assessment, interventions and recommendations  Note:   Limitation: Decreased ADL status, Decreased UE ROM, Decreased UE strength, Decreased Safe judgement during ADL, Decreased endurance, Decreased self-care trans, Decreased high-level ADLs  Prognosis: Good  Assessment: Patient is a 62 y o  female seen for OT evaluation s/p admit to [address] [place] [address] on 12/20/2019 w/Generalized weakness  Commorbidities affecting patient's functional performance at time of assessment include:sepsis, hyperglycemia, anemia, HTN, Metastatic Leiomyosarcoma, Cigarette nicotine dependence, hypokalemia, presented to [facility] with generalized weakness and fall  Orders placed for OT evaluation and treatment  Performed at least two patient identifiers during session including name and wristband  Prior to admission, Patient reported independent with ADLs, family assists with IADLs  Patient lives alone in a 2 story house, with 16 steps to enter, ambulates with no AD, drives and manages her affairs independently  Personal factors affecting patient at time of initial evaluation include: limited caregiver support, steps to enter, decreased initiation and engagement, difficulty performing ADLs and difficulty performing IADLs  Upon evaluation, patient requires supervision, set up and contact guard assist for UB ADLs, minimal  assist for LB ADLs   Occupational performance is affected by the following deficits: decreased functional use of BUEs, decreased muscle strength, degenerative arthritic joint changes, impaired gross motor coordination, dynamic sit/ stand balance deficit with poor standing tolerance time for self care and functional mobility, decreased activity tolerance, increased pain and postural control and postural alignment deficit, requiring external assistance to complete transitional movements  Therapist completed  extensive additional review of medical records and additional review of physical, cognitive or psychosocial history, clinical examination identifying 5 or more performance deficits, clinical decision making of a high complexity , consistent with a high complexity level evaluation  Patient to benefit from continued Occupational Therapy treatment while in the hospital to address deficits as defined above and maximize level of functional independence with ADLs and functional mobility  Occupational Performance areas to address include: bathing/ shower, dressing, toilet hygiene, transfer to all surfaces, functional ambulation, functional mobility, emergency response, health maintenance, medication routine/ management, IADLs: safety procedures, IADLs: meal prep/ clean up, Leisure Participation, Social participation and Home management  From OT standpoint, recommendation at time of d/c would be STR        OT Discharge Recommendation: Short Term Rehab

## 2019-12-22 NOTE — ASSESSMENT & PLAN NOTE
· Pt presented with generalized weakness and mechanical fall, unable to get up off the floor due to profound weakness   · Denies any numbness/tingling or unilateral weakness   · Likely secondary to acute infectious process, continued improvement noted today, able to walk to the bathroom without assistance  · Pt meeting sepsis criteria on admission with tachycardia, leukocytosis and erythema concerning for cellulitis   · Recent hospitalization for similar symptoms   · Continue IV cefepime/vancomycin   · ID consultation pending   · Fall precautions   · PT/OT recommending STR, pt refusing at this time, advise re-evaluation with PT once closer to discharge  · CT head negative for intracranial abnormalities

## 2019-12-22 NOTE — PROGRESS NOTES
[place] Both pt's vancomycin at 113- and had to stop shortly after due to infiltration  Still no access, pt has poor venous access  Will restart as soon as possible
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
## 2019-12-23 PROBLEM — R60.0 LOWER EXTREMITY EDEMA: Status: ACTIVE | Noted: 2019-01-01

## 2019-12-23 NOTE — PLAN OF CARE
Problem: Potential for Falls  Goal: Patient will remain free of falls  Description  INTERVENTIONS:  - Assess patient frequently for physical needs  -  Identify cognitive and physical deficits and behaviors that affect risk of falls    -  [place] fall precautions as indicated by assessment   - Educate patient/family on patient safety including physical limitations  - Instruct patient to call for assistance with activity based on assessment  - Modify environment to reduce risk of injury  - Consider OT/PT consult to assist with strengthening/mobility  Outcome: Progressing     Problem: Prexisting or High Potential for Compromised Skin Integrity  Goal: Skin integrity is maintained or improved  Description  INTERVENTIONS:  - Identify patients at risk for skin breakdown  - Assess and monitor skin integrity  - Assess and monitor nutrition and hydration status  - Monitor labs   - Assess for incontinence   - Turn and reposition patient  - Assist with mobility/ambulation  - Relieve pressure over bony prominences  - Avoid friction and shearing  - Provide appropriate hygiene as needed including keeping skin clean and dry  - Evaluate need for skin moisturizer/barrier cream  - Collaborate with interdisciplinary team   - Patient/family teaching  - Consider wound care consult   Outcome: Progressing     Problem: PAIN - ADULT  Goal: Verbalizes/displays adequate comfort level or baseline comfort level  Description  Interventions:  - Encourage patient to monitor pain and request assistance  - Assess pain using appropriate pain scale  - Administer analgesics based on type and severity of pain and evaluate response  - Implement non-pharmacological measures as appropriate and evaluate response  - Consider cultural and social influences on pain and pain management  - Notify physician/advanced practitioner if interventions unsuccessful or patient reports new pain  Outcome: Progressing     Problem: INFECTION - ADULT  Goal: Absence or prevention of progression during hospitalization  Description  INTERVENTIONS:  - Assess and monitor for signs and symptoms of infection  - Monitor lab/diagnostic results  - Monitor all insertion sites, i e  indwelling lines, tubes, and drains  - Monitor endotracheal if appropriate and nasal secretions for changes in amount and color  - [place] appropriate cooling/warming therapies per order  - Administer medications as ordered  - Instruct and encourage patient and family to use good hand hygiene technique  - Identify and instruct in appropriate isolation precautions for identified infection/condition  Outcome: Progressing     Problem: SAFETY ADULT  Goal: Patient will remain free of falls  Description  INTERVENTIONS:  - Assess patient frequently for physical needs  -  Identify cognitive and physical deficits and behaviors that affect risk of falls    -  [place] fall precautions as indicated by assessment   - Educate patient/family on patient safety including physical limitations  - Instruct patient to call for assistance with activity based on assessment  - Modify environment to reduce risk of injury  - Consider OT/PT consult to assist with strengthening/mobility  Outcome: Progressing  Goal: Maintain or return to baseline ADL function  Description  INTERVENTIONS:  -  Assess patient's ability to carry out ADLs; assess patient's baseline for ADL function and identify physical deficits which impact ability to perform ADLs (bathing, care of mouth/teeth, toileting, grooming, dressing, etc )  - Assess/evaluate cause of self-care deficits   - Assess range of motion  - Assess patient's mobility; develop plan if impaired  - Assess patient's need for assistive devices and provide as appropriate  - Encourage maximum independence but intervene and supervise when necessary  - Involve family in performance of ADLs  - Assess for home care needs following discharge   - Consider OT consult to assist with ADL evaluation and planning for discharge  - Provide patient education as appropriate  Outcome: Progressing  Goal: Maintain or return mobility status to optimal level  Description  INTERVENTIONS:  - Assess patient's baseline mobility status (ambulation, transfers, stairs, etc )    - Identify cognitive and physical deficits and behaviors that affect mobility  - Identify mobility aids required to assist with transfers and/or ambulation (gait belt, sit-to-stand, lift, walker, cane, etc )  - [place] fall precautions as indicated by assessment  - Record patient progress and toleration of activity level on Mobility SBAR; progress patient to next Phase/Stage  - Instruct patient to call for assistance with activity based on assessment  - Consider rehabilitation consult to assist with strengthening/weightbearing, etc   Outcome: Progressing     Problem: DISCHARGE PLANNING  Goal: Discharge to home or other facility with appropriate resources  Description  INTERVENTIONS:  - Identify barriers to discharge w/patient and caregiver  - Arrange for needed discharge resources and transportation as appropriate  - Identify discharge learning needs (meds, wound care, etc )  - Arrange for interpretive services to assist at discharge as needed  - Refer to Case Management Department for coordinating discharge planning if the patient needs post-hospital services based on physician/advanced practitioner order or complex needs related to functional status, cognitive ability, or social support system  Outcome: Progressing     Problem: Knowledge Deficit  Goal: Patient/family/caregiver demonstrates understanding of disease process, treatment plan, medications, and discharge instructions  Description  Complete learning assessment and assess knowledge base    Interventions:  - Provide teaching at level of understanding  - Provide teaching via preferred learning methods  Outcome: Progressing

## 2019-12-23 NOTE — ASSESSMENT & PLAN NOTE
· EKG on admission noted to have severely prolonged QTc  · Cardiology following,  · Likely pseudo prolongation with possible U waves from hypokalemia   · Repeat EKG AM with normalization of QTc to 450   · Recommend discontinuation of Zoloft and follow up with PCP regarding alternative therapy   · Telemetry without significant events x 24 hours, will d/c  · ECHO with EF 60%, no RWMA  · Continue Wellbutrin for depression symptoms

## 2019-12-23 NOTE — ASSESSMENT & PLAN NOTE
· POA as evidenced by tachycardia, tachypnea, leukocytosis with erythema concerning for cellulitis, now resolved   · Pt immunocompromised secondary to malignancy/chemotherapy   · Completed 4 days of IV cefepime/vancomycin, ID evaluated, recommending monitoring off abx therapy   · Procalcitonin elevated at 0 71/0 59/0 40  · Blood cultures negative x 48 hours, continue to trend closely   · Supportive care with antipyretics   · No need for removal of port-a-cath at this time  · Trend WBC and temps closely

## 2019-12-23 NOTE — ASSESSMENT & PLAN NOTE
· Pt with metastatic Leiomyosarcoma  · Follows with oncologist at [facility], thinking about palliative therapy, has follow up to discuss options Thursday, wants port-a-cath to remain in place on discharge as there is no evidence currently of port infection  · Has received 2 doses of chemotherapy at this point, last infusion was about 1 5 weeks ago   · Pt with left port-a-cath in place, surrounding erythema is now resolved  · ID evaluated,  · Recommending discontinuation of IV cefepime and vancomycin   · Blood cultures negative x 48 hours, continue to trend

## 2019-12-23 NOTE — SOCIAL WORK
Cm met at bedside with patient and sister, attending present as well  Patient and sister agreeable to patient discharging home with vna, no preference in agency  Cm provided with information regarding life alert  Patient no additional needs, family will be transporting patient home when stable

## 2019-12-23 NOTE — UTILIZATION REVIEW
Notification of Inpatient Admission/Inpatient Authorization Request   This is a Notification of Inpatient Admission for [address]  Be advised that this patient was admitted to our facility under Inpatient Status  Contact Gurwinder Rivera at [phone] for additional admission information  [facility] DEPT DEDICATED Franki Blackmon [phone]  Patient Name:   Avtar Alba   YOB: 1955       [address]   [address]:   [facility]    Tax ID: [id number]  NPI: [id number] Attending Provider/NPI: Shani Cameron [[id number]]   Place of Service Code: 24     Place of Service Name:  [facility]   Start Date: 12/20/19 1754     Discharge Date & Time: No discharge date for patient encounter  Type of Admission: Inpatient Status Discharge Disposition   (if discharged): Home/Self Care   Patient Diagnoses: Leg pain [M79 606]  Weakness [R53 1]  Prolonged Q-T interval on ECG [R94 31]  Cellulitis of right upper arm [L03 113]  Sepsis without acute organ dysfunction, due to unspecified organism [facility]) [A41 9]     Orders: Admission Orders (From admission, onward)     Ordered        12/20/19 1754  Inpatient Admission (expected length of stay for this patient Order details is greater than two midnights)  Once                    Assigned Utilization Review Contact: Gurwinder Rivera  Utilization   Network Utilization Review Department  Phone: [phone]; Fax [phone]  Email: [email or web address]   ATTENTION PAYERS: Please call the assigned Utilization  directly with any questions or concerns ALL voicemails in the department are confidential  Send all requests for admission clinical reviews, approved or denied determinations and any other requests to dedicated fax number belonging to the campus where the patient is receiving treatment

## 2019-12-23 NOTE — ASSESSMENT & PLAN NOTE
· Pt with worsening edema of the right lower extremity   · 1+ pitting edema of the right leg   · Denies any pain on palpation   · However secondary to history of malignancy, would obtain lower extremity duplex to r/o DVT at this time  · US upper extremities negative for thrombophlebitis

## 2019-12-23 NOTE — CONSULTS
Consultation - Infectious Disease   Joaquín Hogan 62 y o  female MRN: [id number]  Unit/Bed#: -01 Encounter: [id number]      IMPRESSION & RECOMMENDATIONS:     23-year-old female patient with metastatic leiomyosarcoma, admitted 12/20/2019 for weakness    1- metastatic leiomyosarcoma:  Patient is on palliative chemotherapy, last received gemcitabine and Taxotere 12/12/2019  Patient is currently afebrile, hemodynamically stable, does not meet SIRS criteria  Her weakness is likely related to her metastatic leiomyosarcoma  - stop antibiotics  - supportive care as per Oncology and primary team    2- left chest wall Port-A-Cath:   Blood culture 12/14/2019 and 12/20/2019 negative; patient has no fever, she is hemodynamically stable, WBC ranging from 10-26559  Her procalcitonin is trending down, last procalcitonin 12/22/2019 was 0 4  The faint erythema that was present over left chest wall over the past admission has currently resolved  Doubt Port-A-Cath infection; patient also refers the keep the Port-A-Cath in place until she speaks with her oncologist about hospice and end of life  Patient understands that her illness is terminal     - okay to keep Port-A-Cath in place  - follow-up final result of blood culture collected 12/20/2019  - avoid use of Steri-Strips as patient reports severe allergic reaction to Steri-Strips in the past    3- staghorn calculus:  Patient denies urinary symptoms  - no need for antibiotic therapy for this indication at this time    Plan mentioned above discussed in details with patient  Case discussed with primary service    HISTORY OF PRESENT ILLNESS:  Reason for Consult:  Rule out sepsis  HPI: Joaquín Hogan is a 62y o  year old female with metastatic leiomyosarcoma involving liver and bone, left chest wall Port-A-Cath through which she received palliative chemotherapy with gemcitabine and Taxotere 12/12/2019, admitted 12/20/2019 for weakness    Patient was recently discharged 12/18/2019 after being admitted for weakness and fever  Her fever workup including blood culture, chest x-ray, UA, were negative, patient was discharged after short course of IV antibiotic therapy  Patient is currently admitted for weakness and an episode of fall  She denies fever or chills, denies nausea, vomiting, dysuria, headache  Upon admission patient was afebrile, hemodynamically stable, WBC count 11,000  Patient was noted to have faint erythema at the left chest wall  Id consulted to evaluate for possible sepsis or cellulitis around chest wall Port-A-Cath  To note the patient was admitted 10/18/2019 for possible Port-A-Cath exit site cellulitis, Port-A-Cath was removed at that time  During the last hospital stay 12/16/2019, patient was again noted to have a faint erythema around exit site of new Port-A-Cath in left chest wall  Patient attributed the erythema to allergic reaction to stay recent, and she reported significant improvement in erythema over the past 2 weeks  Currently, erythema of the chest wall has almost resolved  No blistering, no purulence, no tenderness  Patient remains afebrile, hemodynamically stable, with no significant leukocytosis  Her blood culture from 12/14/2019 and 12/20/2019 remains negative  REVIEW OF SYSTEMS:  A complete 12 point system-based review of systems is negative other than that noted in the HPI      PAST MEDICAL HISTORY:  Past Medical History:   Diagnosis Date    Asthma     Cancer [facility] [address] [facility]     COPD (chronic obstructive pulmonary disease) ([facility] [address] [facility]     Hyperlipidemia     Hypertension     Psychiatric disorder     Depression     Past Surgical History:   Procedure Laterality Date    IR PCN TO PCNU CONVERSION  11/13/2019    IR PORT REMOVAL  10/14/2019    IR TUBE PLACEMENT NEPHROSTOMY  10/17/2019    LUNG REMOVAL, PARTIAL      lower lobe       FAMILY HISTORY:  Non-contributory    SOCIAL HISTORY:  Social History   Social History     Substance and Sexual Activity Alcohol Use Never    Frequency: Never     Social History     Substance and Sexual Activity   Drug Use Not Currently     Social History     Tobacco Use   Smoking Status Current Every Day Smoker    Packs/day: 0 50    Types: Cigarettes   Smokeless Tobacco Never Used       ALLERGIES:  Allergies   Allergen Reactions    Morphine Itching and Vomiting     Other reaction(s): Vomiting  Other reaction(s): (PIMS) vomiting  Makes her vomit      Sulfa Antibiotics Rash     12-18-19 Pt reported experiencing red face, skin peeling around eyes       MEDICATIONS:  All current active medications have been reviewed        PHYSICAL EXAM:  Temp:  [97 8 °F (36 6 °C)-99 4 °F (37 4 °C)] 97 8 °F (36 6 °C)  HR:  [] 81  Resp:  [17-20] 17  BP: ()/(55-65) 127/65  SpO2:  [92 %-98 %] 95 %  Temp (24hrs), Av 9 °F (37 2 °C), Min:97 8 °F (36 6 °C), Max:99 4 °F (37 4 °C)  Current: Temperature: 97 8 °F (36 6 °C)    Intake/Output Summary (Last 24 hours) at 2019 1304  Last data filed at 2019 1107  Gross per 24 hour   Intake 600 ml   Output 2200 ml   Net -1600 ml       General Appearance:  Appearing well, nontoxic, and in no distress, but looks tired   Head:  Normocephalic, without obvious abnormality, atraumatic; scalp leiomyosarcoma lesion   Eyes:  Conjunctiva pink and sclera anicteric, both eyes   Nose: Nares normal, mucosa normal, no drainage   Throat: Oropharynx moist without lesions   Neck: Supple, symmetrical, no adenopathy, no tenderness/mass/nodules   Back:   Symmetric, no curvature, ROM normal, no CVA tenderness   Lungs:   Clear to auscultation bilaterally, respirations unlabored   Chest Wall:  No tenderness or deformity; left chest wall Port-A-Cath, with no surrounding erythema, no purulence, no swelling, no tenderness   Heart:  RRR; no murmur, rub or gallop   Abdomen:   Soft, non-tender, non-distended, positive bowel sounds    Extremities: No cyanosis, clubbing or edema   Skin: Faint erythema over both arms, patient reports it is chronic, related to her rosacea  No draining wounds noted  Lymph nodes: Cervical, supraclavicular nodes normal   Neurologic: Alert and oriented times 3, extremity strength 5/5 and symmetric       LABS, IMAGING, & OTHER STUDIES:  Lab Results:  I have personally reviewed pertinent labs  Results from last 7 days   Lab Units 12/23/19  0549 12/22/19  0427 12/21/19  0517   WBC Thousand/uL 11 72* 10 30* 9 83   HEMOGLOBIN g/dL 7 8* 7 6* 8 0*   PLATELETS Thousands/uL 263 208 158     Results from last 7 days   Lab Units 12/23/19  0549 12/22/19  0427 12/21/19  0517 12/20/19  1615   SODIUM mmol/L 135* 138 136 133*   POTASSIUM mmol/L 3 4* 3 1* 3 1* 3 2*   CHLORIDE mmol/L 101 103 102 99*   CO2 mmol/L 25 25 24 23   BUN mg/dL 8 10 9 8   CREATININE mg/dL 0 91 0 97 1 05 1 02   EGFR ml/min/1 73sq m 67 62 57 59   CALCIUM mg/dL 8 8 8 7 8 4 8 9   AST U/L  --   --   --  43   ALT U/L  --   --   --  33   ALK PHOS U/L  --   --   --  116     Results from last 7 days   Lab Units 12/20/19  1615   BLOOD CULTURE  No Growth at 48 hrs  No Growth at 48 hrs  Results from last 7 days   Lab Units 12/22/19  0427 12/21/19  0517 12/20/19  1812 12/18/19  0454   PROCALCITONIN ng/ml 0 40* 0 59* 0 71* 1 58*       Imaging Studies:   I have personally reviewed pertinent imaging study reports and images in PACS  CT head done on admission, with no acute intracranial abnormality  Chest x-ray, showing no effusion or infiltrate    Other Studies:   I have personally reviewed pertinent reports

## 2019-12-23 NOTE — ASSESSMENT & PLAN NOTE
· Baseline hgb appears to be around 7-9 on review  · Hgb 7 8 today, stabilized    · Likely secondary to chronic disease/underlying advanced malignancy, normocytic  · Iron panel not indicative of need for supplementation   · Monitor CBC, no signs of acute or active bleeding noted at this time  · Continue B12 supplementation

## 2019-12-23 NOTE — ASSESSMENT & PLAN NOTE
· Random glucose noted to be 150 on admission  · Last hgbA1c 8/2019 - 5 3%  · Repeat hgbA1c continues to be pending, however glucose has been persistently elevated  · Continue PRN SSI coverage   · QID glucose checks  · Would likely recommend glucometer on discharge with dietary changes and follow up with PCP pending A1c results  · Monitor

## 2019-12-23 NOTE — SOCIAL WORK
Cm met with patient at bedside, patient alert and oriented and accompanied by her daughter  Patient reports residing home alone in a second floor apartment with [address] the second floor  Patient reports she is independent with ADL's, no dme use, and denies needing dme  Patient reports filling her prescriptions at [facility] with no co-payment barriers  Patient reports her daughter or sister transport her to MD appointments as needed, she is receiving chemo treatment with [facility] and has adequate transportation to her appointments  Patient has one daughter no additional children and reports her daughter is able to make medical decisions on her behalf if and when needed  Patient denies the need for outpatient mh reports she follows up with her PCP for depression medications, she is denying SI/HI  Patient is agreeable to vna, no preference in agency referrals sent for review  CM reviewed discharge planning process including the following: identifying help at home, patient preference for discharge planning needs, pharmacy preference, and availability of treatment team to discuss questions or concerns patient and/or family may have regarding understanding medications and recognizing signs and symptoms once discharged  CM also encouraged patient to follow up with all recommended appointments after discharge  Patient advised of importance for patient and family to participate in managing patients medical well being

## 2019-12-23 NOTE — PROGRESS NOTES
Progress Note - Annemarie Lucia 1955, 62 y o  female MRN: [id number]    Unit/Bed#: -01 Encounter: [id number]    Primary Care Provider: Jamaal Villarreal MD   Date and time admitted to hospital: 12/20/2019  3:12 PM      DOS: 12/23/2019    * Generalized weakness  Assessment & Plan  · Pt presented with generalized weakness and mechanical fall, unable to get up off the floor due to profound weakness   · Denies any numbness/tingling or unilateral weakness   · Likely secondary to acute infectious process vs  malignancy, continued improvement noted today, walking the halls without much assistance   Refusing STR, agreeable to [facility], CM following  · Pt meeting sepsis criteria on admission with tachycardia, leukocytosis, tachypnea and erythema concerning for cellulitis   · Recent hospitalization for similar symptoms   · Completed 4 days of IV cefepime/vancomcyin, erythema now resolved  · ID evaluated,  · Recommend discontinuation of antibiotics  · Procalcitonin continues to trend down   · Port-A-Cath to remain in place until follow up with outpatient oncologist Dr Elizabeth Domínguez on Thursday   · Stable for discharge from ID standpoint   · Fall precautions   · PT/OT recommending STR, pt refusing at this time, agreeable to Paloma Vasquez  · CT head negative for intracranial abnormalities    Sepsis ([address] )  Assessment & Plan  · POA as evidenced by tachycardia, tachypnea, leukocytosis with erythema concerning for cellulitis, now resolved   · Pt immunocompromised secondary to malignancy/chemotherapy   · Completed 4 days of IV cefepime/vancomycin, ID evaluated, recommending monitoring off abx therapy   · Procalcitonin elevated at 0 71/0 59/0 40  · Blood cultures negative x 48 hours, continue to trend closely   · Supportive care with antipyretics   · No need for removal of port-a-cath at this time  · Trend WBC and temps closely     Lower extremity edema  Assessment & Plan  · Pt with worsening edema of the right lower extremity   · 1+ pitting edema of the right leg   · Denies any pain on palpation   · However secondary to history of malignancy, would obtain lower extremity duplex to r/o DVT at this time  · US upper extremities negative for thrombophlebitis    Hyperglycemia  Assessment & Plan  · Random glucose noted to be 150 on admission  · Last hgbA1c 8/2019 - 5 3%  · Repeat hgbA1c continues to be pending, however glucose has been persistently elevated  · Continue PRN SSI coverage   · QID glucose checks  · Would likely recommend glucometer on discharge with dietary changes and follow up with PCP pending A1c results  · Monitor    Prolonged QT interval  Assessment & Plan  · EKG on admission noted to have severely prolonged QTc  · Cardiology following,  · Likely pseudo prolongation with possible U waves from hypokalemia   · Repeat EKG AM with normalization of QTc to 450   · Recommend discontinuation of Zoloft and follow up with PCP regarding alternative therapy   · Telemetry without significant events x 24 hours, will d/c  · ECHO with EF 60%, no RWMA  · Continue Wellbutrin for depression symptoms    Anemia  Assessment & Plan  · Baseline hgb appears to be around 7-9 on review  · Hgb 7 8 today, stabilized    · Likely secondary to chronic disease/underlying advanced malignancy, normocytic  · Iron panel not indicative of need for supplementation   · Monitor CBC, no signs of acute or active bleeding noted at this time  · Continue B12 supplementation     Staghorn calculus  Assessment & Plan  · Previously noted on CT scan  · Pt asymptomatic currently  · Has follow up with urology in February   · Monitor     Hypertension  Assessment & Plan  · BP appears stable on review   · Continue Prinzide 10/12 5 mg daily  · Monitor      Leiomyosarcoma ([facility] [address] )  Assessment & Plan  · Pt with metastatic Leiomyosarcoma  · Follows with oncologist at [facility], thinking about palliative therapy, has follow up to discuss options Thursday, wants port-a-cath to remain in place on discharge as there is no evidence currently of port infection  · Has received 2 doses of chemotherapy at this point, last infusion was about 1 5 weeks ago   · Pt with left port-a-cath in place, surrounding erythema is now resolved  · ID evaluated,  · Recommending discontinuation of IV cefepime and vancomycin   · Blood cultures negative x 48 hours, continue to trend     Cigarette nicotine dependence without complication  Assessment & Plan  · Pt reports smoking 5-6 cigarettes a day   · Encourage cessation   · Nicotine patch while inpatient     Hypokalemia  Assessment & Plan  · K 3 4 today, persistent  · Increase scheduled daily KDUR from 10 mEq to 40 mEq  · Mag level WNL      VTE Pharmacologic Prophylaxis:   Pharmacologic: Enoxaparin (Lovenox)  Mechanical VTE Prophylaxis in Place: No    Patient Centered Rounds: I have performed bedside rounds with nursing staff today  Supriya     Discussions with Specialists or Other Care Team Provider: Discussed with infectious disease, RN, CM and reviewed previous notes     Education and Discussions with Family / Patient: Discussed with patient and patient's sister at bedside regarding plan of care  Time Spent for Care: 30 minutes  More than 50% of total time spent on counseling and coordination of care as described above  Current Length of Stay: 3 day(s)    Current Patient Status: Inpatient   Certification Statement: The patient will continue to require additional inpatient hospital stay due to monitoring off antibiotics, lower extremity duplex to r/o DVT    Discharge Plan: Not medically stable as above, anticipate next 24 hours  Code Status: Level 3 - DNAR and DNI      Subjective:   Pt reports that she is still feeling a little weak but has been doing laps around the hallways with improvement  Reporting worsening right lower extremity edema over the past 24 hours  Currently denies any chest pain, shortness of breath, abdominal pain, nausea, vomiting   Reports she wants to keep the port in and follow up with her oncologist on Thursday regarding continued treatment for her cancer  Reports that she does not want to go to [facility], but is agreeable to [facility] on discharge, likely tomorrow  Pt continues to complain of right rib cage pain, however refused lidoderm patch today  Objective:     Vitals:   Temp (24hrs), Av 8 °F (37 1 °C), Min:97 8 °F (36 6 °C), Max:99 4 °F (37 4 °C)    Temp:  [97 8 °F (36 6 °C)-99 4 °F (37 4 °C)] 97 8 °F (36 6 °C)  HR:  [] 81  Resp:  [17-20] 17  BP: ()/(55-65) 127/65  SpO2:  [92 %-97 %] 95 %  Body mass index is 43 11 kg/m²  Input and Output Summary (last 24 hours): Intake/Output Summary (Last 24 hours) at 2019 1531  Last data filed at 2019 1107  Gross per 24 hour   Intake 600 ml   Output 1900 ml   Net -1300 ml       Physical Exam:     Physical Exam   Constitutional: No distress  Pt is in no acute distress sitting in her hospital chair resting comfortably accompanied by her sister  Erythema has now resolved around left port site  Swelling improved from previous site of IV infiltration  HENT:   Head: Normocephalic and atraumatic  Eyes: Pupils are equal, round, and reactive to light  Conjunctivae are normal    Cardiovascular: Normal rate, regular rhythm and intact distal pulses  Pulmonary/Chest: Effort normal and breath sounds normal  No stridor  No respiratory distress  She has no wheezes  Abdominal: Soft  Bowel sounds are normal  She exhibits no distension  There is no tenderness  There is no guarding  Musculoskeletal: She exhibits edema (1+ pitting edema right lower extremity)  Neurological: She is alert  Skin: Skin is warm and dry  She is not diaphoretic  No erythema  Psychiatric: She has a normal mood and affect  Vitals reviewed        Additional Data:     Labs:    Results from last 7 days   Lab Units 19  0549  19  0517   WBC Thousand/uL 11 72*   < > 9 83   HEMOGLOBIN g/dL 7 8*   < > 8 0*   HEMATOCRIT % 23 8*   < > 23 6*   PLATELETS Thousands/uL 263   < > 158   NEUTROS PCT %  --   --  90*   LYMPHS PCT %  --   --  6*   MONOS PCT %  --   --  2*   EOS PCT %  --   --  0    < > = values in this interval not displayed  Results from last 7 days   Lab Units 12/23/19  0549  12/20/19  1615   POTASSIUM mmol/L 3 4*   < > 3 2*   CHLORIDE mmol/L 101   < > 99*   CO2 mmol/L 25   < > 23   BUN mg/dL 8   < > 8   CREATININE mg/dL 0 91   < > 1 02   CALCIUM mg/dL 8 8   < > 8 9   ALK PHOS U/L  --   --  116   ALT U/L  --   --  33   AST U/L  --   --  43    < > = values in this interval not displayed  Results from last 7 days   Lab Units 12/20/19  1615   INR  1 12       * I Have Reviewed All Lab Data Listed Above  * Additional Pertinent Lab Tests Reviewed: All Labs Within Last 24 Hours Reviewed    Imaging:    Imaging Reports Reviewed Today Include: None  Imaging Personally Reviewed by Myself Includes:  None    Recent Cultures (last 7 days):     Results from last 7 days   Lab Units 12/20/19  1615   BLOOD CULTURE  No Growth at 48 hrs  No Growth at 48 hrs  Last 24 Hours Medication List:     Current Facility-Administered Medications:  acetaminophen 650 mg Oral Q6H PRN KATERINA Coleman-LIANA   albuterol 2 5 mg Nebulization Q6H PRN Tiffanie Alvarado PA-C   aspirin 81 mg Oral Daily Louise Stahl PA-C   buPROPion 150 mg Oral BID KATERINA Coleman-C   vitamin B-12 1,000 mcg Oral Daily Louise Stahl PA-C   cyclobenzaprine 5 mg Oral TID PRN KATERINA Coleman-C   diphenhydrAMINE 25 mg Oral Q6H PRN KATERINA Coleman-C   enoxaparin 40 mg Subcutaneous Daily KATERINA Beatty-LIANA   gabapentin 600 mg Oral HS Louise Stahl PA-LIANA   insulin lispro 1-5 Units Subcutaneous TID AC Louise Stahl PA-LIANA   lidocaine 1 patch Topical Daily KATERINA Coleman-LIANA   lisinopril-hydrochlorothiazide (PRINZIDE 10/12  5) combo dose  Oral Daily Louise Stahl PA-C   melatonin 6 mg Oral HS Louise Stahl PA-C   montelukast 10 mg Oral HS Louise MONTANO HUA Stahl   multivitamin-minerals 1 tablet Oral Daily Joy Harris PA-C   nicotine 1 patch Transdermal Daily Louise Stahl PA-C   potassium chloride 10 mEq Oral Daily Louise Stahl PA-C   pravastatin 40 mg Oral Daily With [employer] CHARMAINE Stahl PA-C   saccharomyces boulardii 250 mg Oral BID Louise Stahl PA-C   sodium chloride (PF) 3 mL Intravenous PRN Joy Harris PA-C   traMADol 50 mg Oral Q6H PRN Joy Harris PA-C        Today, Patient Was Seen By: Joy Harris PA-C    ** Please Note: Dictation voice to text software may have been used in the creation of this document   **

## 2019-12-23 NOTE — ASSESSMENT & PLAN NOTE
· Pt presented with generalized weakness and mechanical fall, unable to get up off the floor due to profound weakness   · Denies any numbness/tingling or unilateral weakness   · Likely secondary to acute infectious process vs  malignancy, continued improvement noted today, walking the halls without much assistance   Refusing STR, agreeable to [facility], CM following  · Pt meeting sepsis criteria on admission with tachycardia, leukocytosis, tachypnea and erythema concerning for cellulitis   · Recent hospitalization for similar symptoms   · Completed 4 days of IV cefepime/vancomcyin, erythema now resolved  · ID evaluated,  · Recommend discontinuation of antibiotics  · Procalcitonin continues to trend down   · Port-A-Cath to remain in place until follow up with outpatient oncologist Dr Jigar Martin on Thursday   · Stable for discharge from ID standpoint   · Fall precautions   · PT/OT recommending STR, pt refusing at this time, agreeable to Paloma Vasquez  · CT head negative for intracranial abnormalities

## 2019-12-23 NOTE — CONSULTS
The patient's vancomycin therapy has been completed/discontinued  Thank you for this consult; Pharmacy will sign-off now

## 2019-12-23 NOTE — PROGRESS NOTES
Vancomycin Assessment    Estelle Mckinnon is a 62 y o  female who is currently receiving vancomycin 1500 mg IV q 12 H for skin-soft tissue infection     Relevant clinical data and objective history reviewed:  Creatinine   Date Value Ref Range Status   12/22/2019 0 97 0 60 - 1 30 mg/dL Final     Comment:     Standardized to IDMS reference method   12/21/2019 1 05 0 60 - 1 30 mg/dL Final     Comment:     Standardized to IDMS reference method   12/20/2019 1 02 0 60 - 1 30 mg/dL Final     Comment:     Standardized to IDMS reference method     /64 (BP Location: Right arm)   Pulse 94   Temp 99 3 °F (37 4 °C) (Oral)   Resp 19   Ht 5' 8" (1 727 m)   Wt 129 kg (283 lb 8 2 oz)   SpO2 97%   BMI 43 11 kg/m²   I/O last 3 completed shifts: In: 1187 [P O :1570; IV Piggyback:250]  Out: 1400 [Urine:1400]  Lab Results   Component Value Date/Time    BUN 10 12/22/2019 04:27 AM    WBC 10 30 (H) 12/22/2019 04:27 AM    HGB 7 6 (L) 12/22/2019 04:27 AM    HCT 22 5 (L) 12/22/2019 04:27 AM    MCV 99 (H) 12/22/2019 04:27 AM     12/22/2019 04:27 AM     Temp Readings from Last 3 Encounters:   12/22/19 99 3 °F (37 4 °C) (Oral)   12/18/19 98 3 °F (36 8 °C) (Oral)   11/13/19 97 9 °F (36 6 °C) (Oral)     Vancomycin Days of Therapy: 3    Assessment/Plan  The patient is currently on vancomycin utilizing scheduled dosing  Baseline risks associated with therapy include: concomitant nephrotoxic medications and dehydration  The patient is receiving 1500 mg IV q 12 H with the most recent vancomycin level being at steady-state and therapeutic based on a goal of 15-20 (appropriate for most indications) ; therefore, is clinically appropriate and dose will be continued   Pharmacy will continue to follow closely for s/sx of nephrotoxicity, infusion reactions and appropriateness of therapy  BMP and CBC will be ordered per protocol  Plan for repeat trough in 7 days or as patient's condition warrants   Pharmacy will continue to follow the patients culture results and clinical progress daily      Nacho Richards, Alisa

## 2019-12-24 PROBLEM — A41.9 SEPSIS (HCC): Status: RESOLVED | Noted: 2019-10-13 | Resolved: 2019-01-01

## 2019-12-24 NOTE — DISCHARGE INSTRUCTIONS
How to Stop Smoking   WHAT YOU NEED TO KNOW:   Why should I stop smoking? You will improve your health and the health of others around you if you stop smoking  Your risk for heart and lung disease, cancer, stroke, heart attack, and vision problems will also decrease  You can benefit from quitting no matter how long you have smoked  How can I prepare to stop smoking? Nicotine is a highly addictive drug found in cigarettes  Withdrawal symptoms can happen when you stop smoking and make it hard to quit  These include anxiety, depression, irritability, trouble sleeping, and increased appetite  You increase your chances of success if you prepare to quit  · Set a quit date  Yogi Chiu a date that is within the next 2 weeks  Do not pick a day that you think may be stressful or busy  Write down the day or [place]-ni' it on your calender  · Tell friends and family that you plan to quit  Explain that you may have withdrawal symptoms when you try to quit  Ask them to support you  They may be able to encourage you and help reduce your stress to make it easier for you to quit  · Make a list of your reasons for quitting  Put the list somewhere you will see it every day, such as your refrigerator  You can look at the list when you have a craving  · Remove all tobacco and nicotine products from your home, car, and workplace  Also, remove anything else that will tempt you to smoke, such as lighters, matches, or ashtrays  Clean your car, home, and places at work that smell like smoke  The smell of smoke can trigger a craving  · Identify triggers that make you want to smoke  This may include activities, feelings, or people  Also write down 1 way you can deal with each of your triggers  For example, if you want to smoke as soon as you wake up, plan another activity during this time, such as exercise  · Make a plan for how you will quit    Learn about the tools that can help you quit, such as medicine, counseling, or nicotine replacement therapy  Choose at least 2 options to help you quit  What are some tools to help me stop smoking? · Counseling  from a trained healthcare provider can provide you with support and skills to quit smoking  The provider will also teach you to manage your withdrawal symptoms and cravings  You may receive counseling from one counselor, in group therapy, or through phone therapy called a quit line  · Nicotine replacement therapy (NRT)  such as nicotine patches, gum, or lozenges may help reduce your nicotine cravings  You may get these without a doctor's order  Do not use e-cigarettes or smokeless tobacco in place of cigarettes or to help you quit  They still contain nicotine  · Prescription medicines  such as nasal sprays or nicotine inhalers may help reduce your withdrawal symptoms  Other medicines may also be used to reduce your urge to smoke  Ask your healthcare provider about these medicines  You may need to start certain medicines 2 weeks before your quit date for them to work well  · Hypnosis  is a practice that helps guide you through thoughts and feelings  Hypnosis may help decrease your cravings and make you more willing to quit  · Acupuncture therapy  uses very thin needles to balance energy channels in the body  This is thought to help decrease cravings and symptoms of nicotine withdrawal      · Support groups  let you talk to others who are trying to quit or have already quit  It may be helpful to speak with others about how they quit  How can I manage my cravings? · Avoid situations, people, and places that tempt you to smoke  Go to nonsmoking places, such as libraries or restaurants  Understand what tempts you and try to avoid these things  · Keep your hands busy  Hold things such as a stress ball or pen  · Put candy or toothpicks in your mouth  Keep lollipops, sugarless gum, or toothpicks with you at all times  · Do not have alcohol or caffeine  These drinks may tempt you to smoke  Drink healthy liquids such as water or juice instead  · Reward yourself when you resist your cravings  Rewards will motivate you and help you stay positive  · Do an activity that distracts you from your craving  Examples include going for a walk, exercising, or cleaning  What should I know about weight gain after I quit? You may gain a few pounds after you quit smoking  It is healthier for you to gain a few pounds than to continue to smoke  The following can help you prevent weight gain:  · Eat healthy foods  These include fruits, vegetables, whole-grain breads, low-fat dairy products, beans, lean meats, and fish  Eat healthy snacks, such as low-fat yogurt, if you get hungry between meals  · Drink water before, during, and between meals  This will make your stomach feel full and help prevent you from overeating  Ask your healthcare provider how much liquid to drink each day and which liquids are best for you  · Exercise  Take a walk or do some kind of exercise every day  Ask your healthcare provider what exercise is right for you  This may help reduce your cravings and reduce stress  Where can I find support and more information? · [employer]  Phone: 2- 805 - 108-9338  Web Address: www [employer]  CARE AGREEMENT:   You have the right to help plan your care  Learn about your health condition and how it may be treated  Discuss treatment options with your caregivers to decide what care you want to receive  You always have the right to refuse treatment  The above information is an  only  It is not intended as medical advice for individual conditions or treatments  Talk to your doctor, nurse or pharmacist before following any medical regimen to see if it is safe and effective for you  © 2017 [employer] Information is for End User's use only and may not be sold, redistributed or otherwise used for commercial purposes   All illustrations and images included in CareNotes® are the copyrighted property of A D A M , Inc  or Jaden Bashir  Cigarette Smoking and Your Health   WHAT YOU NEED TO KNOW:   What are the risks to my health if I smoke tobacco?  Nicotine and other chemicals found in tobacco damage every cell in your body  Even if you are a light smoker, you have an increased risk for cancer, heart disease, and lung disease  If you are pregnant or have diabetes, smoking increases your risk for complications  What are the benefits to my health if I stop smoking? · You decrease respiratory symptoms such as coughing, wheezing, and shortness of breath  · You reduce your risk for cancers of the lung, mouth, throat, kidney, bladder, pancreas, stomach, and cervix  If you already have cancer, you increase the benefits of chemotherapy  You also reduce your risk for cancer returning or a second cancer from developing  · You reduce your risk for heart disease, blood clots, heart attack, and stroke  · You reduce your risk for lung infections, and diseases such as pneumonia, asthma, chronic bronchitis, and emphysema  · Your circulation improves  More oxygen can be delivered to your body  If you have diabetes, you lower your risk for complications, such as kidney, artery, and eye diseases  You also lower your risk for nerve damage  Nerve damage can lead to amputations, poor vision, and blindness  · You improve your body's ability to heal and to fight infections  What are the health benefits to others if I stop smoking? Tobacco is harmful to nonsmokers who breathe in your secondhand smoke  The following are ways the health of others around you may improve when you stop smoking:  · You lower the risks for lung cancer and heart disease in nonsmoking adults  · If you are pregnant, you lower the risk for miscarriage, early delivery, low birth weight, and stillbirth   You also lower your baby's risk for SIDS, obesity, developmental delay, and neurobehavioral problems, such as ADHD  · If you have children, you lower their risk for ear infections, colds, pneumonia, bronchitis, and asthma  Where can I find more information and support to stop smoking? · [employer]  Phone: 1- 236 - [phone]  Web Address: www [employer]  CARE AGREEMENT:   You have the right to help plan your care  Learn about your health condition and how it may be treated  Discuss treatment options with your caregivers to decide what care you want to receive  You always have the right to refuse treatment  The above information is an  only  It is not intended as medical advice for individual conditions or treatments  Talk to your doctor, nurse or pharmacist before following any medical regimen to see if it is safe and effective for you  © 2017 [employer] Information is for End User's use only and may not be sold, redistributed or otherwise used for commercial purposes  All illustrations and images included in CareNotes® are the copyrighted property of A D A M , Inc  or Jaden Bashir

## 2019-12-24 NOTE — ASSESSMENT & PLAN NOTE
Request for no further labs today potassium was increased to 40 mEq daily will continue    · Increase scheduled daily KDUR from 10 mEq to 40 mEq  · Mag level WNL

## 2019-12-24 NOTE — ASSESSMENT & PLAN NOTE
· Pt with worsening edema of the right lower extremity   · 1+ pitting edema of the right leg   · Denies any pain on palpation   · However secondary to history of malignancy, would obtain lower extremity duplex to r/o DVT at this time:  · US upper extremities negative for thrombophlebitis

## 2019-12-24 NOTE — PROGRESS NOTES
Progress Note - Infectious Disease   Carrie Armstrong 62 y o  female MRN: [id number]  Unit/Bed#: -01 Encounter: [id number]      Impression/Plan:      60-year-old female patient with metastatic leiomyosarcoma, admitted 12/20/2019 for weakness     1- metastatic leiomyosarcoma:  Patient is on palliative chemotherapy, last received gemcitabine and Taxotere 12/12/2019  Patient is currently afebrile, hemodynamically stable, does not meet SIRS criteria  Her weakness is likely related to her metastatic leiomyosarcoma  -  continue to monitor off antibiotics  - supportive care as per Oncology and primary team     2- left chest wall Port-A-Cath:   Blood culture 12/14/2019 and 12/20/2019 negative; patient has no fever, she is hemodynamically stable, WBC ranging from 10-03171  Her procalcitonin is trending down, last procalcitonin 12/22/2019 was 0 4  The faint erythema that was present over left chest wall over the past admission has currently resolved  her blood culture from 12/20/2019 remains negative to date  Doubt Port-A-Cath infection; patient also refers the keep the Port-A-Cath in place until she speaks with her oncologist about hospice and end of life  Patient understands that her illness is terminal     - okay to keep Port-A-Cath in place  - follow-up final result of blood culture collected 12/20/2019  - avoid use of Steri-Strips as patient reports severe allergic reaction to Steri-Strips in the past     3- staghorn calculus:  Patient denies urinary symptoms  - no need for antibiotic therapy for this indication at this time     Plan mentioned above discussed in details with patient  Case discussed with primary service    Antibiotics:  None    Subjective:  Patient has no fever, chills, sweats; no nausea, vomiting, diarrhea; no cough, shortness of breath; no pain     Patient reports generalized weakness    Objective:  Vitals:  Temp:  [98 2 °F (36 8 °C)-99 °F (37 2 °C)] 98 2 °F (36 8 °C)  HR:  [] 94  Resp: [16-20] 18  BP: (104-123)/(56-59) 121/58  SpO2:  [93 %-97 %] 93 %  Temp (24hrs), Av 6 °F (37 °C), Min:98 2 °F (36 8 °C), Max:99 °F (37 2 °C)  Current: Temperature: 98 2 °F (36 8 °C)    Physical Exam:   General Appearance:  Alert, interactive, nontoxic, no acute distress  Throat: Oropharynx moist without lesions  Lungs:   Clear to auscultation bilaterally; no wheezes, rhonchi or rales; respirations unlabored   Heart:  RRR; no murmur, rub or gallop   Abdomen:   Soft, non-tender, non-distended, positive bowel sounds  Extremities: No clubbing, cyanosis  stable bilateral lower extremity edema   Skin: No new rashes or lesions  No draining wounds noted  Leiomyosarcoma scalp lesion  No chest wall erythema, no tenderness or swelling around Port-A-Cath exit site       Labs, Imaging, & Other studies:   All pertinent labs and imaging studies were personally reviewed  Results from last 7 days   Lab Units 19  0549 19  0517   WBC Thousand/uL 11 72* 10 30* 9 83   HEMOGLOBIN g/dL 7 8* 7 6* 8 0*   PLATELETS Thousands/uL 263 208 158     Results from last 7 days   Lab Units 19  0549 19  0427 19  0517 19  1615   SODIUM mmol/L 135* 138 136 133*   POTASSIUM mmol/L 3 4* 3 1* 3 1* 3 2*   CHLORIDE mmol/L 101 103 102 99*   CO2 mmol/L 25 25 24 23   BUN mg/dL 8 10 9 8   CREATININE mg/dL 0 91 0 97 1 05 1 02   EGFR ml/min/1 73sq m 67 62 57 59   CALCIUM mg/dL 8 8 8 7 8 4 8 9   AST U/L  --   --   --  43   ALT U/L  --   --   --  33   ALK PHOS U/L  --   --   --  116     Results from last 7 days   Lab Units 19  1615   BLOOD CULTURE  No Growth at 72 hrs  No Growth at 72 hrs       Results from last 7 days   Lab Units 19  0427 19  0517 19  1812 19  0454   PROCALCITONIN ng/ml 0 40* 0 59* 0 71* 1 58*

## 2019-12-24 NOTE — DISCHARGE SUMMARY
Discharge- Tressa Rubin 1955, 62 y o  female MRN: [id number]    Unit/Bed#: -01 Encounter: [id number]    Primary Care Provider: Jl Deal MD   Date and time admitted to hospital: 12/20/2019  3:12 PM        Sepsis [facility])  Assessment & Plan  · POA as evidenced by tachycardia, tachypnea, leukocytosis with erythema concerning for cellulitis, now resolved   · Pt immunocompromised secondary to malignancy/chemotherapy   · Completed 4 days of IV cefepime/vancomycin, ID evaluated, recommending monitoring off abx therapy   She has remained afebrile and doing well since discontinuation of antibiotics    · Procalcitonin elevated at 0 71/0 59/0 40  · Blood cultures negative x 48 hours, continue to trend closely   · Supportive care with antipyretics   · No need for removal of port-a-cath at this time patient will follow up with her oncologist and primary care physician    Leiomyosarcoma [facility])  Assessment & Plan  · Pt with metastatic Leiomyosarcoma  · Follows with oncologist at [facility], thinking about palliative therapy, has follow up to discuss options Thursday, wants port-a-cath to remain in place on discharge as there is no evidence currently of port infection  · Has received 2 doses of chemotherapy at this point, last infusion was about 1 5 weeks ago   · Pt with left port-a-cath in place, surrounding erythema is now resolved  · ID evaluated,  · Recommending discontinuation of IV cefepime and vancomycin , no further episodes suggesting infection  · Blood cultures negative x 48 hours, continue to trend   · Follow up with Oncology at planned appointment    Hypertension  Assessment & Plan  · BP appears stable on review   · Continue Prinzide 10/12 5 mg daily  · Monitor      Staghorn calculus  Assessment & Plan  · Previously noted on CT scan  · Pt asymptomatic currently  · Has follow up with urology in February   · Monitor     Hypokalemia  Assessment & Plan  Request for no further labs today potassium was increased to 40 mEq daily will continue  · Increase scheduled daily KDUR from 10 mEq to 40 mEq  · Mag level WNL    * Generalized weakness  Assessment & Plan  · Pt presented with generalized weakness and mechanical fall, unable to get up off the floor due to profound weakness   · Denies any numbness/tingling or unilateral weakness   · Likely secondary to acute infectious process vs  malignancy, continued improvement noted today, walking the halls without much assistance   Refusing STR, agreeable to Paloma Vasquez, CM following  · Pt meeting sepsis criteria on admission with tachycardia, leukocytosis, tachypnea and erythema concerning for cellulitis   · Recent hospitalization for similar symptoms   · Completed 4 days of IV cefepime/vancomcyin, erythema now resolved  · ID evaluated,  · Recommend discontinuation of antibiotics  · Procalcitonin continues to trend down   · Port-A-Cath to remain in place until follow up with outpatient oncologist Dr Darby Pruitt on Thursday   · Stable for discharge from ID standpoint   · Fall precautions   · PT/OT recommending STR, pt refusing at this time, agreeable to Paloma Vasquez  · CT head negative for intracranial abnormalities    Cigarette nicotine dependence without complication  Assessment & Plan  · Pt reports smoking 5-6 cigarettes a day   · Encourage cessation   · Nicotine patch while inpatient     Lower extremity edema  Assessment & Plan  · Pt with worsening edema of the right lower extremity   · 1+ pitting edema of the right leg   · Denies any pain on palpation   · However secondary to history of malignancy, would obtain lower extremity duplex to r/o DVT at this time:  · US upper extremities negative for thrombophlebitis    Hyperglycemia  Assessment & Plan  · Random glucose noted to be 150 on admission  · Last hgbA1c 8/2019 - 5 3%  · Repeat hgbA1c continues to be pending, however glucose has been persistently elevated  · Continue PRN SSI coverage   · QID glucose checks  · Will not text this lady with checking her blood glucoses as she has reported that she is likely moving down a palliative course  Prolonged QT interval  Assessment & Plan  · EKG on admission noted to have severely prolonged QTc  · Cardiology following,  · Likely pseudo prolongation with possible U waves from hypokalemia   · Repeat EKG AM with normalization of QTc to 450   · Recommend discontinuation of Zoloft and follow up with PCP regarding alternative therapy   · Telemetry without significant events x 24 hours, will d/c  · ECHO with EF 60%, no RWMA  · Continue Wellbutrin for depression symptoms    Anemia  Assessment & Plan  · Baseline hgb appears to be around 7-9 on review  · Hgb 7 8 today, stabilized  , Oncology can re-evaluate during their appointment later in the week  · Likely secondary to chronic disease/underlying advanced malignancy, normocytic  · Iron panel not indicative of need for supplementation   · Monitor CBC, no signs of acute or active bleeding noted at this time  · Continue B12 supplementation     Hyponatremiaresolved as of 12/21/2019  Assessment & Plan  · Resolved, likely secondary to hypovolemia, improved s/p IV bolus in the ED           Discharging Physician / Practitioner: Raj Lees MD  PCP: Dian Alexander MD  Admission Date:   Admission Orders (From admission, onward)     Ordered        12/20/19 1754  Inpatient Admission (expected length of stay for this patient Order details is greater than two midnights)  Once                   Discharge Date: 12/24/19    Resolved Problems  Date Reviewed: 12/23/2019          Resolved    Hyponatremia 12/21/2019     Resolved by  Jake Juares PA-C          Consultations During Hospital Stay:  · Cardiology  · Infectious disease    Procedures Performed:   · Chest x-ray no acute cardiopulmonary disease  · CT head:1  No acute intracranial abnormality noted  2    Abnormal scalp soft tissue mass in the high right parietal paramedian location abutting the calvarium or its periosteum and not present on a MRI dated 1/30/2014  While this may represent a benign lesion such as an epidermal inclusion cyst or sebaceous cyst, the possibility of a neoplasm cannot be excluded and appropriate clinical correlation is advised  /  ·  Vascular study of the upper extremities left showed no chronic deep venous thrombosis  Patient refused to have the right evaluated  · Vascular study lower extremities no evidence of thrombus    Significant Findings / Test Results:   · As above  · Discharging creatinine 0 91  · Discharging white count 11 72  ·     Incidental Findings:   · None    Test Results Pending at Discharge (will require follow up): · None     Outpatient Tests Requested:  · None    Complications:  None    Reason for Admission:  Generalized weakness with fall    Hospital Course:     Carrie Armstrong is a 62 y o  female patient who originally presented to the hospital on 12/20/2019 due to generalized weakness with fall  Patient has a known history of advanced end-stage leiomyosarcoma  She is following with her oncologist at [facility] and will be meeting with her this week regarding a palliative approach  She has received 2 doses of chemotherapy last infusion was 1 5 weeks prior to this admission  She presented to the hospital generalized weakness and slight erythema at the around the Port-A-Cath site  Was thought that perhaps she had sepsis related to the cellulitis  She was started on IV cefepime and vancomycin  Blood cultures were obtained an ID evaluated the patient  The blood cultures remain negative and the cellulitis seemed to improve  Infectious Disease recommended monitoring off antibiotic therapy  Patient was noted to have worsening edema for which she was evaluated for DVT  No evidence was noted  She was also having rib cage pain related to her metastatic disease  She states she would just prefer to live with that  Refusing Lidoderm patches    Patient is resolute about returning to talk about palliative care with her primary care physician as she does not wish to spend her time in the hospital   She states chemo was going to cause her to be so sick that she needs to be in the hospital she would prefer not to take it  Patient declined short-term rehab referral despite finding that she likely needs this by physical therapy        Please see above list of diagnoses and related plan for additional information  Condition at Discharge: poor     Discharge Day Visit / Exam:     Subjective:  Patient states she really just wants to go home for the holiday no further testing  Vitals: Blood Pressure: 101/54 (12/24/19 1434)  Pulse: 82 (12/24/19 1434)  Temperature: 97 6 °F (36 4 °C) (12/24/19 1434)  Temp Source: Oral (12/24/19 1434)  Respirations: 18 (12/24/19 1434)  Height: 5' 8" (172 7 cm) (12/20/19 1917)  Weight - Scale: 129 kg (283 lb 8 2 oz) (12/20/19 1517)  SpO2: 97 % (12/24/19 1434)  Exam:   Physical Exam  Generally morbidly obese female abdomen soft diffusely tender tenderness in the rib areas are also present on deep inspiration pupils equal round and reactive to light extraocular muscles intact mucous membranes are moist are no oral lesions chest is decreased with poor air entry there is no rhonchi rales or wheezes  Cardiovascular regular rate rhythm positive S1 and S2 no S3-S4 murmur or gallop  Abdomen is soft nontender nondistended with positive bowel sounds no hepatosplenomegaly no guarding or rebound  Neurologically patient is awake alert oriented cranial nerves 2-12 appear to be intact chief appears to have capacity for decision making  Discussion with Family:  No family to discuss  She has the help of her sister and her daughter    Discharge instructions/Information to patient and family:   See after visit summary for information provided to patient and family        Provisions for Follow-Up Care:  See after visit summary for information related to follow-up care and any pertinent home health orders  Disposition:     Home with VNA Services (Reminder: Complete face to face encounter)    For Discharges to [facility] SNF:   · Not Applicable to this Patient - Not Applicable to this Patient    Planned Readmission:  Patient is at high risk for readmission due to her decline of short-term rehab in assistance  Discharge Statement:  I spent 30 minutes discharging the patient  This time was spent on the day of discharge  I had direct contact with the patient on the day of discharge  Greater than 50% of the total time was spent examining patient, answering all patient questions, arranging and discussing plan of care with patient as well as directly providing post-discharge instructions  Additional time then spent on discharge activities  Discharge Medications:  See after visit summary for reconciled discharge medications provided to patient and family        ** Please Note: This note has been constructed using a voice recognition system **

## 2019-12-24 NOTE — ASSESSMENT & PLAN NOTE
· POA as evidenced by tachycardia, tachypnea, leukocytosis with erythema concerning for cellulitis, now resolved   · Pt immunocompromised secondary to malignancy/chemotherapy   · Completed 4 days of IV cefepime/vancomycin, ID evaluated, recommending monitoring off abx therapy   She has remained afebrile and doing well since discontinuation of antibiotics    · Procalcitonin elevated at 0 71/0 59/0 40  · Blood cultures negative x 48 hours, continue to trend closely   · Supportive care with antipyretics   · No need for removal of port-a-cath at this time patient will follow up with her oncologist and primary care physician

## 2019-12-24 NOTE — PLAN OF CARE
Problem: Potential for Falls  Goal: Patient will remain free of falls  Description  INTERVENTIONS:  - Assess patient frequently for physical needs  -  Identify cognitive and physical deficits and behaviors that affect risk of falls    -  [place] fall precautions as indicated by assessment   - Educate patient/family on patient safety including physical limitations  - Instruct patient to call for assistance with activity based on assessment  - Modify environment to reduce risk of injury  - Consider OT/PT consult to assist with strengthening/mobility  Outcome: Progressing     Problem: Prexisting or High Potential for Compromised Skin Integrity  Goal: Skin integrity is maintained or improved  Description  INTERVENTIONS:  - Identify patients at risk for skin breakdown  - Assess and monitor skin integrity  - Assess and monitor nutrition and hydration status  - Monitor labs   - Assess for incontinence   - Turn and reposition patient  - Assist with mobility/ambulation  - Relieve pressure over bony prominences  - Avoid friction and shearing  - Provide appropriate hygiene as needed including keeping skin clean and dry  - Evaluate need for skin moisturizer/barrier cream  - Collaborate with interdisciplinary team   - Patient/family teaching  - Consider wound care consult   Outcome: Progressing     Problem: PAIN - ADULT  Goal: Verbalizes/displays adequate comfort level or baseline comfort level  Description  Interventions:  - Encourage patient to monitor pain and request assistance  - Assess pain using appropriate pain scale  - Administer analgesics based on type and severity of pain and evaluate response  - Implement non-pharmacological measures as appropriate and evaluate response  - Consider cultural and social influences on pain and pain management  - Notify physician/advanced practitioner if interventions unsuccessful or patient reports new pain  Outcome: Progressing     Problem: INFECTION - ADULT  Goal: Absence or prevention of progression during hospitalization  Description  INTERVENTIONS:  - Assess and monitor for signs and symptoms of infection  - Monitor lab/diagnostic results  - Monitor all insertion sites, i e  indwelling lines, tubes, and drains  - Monitor endotracheal if appropriate and nasal secretions for changes in amount and color  - [place] appropriate cooling/warming therapies per order  - Administer medications as ordered  - Instruct and encourage patient and family to use good hand hygiene technique  - Identify and instruct in appropriate isolation precautions for identified infection/condition  Outcome: Progressing     Problem: SAFETY ADULT  Goal: Patient will remain free of falls  Description  INTERVENTIONS:  - Assess patient frequently for physical needs  -  Identify cognitive and physical deficits and behaviors that affect risk of falls    -  [place] fall precautions as indicated by assessment   - Educate patient/family on patient safety including physical limitations  - Instruct patient to call for assistance with activity based on assessment  - Modify environment to reduce risk of injury  - Consider OT/PT consult to assist with strengthening/mobility  Outcome: Progressing  Goal: Maintain or return to baseline ADL function  Description  INTERVENTIONS:  -  Assess patient's ability to carry out ADLs; assess patient's baseline for ADL function and identify physical deficits which impact ability to perform ADLs (bathing, care of mouth/teeth, toileting, grooming, dressing, etc )  - Assess/evaluate cause of self-care deficits   - Assess range of motion  - Assess patient's mobility; develop plan if impaired  - Assess patient's need for assistive devices and provide as appropriate  - Encourage maximum independence but intervene and supervise when necessary  - Involve family in performance of ADLs  - Assess for home care needs following discharge   - Consider OT consult to assist with ADL evaluation and planning for discharge  - Provide patient education as appropriate  Outcome: Progressing  Goal: Maintain or return mobility status to optimal level  Description  INTERVENTIONS:  - Assess patient's baseline mobility status (ambulation, transfers, stairs, etc )    - Identify cognitive and physical deficits and behaviors that affect mobility  - Identify mobility aids required to assist with transfers and/or ambulation (gait belt, sit-to-stand, lift, walker, cane, etc )  - [place] fall precautions as indicated by assessment  - Record patient progress and toleration of activity level on Mobility SBAR; progress patient to next Phase/Stage  - Instruct patient to call for assistance with activity based on assessment  - Consider rehabilitation consult to assist with strengthening/weightbearing, etc   Outcome: Progressing     Problem: DISCHARGE PLANNING  Goal: Discharge to home or other facility with appropriate resources  Description  INTERVENTIONS:  - Identify barriers to discharge w/patient and caregiver  - Arrange for needed discharge resources and transportation as appropriate  - Identify discharge learning needs (meds, wound care, etc )  - Arrange for interpretive services to assist at discharge as needed  - Refer to Case Management Department for coordinating discharge planning if the patient needs post-hospital services based on physician/advanced practitioner order or complex needs related to functional status, cognitive ability, or social support system  Outcome: Progressing     Problem: Knowledge Deficit  Goal: Patient/family/caregiver demonstrates understanding of disease process, treatment plan, medications, and discharge instructions  Description  Complete learning assessment and assess knowledge base    Interventions:  - Provide teaching at level of understanding  - Provide teaching via preferred learning methods  Outcome: Progressing

## 2019-12-24 NOTE — ASSESSMENT & PLAN NOTE
· Random glucose noted to be 150 on admission  · Last hgbA1c 8/2019 - 5 3%  · Repeat hgbA1c continues to be pending, however glucose has been persistently elevated  · Continue PRN SSI coverage   · QID glucose checks  · Will not text this lady with checking her blood glucoses as she has reported that she is likely moving down a palliative course

## 2019-12-24 NOTE — ASSESSMENT & PLAN NOTE
· Pt with metastatic Leiomyosarcoma  · Follows with oncologist at [facility], thinking about palliative therapy, has follow up to discuss options Thursday, wants port-a-cath to remain in place on discharge as there is no evidence currently of port infection  · Has received 2 doses of chemotherapy at this point, last infusion was about 1 5 weeks ago   · Pt with left port-a-cath in place, surrounding erythema is now resolved  · ID evaluated,  · Recommending discontinuation of IV cefepime and vancomycin , no further episodes suggesting infection  · Blood cultures negative x 48 hours, continue to trend   · Follow up with Oncology at planned appointment

## 2019-12-24 NOTE — ASSESSMENT & PLAN NOTE
· Baseline hgb appears to be around 7-9 on review  · Hgb 7 8 today, stabilized  , Oncology can re-evaluate during their appointment later in the week    · Likely secondary to chronic disease/underlying advanced malignancy, normocytic  · Iron panel not indicative of need for supplementation   · Monitor CBC, no signs of acute or active bleeding noted at this time  · Continue B12 supplementation

## 2019-12-24 NOTE — ASSESSMENT & PLAN NOTE
· Pt presented with generalized weakness and mechanical fall, unable to get up off the floor due to profound weakness   · Denies any numbness/tingling or unilateral weakness   · Likely secondary to acute infectious process vs  malignancy, continued improvement noted today, walking the halls without much assistance   Refusing STR, agreeable to Paloma Vasquez, CM following  · Pt meeting sepsis criteria on admission with tachycardia, leukocytosis, tachypnea and erythema concerning for cellulitis   · Recent hospitalization for similar symptoms   · Completed 4 days of IV cefepime/vancomcyin, erythema now resolved  · ID evaluated,  · Recommend discontinuation of antibiotics  · Procalcitonin continues to trend down   · Port-A-Cath to remain in place until follow up with outpatient oncologist Dr Bibi Mcgovern on Thursday   · Stable for discharge from ID standpoint   · Fall precautions   · PT/OT recommending STR, pt refusing at this time, agreeable to Paloma Vasquez  · CT head negative for intracranial abnormalities

## 2019-12-26 NOTE — UTILIZATION REVIEW
Notification of Discharge  This is a Notification of Discharge from our facility [address]  Please be advised that this patient has been discharge from our facility  Below you will find the admission and discharge date and time including the patients disposition  PRESENTATION DATE: 12/20/2019  3:12 PM  OBS ADMISSION DATE:   IP ADMISSION DATE: 12/20/19 1754   DISCHARGE DATE: 12/24/2019  3:32 PM  DISPOSITION: Home with Michael Fraser with [facility]   Admission Orders listed below:  Admission Orders (From admission, onward)     Ordered        12/20/19 1754  Inpatient Admission (expected length of stay for this patient Order details is greater than two midnights)  Once                   Please contact the UR Department if additional information is required to close this patient's authorization/case  [address] Janina Utilization Review Department  Main: [phone] x carefully listen to the prompts  All voicemails are confidential   [email or web address]  Send all requests for admission clinical reviews, approved or denied determinations and any other requests to dedicated fax number below belonging to the campus where the patient is receiving treatment   List of dedicated fax numbers:  [address] DENIALS (Administrative/Medical Necessity) [phone]   [address] [place] [address] (Maternity/NICU/Pediatrics) [phone]   Estelle Chappell [phone]   Lexx Crowley [phone]   [address] [place] [address] [phone]   [address] [facility] [address] [place] [address] [phone]   [facility]  [phone]   [address] [facility]  [address] [facility] [address] [facility] [phone]

## 2020-01-01 ENCOUNTER — TELEPHONE (OUTPATIENT)
Dept: UROLOGY | Facility: MEDICAL CENTER | Age: 65
End: 2020-01-01

## 2020-01-01 ENCOUNTER — TRANSCRIBE ORDERS (OUTPATIENT)
Dept: ADMINISTRATIVE | Facility: HOSPITAL | Age: 65
End: 2020-01-01

## 2020-01-01 ENCOUNTER — ANESTHESIA EVENT (OUTPATIENT)
Dept: PERIOP | Facility: HOSPITAL | Age: 65
End: 2020-01-01
Payer: COMMERCIAL

## 2020-01-01 ENCOUNTER — APPOINTMENT (OUTPATIENT)
Dept: RADIOLOGY | Facility: HOSPITAL | Age: 65
End: 2020-01-01
Payer: MEDICARE

## 2020-01-01 ENCOUNTER — HOSPITAL ENCOUNTER (EMERGENCY)
Facility: HOSPITAL | Age: 65
Discharge: HOME | End: 2020-06-04
Attending: EMERGENCY MEDICINE | Admitting: EMERGENCY MEDICINE
Payer: MEDICARE

## 2020-01-01 ENCOUNTER — TELEPHONE (OUTPATIENT)
Dept: UROLOGY | Facility: CLINIC | Age: 65
End: 2020-01-01

## 2020-01-01 ENCOUNTER — TELEPHONE (OUTPATIENT)
Dept: OTHER | Facility: HOSPITAL | Age: 65
End: 2020-01-01

## 2020-01-01 ENCOUNTER — PREP FOR PROCEDURE (OUTPATIENT)
Dept: UROLOGY | Facility: CLINIC | Age: 65
End: 2020-01-01

## 2020-01-01 ENCOUNTER — HOSPITAL ENCOUNTER (OUTPATIENT)
Facility: HOSPITAL | Age: 65
Setting detail: OUTPATIENT SURGERY
Discharge: HOME | End: 2020-05-13
Attending: UROLOGY | Admitting: UROLOGY
Payer: MEDICARE

## 2020-01-01 ENCOUNTER — HOSPITAL ENCOUNTER (OUTPATIENT)
Facility: HOSPITAL | Age: 65
Setting detail: OUTPATIENT SURGERY
Discharge: HOME | End: 2020-02-04
Attending: UROLOGY | Admitting: UROLOGY
Payer: COMMERCIAL

## 2020-01-01 ENCOUNTER — ANESTHESIA (OUTPATIENT)
Dept: PERIOP | Facility: HOSPITAL | Age: 65
End: 2020-01-01
Payer: MEDICARE

## 2020-01-01 ENCOUNTER — HOSPITAL ENCOUNTER (OUTPATIENT)
Facility: HOSPITAL | Age: 65
Setting detail: OUTPATIENT SURGERY
Discharge: HOME | End: 2020-09-01
Attending: UROLOGY | Admitting: UROLOGY
Payer: MEDICARE

## 2020-01-01 ENCOUNTER — TELEPHONE (OUTPATIENT)
Dept: OTHER | Facility: OTHER | Age: 65
End: 2020-01-01

## 2020-01-01 ENCOUNTER — NURSE TRIAGE (OUTPATIENT)
Dept: OTHER | Facility: OTHER | Age: 65
End: 2020-01-01

## 2020-01-01 ENCOUNTER — ANESTHESIA (OUTPATIENT)
Dept: PERIOP | Facility: HOSPITAL | Age: 65
End: 2020-01-01
Payer: COMMERCIAL

## 2020-01-01 ENCOUNTER — APPOINTMENT (OUTPATIENT)
Dept: RADIOLOGY | Facility: HOSPITAL | Age: 65
End: 2020-01-01
Payer: COMMERCIAL

## 2020-01-01 ENCOUNTER — APPOINTMENT (OUTPATIENT)
Dept: LAB | Facility: CLINIC | Age: 65
End: 2020-01-01
Payer: MEDICARE

## 2020-01-01 ENCOUNTER — APPOINTMENT (OUTPATIENT)
Dept: LAB | Facility: HOSPITAL | Age: 65
End: 2020-01-01
Payer: MEDICARE

## 2020-01-01 ENCOUNTER — OFFICE VISIT (OUTPATIENT)
Dept: UROLOGY | Facility: CLINIC | Age: 65
End: 2020-01-01
Payer: COMMERCIAL

## 2020-01-01 ENCOUNTER — TELEMEDICINE (OUTPATIENT)
Dept: UROLOGY | Facility: CLINIC | Age: 65
End: 2020-01-01
Payer: MEDICARE

## 2020-01-01 ENCOUNTER — HOSPITAL ENCOUNTER (EMERGENCY)
Facility: HOSPITAL | Age: 65
Discharge: HOME | End: 2020-05-31
Attending: EMERGENCY MEDICINE | Admitting: EMERGENCY MEDICINE
Payer: MEDICARE

## 2020-01-01 ENCOUNTER — ANESTHESIA EVENT (OUTPATIENT)
Dept: PERIOP | Facility: HOSPITAL | Age: 65
End: 2020-01-01
Payer: MEDICARE

## 2020-01-01 ENCOUNTER — TELEPHONE (OUTPATIENT)
Dept: UROLOGY | Facility: AMBULATORY SURGERY CENTER | Age: 65
End: 2020-01-01

## 2020-01-01 ENCOUNTER — HOSPITAL ENCOUNTER (EMERGENCY)
Facility: HOSPITAL | Age: 65
Discharge: OTHER INSTITUTION | End: 2020-06-17
Attending: EMERGENCY MEDICINE | Admitting: EMERGENCY MEDICINE
Payer: MEDICARE

## 2020-01-01 ENCOUNTER — HOSPITAL ENCOUNTER (EMERGENCY)
Facility: HOSPITAL | Age: 65
Discharge: HOME | End: 2020-10-05
Attending: EMERGENCY MEDICINE | Admitting: EMERGENCY MEDICINE
Payer: MEDICARE

## 2020-01-01 VITALS
OXYGEN SATURATION: 100 % | HEART RATE: 91 BPM | HEIGHT: 68 IN | SYSTOLIC BLOOD PRESSURE: 143 MMHG | RESPIRATION RATE: 16 BRPM | DIASTOLIC BLOOD PRESSURE: 67 MMHG | BODY MASS INDEX: 44.05 KG/M2 | TEMPERATURE: 98.1 F

## 2020-01-01 VITALS
HEART RATE: 93 BPM | OXYGEN SATURATION: 94 % | HEIGHT: 68 IN | WEIGHT: 267.64 LBS | DIASTOLIC BLOOD PRESSURE: 56 MMHG | BODY MASS INDEX: 40.56 KG/M2 | TEMPERATURE: 97.8 F | RESPIRATION RATE: 14 BRPM | SYSTOLIC BLOOD PRESSURE: 117 MMHG

## 2020-01-01 VITALS
WEIGHT: 272.4 LBS | BODY MASS INDEX: 41.28 KG/M2 | HEIGHT: 68 IN | SYSTOLIC BLOOD PRESSURE: 110 MMHG | DIASTOLIC BLOOD PRESSURE: 80 MMHG | HEART RATE: 80 BPM

## 2020-01-01 VITALS
DIASTOLIC BLOOD PRESSURE: 86 MMHG | SYSTOLIC BLOOD PRESSURE: 184 MMHG | OXYGEN SATURATION: 96 % | WEIGHT: 289.68 LBS | BODY MASS INDEX: 43.9 KG/M2 | HEART RATE: 98 BPM | TEMPERATURE: 98.3 F | HEIGHT: 68 IN | RESPIRATION RATE: 20 BRPM

## 2020-01-01 VITALS
BODY MASS INDEX: 40.47 KG/M2 | DIASTOLIC BLOOD PRESSURE: 70 MMHG | RESPIRATION RATE: 16 BRPM | TEMPERATURE: 97.9 F | SYSTOLIC BLOOD PRESSURE: 132 MMHG | HEART RATE: 72 BPM | OXYGEN SATURATION: 95 % | HEIGHT: 68 IN | WEIGHT: 267 LBS

## 2020-01-01 VITALS
SYSTOLIC BLOOD PRESSURE: 125 MMHG | RESPIRATION RATE: 21 BRPM | DIASTOLIC BLOOD PRESSURE: 69 MMHG | OXYGEN SATURATION: 97 % | HEART RATE: 105 BPM | TEMPERATURE: 97.8 F

## 2020-01-01 VITALS
HEART RATE: 91 BPM | RESPIRATION RATE: 20 BRPM | BODY MASS INDEX: 42.3 KG/M2 | SYSTOLIC BLOOD PRESSURE: 162 MMHG | TEMPERATURE: 97.5 F | HEIGHT: 68 IN | WEIGHT: 279.1 LBS | DIASTOLIC BLOOD PRESSURE: 66 MMHG | OXYGEN SATURATION: 95 %

## 2020-01-01 VITALS
OXYGEN SATURATION: 93 % | WEIGHT: 271.17 LBS | RESPIRATION RATE: 18 BRPM | HEIGHT: 68 IN | DIASTOLIC BLOOD PRESSURE: 64 MMHG | BODY MASS INDEX: 41.1 KG/M2 | HEART RATE: 72 BPM | SYSTOLIC BLOOD PRESSURE: 122 MMHG | TEMPERATURE: 98.2 F

## 2020-01-01 DIAGNOSIS — R30.0 DYSURIA: ICD-10-CM

## 2020-01-01 DIAGNOSIS — N20.0 STAGHORN CALCULUS: ICD-10-CM

## 2020-01-01 DIAGNOSIS — M79.671 RIGHT FOOT PAIN: Primary | ICD-10-CM

## 2020-01-01 DIAGNOSIS — N20.0 STAGHORN CALCULUS: Primary | ICD-10-CM

## 2020-01-01 DIAGNOSIS — N30.01 ACUTE CYSTITIS WITH HEMATURIA: Primary | ICD-10-CM

## 2020-01-01 DIAGNOSIS — R60.9 EDEMA, UNSPECIFIED TYPE: Primary | ICD-10-CM

## 2020-01-01 DIAGNOSIS — N20.0 NEPHROLITHIASIS: ICD-10-CM

## 2020-01-01 DIAGNOSIS — Z01.818 PREOP EXAMINATION: Primary | ICD-10-CM

## 2020-01-01 DIAGNOSIS — R79.89 ELEVATED SERUM CREATININE: ICD-10-CM

## 2020-01-01 DIAGNOSIS — N20.0 LEFT RENAL STONE: Primary | ICD-10-CM

## 2020-01-01 DIAGNOSIS — C49.9 LEIOMYOSARCOMA (HCC): ICD-10-CM

## 2020-01-01 DIAGNOSIS — R11.2 NAUSEA & VOMITING: Primary | ICD-10-CM

## 2020-01-01 DIAGNOSIS — C49.9 LEIOMYOSARCOMA (HCC): Primary | ICD-10-CM

## 2020-01-01 DIAGNOSIS — R50.9 FEVER, UNSPECIFIED FEVER CAUSE: Primary | ICD-10-CM

## 2020-01-01 DIAGNOSIS — Z01.818 PREOP EXAMINATION: ICD-10-CM

## 2020-01-01 DIAGNOSIS — N13.30 HYDRONEPHROSIS, UNSPECIFIED HYDRONEPHROSIS TYPE: ICD-10-CM

## 2020-01-01 DIAGNOSIS — T78.40XA ALLERGIC REACTION: Primary | ICD-10-CM

## 2020-01-01 DIAGNOSIS — E87.6 HYPOKALEMIA: ICD-10-CM

## 2020-01-01 DIAGNOSIS — N20.0 NEPHROLITHIASIS: Primary | ICD-10-CM

## 2020-01-01 DIAGNOSIS — Z46.6 ENCOUNTER FOR ADJUSTMENT OF URETERAL STENT: ICD-10-CM

## 2020-01-01 DIAGNOSIS — Z91.89 AT RISK FOR OBSTRUCTIVE SLEEP APNEA: Primary | ICD-10-CM

## 2020-01-01 DIAGNOSIS — L03.119 CELLULITIS OF FOOT: Primary | ICD-10-CM

## 2020-01-01 DIAGNOSIS — R31.0 GROSS HEMATURIA: Primary | ICD-10-CM

## 2020-01-01 DIAGNOSIS — R31.0 GROSS HEMATURIA: ICD-10-CM

## 2020-01-01 DIAGNOSIS — R30.0 DYSURIA: Primary | ICD-10-CM

## 2020-01-01 DIAGNOSIS — N39.0 ACUTE UTI: Primary | ICD-10-CM

## 2020-01-01 DIAGNOSIS — C49.9 SARCOMA (HCC): ICD-10-CM

## 2020-01-01 DIAGNOSIS — N30.01 ACUTE CYSTITIS WITH HEMATURIA: ICD-10-CM

## 2020-01-01 DIAGNOSIS — C79.9 METASTATIC CANCER (HCC): ICD-10-CM

## 2020-01-01 LAB
ALBUMIN SERPL BCP-MCNC: 3 G/DL (ref 3.5–5)
ALBUMIN SERPL BCP-MCNC: 3.1 G/DL (ref 3.5–5)
ALP SERPL-CCNC: 144 U/L (ref 46–116)
ALP SERPL-CCNC: 187 U/L (ref 46–116)
ALT SERPL W P-5'-P-CCNC: 19 U/L (ref 12–78)
ALT SERPL W P-5'-P-CCNC: 94 U/L (ref 12–78)
ANION GAP SERPL CALCULATED.3IONS-SCNC: 10 MMOL/L (ref 4–13)
ANION GAP SERPL CALCULATED.3IONS-SCNC: 10 MMOL/L (ref 4–13)
ANION GAP SERPL CALCULATED.3IONS-SCNC: 7 MMOL/L (ref 4–13)
ANION GAP SERPL CALCULATED.3IONS-SCNC: 7 MMOL/L (ref 4–13)
ANISOCYTOSIS BLD QL SMEAR: PRESENT
AST SERPL W P-5'-P-CCNC: 12 U/L (ref 5–45)
AST SERPL W P-5'-P-CCNC: 26 U/L (ref 5–45)
ATRIAL RATE: 102 BPM
ATRIAL RATE: 97 BPM
BACTERIA BLD CULT: NORMAL
BACTERIA BLD CULT: NORMAL
BACTERIA UR CULT: ABNORMAL
BACTERIA UR CULT: NORMAL
BACTERIA UR QL AUTO: ABNORMAL /HPF
BASOPHILS # BLD AUTO: 0.02 THOUSANDS/ΜL (ref 0–0.1)
BASOPHILS # BLD AUTO: 0.03 THOUSANDS/ΜL (ref 0–0.1)
BASOPHILS # BLD AUTO: 0.04 THOUSANDS/ΜL (ref 0–0.1)
BASOPHILS # BLD MANUAL: 0 THOUSAND/UL (ref 0–0.1)
BASOPHILS NFR BLD AUTO: 0 % (ref 0–1)
BASOPHILS NFR MAR MANUAL: 0 % (ref 0–1)
BILIRUB DIRECT SERPL-MCNC: 0.13 MG/DL (ref 0–0.2)
BILIRUB SERPL-MCNC: 0.3 MG/DL (ref 0.2–1)
BILIRUB SERPL-MCNC: 0.4 MG/DL (ref 0.2–1)
BILIRUB UR QL STRIP: NEGATIVE
BUN SERPL-MCNC: 16 MG/DL (ref 5–25)
BUN SERPL-MCNC: 17 MG/DL (ref 5–25)
BUN SERPL-MCNC: 19 MG/DL (ref 5–25)
BUN SERPL-MCNC: 8 MG/DL (ref 5–25)
CALCIUM SERPL-MCNC: 8.9 MG/DL (ref 8.3–10.1)
CALCIUM SERPL-MCNC: 8.9 MG/DL (ref 8.3–10.1)
CALCIUM SERPL-MCNC: 9.6 MG/DL (ref 8.3–10.1)
CALCIUM SERPL-MCNC: 9.9 MG/DL (ref 8.3–10.1)
CHLORIDE SERPL-SCNC: 102 MMOL/L (ref 100–108)
CHLORIDE SERPL-SCNC: 104 MMOL/L (ref 100–108)
CHLORIDE SERPL-SCNC: 105 MMOL/L (ref 100–108)
CHLORIDE SERPL-SCNC: 105 MMOL/L (ref 100–108)
CLARITY UR: ABNORMAL
CO2 SERPL-SCNC: 26 MMOL/L (ref 21–32)
CO2 SERPL-SCNC: 28 MMOL/L (ref 21–32)
CO2 SERPL-SCNC: 30 MMOL/L (ref 21–32)
CO2 SERPL-SCNC: 30 MMOL/L (ref 21–32)
COLOR UR: ABNORMAL
COLOR UR: YELLOW
CREAT SERPL-MCNC: 0.97 MG/DL (ref 0.6–1.3)
CREAT SERPL-MCNC: 1.07 MG/DL (ref 0.6–1.3)
CREAT SERPL-MCNC: 1.13 MG/DL (ref 0.6–1.3)
CREAT SERPL-MCNC: 1.52 MG/DL (ref 0.6–1.3)
EOSINOPHIL # BLD AUTO: 0.03 THOUSAND/ΜL (ref 0–0.61)
EOSINOPHIL # BLD AUTO: 0.12 THOUSAND/ΜL (ref 0–0.61)
EOSINOPHIL # BLD AUTO: 0.15 THOUSAND/ΜL (ref 0–0.61)
EOSINOPHIL # BLD MANUAL: 0.09 THOUSAND/UL (ref 0–0.4)
EOSINOPHIL NFR BLD AUTO: 1 % (ref 0–6)
EOSINOPHIL NFR BLD MANUAL: 1 % (ref 0–6)
ERYTHROCYTE [DISTWIDTH] IN BLOOD BY AUTOMATED COUNT: 14.5 % (ref 11.6–15.1)
ERYTHROCYTE [DISTWIDTH] IN BLOOD BY AUTOMATED COUNT: 16.1 % (ref 11.6–15.1)
ERYTHROCYTE [DISTWIDTH] IN BLOOD BY AUTOMATED COUNT: 17.1 % (ref 11.6–15.1)
ERYTHROCYTE [DISTWIDTH] IN BLOOD BY AUTOMATED COUNT: 17.2 % (ref 11.6–15.1)
EST. AVERAGE GLUCOSE BLD GHB EST-MCNC: 103 MG/DL
GFR SERPL CREATININE-BSD FRML MDRD: 36 ML/MIN/1.73SQ M
GFR SERPL CREATININE-BSD FRML MDRD: 51 ML/MIN/1.73SQ M
GFR SERPL CREATININE-BSD FRML MDRD: 55 ML/MIN/1.73SQ M
GFR SERPL CREATININE-BSD FRML MDRD: 62 ML/MIN/1.73SQ M
GLUCOSE SERPL-MCNC: 124 MG/DL (ref 65–140)
GLUCOSE SERPL-MCNC: 141 MG/DL (ref 65–140)
GLUCOSE SERPL-MCNC: 145 MG/DL (ref 65–140)
GLUCOSE SERPL-MCNC: 178 MG/DL (ref 65–140)
GLUCOSE UR STRIP-MCNC: NEGATIVE MG/DL
HBA1C MFR BLD: 5.2 % (ref 4.2–6.3)
HCT VFR BLD AUTO: 32.6 % (ref 34.8–46.1)
HCT VFR BLD AUTO: 32.6 % (ref 34.8–46.1)
HCT VFR BLD AUTO: 33.3 % (ref 34.8–46.1)
HCT VFR BLD AUTO: 37.2 % (ref 34.8–46.1)
HGB BLD-MCNC: 10.7 G/DL (ref 11.5–15.4)
HGB BLD-MCNC: 11 G/DL (ref 11.5–15.4)
HGB BLD-MCNC: 11 G/DL (ref 11.5–15.4)
HGB BLD-MCNC: 12.8 G/DL (ref 11.5–15.4)
HGB UR QL STRIP.AUTO: ABNORMAL
HYPERCHROMIA BLD QL SMEAR: PRESENT
IMM GRANULOCYTES # BLD AUTO: 0.02 THOUSAND/UL (ref 0–0.2)
IMM GRANULOCYTES # BLD AUTO: 0.12 THOUSAND/UL (ref 0–0.2)
IMM GRANULOCYTES NFR BLD AUTO: 0 % (ref 0–2)
IMM GRANULOCYTES NFR BLD AUTO: 1 % (ref 0–2)
KETONES UR STRIP-MCNC: NEGATIVE MG/DL
LACTATE SERPL-SCNC: 1 MMOL/L (ref 0.5–2)
LEUKOCYTE ESTERASE UR QL STRIP: ABNORMAL
LYMPHOCYTES # BLD AUTO: 0.92 THOUSANDS/ΜL (ref 0.6–4.47)
LYMPHOCYTES # BLD AUTO: 1.21 THOUSAND/UL (ref 0.6–4.47)
LYMPHOCYTES # BLD AUTO: 1.95 THOUSANDS/ΜL (ref 0.6–4.47)
LYMPHOCYTES # BLD AUTO: 14 % (ref 14–44)
LYMPHOCYTES # BLD AUTO: 2.49 THOUSANDS/ΜL (ref 0.6–4.47)
LYMPHOCYTES NFR BLD AUTO: 17 % (ref 14–44)
LYMPHOCYTES NFR BLD AUTO: 20 % (ref 14–44)
LYMPHOCYTES NFR BLD AUTO: 21 % (ref 14–44)
MAGNESIUM SERPL-MCNC: 2 MG/DL (ref 1.6–2.6)
MCH RBC QN AUTO: 32.7 PG (ref 26.8–34.3)
MCH RBC QN AUTO: 33.7 PG (ref 26.8–34.3)
MCH RBC QN AUTO: 34.2 PG (ref 26.8–34.3)
MCH RBC QN AUTO: 34.2 PG (ref 26.8–34.3)
MCHC RBC AUTO-ENTMCNC: 32.8 G/DL (ref 31.4–37.4)
MCHC RBC AUTO-ENTMCNC: 33 G/DL (ref 31.4–37.4)
MCHC RBC AUTO-ENTMCNC: 33.7 G/DL (ref 31.4–37.4)
MCHC RBC AUTO-ENTMCNC: 34.4 G/DL (ref 31.4–37.4)
MCV RBC AUTO: 100 FL (ref 82–98)
MCV RBC AUTO: 103 FL (ref 82–98)
MCV RBC AUTO: 104 FL (ref 82–98)
MCV RBC AUTO: 95 FL (ref 82–98)
MONOCYTES # BLD AUTO: 0.09 THOUSAND/UL (ref 0–1.22)
MONOCYTES # BLD AUTO: 0.24 THOUSAND/ΜL (ref 0.17–1.22)
MONOCYTES # BLD AUTO: 0.62 THOUSAND/ΜL (ref 0.17–1.22)
MONOCYTES # BLD AUTO: 0.74 THOUSAND/ΜL (ref 0.17–1.22)
MONOCYTES NFR BLD AUTO: 5 % (ref 4–12)
MONOCYTES NFR BLD AUTO: 5 % (ref 4–12)
MONOCYTES NFR BLD AUTO: 8 % (ref 4–12)
MONOCYTES NFR BLD: 1 % (ref 4–12)
MUCOUS THREADS UR QL AUTO: ABNORMAL
NEUTROPHILS # BLD AUTO: 4.11 THOUSANDS/ΜL (ref 1.85–7.62)
NEUTROPHILS # BLD AUTO: 6.32 THOUSANDS/ΜL (ref 1.85–7.62)
NEUTROPHILS # BLD AUTO: 9.13 THOUSANDS/ΜL (ref 1.85–7.62)
NEUTROPHILS # BLD MANUAL: 7.27 THOUSAND/UL (ref 1.85–7.62)
NEUTS BAND NFR BLD MANUAL: 4 % (ref 0–8)
NEUTS SEG NFR BLD AUTO: 70 % (ref 43–75)
NEUTS SEG NFR BLD AUTO: 73 % (ref 43–75)
NEUTS SEG NFR BLD AUTO: 77 % (ref 43–75)
NEUTS SEG NFR BLD AUTO: 80 % (ref 43–75)
NITRITE UR QL STRIP: NEGATIVE
NON-SQ EPI CELLS URNS QL MICRO: ABNORMAL /HPF
NRBC BLD AUTO-RTO: 0 /100 WBCS
NT-PROBNP SERPL-MCNC: 204 PG/ML
NT-PROBNP SERPL-MCNC: 351 PG/ML
OTHER STN SPEC: ABNORMAL
P AXIS: 56 DEGREES
P AXIS: 60 DEGREES
PH UR STRIP.AUTO: 5.5 [PH]
PH UR STRIP.AUTO: 6 [PH]
PH UR STRIP.AUTO: 8.5 [PH]
PLATELET # BLD AUTO: 181 THOUSANDS/UL (ref 149–390)
PLATELET # BLD AUTO: 194 THOUSANDS/UL (ref 149–390)
PLATELET # BLD AUTO: 218 THOUSANDS/UL (ref 149–390)
PLATELET # BLD AUTO: 244 THOUSANDS/UL (ref 149–390)
PLATELET BLD QL SMEAR: ADEQUATE
PMV BLD AUTO: 10 FL (ref 8.9–12.7)
PMV BLD AUTO: 10.1 FL (ref 8.9–12.7)
PMV BLD AUTO: 9.1 FL (ref 8.9–12.7)
PMV BLD AUTO: 9.2 FL (ref 8.9–12.7)
POTASSIUM SERPL-SCNC: 3.2 MMOL/L (ref 3.5–5.3)
POTASSIUM SERPL-SCNC: 3.4 MMOL/L (ref 3.5–5.3)
POTASSIUM SERPL-SCNC: 3.5 MMOL/L (ref 3.5–5.3)
POTASSIUM SERPL-SCNC: 3.5 MMOL/L (ref 3.5–5.3)
PR INTERVAL: 144 MS
PR INTERVAL: 146 MS
PROT SERPL-MCNC: 6.7 G/DL (ref 6.4–8.2)
PROT SERPL-MCNC: 7 G/DL (ref 6.4–8.2)
PROT UR STRIP-MCNC: ABNORMAL MG/DL
PROT UR STRIP-MCNC: NEGATIVE MG/DL
QRS AXIS: 60 DEGREES
QRS AXIS: 61 DEGREES
QRSD INTERVAL: 84 MS
QRSD INTERVAL: 84 MS
QT INTERVAL: 342 MS
QT INTERVAL: 350 MS
QTC INTERVAL: 434 MS
QTC INTERVAL: 456 MS
RBC # BLD AUTO: 3.13 MILLION/UL (ref 3.81–5.12)
RBC # BLD AUTO: 3.22 MILLION/UL (ref 3.81–5.12)
RBC # BLD AUTO: 3.26 MILLION/UL (ref 3.81–5.12)
RBC # BLD AUTO: 3.92 MILLION/UL (ref 3.81–5.12)
RBC #/AREA URNS AUTO: ABNORMAL /HPF
SARS-COV-2 RNA SPEC QL NAA+PROBE: NOT DETECTED
SL AMB  POCT GLUCOSE, UA: NORMAL
SL AMB LEUKOCYTE ESTERASE,UA: NORMAL
SL AMB POCT BILIRUBIN,UA: NORMAL
SL AMB POCT BLOOD,UA: NORMAL
SL AMB POCT CLARITY,UA: NORMAL
SL AMB POCT COLOR,UA: YELLOW
SL AMB POCT KETONES,UA: NORMAL
SL AMB POCT NITRITE,UA: NORMAL
SL AMB POCT PH,UA: 5
SL AMB POCT SPECIFIC GRAVITY,UA: 1.01
SL AMB POCT URINE PROTEIN: NORMAL
SL AMB POCT UROBILINOGEN: 0.2
SODIUM SERPL-SCNC: 140 MMOL/L (ref 136–145)
SODIUM SERPL-SCNC: 140 MMOL/L (ref 136–145)
SODIUM SERPL-SCNC: 142 MMOL/L (ref 136–145)
SODIUM SERPL-SCNC: 142 MMOL/L (ref 136–145)
SP GR UR STRIP.AUTO: 1.01 (ref 1–1.03)
SP GR UR STRIP.AUTO: 1.02 (ref 1–1.03)
SP GR UR STRIP.AUTO: 1.02 (ref 1–1.03)
T WAVE AXIS: 60 DEGREES
T WAVE AXIS: 68 DEGREES
TOTAL CELLS COUNTED SPEC: 100
TROPONIN I SERPL-MCNC: <0.02 NG/ML
UROBILINOGEN UR QL STRIP.AUTO: 0.2 E.U./DL
VENTRICULAR RATE: 102 BPM
VENTRICULAR RATE: 97 BPM
WBC # BLD AUTO: 12.55 THOUSAND/UL (ref 4.31–10.16)
WBC # BLD AUTO: 5.34 THOUSAND/UL (ref 4.31–10.16)
WBC # BLD AUTO: 8.65 THOUSAND/UL (ref 4.31–10.16)
WBC # BLD AUTO: 9.16 THOUSAND/UL (ref 4.31–10.16)
WBC #/AREA URNS AUTO: ABNORMAL /HPF

## 2020-01-01 PROCEDURE — 96372 THER/PROPH/DIAG INJ SC/IM: CPT

## 2020-01-01 PROCEDURE — 74420 UROGRAPHY RTRGR +-KUB: CPT

## 2020-01-01 PROCEDURE — U0003 INFECTIOUS AGENT DETECTION BY NUCLEIC ACID (DNA OR RNA); SEVERE ACUTE RESPIRATORY SYNDROME CORONAVIRUS 2 (SARS-COV-2) (CORONAVIRUS DISEASE [COVID-19]), AMPLIFIED PROBE TECHNIQUE, MAKING USE OF HIGH THROUGHPUT TECHNOLOGIES AS DESCRIBED BY CMS-2020-01-R: HCPCS

## 2020-01-01 PROCEDURE — 96361 HYDRATE IV INFUSION ADD-ON: CPT

## 2020-01-01 PROCEDURE — 36415 COLL VENOUS BLD VENIPUNCTURE: CPT | Performed by: EMERGENCY MEDICINE

## 2020-01-01 PROCEDURE — 96376 TX/PRO/DX INJ SAME DRUG ADON: CPT

## 2020-01-01 PROCEDURE — 87086 URINE CULTURE/COLONY COUNT: CPT

## 2020-01-01 PROCEDURE — NC001 PR NO CHARGE: Performed by: UROLOGY

## 2020-01-01 PROCEDURE — 87077 CULTURE AEROBIC IDENTIFY: CPT

## 2020-01-01 PROCEDURE — 83605 ASSAY OF LACTIC ACID: CPT | Performed by: PHYSICIAN ASSISTANT

## 2020-01-01 PROCEDURE — 99284 EMERGENCY DEPT VISIT MOD MDM: CPT | Performed by: PHYSICIAN ASSISTANT

## 2020-01-01 PROCEDURE — 87186 SC STD MICRODIL/AGAR DIL: CPT

## 2020-01-01 PROCEDURE — 80053 COMPREHEN METABOLIC PANEL: CPT | Performed by: EMERGENCY MEDICINE

## 2020-01-01 PROCEDURE — 52332 CYSTOSCOPY AND TREATMENT: CPT | Performed by: UROLOGY

## 2020-01-01 PROCEDURE — C1758 CATHETER, URETERAL: HCPCS | Performed by: UROLOGY

## 2020-01-01 PROCEDURE — 99284 EMERGENCY DEPT VISIT MOD MDM: CPT

## 2020-01-01 PROCEDURE — 99284 EMERGENCY DEPT VISIT MOD MDM: CPT | Performed by: EMERGENCY MEDICINE

## 2020-01-01 PROCEDURE — 93010 ELECTROCARDIOGRAM REPORT: CPT | Performed by: INTERNAL MEDICINE

## 2020-01-01 PROCEDURE — 85007 BL SMEAR W/DIFF WBC COUNT: CPT | Performed by: EMERGENCY MEDICINE

## 2020-01-01 PROCEDURE — 85027 COMPLETE CBC AUTOMATED: CPT | Performed by: EMERGENCY MEDICINE

## 2020-01-01 PROCEDURE — C1769 GUIDE WIRE: HCPCS | Performed by: UROLOGY

## 2020-01-01 PROCEDURE — 83880 ASSAY OF NATRIURETIC PEPTIDE: CPT | Performed by: EMERGENCY MEDICINE

## 2020-01-01 PROCEDURE — 80048 BASIC METABOLIC PNL TOTAL CA: CPT | Performed by: PHYSICIAN ASSISTANT

## 2020-01-01 PROCEDURE — 87147 CULTURE TYPE IMMUNOLOGIC: CPT

## 2020-01-01 PROCEDURE — 99283 EMERGENCY DEPT VISIT LOW MDM: CPT

## 2020-01-01 PROCEDURE — C2617 STENT, NON-COR, TEM W/O DEL: HCPCS | Performed by: UROLOGY

## 2020-01-01 PROCEDURE — 83880 ASSAY OF NATRIURETIC PEPTIDE: CPT | Performed by: PHYSICIAN ASSISTANT

## 2020-01-01 PROCEDURE — 81001 URINALYSIS AUTO W/SCOPE: CPT

## 2020-01-01 PROCEDURE — 81002 URINALYSIS NONAUTO W/O SCOPE: CPT | Performed by: PHYSICIAN ASSISTANT

## 2020-01-01 PROCEDURE — 80048 BASIC METABOLIC PNL TOTAL CA: CPT | Performed by: EMERGENCY MEDICINE

## 2020-01-01 PROCEDURE — 96365 THER/PROPH/DIAG IV INF INIT: CPT

## 2020-01-01 PROCEDURE — 87086 URINE CULTURE/COLONY COUNT: CPT | Performed by: PHYSICIAN ASSISTANT

## 2020-01-01 PROCEDURE — 96375 TX/PRO/DX INJ NEW DRUG ADDON: CPT

## 2020-01-01 PROCEDURE — 99213 OFFICE O/P EST LOW 20 MIN: CPT | Performed by: PHYSICIAN ASSISTANT

## 2020-01-01 PROCEDURE — 99285 EMERGENCY DEPT VISIT HI MDM: CPT | Performed by: EMERGENCY MEDICINE

## 2020-01-01 PROCEDURE — 84484 ASSAY OF TROPONIN QUANT: CPT | Performed by: EMERGENCY MEDICINE

## 2020-01-01 PROCEDURE — 93005 ELECTROCARDIOGRAM TRACING: CPT

## 2020-01-01 PROCEDURE — 81001 URINALYSIS AUTO W/SCOPE: CPT | Performed by: EMERGENCY MEDICINE

## 2020-01-01 PROCEDURE — 87086 URINE CULTURE/COLONY COUNT: CPT | Performed by: EMERGENCY MEDICINE

## 2020-01-01 PROCEDURE — 85025 COMPLETE CBC W/AUTO DIFF WBC: CPT | Performed by: PHYSICIAN ASSISTANT

## 2020-01-01 PROCEDURE — 83735 ASSAY OF MAGNESIUM: CPT | Performed by: EMERGENCY MEDICINE

## 2020-01-01 PROCEDURE — C9113 INJ PANTOPRAZOLE SODIUM, VIA: HCPCS | Performed by: EMERGENCY MEDICINE

## 2020-01-01 PROCEDURE — 85025 COMPLETE CBC W/AUTO DIFF WBC: CPT | Performed by: EMERGENCY MEDICINE

## 2020-01-01 PROCEDURE — 36415 COLL VENOUS BLD VENIPUNCTURE: CPT | Performed by: PHYSICIAN ASSISTANT

## 2020-01-01 PROCEDURE — 87040 BLOOD CULTURE FOR BACTERIA: CPT | Performed by: EMERGENCY MEDICINE

## 2020-01-01 PROCEDURE — 99214 OFFICE O/P EST MOD 30 MIN: CPT | Performed by: UROLOGY

## 2020-01-01 PROCEDURE — 81001 URINALYSIS AUTO W/SCOPE: CPT | Performed by: PHYSICIAN ASSISTANT

## 2020-01-01 PROCEDURE — 80076 HEPATIC FUNCTION PANEL: CPT | Performed by: EMERGENCY MEDICINE

## 2020-01-01 PROCEDURE — 96374 THER/PROPH/DIAG INJ IV PUSH: CPT

## 2020-01-01 DEVICE — INLAY OPTIMA URETERAL STENT W/O GUIDEWIRE
Type: IMPLANTABLE DEVICE | Status: FUNCTIONAL
Brand: BARD® INLAY OPTIMA® URETERAL STENT

## 2020-01-01 DEVICE — INLAY OPTIMA URETERAL STENT W/O GUIDEWIRE
Type: IMPLANTABLE DEVICE | Site: URETER | Status: FUNCTIONAL
Brand: BARD® INLAY OPTIMA® URETERAL STENT

## 2020-01-01 DEVICE — STENT URETERAL 6 FR 28CM INLAY OPTIMA: Type: IMPLANTABLE DEVICE | Site: URINARY BLADDER | Status: FUNCTIONAL

## 2020-01-01 RX ORDER — PROMETHAZINE HYDROCHLORIDE 25 MG/ML
12.5 INJECTION, SOLUTION INTRAMUSCULAR; INTRAVENOUS ONCE AS NEEDED
Status: DISCONTINUED | OUTPATIENT
Start: 2020-01-01 | End: 2020-01-01 | Stop reason: HOSPADM

## 2020-01-01 RX ORDER — KETOROLAC TROMETHAMINE 30 MG/ML
10 INJECTION, SOLUTION INTRAMUSCULAR; INTRAVENOUS ONCE
Status: COMPLETED | OUTPATIENT
Start: 2020-01-01 | End: 2020-01-01

## 2020-01-01 RX ORDER — METOCLOPRAMIDE HYDROCHLORIDE 5 MG/ML
10 INJECTION INTRAMUSCULAR; INTRAVENOUS ONCE
Status: COMPLETED | OUTPATIENT
Start: 2020-01-01 | End: 2020-01-01

## 2020-01-01 RX ORDER — ALBUTEROL SULFATE 2.5 MG/3ML
5 SOLUTION RESPIRATORY (INHALATION) ONCE
Status: COMPLETED | OUTPATIENT
Start: 2020-01-01 | End: 2020-01-01

## 2020-01-01 RX ORDER — PHENAZOPYRIDINE HYDROCHLORIDE 100 MG/1
100 TABLET, FILM COATED ORAL ONCE
Status: COMPLETED | OUTPATIENT
Start: 2020-01-01 | End: 2020-01-01

## 2020-01-01 RX ORDER — LIDOCAINE HYDROCHLORIDE 20 MG/ML
15 SOLUTION OROPHARYNGEAL ONCE
Status: COMPLETED | OUTPATIENT
Start: 2020-01-01 | End: 2020-01-01

## 2020-01-01 RX ORDER — PROPOFOL 10 MG/ML
INJECTION, EMULSION INTRAVENOUS AS NEEDED
Status: DISCONTINUED | OUTPATIENT
Start: 2020-01-01 | End: 2020-01-01

## 2020-01-01 RX ORDER — FENTANYL CITRATE/PF 50 MCG/ML
50 SYRINGE (ML) INJECTION
Status: DISCONTINUED | OUTPATIENT
Start: 2020-01-01 | End: 2020-01-01 | Stop reason: HOSPADM

## 2020-01-01 RX ORDER — ONDANSETRON 2 MG/ML
4 INJECTION INTRAMUSCULAR; INTRAVENOUS ONCE AS NEEDED
Status: COMPLETED | OUTPATIENT
Start: 2020-01-01 | End: 2020-01-01

## 2020-01-01 RX ORDER — HYDROMORPHONE HCL/PF 1 MG/ML
0.5 SYRINGE (ML) INJECTION
Status: DISCONTINUED | OUTPATIENT
Start: 2020-01-01 | End: 2020-01-01 | Stop reason: HOSPADM

## 2020-01-01 RX ORDER — SUCCINYLCHOLINE/SOD CL,ISO/PF 100 MG/5ML
SYRINGE (ML) INTRAVENOUS AS NEEDED
Status: DISCONTINUED | OUTPATIENT
Start: 2020-01-01 | End: 2020-01-01 | Stop reason: SURG

## 2020-01-01 RX ORDER — LEVOFLOXACIN 5 MG/ML
750 INJECTION, SOLUTION INTRAVENOUS ONCE
Status: COMPLETED | OUTPATIENT
Start: 2020-01-01 | End: 2020-01-01

## 2020-01-01 RX ORDER — ROCURONIUM BROMIDE 10 MG/ML
INJECTION, SOLUTION INTRAVENOUS AS NEEDED
Status: DISCONTINUED | OUTPATIENT
Start: 2020-01-01 | End: 2020-01-01 | Stop reason: SURG

## 2020-01-01 RX ORDER — CLINDAMYCIN HYDROCHLORIDE 150 MG/1
300 CAPSULE ORAL ONCE
Status: COMPLETED | OUTPATIENT
Start: 2020-01-01 | End: 2020-01-01

## 2020-01-01 RX ORDER — ONDANSETRON HYDROCHLORIDE 8 MG/1
TABLET, FILM COATED ORAL EVERY 8 HOURS PRN
COMMUNITY

## 2020-01-01 RX ORDER — PROCHLORPERAZINE MALEATE 5 MG/1
5 TABLET ORAL ONCE
Status: COMPLETED | OUTPATIENT
Start: 2020-01-01 | End: 2020-01-01

## 2020-01-01 RX ORDER — SERTRALINE HYDROCHLORIDE 100 MG/1
150 TABLET, FILM COATED ORAL DAILY
COMMUNITY
Start: 2019-08-13

## 2020-01-01 RX ORDER — HYDROMORPHONE HCL/PF 1 MG/ML
1 SYRINGE (ML) INJECTION ONCE
Status: DISCONTINUED | OUTPATIENT
Start: 2020-01-01 | End: 2020-01-01

## 2020-01-01 RX ORDER — DEXAMETHASONE SODIUM PHOSPHATE 10 MG/ML
INJECTION, SOLUTION INTRAMUSCULAR; INTRAVENOUS AS NEEDED
Status: DISCONTINUED | OUTPATIENT
Start: 2020-01-01 | End: 2020-01-01 | Stop reason: SURG

## 2020-01-01 RX ORDER — LEVOFLOXACIN 5 MG/ML
750 INJECTION, SOLUTION INTRAVENOUS ONCE
Status: CANCELLED | OUTPATIENT
Start: 2020-01-01 | End: 2020-01-01

## 2020-01-01 RX ORDER — PROPOFOL 10 MG/ML
INJECTION, EMULSION INTRAVENOUS AS NEEDED
Status: DISCONTINUED | OUTPATIENT
Start: 2020-01-01 | End: 2020-01-01 | Stop reason: SURG

## 2020-01-01 RX ORDER — MEPERIDINE HYDROCHLORIDE 50 MG/ML
12.5 INJECTION INTRAMUSCULAR; INTRAVENOUS; SUBCUTANEOUS ONCE AS NEEDED
Status: DISCONTINUED | OUTPATIENT
Start: 2020-01-01 | End: 2020-01-01 | Stop reason: HOSPADM

## 2020-01-01 RX ORDER — SODIUM CHLORIDE 9 MG/ML
125 INJECTION, SOLUTION INTRAVENOUS CONTINUOUS
Status: DISCONTINUED | OUTPATIENT
Start: 2020-01-01 | End: 2020-01-01 | Stop reason: HOSPADM

## 2020-01-01 RX ORDER — OLANZAPINE 10 MG/1
10 TABLET ORAL
COMMUNITY

## 2020-01-01 RX ORDER — LORAZEPAM 1 MG/1
1 TABLET ORAL EVERY 8 HOURS PRN
COMMUNITY

## 2020-01-01 RX ORDER — PROCHLORPERAZINE MALEATE 10 MG
10 TABLET ORAL EVERY 6 HOURS PRN
COMMUNITY

## 2020-01-01 RX ORDER — CIPROFLOXACIN 500 MG/1
500 TABLET, FILM COATED ORAL 2 TIMES DAILY
Qty: 10 TABLET | Refills: 0 | Status: SHIPPED | OUTPATIENT
Start: 2020-01-01 | End: 2020-01-01

## 2020-01-01 RX ORDER — ALBUTEROL SULFATE 2.5 MG/3ML
2.5 SOLUTION RESPIRATORY (INHALATION) ONCE
Status: COMPLETED | OUTPATIENT
Start: 2020-01-01 | End: 2020-01-01

## 2020-01-01 RX ORDER — METHYLPREDNISOLONE SODIUM SUCCINATE 125 MG/2ML
125 INJECTION, POWDER, LYOPHILIZED, FOR SOLUTION INTRAMUSCULAR; INTRAVENOUS ONCE
Status: COMPLETED | OUTPATIENT
Start: 2020-01-01 | End: 2020-01-01

## 2020-01-01 RX ORDER — KETAMINE HYDROCHLORIDE 50 MG/ML
INJECTION, SOLUTION, CONCENTRATE INTRAMUSCULAR; INTRAVENOUS AS NEEDED
Status: DISCONTINUED | OUTPATIENT
Start: 2020-01-01 | End: 2020-01-01

## 2020-01-01 RX ORDER — PROPOFOL 10 MG/ML
INJECTION, EMULSION INTRAVENOUS CONTINUOUS PRN
Status: DISCONTINUED | OUTPATIENT
Start: 2020-01-01 | End: 2020-01-01

## 2020-01-01 RX ORDER — POTASSIUM CHLORIDE 20 MEQ/1
20 TABLET, EXTENDED RELEASE ORAL 2 TIMES DAILY
Qty: 60 TABLET | Refills: 0 | Status: SHIPPED | OUTPATIENT
Start: 2020-01-01 | End: 2020-01-01

## 2020-01-01 RX ORDER — ONDANSETRON 2 MG/ML
4 INJECTION INTRAMUSCULAR; INTRAVENOUS ONCE AS NEEDED
Status: DISCONTINUED | OUTPATIENT
Start: 2020-01-01 | End: 2020-01-01 | Stop reason: HOSPADM

## 2020-01-01 RX ORDER — OXYCODONE HCL 20 MG/1
20 TABLET, FILM COATED, EXTENDED RELEASE ORAL EVERY 12 HOURS SCHEDULED
COMMUNITY

## 2020-01-01 RX ORDER — CLINDAMYCIN PHOSPHATE 600 MG/50ML
600 INJECTION INTRAVENOUS ONCE
Status: COMPLETED | OUTPATIENT
Start: 2020-01-01 | End: 2020-01-01

## 2020-01-01 RX ORDER — HYDROMORPHONE HCL/PF 1 MG/ML
1 SYRINGE (ML) INJECTION ONCE
Status: COMPLETED | OUTPATIENT
Start: 2020-01-01 | End: 2020-01-01

## 2020-01-01 RX ORDER — CLINDAMYCIN HYDROCHLORIDE 150 MG/1
CAPSULE ORAL
Qty: 56 CAPSULE | Refills: 0 | Status: SHIPPED | OUTPATIENT
Start: 2020-01-01 | End: 2020-01-01

## 2020-01-01 RX ORDER — MIDAZOLAM HYDROCHLORIDE 2 MG/2ML
INJECTION, SOLUTION INTRAMUSCULAR; INTRAVENOUS AS NEEDED
Status: DISCONTINUED | OUTPATIENT
Start: 2020-01-01 | End: 2020-01-01 | Stop reason: SURG

## 2020-01-01 RX ORDER — LORAZEPAM 1 MG/1
1 TABLET ORAL ONCE
Status: COMPLETED | OUTPATIENT
Start: 2020-01-01 | End: 2020-01-01

## 2020-01-01 RX ORDER — MAGNESIUM HYDROXIDE 1200 MG/15ML
LIQUID ORAL AS NEEDED
Status: DISCONTINUED | OUTPATIENT
Start: 2020-01-01 | End: 2020-01-01 | Stop reason: HOSPADM

## 2020-01-01 RX ORDER — CEPHALEXIN 500 MG/1
500 CAPSULE ORAL EVERY 12 HOURS SCHEDULED
Qty: 10 CAPSULE | Refills: 0 | Status: SHIPPED | OUTPATIENT
Start: 2020-01-01 | End: 2020-01-01

## 2020-01-01 RX ORDER — PROCHLORPERAZINE 25 MG
25 SUPPOSITORY, RECTAL RECTAL ONCE
Status: DISCONTINUED | OUTPATIENT
Start: 2020-01-01 | End: 2020-01-01

## 2020-01-01 RX ORDER — MAGNESIUM SULFATE HEPTAHYDRATE 40 MG/ML
2 INJECTION, SOLUTION INTRAVENOUS ONCE
Status: COMPLETED | OUTPATIENT
Start: 2020-01-01 | End: 2020-01-01

## 2020-01-01 RX ORDER — SODIUM CHLORIDE, SODIUM LACTATE, POTASSIUM CHLORIDE, CALCIUM CHLORIDE 600; 310; 30; 20 MG/100ML; MG/100ML; MG/100ML; MG/100ML
125 INJECTION, SOLUTION INTRAVENOUS CONTINUOUS
Status: DISCONTINUED | OUTPATIENT
Start: 2020-01-01 | End: 2020-01-01 | Stop reason: HOSPADM

## 2020-01-01 RX ORDER — LEVOFLOXACIN 500 MG/1
500 TABLET, FILM COATED ORAL EVERY 24 HOURS
Qty: 5 TABLET | Refills: 0 | Status: SHIPPED | OUTPATIENT
Start: 2020-01-01 | End: 2020-01-01

## 2020-01-01 RX ORDER — CEFUROXIME AXETIL 250 MG/1
250 TABLET ORAL EVERY 12 HOURS SCHEDULED
Qty: 6 TABLET | Refills: 0 | Status: SHIPPED | OUTPATIENT
Start: 2020-01-01 | End: 2020-01-01

## 2020-01-01 RX ORDER — SCOLOPAMINE TRANSDERMAL SYSTEM 1 MG/1
1 PATCH, EXTENDED RELEASE TRANSDERMAL ONCE AS NEEDED
Status: DISCONTINUED | OUTPATIENT
Start: 2020-01-01 | End: 2020-01-01 | Stop reason: HOSPADM

## 2020-01-01 RX ORDER — SODIUM CHLORIDE 9 MG/ML
INJECTION, SOLUTION INTRAVENOUS CONTINUOUS PRN
Status: DISCONTINUED | OUTPATIENT
Start: 2020-01-01 | End: 2020-01-01 | Stop reason: SURG

## 2020-01-01 RX ORDER — MIDAZOLAM HYDROCHLORIDE 2 MG/2ML
INJECTION, SOLUTION INTRAMUSCULAR; INTRAVENOUS AS NEEDED
Status: DISCONTINUED | OUTPATIENT
Start: 2020-01-01 | End: 2020-01-01

## 2020-01-01 RX ORDER — LIDOCAINE HYDROCHLORIDE 10 MG/ML
INJECTION, SOLUTION EPIDURAL; INFILTRATION; INTRACAUDAL; PERINEURAL AS NEEDED
Status: DISCONTINUED | OUTPATIENT
Start: 2020-01-01 | End: 2020-01-01 | Stop reason: SURG

## 2020-01-01 RX ORDER — NITROFURANTOIN 25; 75 MG/1; MG/1
100 CAPSULE ORAL 2 TIMES DAILY
Qty: 10 CAPSULE | Refills: 0 | Status: SHIPPED | OUTPATIENT
Start: 2020-01-01 | End: 2020-01-01

## 2020-01-01 RX ORDER — DOCUSATE SODIUM 100 MG/1
100 CAPSULE, LIQUID FILLED ORAL 2 TIMES DAILY
COMMUNITY

## 2020-01-01 RX ORDER — FENTANYL CITRATE 50 UG/ML
INJECTION, SOLUTION INTRAMUSCULAR; INTRAVENOUS AS NEEDED
Status: DISCONTINUED | OUTPATIENT
Start: 2020-01-01 | End: 2020-01-01 | Stop reason: SURG

## 2020-01-01 RX ORDER — NITROFURANTOIN 25; 75 MG/1; MG/1
100 CAPSULE ORAL 2 TIMES DAILY
Qty: 10 CAPSULE | Refills: 0 | Status: ON HOLD | OUTPATIENT
Start: 2020-01-01 | End: 2020-01-01

## 2020-01-01 RX ORDER — DIPHENHYDRAMINE HYDROCHLORIDE 50 MG/ML
12.5 INJECTION INTRAMUSCULAR; INTRAVENOUS ONCE
Status: COMPLETED | OUTPATIENT
Start: 2020-01-01 | End: 2020-01-01

## 2020-01-01 RX ORDER — OXYCODONE HYDROCHLORIDE 10 MG/1
10 TABLET ORAL ONCE
Status: COMPLETED | OUTPATIENT
Start: 2020-01-01 | End: 2020-01-01

## 2020-01-01 RX ORDER — DIPHENHYDRAMINE HYDROCHLORIDE 50 MG/ML
50 INJECTION INTRAMUSCULAR; INTRAVENOUS ONCE
Status: DISCONTINUED | OUTPATIENT
Start: 2020-01-01 | End: 2020-01-01

## 2020-01-01 RX ORDER — DIPHENHYDRAMINE HYDROCHLORIDE 50 MG/ML
25 INJECTION INTRAMUSCULAR; INTRAVENOUS ONCE
Status: COMPLETED | OUTPATIENT
Start: 2020-01-01 | End: 2020-01-01

## 2020-01-01 RX ORDER — BUTALBITAL/ASPIRIN/CAFFEINE 50-325-40
1 CAPSULE ORAL EVERY 4 HOURS PRN
COMMUNITY

## 2020-01-01 RX ORDER — LIDOCAINE HYDROCHLORIDE 20 MG/ML
JELLY TOPICAL AS NEEDED
Status: DISCONTINUED | OUTPATIENT
Start: 2020-01-01 | End: 2020-01-01 | Stop reason: HOSPADM

## 2020-01-01 RX ORDER — NITROFURANTOIN 25; 75 MG/1; MG/1
100 CAPSULE ORAL 2 TIMES DAILY
Qty: 4 CAPSULE | Refills: 0 | Status: SHIPPED | OUTPATIENT
Start: 2020-01-01 | End: 2020-01-01

## 2020-01-01 RX ORDER — PANTOPRAZOLE SODIUM 40 MG/1
40 INJECTION, POWDER, FOR SOLUTION INTRAVENOUS ONCE
Status: COMPLETED | OUTPATIENT
Start: 2020-01-01 | End: 2020-01-01

## 2020-01-01 RX ORDER — OXYCODONE HYDROCHLORIDE 10 MG/1
10 TABLET ORAL EVERY 6 HOURS PRN
Qty: 30 TABLET | Refills: 0 | Status: SHIPPED | OUTPATIENT
Start: 2020-01-01

## 2020-01-01 RX ORDER — DEXMEDETOMIDINE HYDROCHLORIDE 100 UG/ML
INJECTION, SOLUTION INTRAVENOUS AS NEEDED
Status: DISCONTINUED | OUTPATIENT
Start: 2020-01-01 | End: 2020-01-01 | Stop reason: SURG

## 2020-01-01 RX ORDER — PROMETHAZINE HYDROCHLORIDE 25 MG/1
25 SUPPOSITORY RECTAL EVERY 6 HOURS PRN
Qty: 12 SUPPOSITORY | Refills: 0 | Status: SHIPPED | OUTPATIENT
Start: 2020-01-01

## 2020-01-01 RX ORDER — OXYCODONE HYDROCHLORIDE 10 MG/1
10 TABLET ORAL EVERY 4 HOURS PRN
Qty: 30 TABLET | Refills: 0 | Status: SHIPPED | OUTPATIENT
Start: 2020-01-01

## 2020-01-01 RX ORDER — LIDOCAINE HYDROCHLORIDE 20 MG/ML
INJECTION, SOLUTION EPIDURAL; INFILTRATION; INTRACAUDAL; PERINEURAL AS NEEDED
Status: DISCONTINUED | OUTPATIENT
Start: 2020-01-01 | End: 2020-01-01 | Stop reason: SURG

## 2020-01-01 RX ORDER — PHENAZOPYRIDINE HYDROCHLORIDE 100 MG/1
100 TABLET, FILM COATED ORAL 3 TIMES DAILY PRN
Qty: 20 TABLET | Refills: 0 | Status: SHIPPED | OUTPATIENT
Start: 2020-01-01

## 2020-01-01 RX ORDER — DIPHENHYDRAMINE HYDROCHLORIDE 50 MG/ML
12.5 INJECTION INTRAMUSCULAR; INTRAVENOUS ONCE AS NEEDED
Status: DISCONTINUED | OUTPATIENT
Start: 2020-01-01 | End: 2020-01-01 | Stop reason: HOSPADM

## 2020-01-01 RX ORDER — IBUPROFEN 600 MG/1
600 TABLET ORAL EVERY 6 HOURS PRN
Qty: 30 TABLET | Refills: 0 | Status: SHIPPED | OUTPATIENT
Start: 2020-01-01

## 2020-01-01 RX ORDER — ONDANSETRON 2 MG/ML
INJECTION INTRAMUSCULAR; INTRAVENOUS AS NEEDED
Status: DISCONTINUED | OUTPATIENT
Start: 2020-01-01 | End: 2020-01-01 | Stop reason: SURG

## 2020-01-01 RX ORDER — PREGABALIN 200 MG/1
400 CAPSULE ORAL DAILY
COMMUNITY

## 2020-01-01 RX ORDER — EPINEPHRINE 1 MG/ML
0.4 INJECTION, SOLUTION, CONCENTRATE INTRAVENOUS ONCE
Status: COMPLETED | OUTPATIENT
Start: 2020-01-01 | End: 2020-01-01

## 2020-01-01 RX ORDER — FUROSEMIDE 40 MG/1
40 TABLET ORAL 2 TIMES DAILY
Qty: 60 TABLET | Refills: 0 | Status: SHIPPED | OUTPATIENT
Start: 2020-01-01 | End: 2020-01-01

## 2020-01-01 RX ADMIN — KETAMINE HYDROCHLORIDE 30 MG: 50 INJECTION INTRAMUSCULAR; INTRAVENOUS at 08:05

## 2020-01-01 RX ADMIN — EPINEPHRINE 0.4 MG: 1 INJECTION, SOLUTION, CONCENTRATE INTRAVENOUS at 22:43

## 2020-01-01 RX ADMIN — FENTANYL CITRATE 50 MCG: 50 INJECTION, SOLUTION INTRAMUSCULAR; INTRAVENOUS at 09:36

## 2020-01-01 RX ADMIN — SODIUM CHLORIDE 1000 ML: 0.9 INJECTION, SOLUTION INTRAVENOUS at 12:10

## 2020-01-01 RX ADMIN — ROCURONIUM BROMIDE 5 MG: 10 INJECTION, SOLUTION INTRAVENOUS at 08:32

## 2020-01-01 RX ADMIN — PHENAZOPYRIDINE HYDROCHLORIDE 100 MG: 100 TABLET ORAL at 10:48

## 2020-01-01 RX ADMIN — ONDANSETRON 4 MG: 2 INJECTION INTRAMUSCULAR; INTRAVENOUS at 08:32

## 2020-01-01 RX ADMIN — ONDANSETRON 4 MG: 2 INJECTION INTRAMUSCULAR; INTRAVENOUS at 09:48

## 2020-01-01 RX ADMIN — HYDROMORPHONE HYDROCHLORIDE 1 MG: 1 INJECTION, SOLUTION INTRAMUSCULAR; INTRAVENOUS; SUBCUTANEOUS at 23:07

## 2020-01-01 RX ADMIN — HYDROMORPHONE HYDROCHLORIDE 1 MG: 1 INJECTION, SOLUTION INTRAMUSCULAR; INTRAVENOUS; SUBCUTANEOUS at 12:20

## 2020-01-01 RX ADMIN — PROPOFOL 300 MG: 10 INJECTION, EMULSION INTRAVENOUS at 08:32

## 2020-01-01 RX ADMIN — DEXAMETHASONE SODIUM PHOSPHATE 4 MG: 10 INJECTION, SOLUTION INTRAMUSCULAR; INTRAVENOUS at 08:44

## 2020-01-01 RX ADMIN — LORAZEPAM 1 MG: 1 TABLET ORAL at 23:27

## 2020-01-01 RX ADMIN — Medication 140 MG: at 08:32

## 2020-01-01 RX ADMIN — FENTANYL CITRATE 50 MCG: 50 INJECTION, SOLUTION INTRAMUSCULAR; INTRAVENOUS at 09:24

## 2020-01-01 RX ADMIN — DIPHENHYDRAMINE HYDROCHLORIDE 25 MG: 50 INJECTION, SOLUTION INTRAMUSCULAR; INTRAVENOUS at 12:23

## 2020-01-01 RX ADMIN — MIDAZOLAM HYDROCHLORIDE 1 MG: 1 INJECTION, SOLUTION INTRAMUSCULAR; INTRAVENOUS at 07:59

## 2020-01-01 RX ADMIN — PROCHLORPERAZINE MALEATE 5 MG: 5 TABLET ORAL at 00:01

## 2020-01-01 RX ADMIN — KETOROLAC TROMETHAMINE 9.9 MG: 30 INJECTION, SOLUTION INTRAMUSCULAR at 13:47

## 2020-01-01 RX ADMIN — OXYCODONE HYDROCHLORIDE 10 MG: 10 TABLET ORAL at 23:27

## 2020-01-01 RX ADMIN — PHENAZOPYRIDINE 100 MG: 100 TABLET ORAL at 09:05

## 2020-01-01 RX ADMIN — PROPOFOL 150 MG: 10 INJECTION, EMULSION INTRAVENOUS at 08:44

## 2020-01-01 RX ADMIN — METHYLPREDNISOLONE SODIUM SUCCINATE 125 MG: 125 INJECTION, POWDER, FOR SOLUTION INTRAMUSCULAR; INTRAVENOUS at 22:45

## 2020-01-01 RX ADMIN — CLINDAMYCIN PHOSPHATE 600 MG: 600 INJECTION, SOLUTION INTRAVENOUS at 23:56

## 2020-01-01 RX ADMIN — ALBUTEROL SULFATE 5 MG: 2.5 SOLUTION RESPIRATORY (INHALATION) at 22:52

## 2020-01-01 RX ADMIN — FAMOTIDINE 20 MG: 10 INJECTION INTRAVENOUS at 22:45

## 2020-01-01 RX ADMIN — SODIUM CHLORIDE, SODIUM LACTATE, POTASSIUM CHLORIDE, AND CALCIUM CHLORIDE 125 ML/HR: .6; .31; .03; .02 INJECTION, SOLUTION INTRAVENOUS at 07:41

## 2020-01-01 RX ADMIN — MIDAZOLAM HYDROCHLORIDE 2 MG: 1 INJECTION, SOLUTION INTRAMUSCULAR; INTRAVENOUS at 08:36

## 2020-01-01 RX ADMIN — LIDOCAINE HYDROCHLORIDE 50 MG: 10 INJECTION, SOLUTION EPIDURAL; INFILTRATION; INTRACAUDAL; PERINEURAL at 08:32

## 2020-01-01 RX ADMIN — METOCLOPRAMIDE HYDROCHLORIDE 10 MG: 5 INJECTION INTRAMUSCULAR; INTRAVENOUS at 12:19

## 2020-01-01 RX ADMIN — LEVOFLOXACIN 750 MG: 5 INJECTION, SOLUTION INTRAVENOUS at 07:08

## 2020-01-01 RX ADMIN — FENTANYL CITRATE 100 MCG: 50 INJECTION, SOLUTION INTRAMUSCULAR; INTRAVENOUS at 08:32

## 2020-01-01 RX ADMIN — MIDAZOLAM HYDROCHLORIDE 1 MG: 1 INJECTION, SOLUTION INTRAMUSCULAR; INTRAVENOUS at 08:02

## 2020-01-01 RX ADMIN — Medication 3000 MG: at 08:36

## 2020-01-01 RX ADMIN — DIPHENHYDRAMINE HYDROCHLORIDE 12.5 MG: 50 INJECTION, SOLUTION INTRAMUSCULAR; INTRAVENOUS at 22:50

## 2020-01-01 RX ADMIN — Medication 3000 MG: at 08:31

## 2020-01-01 RX ADMIN — DEXMEDETOMIDINE HCL 12 MCG: 100 INJECTION INTRAVENOUS at 08:46

## 2020-01-01 RX ADMIN — ALBUTEROL SULFATE 2.5 MG: 2.5 SOLUTION RESPIRATORY (INHALATION) at 07:38

## 2020-01-01 RX ADMIN — MAGNESIUM SULFATE IN WATER 2 G: 40 INJECTION, SOLUTION INTRAVENOUS at 13:51

## 2020-01-01 RX ADMIN — SODIUM CHLORIDE: 9 INJECTION, SOLUTION INTRAVENOUS at 08:45

## 2020-01-01 RX ADMIN — PANTOPRAZOLE SODIUM 40 MG: 40 INJECTION, POWDER, FOR SOLUTION INTRAVENOUS at 12:14

## 2020-01-01 RX ADMIN — SODIUM CHLORIDE 500 ML: 0.9 INJECTION, SOLUTION INTRAVENOUS at 23:43

## 2020-01-01 RX ADMIN — OXYCODONE HYDROCHLORIDE 10 MG: 10 TABLET ORAL at 23:12

## 2020-01-01 RX ADMIN — LIDOCAINE HYDROCHLORIDE 15 ML: 20 SOLUTION ORAL; TOPICAL at 14:53

## 2020-01-01 RX ADMIN — MIDAZOLAM 2 MG: 1 INJECTION INTRAMUSCULAR; INTRAVENOUS at 08:25

## 2020-01-01 RX ADMIN — PROPOFOL 80 MCG/KG/MIN: 10 INJECTION, EMULSION INTRAVENOUS at 08:05

## 2020-01-01 RX ADMIN — DEXMEDETOMIDINE HCL 8 MCG: 100 INJECTION INTRAVENOUS at 08:53

## 2020-01-01 RX ADMIN — IOHEXOL 15 ML: 240 INJECTION, SOLUTION INTRATHECAL; INTRAVASCULAR; INTRAVENOUS; ORAL at 09:00

## 2020-01-01 RX ADMIN — CLINDAMYCIN HYDROCHLORIDE 300 MG: 150 CAPSULE ORAL at 00:12

## 2020-01-01 RX ADMIN — SODIUM CHLORIDE 125 ML/HR: 0.9 INJECTION, SOLUTION INTRAVENOUS at 07:54

## 2020-01-01 RX ADMIN — ONDANSETRON 4 MG: 2 INJECTION INTRAMUSCULAR; INTRAVENOUS at 08:44

## 2020-01-01 RX ADMIN — SCOPALAMINE 1 PATCH: 1 PATCH, EXTENDED RELEASE TRANSDERMAL at 08:22

## 2020-01-01 RX ADMIN — PROPOFOL 40 MG: 10 INJECTION, EMULSION INTRAVENOUS at 08:05

## 2020-01-01 RX ADMIN — LIDOCAINE HYDROCHLORIDE 100 MG: 20 INJECTION, SOLUTION EPIDURAL; INFILTRATION; INTRACAUDAL; PERINEURAL at 08:44

## 2020-01-01 RX ADMIN — HYDROMORPHONE HYDROCHLORIDE 1 MG: 1 INJECTION, SOLUTION INTRAMUSCULAR; INTRAVENOUS; SUBCUTANEOUS at 22:30

## 2020-01-01 RX ADMIN — METOCLOPRAMIDE HYDROCHLORIDE 10 MG: 5 INJECTION INTRAMUSCULAR; INTRAVENOUS at 13:45

## 2020-01-01 RX ADMIN — PHENAZOPYRIDINE HYDROCHLORIDE 100 MG: 100 TABLET ORAL at 10:38

## 2020-01-01 RX ADMIN — SODIUM CHLORIDE, SODIUM LACTATE, POTASSIUM CHLORIDE, AND CALCIUM CHLORIDE 125 ML/HR: .6; .31; .03; .02 INJECTION, SOLUTION INTRAVENOUS at 07:59

## 2020-01-01 RX ADMIN — PROMETHAZINE HYDROCHLORIDE 6.25 MG: 25 INJECTION INTRAMUSCULAR; INTRAVENOUS at 10:20

## 2020-01-07 NOTE — PRE-PROCEDURE INSTRUCTIONS
Pre-Surgery Instructions:   Medication Instructions    albuterol (2 5 mg/3 mL) 0 083 % nebulizer solution Instructed patient per Anesthesia Guidelines   aspirin 81 mg chewable tablet Pt was instructed to hold for one week prior to surgery    buPROPion (WELLBUTRIN SR) 150 mg 12 hr tablet Instructed patient per Anesthesia Guidelines   cyclobenzaprine (FLEXERIL) 5 mg tablet Instructed patient per Anesthesia Guidelines   gabapentin (NEURONTIN) 600 MG tablet Instructed patient per Anesthesia Guidelines   lisinopril-hydrochlorothiazide (PRINZIDE,ZESTORETIC) 10-12 5 MG per tablet Pt was instructed to hold morning of surgery    montelukast (SINGULAIR) 10 mg tablet Instructed patient per Anesthesia Guidelines   Multiple Vitamins-Minerals (CENTRUM SILVER 50+WOMEN) TABS Pt was instructed to hold for one week prior to surgery    nicotine (NICODERM CQ) 21 mg/24 hr TD 24 hr patch Instructed patient per Anesthesia Guidelines   ondansetron (ZOFRAN) 8 mg tablet Instructed patient per Anesthesia Guidelines   potassium chloride (K-DUR,KLOR-CON) 10 mEq tablet Instructed patient per Anesthesia Guidelines   saccharomyces boulardii (FLORASTOR) 250 mg capsule Instructed patient per Anesthesia Guidelines   simvastatin (ZOCOR) 20 mg tablet Instructed patient per Anesthesia Guidelines   vitamin B-12 (VITAMIN B-12) 1,000 mcg tablet Instructed patient per Anesthesia Guidelines

## 2020-01-21 NOTE — H&P (VIEW-ONLY)
Assessment and plan:       1  Stage IV metastatic leiomyosarcoma and left staghorn calculus  - Nephrostomy has been converted to an internal double-J stent  - She is scheduled for stent exchange on 02/04/2020 with Dr Sarah Prince  - She is primarily asymptomatic in regards to stent placement  - Pre, jose, and postoperative course of this procedure was discussed with the patient and her sister as well as surgical risks  - Urine specimen collected today will be sent for culture for preoperative purposes  - She will follow-up as scheduled for stent exchange  Zena Solorio PA-C      Chief Complaint     Chief Complaint   Patient presents with    Stage IV metastatic leiomyosarcoma and left staghorn calculu         History of Present Illness     Zan Rivers is a 62 y o  female patient known to Dr Sarah Prince for a history of stage IV metastatic leiomyosarcoma previously receiving palliative chemotherapy  During previous hospitalization the patient was found to have a staghorn calculus with dilation of the upper pole of the left kidney  She underwent placement of left percutaneous nephrostomy tube with eventual resolution  She currently has a left ureteral stent in place and is planned for stent exchange on 02/04/2019 with Dr Sarah Prince  Consent form has been signed  Patient reports that she is primarily asymptomatic in regards to stent  She is doing reasonably well overall  She is no longer undergoing chemotherapy as she had adverse reactions to previous treatments  She continues to see an oncologist and is seeking immunotherapy at this time        Laboratory     Lab Results   Component Value Date    CREATININE 0 91 12/23/2019       No results found for: PSA    Recent Results (from the past 1 hour(s))   POCT urine dip    Collection Time: 01/24/20  1:20 PM   Result Value Ref Range    LEUKOCYTE ESTERASE,UA large     NITRITE,UA -     SL AMB POCT UROBILINOGEN 0 2     POCT URINE PROTEIN -      PH,UA 5  0     BLOOD,UA hemolyzed large     SPECIFIC GRAVITY,UA 1 015     KETONES,UA -     BILIRUBIN,UA -     GLUCOSE, UA -      COLOR,UA yellow     CLARITY,UA slightly cloudy          Review of Systems     Review of Systems   Constitutional: Negative for chills and fever  HENT: Negative  Eyes: Negative  Respiratory: Negative for shortness of breath  Cardiovascular: Negative for chest pain  Gastrointestinal: Negative for constipation, diarrhea, nausea and vomiting  Genitourinary: Negative for difficulty urinating, dyspareunia, dysuria, enuresis, flank pain, frequency, hematuria and urgency  Musculoskeletal: Negative  Allergies     Allergies   Allergen Reactions    Steri Strips [Medical Tape] Blisters    Morphine Itching and Vomiting     Other reaction(s): Vomiting  Other reaction(s): (PIMS) vomiting  Makes her vomit      Sulfa Antibiotics Rash     12-18-19 Pt reported experiencing red face, skin peeling around eyes       Physical Exam     Physical Exam   Constitutional: She is oriented to person, place, and time  She appears well-developed and well-nourished  No distress  HENT:   Head: Atraumatic  Right Ear: External ear normal    Left Ear: External ear normal    Nose: Nose normal    Eyes: Right eye exhibits no discharge  Left eye exhibits no discharge  No scleral icterus  Cardiovascular: Normal rate, regular rhythm, normal heart sounds and intact distal pulses  Exam reveals no gallop and no friction rub  No murmur heard  Pulmonary/Chest: Effort normal and breath sounds normal  No stridor  No respiratory distress  She has no rales  Genitourinary:   Genitourinary Comments: Negative for CVA tenderness bilaterally   Musculoskeletal:   Ambulates independently   Neurological: She is alert and oriented to person, place, and time  Skin: Skin is warm and dry  She is not diaphoretic  Psychiatric: She has a normal mood and affect   Her behavior is normal  Judgment and thought content normal    Nursing note and vitals reviewed  Vital Signs     Vitals:    01/24/20 1308   BP: 110/80   BP Location: Left arm   Patient Position: Sitting   Cuff Size: Standard   Pulse: 80   Weight: 124 kg (272 lb 6 4 oz)   Height: 5' 8" (1 727 m)         Current Medications       Current Outpatient Medications:     albuterol (2 5 mg/3 mL) 0 083 % nebulizer solution, Take 2 5 mg by nebulization every 6 (six) hours as needed for wheezing or shortness of breath, Disp: , Rfl:     aspirin 81 mg chewable tablet, Chew 81 mg daily, Disp: , Rfl:     buPROPion (WELLBUTRIN SR) 150 mg 12 hr tablet, Take 150 mg by mouth 2 (two) times a day, Disp: , Rfl:     cyclobenzaprine (FLEXERIL) 5 mg tablet, Take 10 mg by mouth , Disp: , Rfl: 0    gabapentin (NEURONTIN) 600 MG tablet, Take 600 mg by mouth daily at bedtime, Disp: , Rfl:     lisinopril-hydrochlorothiazide (PRINZIDE,ZESTORETIC) 10-12 5 MG per tablet, Take 1 tablet by mouth daily, Disp: , Rfl:     Misc   Devices KIT, Oxygen at night, Disp: , Rfl:     montelukast (SINGULAIR) 10 mg tablet, Take 10 mg by mouth daily at bedtime, Disp: , Rfl:     Multiple Vitamins-Minerals (CENTRUM SILVER 50+WOMEN) TABS, Centrum Silver, Disp: , Rfl:     nicotine (NICODERM CQ) 21 mg/24 hr TD 24 hr patch, Place 1 patch on the skin every 24 hours, Disp: , Rfl:     ondansetron (ZOFRAN) 8 mg tablet, Take 8 mg by mouth every 8 (eight) hours as needed for nausea or vomiting, Disp: , Rfl:     potassium chloride (K-DUR,KLOR-CON) 10 mEq tablet, Take 10 mEq by mouth daily, Disp: , Rfl:     saccharomyces boulardii (FLORASTOR) 250 mg capsule, Take 1 capsule (250 mg total) by mouth 2 (two) times a day, Disp: , Rfl: 0    sertraline (ZOLOFT) 100 mg tablet, Take 150 mg by mouth daily, Disp: , Rfl:     simvastatin (ZOCOR) 20 mg tablet, Take 20 mg by mouth daily at bedtime, Disp: , Rfl:     vitamin B-12 (VITAMIN B-12) 1,000 mcg tablet, Take 1,000 mcg by mouth daily, Disp: , Rfl:       Active Problems     Patient Active Problem List   Diagnosis    Hypokalemia    Cigarette nicotine dependence without complication    Abnormal CT of the chest    Leiomyosarcoma (HCC)    Hypertension    Staghorn calculus    Generalized weakness    Anemia    Prolonged QT interval    Hyperglycemia    Lower extremity edema         Past Medical History     Past Medical History:   Diagnosis Date    Asthma     [facility] [facility]     COPD (chronic obstructive pulmonary disease) ([facility] [facility]     Hyperlipidemia     Hypertension     Kidney stone     Psychiatric disorder     Depression         Surgical History     Past Surgical History:   Procedure Laterality Date    IR PCN TO PCNU CONVERSION  11/13/2019    IR PORT REMOVAL  10/14/2019    IR TUBE PLACEMENT NEPHROSTOMY  10/17/2019    LUNG REMOVAL, PARTIAL      lower lobe    PILONIDAL CYST EXCISION      TONSILLECTOMY           Family History     History reviewed  No pertinent family history        Social History     Social History       Radiology

## 2020-01-21 NOTE — PROGRESS NOTES
Assessment and plan:       1  Stage IV metastatic leiomyosarcoma and left staghorn calculus  - Nephrostomy has been converted to an internal double-J stent  - She is scheduled for stent exchange on 02/04/2020 with Dr Jennifer Erickson  - She is primarily asymptomatic in regards to stent placement  - Pre, jose, and postoperative course of this procedure was discussed with the patient and her sister as well as surgical risks  - Urine specimen collected today will be sent for culture for preoperative purposes  - She will follow-up as scheduled for stent exchange  Dev Zuleta PA-C      Chief Complaint     Chief Complaint   Patient presents with    Stage IV metastatic leiomyosarcoma and left staghorn calculu         History of Present Illness     Sheridan Mckinney is a 62 y o  female patient known to Dr Jennifer Erickson for a history of stage IV metastatic leiomyosarcoma previously receiving palliative chemotherapy  During previous hospitalization the patient was found to have a staghorn calculus with dilation of the upper pole of the left kidney  She underwent placement of left percutaneous nephrostomy tube with eventual resolution  She currently has a left ureteral stent in place and is planned for stent exchange on 02/04/2019 with Dr Jennifer Erickson  Consent form has been signed  Patient reports that she is primarily asymptomatic in regards to stent  She is doing reasonably well overall  She is no longer undergoing chemotherapy as she had adverse reactions to previous treatments  She continues to see an oncologist and is seeking immunotherapy at this time        Laboratory     Lab Results   Component Value Date    CREATININE 0 91 12/23/2019       No results found for: PSA    Recent Results (from the past 1 hour(s))   POCT urine dip    Collection Time: 01/24/20  1:20 PM   Result Value Ref Range    LEUKOCYTE ESTERASE,UA large     NITRITE,UA -     SL AMB POCT UROBILINOGEN 0 2     POCT URINE PROTEIN -      PH,UA 5  0     BLOOD,UA hemolyzed large     SPECIFIC GRAVITY,UA 1 015     KETONES,UA -     BILIRUBIN,UA -     GLUCOSE, UA -      COLOR,UA yellow     CLARITY,UA slightly cloudy          Review of Systems     Review of Systems   Constitutional: Negative for chills and fever  HENT: Negative  Eyes: Negative  Respiratory: Negative for shortness of breath  Cardiovascular: Negative for chest pain  Gastrointestinal: Negative for constipation, diarrhea, nausea and vomiting  Genitourinary: Negative for difficulty urinating, dyspareunia, dysuria, enuresis, flank pain, frequency, hematuria and urgency  Musculoskeletal: Negative  Allergies     Allergies   Allergen Reactions    Steri Strips [Medical Tape] Blisters    Morphine Itching and Vomiting     Other reaction(s): Vomiting  Other reaction(s): (PIMS) vomiting  Makes her vomit      Sulfa Antibiotics Rash     12-18-19 Pt reported experiencing red face, skin peeling around eyes       Physical Exam     Physical Exam   Constitutional: She is oriented to person, place, and time  She appears well-developed and well-nourished  No distress  HENT:   Head: Atraumatic  Right Ear: External ear normal    Left Ear: External ear normal    Nose: Nose normal    Eyes: Right eye exhibits no discharge  Left eye exhibits no discharge  No scleral icterus  Cardiovascular: Normal rate, regular rhythm, normal heart sounds and intact distal pulses  Exam reveals no gallop and no friction rub  No murmur heard  Pulmonary/Chest: Effort normal and breath sounds normal  No stridor  No respiratory distress  She has no rales  Genitourinary:   Genitourinary Comments: Negative for CVA tenderness bilaterally   Musculoskeletal:   Ambulates independently   Neurological: She is alert and oriented to person, place, and time  Skin: Skin is warm and dry  She is not diaphoretic  Psychiatric: She has a normal mood and affect   Her behavior is normal  Judgment and thought content normal    Nursing note and vitals reviewed  Vital Signs     Vitals:    01/24/20 1308   BP: 110/80   BP Location: Left arm   Patient Position: Sitting   Cuff Size: Standard   Pulse: 80   Weight: 124 kg (272 lb 6 4 oz)   Height: 5' 8" (1 727 m)         Current Medications       Current Outpatient Medications:     albuterol (2 5 mg/3 mL) 0 083 % nebulizer solution, Take 2 5 mg by nebulization every 6 (six) hours as needed for wheezing or shortness of breath, Disp: , Rfl:     aspirin 81 mg chewable tablet, Chew 81 mg daily, Disp: , Rfl:     buPROPion (WELLBUTRIN SR) 150 mg 12 hr tablet, Take 150 mg by mouth 2 (two) times a day, Disp: , Rfl:     cyclobenzaprine (FLEXERIL) 5 mg tablet, Take 10 mg by mouth , Disp: , Rfl: 0    gabapentin (NEURONTIN) 600 MG tablet, Take 600 mg by mouth daily at bedtime, Disp: , Rfl:     lisinopril-hydrochlorothiazide (PRINZIDE,ZESTORETIC) 10-12 5 MG per tablet, Take 1 tablet by mouth daily, Disp: , Rfl:     Misc   Devices KIT, Oxygen at night, Disp: , Rfl:     montelukast (SINGULAIR) 10 mg tablet, Take 10 mg by mouth daily at bedtime, Disp: , Rfl:     Multiple Vitamins-Minerals (CENTRUM SILVER 50+WOMEN) TABS, Centrum Silver, Disp: , Rfl:     nicotine (NICODERM CQ) 21 mg/24 hr TD 24 hr patch, Place 1 patch on the skin every 24 hours, Disp: , Rfl:     ondansetron (ZOFRAN) 8 mg tablet, Take 8 mg by mouth every 8 (eight) hours as needed for nausea or vomiting, Disp: , Rfl:     potassium chloride (K-DUR,KLOR-CON) 10 mEq tablet, Take 10 mEq by mouth daily, Disp: , Rfl:     saccharomyces boulardii (FLORASTOR) 250 mg capsule, Take 1 capsule (250 mg total) by mouth 2 (two) times a day, Disp: , Rfl: 0    sertraline (ZOLOFT) 100 mg tablet, Take 150 mg by mouth daily, Disp: , Rfl:     simvastatin (ZOCOR) 20 mg tablet, Take 20 mg by mouth daily at bedtime, Disp: , Rfl:     vitamin B-12 (VITAMIN B-12) 1,000 mcg tablet, Take 1,000 mcg by mouth daily, Disp: , Rfl:       Active Problems     Patient Active Problem List   Diagnosis    Hypokalemia    Cigarette nicotine dependence without complication    Abnormal CT of the chest    Leiomyosarcoma (HCC)    Hypertension    Staghorn calculus    Generalized weakness    Anemia    Prolonged QT interval    Hyperglycemia    Lower extremity edema         Past Medical History     Past Medical History:   Diagnosis Date    Asthma     [facility]     COPD (chronic obstructive pulmonary disease) ([facility] [facility]     Hyperlipidemia     Hypertension     Kidney stone     Psychiatric disorder     Depression         Surgical History     Past Surgical History:   Procedure Laterality Date    IR PCN TO PCNU CONVERSION  11/13/2019    IR PORT REMOVAL  10/14/2019    IR TUBE PLACEMENT NEPHROSTOMY  10/17/2019    LUNG REMOVAL, PARTIAL      lower lobe    PILONIDAL CYST EXCISION      TONSILLECTOMY           Family History     History reviewed  No pertinent family history        Social History     Social History       Radiology

## 2020-02-04 NOTE — OP NOTE
OPERATIVE REPORT  PATIENT NAME: Michele Shen    :  1955  MRN: [id number]  Pt Location: [facility] OR ROOM 04    SURGERY DATE: 2020    Surgeon(s) and Role:     * Catalina Vanegas MD - Primary    Preop Diagnosis:  Staghorn calculus [N20 0]  Leiomyosarcoma ([address] ) [C49 9]    Post-Op Diagnosis Codes:     * Staghorn calculus [N20 0]     * Leiomyosarcoma ([address] ) [C49 9]    Procedure(s) (LRB):  EXCHANGE STENT URETERAL, Left retrograde (Left)    Specimen(s):  * No specimens in log *    Estimated Blood Loss:   Minimal    Drains:  Ureteral Drain/Stent Left ureter 6 Fr  (Active)   Site Assessment BERNA 2020  9:15 AM   Dressing Status Open to air 2020  9:15 AM   Number of days: 0       Anesthesia Type:   General    Operative Indications:  Staghorn calculus [N20 0]  Leiomyosarcoma ([address] ) [C49 9]      Operative Findings:  1  Minimally encrusted distal stent  2  Large radiopaque stone in the renal pelvis on the left  3  Hydronephrotic upper calyx with stent placed in good position      Complications:   None    Procedure and Technique:  Michele Shen  is a 62y o   year old female with a history of terminal malignancy and large staghorn calculus of the left kidney  We decided against stone intervention given her critical illness and although the patient was initially percutaneous nephrostomy tube dependent, we were able to internalize the stent  She has done well     She presents to the operating room on 2020  to undergo exchange of the left ureteral stent  After the smooth induction of general LMA anesthesia, the patient was placed in the dorsal lithotomy position  Her genitalia was prepped and draped in a sterile fashion  Intravenous antibiotics were administered  A timeout was performed with all members of the operative team and from the patient's identity, procedure to be performed, and laterality of the case  A 22 Vietnamese rigid cystoscope with 30° lens was inserted  The bladder was thoroughly inspected  There was mild erythema adjacent to the left ureteral orifice where the stent was visualized  The remainder of the bladder was free and clear mucosal abnormalities or lesions  Bard solo +wire was introduced alongside the stent into the upper pole  There was a large radiopaque stone in the renal pelvis  Stent was then removed  There was minimal incrustation  Dual-lumen catheter was advanced over the wire into the proximal ureter and contrast was injected  The upper pole dilation was noted  The wire was then repassed and secured into an upper pole calyx  A 6 x 28 Croatian double-J ureteral stent was then placed in the standard fashion  The proximal coil was appreciated upper pole calyx and the distal coil was visualized within the bladder  There was no string left in place  The bladder was emptied and the cystoscope was removed  2% viscous lidocaine was placed per urethra    Plan-will continue routine stent changes given the patient's terminal illness    Next in 4 months     I was present for the entire procedure    Patient Disposition:  PACU     SIGNATURE: Billie Armenta MD  DATE: February 4, 2020  TIME: 9:26 AM

## 2020-02-04 NOTE — ANESTHESIA PREPROCEDURE EVALUATION
Review of Systems/Medical History  Patient summary reviewed  Chart reviewed  No history of anesthetic complications     Cardiovascular  EKG reviewed, Exercise tolerance (METS): >4,  Hyperlipidemia, Hypertension , Dysrhythmias , atrial fibrillation,   Comment: EKG 12/21/19:  Atrial fibrillation  Low voltage QRS  Prolonged QT      TTE 12/23/19:  LEFT VENTRICLE: Size was normal  Systolic function was normal  Ejection fraction was estimated to be 60 %  There were no regional wall motion abnormalities  Wall thickness was normal  DOPPLER: Left ventricular diastolic function parameters  were normal     RIGHT VENTRICLE: The size was normal  Systolic function was normal  Wall thickness was normal     LEFT ATRIUM: Size was normal     RIGHT ATRIUM: Size was normal     MITRAL VALVE: Valve structure was normal  There was normal leaflet separation  DOPPLER: The transmitral velocity was within the normal range  There was no evidence for stenosis  There was no regurgitation  AORTIC VALVE: The valve was probably trileaflet  Leaflets exhibited normal thickness and normal cuspal separation  DOPPLER: Transaortic velocity was within the normal range  There was no evidence for stenosis  There was no regurgitation  TRICUSPID VALVE: The valve structure was normal  There was normal leaflet separation  DOPPLER: The transtricuspid velocity was within the normal range  There was no evidence for stenosis  There was trace regurgitation  Pulmonary artery  systolic pressure was mildly increased  Estimated peak PA pressure was 40 mmHg  PULMONIC VALVE: Leaflets exhibited normal thickness, no calcification, and normal cuspal separation  DOPPLER: The transpulmonic velocity was within the normal range  There was no regurgitation  PERICARDIUM: There was no pericardial effusion   The pericardium was normal in appearance     , Pulmonary hypertension Pulmonary  Smoker cigarette smoker  , Tobacco cessation counseling given , COPD moderate- medication dependent , Asthma , well controlled/ stable , No recent URI ,        GI/Hepatic       Kidney stones, No chronic kidney disease ,   Comment: Admitted with potentially urosepsis 12/2019, blood cultures negative     Endo/Other    Obesity  morbid obesity   GYN       Hematology  Anemia anemia of chronic disease,    Comment: Baseline Hb 7-8 per chart Musculoskeletal    Comment: Metastatic leiomyosarcoma,    Left portacath in situ      Neurology  Negative neurology ROS      Psychology   Anxiety, Depression ,              Physical Exam    Airway    Mallampati score: II  TM Distance: >3 FB  Neck ROM: full     Dental       Cardiovascular      Pulmonary      Other Findings        Anesthesia Plan  ASA Score- 3     Anesthesia Type- general with ASA Monitors  Additional Monitors:   Airway Plan: LMA  Comment: Recent labs personally reviewed:  Lab Results       Component                Value               Date                       WBC                      11 72 (H)           12/23/2019                 HGB                      7 8 (L)             12/23/2019                 PLT                      263                 12/23/2019            Lab Results       Component                Value               Date                       K                        3 4 (L)             12/23/2019                 BUN                      8                   12/23/2019                 CREATININE               0 91                12/23/2019            Lab Results       Component                Value               Date                       PTT                      38 (H)              12/20/2019             Lab Results       Component                Value               Date                       INR                      1 12                12/20/2019              Blood type       I, Alma Dumont MD, have personally seen and evaluated the patient prior to anesthetic care    I have reviewed the pre-anesthetic record, medical history, allergies, medications and any other medical records if appropriate to the anesthetic care  If a CRNA is involved in the case, I have reviewed the CRNA assessment, if present, and agree  I discussed the anesthetic plan and risks/benefits/alternatives with the patient including possible PONV, sore throat, and possibility of rare anesthetic and surgical emergencies        Plan Factors-  Patient did not smoke on day of surgery  Induction- intravenous  Postoperative Plan-     Informed Consent- Anesthetic plan and risks discussed with patient  I personally reviewed this patient with the CRNA  Discussed and agreed on the Anesthesia Plan with the CRNA  Frank Romero

## 2020-02-04 NOTE — ANESTHESIA POSTPROCEDURE EVALUATION
Post-Op Assessment Note    CV Status:  Stable  Pain Score: 0    Pain management: adequate     Mental Status:  Sleepy   Hydration Status:  Stable   PONV Controlled:  None   Airway Patency:  Patent and adequate   Post Op Vitals Reviewed: Yes      Staff: CRNA           BP   122/76   Temp   97 7   Pulse  67   Resp   16   SpO2   99%

## 2020-02-04 NOTE — DISCHARGE INSTRUCTIONS
Urethral Stent Placement   WHAT YOU NEED TO KNOW:   Urethral stent placement is a procedure to open a blockage or stricture (narrowing) of your urethra  The urethra is the tube that carries urine from your bladder out of your body  A stent is a small plastic or metal tube used to open your narrowed urethra  A urethral stent may stay in for a short or long period of time  DISCHARGE INSTRUCTIONS:   Medicines:   · Antibiotics: This medicine is given to help treat or prevent an infection caused by bacteria  · Pain medicine: You may be given a prescription medicine to decrease pain  Do not wait until the pain is severe before you take this medicine  · Take your medicine as directed  Contact your healthcare provider if you think your medicine is not helping or if you have side effects  Tell him or her if you are allergic to any medicine  Keep a list of the medicines, vitamins, and herbs you take  Include the amounts, and when and why you take them  Bring the list or the pill bottles to follow-up visits  Carry your medicine list with you in case of an emergency  Follow up with your healthcare provider or urologist as directed: You may need more tests or procedures  Write down your questions so you remember to ask them during your visits  Activity:  Ask when it is okay for you to return to work or school, have sex, or do your usual activities  Contact your healthcare provider or urologist if:   · You have pain in your lower abdomen  · You feel like you need to urinate more often than usual     · You have pain in the area between your anus and your genitals  · You have pain with an erection  · You have pain during or after sex  · You have questions or concerns about your condition or care  Seek care immediately or call 911 if:   · You have a fever and chills  · You have blood or discharge in your urine      · You have severe pain between your ribs and hips or in your lower back     · You have trouble urinating, or pain when you urinate  © 2017 [employer] Information is for End User's use only and may not be sold, redistributed or otherwise used for commercial purposes  All illustrations and images included in CareNotes® are the copyrighted property of [employer]  or Jaden Bashir  The above information is an  only  It is not intended as medical advice for individual conditions or treatments  Talk to your doctor, nurse or pharmacist before following any medical regimen to see if it is safe and effective for you
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
## 2020-02-04 NOTE — INTERVAL H&P NOTE
H&P reviewed  After examining the patient I find no changes in the patients condition since the H&P had been written  Left stent exchange today    Vitals:    02/04/20 0735   BP: 115/61   Pulse: 100   Resp: 13   Temp: 98 2 °F (36 8 °C)   SpO2: 91%

## 2020-07-30 NOTE — TELEPHONE ENCOUNTER
Patient of Dr Chelly Enciso seen at [facility]    Patient experiencing discomfort and pain   Daughter, Kris Gooden, asking to speak with clinical    She can be reached at [phone]

## 2020-07-30 NOTE — TELEPHONE ENCOUNTER
Patient managed by Kev Sheffield at the [facility] office  Patient is seen by staghorn calculus and is s/p EXCHANGE STENT URETERAL, cystoscopy; RETROGRADE PYLEOGRAM (Left Bladder) on 5/13/20  Patient is scheduled for EXCHANGE STENT URETERAL (Left Bladder) on 9/1/20  Attempted to call patient/daugher back  There was no answer and just a busy tone  Attempted to call back again and daughter answered  Daughter reports that stent is starting to hurt her again  She is reporting that she is having pain above hip and it is pulsating  Daughter reports that her urine is also brownish and she is lethargic  Daughter reports that she does have fevers at night but it is believed that these are from her cancer  She is also having nausea but this is from chemo  Daughter denies patient having any hematuria, urgency, frequency, dysuria, painful urination, vomiting, or any difficulty urinating  Because of cancer she gets oxycodone/roxicodone so she has been taking this which helps but it comes back  Please advise of any further recommendations  Thank you

## 2020-07-31 NOTE — TELEPHONE ENCOUNTER
Called and left message stating that Ayaka Draper PA-C recommends repeat urine test  I informed her that if goes to a [facility] labs she will not need to have a paper script as the orders are already In Epic  She was also instructed to hydrate, take Tylenol and use a heating pad  She was instructed to give us a call should she have any further questions or concerns

## 2020-07-31 NOTE — TELEPHONE ENCOUNTER
Let us have patient go for repeat urine studies if she is not performed these recently  Should hydrate, Tylenol, heating pad

## 2020-08-14 NOTE — TELEPHONE ENCOUNTER
Patient managed by Dr Ollie Burdick, patients sister Ladonna Calix called in asking if patient surgery can be moved up a week  Avelina stated both patient and her father have procedures the same day and it would be difficult for her  Avelina also stated she believes patients stone is breaking off because she stated she seen some "eliseo" particles  Ladonna Calix can be reached at [phone] and authorized to leave a message on [employer]

## 2020-08-17 NOTE — TELEPHONE ENCOUNTER
Patient has a large staghorn calculus, possible for some sediment to have broken off and passed  No imaging needed as she has upcoming stent exchange  Should have updated urine studies prior to her ureteral stent insertion to ensure no infection  Thank you

## 2020-08-17 NOTE — TELEPHONE ENCOUNTER
Patient managed by Denis Nguyen by staghorn calculus  Patient is s/p EXCHANGE STENT URETERAL, cystoscopy; RETROGRADE PYLEOGRAM (Left Bladder) on 5/13/20  Patient is scheduled for s/p EXCHANGE STENT URETERAL (Left Bladder) on 9/1/20 which is going to be rescheduled  Jodi Michaud is reporting that patient thinks that she passed a stone last Monday  She was having pain of an 8/10 and saw something in her urine  No symptoms now  Should patient have any imagining done?

## 2020-08-17 NOTE — TELEPHONE ENCOUNTER
Patients sister called back on status of previous request  Vinita Cheadle can be reached at [phone]

## 2020-08-17 NOTE — TELEPHONE ENCOUNTER
I returned Avelina's call to let her know that Vargas Christianofreida was out of the office today but would be back tomorrow  Avelina stated that the best number to reach her tomorrow would be [phone]  Before hanging up the phone she mentioned that her sister thinks she might have passed a stone last Monday  She was having a lot of pain up until 8/10 and did see something funny in her urine and seems to think it could possibly be a stone  I told Avelina that I would pass this information on to the office and Vargas Cobian will call her back to discuss rescheduling the procedure  Saddie Felty will wait to hear back from our office

## 2020-08-18 NOTE — TELEPHONE ENCOUNTER
I spoke to patients sister Suzy Summers, She was under the impression that her surgery was scheduled in [place]  I told her its scheduled at our [facility] and she said that was perfect  She understands her sister needs to have urine culture prior to surgery and the location is [place] and the hospital will call the day prior with an arrival time

## 2020-08-26 NOTE — TELEPHONE ENCOUNTER
Called and spoke to patient at this time  Advised per provider we would want to have urine studies done  Advised I do see she went yest for urine studies our office will monitor for results      I did advised patient if she have increase in temp, has severe uncontrollable pain, unable to void   We would advise her to go to the ER     Patient verbalized understanding and is thankful for call

## 2020-08-26 NOTE — TELEPHONE ENCOUNTER
Ezell Paget is a 60 y o female with history of metastic leiomyosarcoma found to have a large staghorn calculus previously drained with percutaneous nephrostomy during a period of acute illness  Pt s/p ureteral sent exchange,cystoscopy, retrograde pyelogram(left bladder) on 5/13/2020 Patient managed by Dr Karthik Zambrano  Pt scheduled for Stent exhange 9/1/2020  Patient daughter Glennon Olszewski called to report  Pt has significant amount of Bright red blood in urine  Pt also complains of severe flank pain 9/10  Low grade fever 100 1 Pt has taken oxycodone with no relief  Pt daughter states she is hydrating well with water      Will send message to provider for recommendations

## 2020-08-26 NOTE — TELEPHONE ENCOUNTER
Called and spoke to patient this time  Advised per provider     Maribeth Alejandra PA-C   to [facility]        8/26/20 4:13 PM   Note      Patient's urine shows a moderate amount of bacteria  I have sent a prescription of nitrofurantoin to her preferred pharmacy  She has no previous positive urine cultures to base this choice off of  I will continue to monitor for results                Patient verbalized understanding and is thankful for call

## 2020-08-26 NOTE — TELEPHONE ENCOUNTER
Patient's urine shows a moderate amount of bacteria  I have sent a prescription of nitrofurantoin to her preferred pharmacy  She has no previous positive urine cultures to base this choice off of  I will continue to monitor for results

## 2020-08-26 NOTE — TELEPHONE ENCOUNTER
Should go for urine testing at a minimum  If fever increases, will need to be evaluated urgently, likely at the ER

## 2020-08-27 NOTE — TELEPHONE ENCOUNTER
----- Message from James Villa PA-C sent at 8/27/2020  8:37 AM EDT -----  Patient should complete nitrofurantoin as prescribed

## 2020-08-27 NOTE — TELEPHONE ENCOUNTER
Called and left detailed message (OK per comm consent) advised to complete antibiotic as prescribed  Any further questions or concerns, she may call office back

## 2020-08-28 NOTE — PRE-PROCEDURE INSTRUCTIONS
Pre-Surgery Instructions:   Medication Instructions    albuterol (2 5 mg/3 mL) 0 083 % nebulizer solution Instructed patient per Anesthesia Guidelines   aspirin 81 mg chewable tablet Instructed patient per Anesthesia Guidelines   buPROPion (WELLBUTRIN SR) 150 mg 12 hr tablet Instructed patient per Anesthesia Guidelines   furosemide (LASIX) 40 mg tablet Instructed patient per Anesthesia Guidelines   LORazepam (ATIVAN) 1 mg tablet Instructed patient per Anesthesia Guidelines   montelukast (SINGULAIR) 10 mg tablet Instructed patient per Anesthesia Guidelines   nicotine (NICODERM CQ) 21 mg/24 hr TD 24 hr patch Instructed patient per Anesthesia Guidelines   nitrofurantoin (MACROBID) 100 mg capsule Instructed patient per Anesthesia Guidelines   OLANZapine (ZyPREXA) 10 mg tablet Instructed patient per Anesthesia Guidelines   ondansetron (ZOFRAN) 8 mg tablet Instructed patient per Anesthesia Guidelines   oxyCODONE (OxyCONTIN) 20 mg 12 hr tablet Instructed patient per Anesthesia Guidelines   oxyCODONE (ROXICODONE) 10 MG TABS Instructed patient per Anesthesia Guidelines   phenazopyridine (PYRIDIUM) 100 mg tablet Instructed patient per Anesthesia Guidelines   potassium chloride (K-DUR,KLOR-CON) 10 mEq tablet Instructed patient per Anesthesia Guidelines   pregabalin (LYRICA) 200 MG capsule Instructed patient per Anesthesia Guidelines   prochlorperazine (COMPAZINE) 10 mg tablet Instructed patient per Anesthesia Guidelines   sertraline (ZOLOFT) 100 mg tablet Instructed patient per Anesthesia Guidelines   vitamin B-12 (VITAMIN B-12) 1,000 mcg tablet Instructed patient per Anesthesia Guidelines

## 2020-08-28 NOTE — TELEPHONE ENCOUNTER
Patient daughter called in for results of UA  Patient daughter on communication consent form  Please advise

## 2020-08-28 NOTE — TELEPHONE ENCOUNTER
Called and spoke to patients daughter  Made her aware patient needs to completer antibiotic as prescribed  She verbalized understanding  Daughter reports that she thinks that changes every 6 months is to long in between  She reports that around the 4-5 month time she starts complaining about pain and gets infection  Daughter is wondering if that after change on 9/1/20 if they could be done every 3 months?

## 2020-09-01 NOTE — DISCHARGE INSTRUCTIONS
Ureteral Stent Placement   WHAT YOU NEED TO KNOW:   Ureteral stent placement is a procedure to open a blocked or narrow ureter  The ureter is the tube that carries urine from your kidney into your bladder  A stent is a thin hollow plastic tube used to hold your ureter open and allow urine to flow  The stent may stay in for several weeks  DISCHARGE INSTRUCTIONS:   Medicines:   · Pain medicine  may be given to take away or decrease pain  Do not wait until the pain is severe before you take your medicine  · Antibiotics  help prevent infections  Your healthcare provider may prescribe these for you while your stent remains in  · Take your medicine as directed  Contact your healthcare provider if you think your medicine is not helping or if you have side effects  Tell him or her if you are allergic to any medicine  Keep a list of the medicines, vitamins, and herbs you take  Include the amounts, and when and why you take them  Bring the list or the pill bottles to follow-up visits  Carry your medicine list with you in case of an emergency  Follow up with your urologist as directed: You will need regular follow-up visits with your urologist as long as the stent remains in  He will check to make sure the stent is working properly  He may do urine cultures to check for infection  Write down your questions so you remember to ask them during your visits  Self-care:   · Drink liquids  as directed  Ask your healthcare provider how much liquid to drink each day and which liquids are best for you  Fluids such as cranberry or apple juice may be especially helpful to prevent urinary infections  · Return to normal activities  the day after your stent placement or as directed by your healthcare provider  · You may take a shower  the day after your stent placement if your healthcare provider says it is okay  Contact your healthcare provider or urologist if:   · You have a fever or chills      · You feel like you need to urinate often  · You have pain when you urinate or pain around your bladder or kidney  · You see blood in your urine or it looks cloudy  · You have questions or concerns about your condition or care  Seek care immediately or call 911 if:   · You urinate little or not at all  · You have severe pain in your abdomen  © 2017 [employer] Information is for End User's use only and may not be sold, redistributed or otherwise used for commercial purposes  All illustrations and images included in CareNotes® are the copyrighted property of A D A M , Inc  or Jaden Bashir  The above information is an  only  It is not intended as medical advice for individual conditions or treatments  Talk to your doctor, nurse or pharmacist before following any medical regimen to see if it is safe and effective for you

## 2020-09-01 NOTE — H&P
UROLOGY PREOPERATIVE H&P NOTE     CHIEF COMPLAINT   Ino Fam is a 60 y o  female with a complaint of   LEFT renal stone, hydronephrosis, Stage IV leiomyosarcoma      History of Present Illness:     60 y o  female with a history of Stage IV metastatic leiomyosarcoma now undergoing palliative chemotherapy x1 round  Chemotherapy was interrupted when the patient presented to the hospital with sepsis picture  She has a history of significant stone disease and previously saw Dr Krystle Delgado  At the time of her recent hospitalization, staghorn calculus with dilation of the upper pole was noted  Patient underwent left percutaneous nephrostomy tube with eventual resolution  Patient also had an indwelling port that was removed for concern for bacteremia  Unfortunately her urine and blood cultures did not return positive with any formal  Bacteria  Given the patient's ongoing malignancy and metastatic disease to her ribcage and scalp as well as the pain associated with these, her Oncology team  Would like to restart her chemotherapy  There is significant concerned that persistent stone or indwelling foreign body in the form of stent or nephrostomy tube may represent a nidus for infection that could compromise the patient is status if she becomes significantly immunocompromised during chemotherapy  The patient was ultimately referred interventional radiology  For improvement in her quality of life, her nephrostomy tube was converted to an indwelling stent  The patient did have some mild stent discomfort with frequency and urgency immediately after the procedure but is now feeling dramatically improved  Underwent first stent change in February 2020  Has been doing reasonable well  PET scan demonstrated upper pole hydronephrosis  Returns for routing exchange, last in May  Noticing some symptoms  Pretreated with Macrobid      Past Medical History:     Past Medical History:   Diagnosis Date    Anxiety     Asthma     [facility] ([facility] [address] )     COPD (chronic obstructive pulmonary disease) (HCC)     Depression     Hyperlipidemia     Hypertension     Kidney stone     Leiomyosarcoma (HCC)     Post concussion syndrome     Psychiatric disorder     Depression       PAST SURGICAL HISTORY:     Past Surgical History:   Procedure Laterality Date    COLON SURGERY      colon resection    COLONOSCOPY      CYSTOSCOPY      FL RETROGRADE PYELOGRAM  2/4/2020    IR PCN TO PCNU CONVERSION  11/13/2019    IR PORT REMOVAL  10/14/2019    IR TUBE PLACEMENT NEPHROSTOMY  10/17/2019    LUNG REMOVAL, PARTIAL      lower lobe    PILONIDAL CYST EXCISION      AR CYSTOSCOPY,INSERT URETERAL STENT Left 2/4/2020    Procedure: EXCHANGE STENT URETERAL, Left retrograde;  Surgeon: Verdell Gilford, MD;  Location: [facility];  Service: Urology    TONSILLECTOMY      TUMOR REMOVAL      multiple sites        CURRENT MEDICATIONS:     Current Facility-Administered Medications   Medication Dose Route Frequency Provider Last Rate Last Dose    ceFAZolin (ANCEF) 3,000 mg in dextrose 5% 100 ml IVPB  3,000 mg Intravenous Once Verdell Gilford, MD        sodium chloride 0 9 % infusion  125 mL/hr Intravenous Continuous Ivory Tariq,  125 mL/hr at 05/13/20 0754 125 mL/hr at 05/13/20 0754       ALLERGIES:     Allergies   Allergen Reactions    Steri Strips [Medical Tape] Blisters     Also the heavier cloth-like tape    Morphine Itching and Vomiting     Other reaction(s): Vomiting  Other reaction(s): (PIMS) vomiting  Makes her vomit      Sulfa Antibiotics Rash     12-18-19 Pt reported experiencing red face, skin peeling around eyes       SOCIAL HISTORY:     Social History     Socioeconomic History    Marital status:      Spouse name: None    Number of children: None    Years of education: None    Highest education level: None   Occupational History    None   Social Needs    Financial resource strain: None    Food insecurity:     Worry: None     Inability: None    Transportation needs:     Medical: None     Non-medical: None   Tobacco Use    Smoking status: Current Every Day Smoker     Packs/day: 0 50     Types: Cigarettes    Smokeless tobacco: Never Used    Tobacco comment: nicotin patch on   Substance and Sexual Activity    Alcohol use: Never     Frequency: Never    Drug use: Not Currently    Sexual activity: None   Lifestyle    Physical activity:     Days per week: None     Minutes per session: None    Stress: None   Relationships    Social connections:     Talks on phone: None     Gets together: None     Attends Sikhism service: None     Active member of club or organization: None     Attends meetings of clubs or organizations: None     Relationship status: None    Intimate partner violence:     Fear of current or ex partner: None     Emotionally abused: None     Physically abused: None     Forced sexual activity: None   Other Topics Concern    None   Social History Narrative    None       SOCIAL HISTORY:   No family history on file  REVIEW OF SYSTEMS:     Review of Systems   Constitutional: Positive for activity change and fever  Negative for unexpected weight change  Respiratory: Negative  Cardiovascular: Positive for chest pain (rib pain)  Gastrointestinal: Positive for abdominal pain  Genitourinary: Negative  Musculoskeletal: Positive for back pain  Skin: Negative  Psychiatric/Behavioral: Negative  PHYSICAL EXAM:     /73   Pulse 85   Temp 98 °F (36 7 °C) (Temporal)   Resp 16   Ht 5' 8" (1 727 m)   Wt 127 kg (279 lb 1 6 oz)   SpO2 94%   BMI 42 44 kg/m²     General:  Ill appearing female in no acute distress  They have a normal affect  There is not appear to be any gross neurologic defects or abnormalities  Scalp tumor  HEENT:  Normocephalic, atraumatic  Neck is supple without any palpable lymphadenopathy  Cardiovascular:  Patient has normal palpable distal radial pulses    There is no significant peripheral edema  No JVD is noted  Chest wall port  Regular rate  Respiratory:  Patient has unlabored respirations  There is no audible wheeze or rhonchi  Coarse lung sounds  Abdomen:    Abdomen is soft and nontender  There is no tympany  Inguinal and umbilical hernia are not appreciated  Musculoskeletal:  Patient does not have significant CVA tenderness in the  flank with palpation or percussion  They full range of motion in all 4 extremities  Strength in all 4 extremities appears congruent  Patient is able to ambulate without assistance or difficulty  Dermatologic:  Patient has no skin abnormalities or rashes  LABS:     Lab Results   Component Value Date    WBC 8 65 06/16/2020    HGB 11 0 (L) 06/16/2020    HCT 33 3 (L) 06/16/2020     (H) 06/16/2020     06/16/2020     Lab Results   Component Value Date    SODIUM 140 06/16/2020    K 3 4 (L) 06/16/2020     06/16/2020    CO2 28 06/16/2020    BUN 19 06/16/2020    CREATININE 1 52 (H) 06/16/2020    GLUC 178 (H) 06/16/2020    CALCIUM 8 9 06/16/2020     Urine Culture  60,000-69,000 cfu/ml Enterococcus faecalisAbnormal         20,000-29,000 cfu/ml     Mixed Contaminants X2       Susceptibility      Enterococcus faecalis      RIKY      Ampicillin ($$)  <=2 00 ug/ml  Susceptible      Levofloxacin ($)  1 00 ug/ml  Susceptible      Nitrofurantoin  <=32 ug/ml  Susceptible      Tetracycline  >8 ug/ml  Resistant      Vancomycin ($)  0 50 ug/ml  Susceptible      ZID Performed  Yes                   Specimen Collected: 08/25/20 12:24         IMAGING:     OUTSIDE PET SCAN  EXAM:  PET CT WHOLE BODY - 4/20/2020 - 4/20/2020 1:20 pm    HISTORY  61 y/o ,F,Metastatic Recurrent Leiomyosarcoma Restaging   Subsequent evaluation  COMPARISON:  PET CT WHOLE BODY dated 10/22/2019      TECHNIQUE:  Following at least 4-hours of fasting, the patient's blood glucose was 112 mg/dL   Approximately 69 minutes following intravenous injection of 11 7 mCi 32-lyevel-1-deoxyglucose (FDG) within the right antecubital fossa, low dose noncontrast CT images were obtained at 5 mm slice thickness from the skull vertexthrough toes  Then, emission PET images were obtained through the same region   The noncontrast CT was used for anatomic localization and photon attenuation correction of the PET scan  The standardized uptake values (SUV) reported below are maximum values within a region of interest     FINDINGS:  BASELINE METABOLIC ACTIVITY  - Mediastinal blood pool: SUV 3   - Liver: SUV 4  HEAD/NECK  Scalp: Increase in size and metabolic activity of a 4 4 x 3 6 cm exophytic solid mass extending from the right parietal scalp, SUV 8 7; previously 2 1 cm, SUV 2 9  Brain:  Symmetric activity within the visualized brain  Intracranial carotid artery calcification:  Absent  Extracranial carotid artery calcification:    Absent  Metabolically-active cervical lymph nodes: Absent  CHEST  LINES  DEVICES: Right chest Port-A-Cath  The tip of the implantable subclavian approach central venous catheter terminates in the cavoatrial junction  CHEST WALL  - New intense focal intramuscular metabolic activity within the left posterior chest wall, SUV 9 7; axial fused series 604, image 333/389   - Increase in size and metabolic activity of 5 2 x 1 6 cm intramuscular mass located deep within the right lateral chest wall at the level of 4th and 5th ribs, SUV 7 1; previously 3 9 x 1 5 cm, SUV 5 6   - No focal metabolic activity localizes to either breast     LUNG  Localized metabolic activity within the pulmonary parenchyma: Absent  METABOLICALLY-ACTIVE LYMPH NODES  Mediastinal: Absent  Hilar: Absent  Axillary: Absent  MEDIASTINUM  Heart:  Normal heart size  No pericardial effusion  Coronary artery calcification: Mild  Thoracic aortic aneurysm: Absent      ABDOMEN  LINES  DEVICES: Interval removal of left percutaneous nephrostomy tube and placement of a nephroureteral stent     INTRAPERITONEUM  Metabolically-active mesenteric lymph nodes: Absent  Liver  spleen:  Symmetric low intensity homogeneous metabolic activity throughout the parenchyma of the liver and spleen   Splenomegaly, 15 5 (CC) x 11 (AP) x 9 (TV) cm x 0 58 + 30 = 920 mL volume (normal spleen volume: 110-340 mL) x 1 05 = 966 grams weight    Multiple small calcified nonobstructive gallstones  Bowel: Right hemicolectomy   No focal metabolic activity localizes to the ileocolic anastomosis   No small bowel dilatation or evidence for obstruction  Abdominal wall/peritoneum: Increase in size and metabolic activity of 3 4 x 2 7 cm mass within the superficial fat the lower left anterior abdominal wall, SUV 3 9; previously 2 5 x 2 4 cm   No ascites  RETROPERITONEUM  Metabolically-active retroperitoneal lymph nodes: Absent  Adrenal glands and pancreas:  Low-level metabolic activity throughout the adrenal glands and pancreas   No pancreatic ductal dilatation  Kidneys: Smaller left kidney with multifocal cortical scarring and volume loss secondary to a large staghorn calculus   New moderate left hydronephrosis   Excreted radiotracer visible within the left renal pelvis and ureter, suggests partial obstruction   Photopenia corresponds to bilateral fluid-density (2 HU) exophytic cortical cysts  Aorta:  Mild atherosclerotic calcification of the abdominal aorta and its branch vessels  No abdominal aortic aneurysm  PELVIS  Metabolically-active pelvic lymph nodes: Absent  Anteverted uterus  No focal metabolic activity localizes to the uterus or either ovaries  MUSCULOSKELETAL  Focal metabolic activity within osseous structures: Absent  New focal intramuscular metabolic activity within the left mid biceps femoris, SUV 4 6; axial fused series 604, image 154/[id number]  IMPRESSION:  Progression of neoplastic disease:    1   New metabolically-active intramuscular mass lesions involving left posterior chest wall and left biceps femoris  2  Increase in size of existing metabolically-active mass lesions within the right scalp; right lateral chest wall musculature; left superficial abdominal wall fat    3  New left hydronephrosis, suspicious for partial obstruction of nephroureteral stent bypassing a staghorn calculus  ASSESSMENT:     60 y o  female with Stage IV metastatic leiomyosarcoma and LEFT staghorn calculus, nephrostomy converted to internal JJ stent now requiring exchange    PLAN:     I discussed the plan to move forward with a routine stent exchanges  Will proceed with cystoscopy, left retrograde pyelogram and left double-J stent exchange was discussed  Risks and benefits were reviewed  The patient has an informed consent signed for the regular exchanges for the year  Pretreated with Macrobid  Levaquin today  LEFT side marked

## 2020-09-01 NOTE — OP NOTE
OPERATIVE REPORT  PATIENT NAME: Jenaro Guy    :  1955  MRN: [id number]  Pt Location: [facility] OR ROOM 04    SURGERY DATE: 2020    Surgeon(s) and Role:     * Antonio Smith MD - Primary    Preop Diagnosis:  Left renal stone [N20 0]  Encounter for adjustment of ureteral stent [Z46 6]    Post-Op Diagnosis Codes:     * Left renal stone [N20 0]     * Encounter for adjustment of ureteral stent [Z46 6]    Procedure(s) (LRB):  EXCHANGE STENT URETERAL (Left)    Specimen(s):  * No specimens in log *    Estimated Blood Loss:   Minimal    Drains:  Ureteral Drain/Stent Left ureter 6 Fr  (Active)   Number of days: 111       Ureteral Drain/Stent Left ureter 6 Fr  (Active)   Number of days: 0       Anesthesia Type:   General    Operative Indications:  Left renal stone [N20 0]  Encounter for adjustment of ureteral stent [Z46 6]      Operative Findings:  1  Minimally encrusted stent  2  Radioopaque renal pelvis stone  3  Upper pole hydronephrosis    Complications:   None    Procedure and Technique:  Jenaro Guy  is a 60y o   year old female with a history of large left-sided stone burden and advanced leiomyosarcoma  She presents to the operating room on 2020 to undergo exchange of the left ureteral stent  After the smooth induction of monitored sedation anesthesia, the patient was placed in the dorsal lithotomy position  Her genitalia was prepped and draped in a sterile fashion  Intravenous antibiotics were administered  A timeout was performed with all members of the operative team and from the patient's identity, procedure to be performed, and laterality of the case  A 22 Scottish rigid cystoscope with 30° lens was inserted  The bladder was thoroughly inspected  There was mild erythema adjacent to the left ureteral orifice where the stent was visualized  Stent was minimally encrusted The remainder of the bladder was free and clear mucosal abnormalities or lesions    A wire was inserted alongside the stent and the stent was removed  A dual-lumen catheter was then placed over top of the wire  Retrograde pyelography was performed  There was radiopaque stone in the renal pelvis and upper pole hydronephrosis         The wire was then repassed and secured into an upper pole calyx  A 6 x 28 Chinese double-J ureteral stent was then placed in the standard fashion  The proximal coil was appreciated renal pelvis and the distal coil was visualized within the bladder  There was no string left in place  The bladder was emptied and the cystoscope was removed  2% viscous lidocaine was placed per urethra       I was present for the entire procedure    Patient Disposition:  PACU     SIGNATURE: Prakash Ragsdale MD  DATE: September 1, 2020  TIME: 8:20 AM

## 2020-09-01 NOTE — ANESTHESIA PREPROCEDURE EVALUATION
Procedure:  EXCHANGE STENT URETERAL (Left Bladder)    Relevant Problems   CARDIO   (+) Hypertension      /RENAL   (+) Staghorn calculus      HEMATOLOGY   (+) Anemia      Pulmonary HTN  Morbid obesity  Chronic pain on opioids, benzo  Active smoker, 1PPD  Leiomyosarcoma             Physical Exam    Airway    Mallampati score: III  TM Distance: >3 FB       Dental       Cardiovascular  Rhythm: regular, Rate: normal,     Pulmonary  Pulmonary exam normal     Other Findings        Anesthesia Plan  ASA Score- 3     Anesthesia Type- general with ASA Monitors  Additional Monitors:   Airway Plan: ETT and LMA  Plan Factors-Exercise tolerance (METS): <4 METS  Chart reviewed  EKG reviewed  Imaging results reviewed  Existing labs reviewed  Patient is a current smoker  Patient instructed to abstain from smoking on day of procedure  Patient did not smoke on day of surgery  Obstructive sleep apnea risk education given perioperatively  There is medical exclusion for perioperative obstructive sleep apnea risk education  Induction- intravenous  Postoperative Plan-     Informed Consent- Anesthetic plan and risks discussed with patient  I personally reviewed this patient with the CRNA  Discussed and agreed on the Anesthesia Plan with the CRNA  Bobo Dunn

## 2020-09-01 NOTE — ANESTHESIA POSTPROCEDURE EVALUATION
Post-Op Assessment Note    CV Status:  Stable  Pain Score: 0    Pain management: adequate     Mental Status:  Alert and awake   Hydration Status:  Stable   PONV Controlled:  None   Airway Patency:  Patent and adequate      Post Op Vitals Reviewed: Yes      Staff: CRNA, Anesthesiologist         No complications documented      BP   138/71   Temp   97 9   Pulse  95   Resp   18   SpO2   100%

## 2020-09-03 NOTE — TELEPHONE ENCOUNTER
Called and left message for daughter to call the office back  Office number was left in the message  Patient will only need to obtain urine cultures if having symptoms of a UTI

## 2020-09-03 NOTE — TELEPHONE ENCOUNTER
I spoke to patients daughter and scheduled her stent exchange for 12/1 at [facility]  She is asking how often she needs to have a urine culture done to prevent infections   Please call her back at [phone]

## 2020-09-04 NOTE — TELEPHONE ENCOUNTER
I did not mentioned standing or regular urine testing  What I recommended was that if she had symptoms and contacted our office, we would test her urine for infections  No appt prior to next stent exchange unless family wants it

## 2020-09-04 NOTE — TELEPHONE ENCOUNTER
Called and spoke to patients daughter  Daughter reports that her aunt took patient for her surgery the other day and that Prerna De Luna mentioned doing random urine cultures to check for infections  Daughter is wondering if they should be doing this and how often? Daughter also would like to know if patient will need an appointment prior to next stent exchange?

## 2020-09-04 NOTE — TELEPHONE ENCOUNTER
Called and spoke to patients daughter  Made her aware that he does not recommend standing or regular urine testing  Made her aware that he recommends that if she develops  symptoms they are to contact the office so we can test her urine for infections  Made her aware that no appt prior to next stent exchange unless family wants it  She verbalized understanding and denies any other questions or concerns at this time

## 2020-09-16 NOTE — TELEPHONE ENCOUNTER
Patient managed by Jamshid Potts at the [facility] office  Patient is seen for left renal stone, recurrent infections, and ureteral stent exchanges  Last exchange was done on 9/1/20 and next exchange scheduled for 12/1/20    Called and spoke to daughter  Daughter reports that starting this AM patient is having hematuria and dysuria  No clots, uregecy, frequency, fevers, chills, nausea, vomiting, or any difficulty urinating  Daughter reports that she noticed she has been sleeping a lot which she reports happened with last infection  Daughter is aware that patient should go for urine testing to check for a possible infection  She is aware that in the meantime patient should stay well hydrated and continue with symptoms management

## 2020-09-18 NOTE — TELEPHONE ENCOUNTER
Tejal Lozano PA-C  [address] [place] [address] [facility]               Urine culture positive - levaquin sent to pharmacy  Please contact patient to make her aware  Called and left a message for patient's daughter Ary Salmon, that urine culture results showed a small amount of bacteria and an antibiotic was sent to the pharmacy accordingly  Encouraged her to call back with any further questions or concerns

## 2020-09-21 NOTE — TELEPHONE ENCOUNTER
Patient is status post recent ureteral stent exchange  Patient's urine culture reveals susceptibility to Levaquin  If she has persistent fevers and chills despite appropriate antibiotic coverage, would recommend ER for further evaluation

## 2020-09-21 NOTE — TELEPHONE ENCOUNTER
Called and spoke to patients daughter and patient on speaker phone  Made them aware that urine culture reveals susceptibility to Levaquin  And that if she is having persistent fevers and chills despite appropriate antibiotic coverage, would recommend ER for further evaluation  They verbalized understanding and will go to ER as recommended

## 2020-09-21 NOTE — PROGRESS NOTES
Pt's daughter called stating  Pt has been vomiting for past 24 hours with temp of 100  Pt had positive UTI on 9/16 and was prescribed levofloxacin on Friday 9/18  Pt is still having pain and burning with urination for which she is taking pyridium  Dr Demi Edmond did stent exchange on 9/1  Advised pt to hydrate and that provider would be contacted for further instructions

## 2021-01-21 ENCOUNTER — TELEPHONE (OUTPATIENT)
Dept: UROLOGY | Facility: CLINIC | Age: 66
End: 2021-01-21

## 2021-07-27 NOTE — ASSESSMENT & PLAN NOTE
· Pt reports smoking 5-6 cigarettes a day   · Encourage cessation   · Nicotine patch while inpatient no

## (undated) DEVICE — INVIEW CLEAR LEGGINGS: Brand: CONVERTORS

## (undated) DEVICE — EXIDINE 4 PCT

## (undated) DEVICE — TUBING SUCTION 5MM X 12 FT

## (undated) DEVICE — CATH URETERAL 5FR X 70 CM FLEX TIP POLYUR BARD

## (undated) DEVICE — GUIDEWIRE STRGHT TIP 0.038 IN SOLO PLUS

## (undated) DEVICE — GLOVE SRG BIOGEL 8

## (undated) DEVICE — PACK TUR

## (undated) DEVICE — CHLORHEXIDINE 4PCT 4 OZ

## (undated) DEVICE — URETERAL DUAL LUMEN CATH

## (undated) DEVICE — UROCATCH BAG

## (undated) DEVICE — SPONGE 4 X 4 XRAY 16 PLY STRL LF RFD

## (undated) DEVICE — SCD SEQUENTIAL COMPRESSION COMFORT SLEEVE MEDIUM KNEE LENGTH: Brand: KENDALL SCD

## (undated) DEVICE — CATH URET .038 10FR 50CM DUAL LUMEN

## (undated) DEVICE — PREMIUM DRY TRAY LF: Brand: MEDLINE INDUSTRIES, INC.

## (undated) DEVICE — SPECIMEN CONTAINER STERILE PEEL PACK

## (undated) DEVICE — GUIDEWIRE STRGHT TIP 0.035 IN  SOLO PLUS